# Patient Record
Sex: FEMALE | Race: WHITE | NOT HISPANIC OR LATINO
[De-identification: names, ages, dates, MRNs, and addresses within clinical notes are randomized per-mention and may not be internally consistent; named-entity substitution may affect disease eponyms.]

---

## 2020-07-11 ENCOUNTER — TRANSCRIPTION ENCOUNTER (OUTPATIENT)
Age: 80
End: 2020-07-11

## 2021-02-11 ENCOUNTER — TRANSCRIPTION ENCOUNTER (OUTPATIENT)
Age: 81
End: 2021-02-11

## 2021-05-21 ENCOUNTER — APPOINTMENT (OUTPATIENT)
Dept: PULMONOLOGY | Facility: CLINIC | Age: 81
End: 2021-05-21
Payer: MEDICARE

## 2021-05-21 ENCOUNTER — LABORATORY RESULT (OUTPATIENT)
Age: 81
End: 2021-05-21

## 2021-05-21 VITALS
WEIGHT: 147 LBS | HEART RATE: 100 BPM | DIASTOLIC BLOOD PRESSURE: 62 MMHG | TEMPERATURE: 97.5 F | OXYGEN SATURATION: 98 % | HEIGHT: 61 IN | BODY MASS INDEX: 27.75 KG/M2 | SYSTOLIC BLOOD PRESSURE: 98 MMHG

## 2021-05-21 PROCEDURE — 36415 COLL VENOUS BLD VENIPUNCTURE: CPT

## 2021-05-21 PROCEDURE — 94060 EVALUATION OF WHEEZING: CPT

## 2021-05-21 PROCEDURE — 95012 NITRIC OXIDE EXP GAS DETER: CPT | Mod: 59

## 2021-05-21 PROCEDURE — 99406 BEHAV CHNG SMOKING 3-10 MIN: CPT | Mod: 25

## 2021-05-21 PROCEDURE — 99204 OFFICE O/P NEW MOD 45 MIN: CPT | Mod: 25

## 2021-05-23 NOTE — PROCEDURE
[FreeTextEntry1] : Chest CT scan performed on May 17, 2021 showed mild emphysema.  No new suspicious pulmonary nodules.  Continued chest CT scan follow-up as clinically indicated.  Reticular opacities at lung bases may represent interstitial lung disease.\par \par Spirometry performed in my office today shows mild obstructive airway disease with significant improvement postbronchodilator, consistent with a reversible bronchospastic disease (asthma).\par \par  Exhaled Nitric Oxide             Final\par \par No Documents Attached\par \par \par   Test   Result   Flag Reference Goal Last Verified \par   Exhaled Nitric Oxide 9      REQUIRED \par \par  Ordered by: SANDY ARIAS       Collected/Examined: 21May2021 09:38AM       \par Verification Required       Stage: Final       \par  Performed at: Other       Performed by: SANDY ARIAS       Resulted: 21May2021 09:38AM       Last Updated: 21May2021 09:39AM       \par

## 2021-05-23 NOTE — PHYSICAL EXAM
[No Acute Distress] : no acute distress [Normal Oropharynx] : normal oropharynx [Normal Appearance] : normal appearance [No Neck Mass] : no neck mass [Normal Rate/Rhythm] : normal rate/rhythm [Normal S1, S2] : normal s1, s2 [No Murmurs] : no murmurs [No Resp Distress] : no resp distress [No Abnormalities] : no abnormalities [Benign] : benign [Normal Gait] : normal gait [No Clubbing] : no clubbing [No Cyanosis] : no cyanosis [No Edema] : no edema [FROM] : FROM [Normal Color/ Pigmentation] : normal color/ pigmentation [No Focal Deficits] : no focal deficits [Oriented x3] : oriented x3 [Normal Affect] : normal affect [TextBox_68] : Decreased breath sounds bilaterally

## 2021-05-23 NOTE — DISCUSSION/SUMMARY
[FreeTextEntry1] : Marilyn is a patient with progressively worsening shortness of breath and cough likely secondary to COPD/asthma from history of cigarette smoking, she may have an element of interstitial lung disease/pulmonary fibrosis, rule out collagen vascular disease induced pulmonary fibrosis, such as arthritis.  Secondly, she is a patient with history of atrial fibrillation on Eliquis.

## 2021-05-23 NOTE — CONSULT LETTER
[Dear  ___] : Dear  [unfilled], [Please see my note below.] : Please see my note below. [Courtesy Letter:] : I had the pleasure of seeing your patient, [unfilled], in my office today. [Consult Closing:] : Thank you very much for allowing me to participate in the care of this patient.  If you have any questions, please do not hesitate to contact me. [Sincerely,] : Sincerely, [FreeTextEntry3] : Dr. Petty Steen

## 2021-05-23 NOTE — REASON FOR VISIT
[Consultation] : a consultation [Abnormal CXR/ Chest CT] : an abnormal CXR/ chest CT [COPD] : COPD [Shortness of Breath] : shortness of breath

## 2021-05-23 NOTE — HISTORY OF PRESENT ILLNESS
[TextBox_4] : Marilyn is a pleasant 80-year-old female with history of atrial fibrillation on Eliquis, COPD from history of active cigarette smoking, she came in complaining of progressively worsening chest pain, shortness of breath and cough for years, no fever or chills.

## 2021-05-23 NOTE — ASSESSMENT
[FreeTextEntry1] : 10 minutes spent in smoking cessation counseling. Educated patient on deleterious effects and all potential consequences of smoking. Counseled patient on various smoking cessation techniques.\par Venipuncture with labs drawn in office\par Start Cal Stack.\par Will perform complete pulmonary function test for further evaluation and follow-up in 2 weeks.

## 2021-05-25 LAB
ACE BLD-CCNC: 75 U/L
ALBUMIN SERPL ELPH-MCNC: 4.7 G/DL
ALP BLD-CCNC: 90 U/L
ALT SERPL-CCNC: 19 U/L
ANION GAP SERPL CALC-SCNC: 15 MMOL/L
AST SERPL-CCNC: 22 U/L
BASOPHILS # BLD AUTO: 0.1 K/UL
BASOPHILS NFR BLD AUTO: 1.7 %
BILIRUB SERPL-MCNC: 0.4 MG/DL
BUN SERPL-MCNC: 12 MG/DL
CALCIUM SERPL-MCNC: 10.2 MG/DL
CHLORIDE SERPL-SCNC: 107 MMOL/L
CK SERPL-CCNC: 79 U/L
CO2 SERPL-SCNC: 21 MMOL/L
CREAT SERPL-MCNC: 0.98 MG/DL
CRP SERPL-MCNC: <3 MG/L
ENA JO1 AB SER IA-ACNC: <0.2 AL
ENA SCL70 IGG SER IA-ACNC: <0.2 AL
EOSINOPHIL # BLD AUTO: 0.41 K/UL
EOSINOPHIL NFR BLD AUTO: 7.1 %
ERYTHROCYTE [SEDIMENTATION RATE] IN BLOOD BY WESTERGREN METHOD: 30 MM/HR
GLUCOSE SERPL-MCNC: 92 MG/DL
HCT VFR BLD CALC: 39.8 %
HGB BLD-MCNC: 12.4 G/DL
IMM GRANULOCYTES NFR BLD AUTO: 0.2 %
LYMPHOCYTES # BLD AUTO: 2.06 K/UL
LYMPHOCYTES NFR BLD AUTO: 35.8 %
MAN DIFF?: NORMAL
MCHC RBC-ENTMCNC: 31.2 GM/DL
MCHC RBC-ENTMCNC: 32.1 PG
MCV RBC AUTO: 103.1 FL
MONOCYTES # BLD AUTO: 0.83 K/UL
MONOCYTES NFR BLD AUTO: 14.4 %
NEUTROPHILS # BLD AUTO: 2.35 K/UL
NEUTROPHILS NFR BLD AUTO: 40.8 %
PLATELET # BLD AUTO: 229 K/UL
POTASSIUM SERPL-SCNC: 5 MMOL/L
PROT SERPL-MCNC: 7 G/DL
RBC # BLD: 3.86 M/UL
RBC # FLD: 14.6 %
RHEUMATOID FACT SER QL: <10 IU/ML
SODIUM SERPL-SCNC: 143 MMOL/L
WBC # FLD AUTO: 5.76 K/UL

## 2021-05-31 LAB
ALTERN TENCAPG(M6): 2.2 MCG/ML
ANA SER IF-ACNC: NEGATIVE
ASPER FUMCAPG(M3): 4.4 MCG/ML
AUREOBASCAPG(M12): <2 MCG/ML
MICROPOLYCAPG(M22): <2 MCG/ML
MPO AB + PR3 PNL SER: NORMAL
PENIC CHRYCAPG(M1): 3.7 MCG/ML
PHOMA BETAE IGG: <2 MCG/ML
THERMOCAPG(M23): <2 MCG/ML
TRICHODERMA VIRIDE IGG: <2 MCG/ML

## 2021-06-04 ENCOUNTER — APPOINTMENT (OUTPATIENT)
Dept: PULMONOLOGY | Facility: CLINIC | Age: 81
End: 2021-06-04
Payer: MEDICARE

## 2021-06-04 VITALS
TEMPERATURE: 96.5 F | HEART RATE: 85 BPM | OXYGEN SATURATION: 98 % | DIASTOLIC BLOOD PRESSURE: 64 MMHG | SYSTOLIC BLOOD PRESSURE: 110 MMHG

## 2021-06-04 PROCEDURE — 94010 BREATHING CAPACITY TEST: CPT

## 2021-06-04 PROCEDURE — 99214 OFFICE O/P EST MOD 30 MIN: CPT | Mod: 25

## 2021-06-04 RX ORDER — PANTOPRAZOLE 40 MG/1
40 TABLET, DELAYED RELEASE ORAL DAILY
Qty: 90 | Refills: 3 | Status: ACTIVE | COMMUNITY
Start: 2021-06-04 | End: 1900-01-01

## 2021-06-06 NOTE — CONSULT LETTER
[Dear  ___] : Dear  [unfilled], [Courtesy Letter:] : I had the pleasure of seeing your patient, [unfilled], in my office today. [Please see my note below.] : Please see my note below. [Consult Closing:] : Thank you very much for allowing me to participate in the care of this patient.  If you have any questions, please do not hesitate to contact me. [Sincerely,] : Sincerely, [FreeTextEntry3] : Dr. Petty Steen

## 2021-06-06 NOTE — REASON FOR VISIT
[Abnormal CXR/ Chest CT] : an abnormal CXR/ chest CT [Asthma] : asthma [COPD] : COPD [Shortness of Breath] : shortness of breath [Follow-Up] : a follow-up visit [TextBox_44] : Numbness of breath is slightly better.  Also complains of heartburn

## 2021-06-06 NOTE — ASSESSMENT
[FreeTextEntry1] : 10 minutes spent in smoking cessation counseling. Educated patient on deleterious effects and all potential consequences of smoking. Counseled patient on various smoking cessation techniques.\par Venipuncture with labs drawn in office\par Dulera HFA.\par Start Protonix for GERD

## 2021-06-25 ENCOUNTER — APPOINTMENT (OUTPATIENT)
Dept: PULMONOLOGY | Facility: CLINIC | Age: 81
End: 2021-06-25
Payer: MEDICARE

## 2021-06-25 VITALS
TEMPERATURE: 98 F | SYSTOLIC BLOOD PRESSURE: 122 MMHG | HEART RATE: 103 BPM | DIASTOLIC BLOOD PRESSURE: 70 MMHG | OXYGEN SATURATION: 98 %

## 2021-06-25 PROCEDURE — 99214 OFFICE O/P EST MOD 30 MIN: CPT | Mod: 25

## 2021-06-25 PROCEDURE — 95012 NITRIC OXIDE EXP GAS DETER: CPT

## 2021-06-25 PROCEDURE — 94010 BREATHING CAPACITY TEST: CPT

## 2021-06-25 PROCEDURE — 99407 BEHAV CHNG SMOKING > 10 MIN: CPT | Mod: 25

## 2021-06-25 NOTE — ASSESSMENT
[FreeTextEntry1] : Start Trelegy Ellipta, discontinue Dulera HFA at this point. \par Might need Daliresp for additional COPD treatment\par 10 minutes spent in smoking cessation counseling. Educated patient on deleterious effects and all potential consequences of smoking. Counseled patient on various smoking cessation techniques.\par

## 2021-06-25 NOTE — REASON FOR VISIT
[Follow-Up] : a follow-up visit [Cough] : cough [COPD] : COPD [TextBox_44] : Complains of productive cough

## 2021-06-25 NOTE — PROCEDURE
[FreeTextEntry1] : Spirometry shows mild obstructive airway disease.\par \par  Exhaled Nitric Oxide             Final\par \par No Documents Attached\par \par \par   Test   Result   Flag Reference Goal Last Verified \par   Exhaled Nitric Oxide 7      REQUIRED \par \par  Ordered by: SANDY ARIAS       Collected/Examined: 25Jun2021 09:07AM       \par Verification Required       Stage: Final       \par  Performed at: Other       Performed by: SANDY ARIAS       Resulted: 25Jun2021 09:06AM       Last Updated: 25Jun2021 09:07AM       \par

## 2021-07-02 ENCOUNTER — APPOINTMENT (OUTPATIENT)
Dept: PULMONOLOGY | Facility: CLINIC | Age: 81
End: 2021-07-02
Payer: MEDICARE

## 2021-07-02 VITALS
OXYGEN SATURATION: 97 % | DIASTOLIC BLOOD PRESSURE: 78 MMHG | SYSTOLIC BLOOD PRESSURE: 119 MMHG | BODY MASS INDEX: 27.75 KG/M2 | TEMPERATURE: 97.6 F | WEIGHT: 147 LBS | HEIGHT: 61 IN

## 2021-07-02 PROCEDURE — 99407 BEHAV CHNG SMOKING > 10 MIN: CPT

## 2021-07-02 PROCEDURE — 99214 OFFICE O/P EST MOD 30 MIN: CPT | Mod: 25

## 2021-07-04 NOTE — ASSESSMENT
[FreeTextEntry1] : Continue Trelegy\par Start Daliresp 250 mcg QD\par 10 minutes spent in smoking cessation counseling. Educated patient on deleterious effects and all potential consequences of smoking. Counseled patient on various smoking cessation techniques.\par Check PFT for follow-up

## 2021-08-06 ENCOUNTER — APPOINTMENT (OUTPATIENT)
Dept: PULMONOLOGY | Facility: CLINIC | Age: 81
End: 2021-08-06
Payer: MEDICARE

## 2021-08-06 VITALS
HEART RATE: 96 BPM | HEIGHT: 61 IN | DIASTOLIC BLOOD PRESSURE: 68 MMHG | OXYGEN SATURATION: 97 % | BODY MASS INDEX: 27.75 KG/M2 | WEIGHT: 147 LBS | RESPIRATION RATE: 15 BRPM | TEMPERATURE: 97.5 F | SYSTOLIC BLOOD PRESSURE: 109 MMHG

## 2021-08-06 PROCEDURE — 94010 BREATHING CAPACITY TEST: CPT

## 2021-08-06 PROCEDURE — 94729 DIFFUSING CAPACITY: CPT

## 2021-08-06 PROCEDURE — 99407 BEHAV CHNG SMOKING > 10 MIN: CPT

## 2021-08-06 PROCEDURE — ZZZZZ: CPT

## 2021-08-06 PROCEDURE — 99214 OFFICE O/P EST MOD 30 MIN: CPT | Mod: 25

## 2021-08-06 PROCEDURE — 88738 HGB QUANT TRANSCUTANEOUS: CPT

## 2021-08-06 PROCEDURE — 94726 PLETHYSMOGRAPHY LUNG VOLUMES: CPT

## 2021-08-08 NOTE — REASON FOR VISIT
[Follow-Up] : a follow-up visit [Cough] : cough [COPD] : COPD [Shortness of Breath] : shortness of breath [TextBox_44] : whitish cough

## 2021-08-08 NOTE — ASSESSMENT
[FreeTextEntry1] : Continue Trelegy\par Start Z-Mason and Medrol Dosepak\par Albutertol via Neb\par 10 minutes spent in smoking cessation counseling. Educated patient on deleterious effects and all potential consequences of smoking. Counseled patient on various smoking cessation techniques.\par

## 2021-08-08 NOTE — PROCEDURE
[FreeTextEntry1] : Pulmonary function test performed today: spirometry shows mild obstructive airway disease volume is within normal limits; resistance is increased; diffusion shows moderate impairment.  SPO2 at rest 97%.

## 2021-08-13 ENCOUNTER — APPOINTMENT (OUTPATIENT)
Dept: PULMONOLOGY | Facility: CLINIC | Age: 81
End: 2021-08-13

## 2021-09-27 ENCOUNTER — APPOINTMENT (OUTPATIENT)
Dept: PULMONOLOGY | Facility: CLINIC | Age: 81
End: 2021-09-27
Payer: MEDICARE

## 2021-09-27 VITALS
BODY MASS INDEX: 27.56 KG/M2 | TEMPERATURE: 94.8 F | RESPIRATION RATE: 17 BRPM | HEIGHT: 61 IN | DIASTOLIC BLOOD PRESSURE: 82 MMHG | OXYGEN SATURATION: 96 % | WEIGHT: 146 LBS | SYSTOLIC BLOOD PRESSURE: 120 MMHG | HEART RATE: 98 BPM

## 2021-09-27 PROCEDURE — 90662 IIV NO PRSV INCREASED AG IM: CPT

## 2021-09-27 PROCEDURE — G0008: CPT

## 2021-09-27 PROCEDURE — 99214 OFFICE O/P EST MOD 30 MIN: CPT | Mod: 25

## 2021-09-27 PROCEDURE — 99407 BEHAV CHNG SMOKING > 10 MIN: CPT

## 2021-09-27 NOTE — ASSESSMENT
[FreeTextEntry1] : Continue Trelegy\par Albutertol via Neb\par 10 minutes spent in smoking cessation counseling. Educated patient on deleterious effects and all potential consequences of smoking. Counseled patient on various smoking cessation techniques.\par Fluzone high-dose vaccine given today.\par

## 2021-10-01 ENCOUNTER — TRANSCRIPTION ENCOUNTER (OUTPATIENT)
Age: 81
End: 2021-10-01

## 2021-10-04 ENCOUNTER — APPOINTMENT (OUTPATIENT)
Dept: PULMONOLOGY | Facility: CLINIC | Age: 81
End: 2021-10-04
Payer: MEDICARE

## 2021-10-04 ENCOUNTER — RX RENEWAL (OUTPATIENT)
Age: 81
End: 2021-10-04

## 2021-10-04 VITALS
BODY MASS INDEX: 28.32 KG/M2 | HEIGHT: 61 IN | HEART RATE: 101 BPM | WEIGHT: 150 LBS | OXYGEN SATURATION: 97 % | SYSTOLIC BLOOD PRESSURE: 120 MMHG | DIASTOLIC BLOOD PRESSURE: 74 MMHG

## 2021-10-04 PROCEDURE — 99407 BEHAV CHNG SMOKING > 10 MIN: CPT

## 2021-10-04 PROCEDURE — G0008: CPT

## 2021-10-04 PROCEDURE — 99214 OFFICE O/P EST MOD 30 MIN: CPT | Mod: 25

## 2021-10-04 PROCEDURE — 90662 IIV NO PRSV INCREASED AG IM: CPT

## 2021-10-04 NOTE — ASSESSMENT
[FreeTextEntry1] : Symbicort HFA\par Albutertol via Neb\par 15 minutes spent in smoking cessation counseling. Educated patient on deleterious effects and all potential consequences of smoking. Counseled patient on various smoking cessation techniques.\par Fluzone high-dose vaccine given to the left deltoid region today.\par

## 2021-10-18 ENCOUNTER — APPOINTMENT (OUTPATIENT)
Dept: PULMONOLOGY | Facility: CLINIC | Age: 81
End: 2021-10-18

## 2021-11-01 ENCOUNTER — APPOINTMENT (OUTPATIENT)
Dept: PULMONOLOGY | Facility: CLINIC | Age: 81
End: 2021-11-01
Payer: MEDICARE

## 2021-11-01 VITALS
TEMPERATURE: 95.7 F | DIASTOLIC BLOOD PRESSURE: 75 MMHG | HEART RATE: 97 BPM | WEIGHT: 149 LBS | BODY MASS INDEX: 28.15 KG/M2 | OXYGEN SATURATION: 95 % | SYSTOLIC BLOOD PRESSURE: 116 MMHG

## 2021-11-01 PROCEDURE — 99214 OFFICE O/P EST MOD 30 MIN: CPT | Mod: 25

## 2021-11-01 PROCEDURE — 99407 BEHAV CHNG SMOKING > 10 MIN: CPT

## 2021-11-29 ENCOUNTER — APPOINTMENT (OUTPATIENT)
Dept: PULMONOLOGY | Facility: CLINIC | Age: 81
End: 2021-11-29
Payer: MEDICARE

## 2021-11-29 VITALS
SYSTOLIC BLOOD PRESSURE: 133 MMHG | HEART RATE: 90 BPM | OXYGEN SATURATION: 98 % | HEIGHT: 61 IN | DIASTOLIC BLOOD PRESSURE: 84 MMHG

## 2021-11-29 PROCEDURE — 99214 OFFICE O/P EST MOD 30 MIN: CPT | Mod: 25

## 2021-11-29 PROCEDURE — 99407 BEHAV CHNG SMOKING > 10 MIN: CPT

## 2021-11-29 NOTE — REASON FOR VISIT
[Follow-Up] : a follow-up visit [Cough] : cough [COPD] : COPD [Shortness of Breath] : shortness of breath [TextBox_44] : For right hip hardware surgery by Dr. Juanito Mar on 12/7/21 at [facility].

## 2021-11-29 NOTE — ASSESSMENT
[FreeTextEntry1] : There are no acute or active pulmonary contraindications to the proposed right hip surgery. \par Continue Symbicort HFA for history of COPD\par Continue albuterol via nebulizer. \par 15 minutes spent in smoking cessation counseling. Educated patient on deleterious effects and all potential consequences of smoking. Counseled patient on various smoking cessation techniques.\par

## 2021-11-29 NOTE — CONSULT LETTER
[Dear  ___] : Dear  [unfilled], [Courtesy Letter:] : I had the pleasure of seeing your patient, [unfilled], in my office today. [Please see my note below.] : Please see my note below. [Referral Closing:] : Thank you very much for seeing this patient.  If you have any questions, please do not hesitate to contact me. [Sincerely,] : Sincerely, [FreeTextEntry3] : Dr. Petty Steen [DrMirza  ___] : Dr. FRAZIER

## 2021-11-29 NOTE — DISCUSSION/SUMMARY
[FreeTextEntry1] : Marilyn is a patient scheduled to undergo right hip surgery on December 7, 2021. Secondly, she is a patient with COPD from history of cigarette smoking. Thirdly, she is a patient with stable lung nodules.

## 2021-12-02 ENCOUNTER — LABORATORY RESULT (OUTPATIENT)
Age: 81
End: 2021-12-02

## 2021-12-03 ENCOUNTER — APPOINTMENT (OUTPATIENT)
Dept: PULMONOLOGY | Facility: CLINIC | Age: 81
End: 2021-12-03

## 2021-12-06 ENCOUNTER — APPOINTMENT (OUTPATIENT)
Dept: PULMONOLOGY | Facility: CLINIC | Age: 81
End: 2021-12-06
Payer: MEDICARE

## 2021-12-06 VITALS — SYSTOLIC BLOOD PRESSURE: 130 MMHG | OXYGEN SATURATION: 94 % | DIASTOLIC BLOOD PRESSURE: 80 MMHG | HEART RATE: 98 BPM

## 2021-12-06 PROCEDURE — ZZZZZ: CPT

## 2021-12-06 PROCEDURE — 99214 OFFICE O/P EST MOD 30 MIN: CPT | Mod: 25

## 2021-12-06 PROCEDURE — 94060 EVALUATION OF WHEEZING: CPT

## 2021-12-06 PROCEDURE — 94729 DIFFUSING CAPACITY: CPT

## 2021-12-06 PROCEDURE — 88738 HGB QUANT TRANSCUTANEOUS: CPT

## 2021-12-06 PROCEDURE — 95012 NITRIC OXIDE EXP GAS DETER: CPT

## 2021-12-06 PROCEDURE — 94726 PLETHYSMOGRAPHY LUNG VOLUMES: CPT

## 2021-12-06 NOTE — PROCEDURE
[FreeTextEntry1] : Pulmonary function test performed in my office today: Spirometry shows mild obstructive airway disease with some improvement postbronchodilator; lung volume is within normal limits; resistance is increased; diffusion shows moderate impairment.  SPO2 at rest room air is 94%.\par \par  Exhaled Nitric Oxide             Final\par \par No Documents Attached\par \par \par   Test   Result   Flag Reference Goal Last Verified \par   Exhaled Nitric Oxide 5      REQUIRED \par \par  Ordered by: SANDY ARIAS       Collected/Examined: [id number] 09:36AM       \par Verification Required       Stage: Final       \par  Performed at: Other       Performed by: SANDY ARIAS       Resulted: [id number] 09:36AM       Last Updated: [id number] 09:37AM       \par

## 2021-12-06 NOTE — CONSULT LETTER
[Dear  ___] : Dear  [unfilled], [Courtesy Letter:] : I had the pleasure of seeing your patient, [unfilled], in my office today. [Please see my note below.] : Please see my note below. [Referral Closing:] : Thank you very much for seeing this patient.  If you have any questions, please do not hesitate to contact me. [Sincerely,] : Sincerely, [DrMirza  ___] : Dr. FRAZIER [FreeTextEntry3] : Dr. Petty Steen

## 2021-12-06 NOTE — REASON FOR VISIT
[Follow-Up] : a follow-up visit [Cough] : cough [COPD] : COPD [Shortness of Breath] : shortness of breath [TextBox_44] : For right hip hardware surgery by Dr. Juanito Mar on 12/7/21 at [facility].

## 2021-12-12 LAB — POCT - HEMOGLOBIN (HGB), QUANTITATIVE, TRANSCUTANEOUS: 12.2

## 2022-01-10 ENCOUNTER — APPOINTMENT (OUTPATIENT)
Dept: PULMONOLOGY | Facility: CLINIC | Age: 82
End: 2022-01-10
Payer: MEDICARE

## 2022-01-10 VITALS
SYSTOLIC BLOOD PRESSURE: 124 MMHG | OXYGEN SATURATION: 99 % | HEART RATE: 92 BPM | TEMPERATURE: 98.2 F | DIASTOLIC BLOOD PRESSURE: 63 MMHG

## 2022-01-10 DIAGNOSIS — G47.33 OBSTRUCTIVE SLEEP APNEA (ADULT) (PEDIATRIC): ICD-10-CM

## 2022-01-10 PROCEDURE — 71046 X-RAY EXAM CHEST 2 VIEWS: CPT

## 2022-01-10 PROCEDURE — 99214 OFFICE O/P EST MOD 30 MIN: CPT | Mod: 25

## 2022-01-10 NOTE — DISCUSSION/SUMMARY
[FreeTextEntry1] : Marilyn is a patient with cough secondary to COPD from history of cigarette smoking.  Secondly, she is a patient with stable lung nodules.  Thirdly, she is a patient with history of CAD/atrial fibrillation.

## 2022-01-10 NOTE — PHYSICAL EXAM
[No Acute Distress] : no acute distress [Normal Oropharynx] : normal oropharynx [Normal Appearance] : normal appearance [No Neck Mass] : no neck mass [Normal Rate/Rhythm] : normal rate/rhythm [Normal S1, S2] : normal s1, s2 [No Murmurs] : no murmurs [No Resp Distress] : no resp distress [No Abnormalities] : no abnormalities [Benign] : benign [Normal Gait] : normal gait [No Clubbing] : no clubbing [No Cyanosis] : no cyanosis [No Edema] : no edema [FROM] : FROM [Normal Color/ Pigmentation] : normal color/ pigmentation [No Focal Deficits] : no focal deficits [Oriented x3] : oriented x3 [Normal Affect] : normal affect [TextBox_68] : bilateral rhonchi

## 2022-01-10 NOTE — HISTORY OF PRESENT ILLNESS
[TextBox_4] : sick for 2 weeks, with cough/SOB, no fever/chills, s/p - COVID PCR.  Was treated with antibiotics/Prednisone taper by Tammie Steele. Still has cough\par

## 2022-01-10 NOTE — PROCEDURE
[FreeTextEntry1] : \par  Xray Chest 2 Views PA/Lat             Final\par \par No Documents Attached\par \par \par \par \par   \par Chest x-ray PA and lateral views performed in my office today showed cardiomegaly, clear lungs, no evidence of infiltrates or pleural effusions. \par \par \par  Ordered by: SANDY ARIAS       Collected/Examined: 10Jan2022 08:45AM       \par Verified by: SANDY ARIAS 10Jan2022 06:19PM       \par  Result Communication: No patient communication needed at this time;\par Stage: Final       \par  Performed at: In Office       Performed by: SANDY ARIAS       Resulted: 10Jan2022 08:45AM       Last Updated: 10Jan2022 06:19PM       Accession: [id number]

## 2022-01-10 NOTE — ASSESSMENT
[FreeTextEntry1] : Doxycycline and Prednisone taper\par Continue Symbicort HFA for history of COPD\par Continue albuterol via nebulizer. \par 15 minutes spent in smoking cessation counseling. Educated patient on deleterious effects and all potential consequences of smoking. Counseled patient on various smoking cessation techniques.\par Cardiology follow-up with Dr. Cho.

## 2022-01-24 ENCOUNTER — APPOINTMENT (OUTPATIENT)
Dept: PULMONOLOGY | Facility: CLINIC | Age: 82
End: 2022-01-24
Payer: MEDICARE

## 2022-01-24 VITALS
OXYGEN SATURATION: 96 % | HEART RATE: 94 BPM | DIASTOLIC BLOOD PRESSURE: 70 MMHG | TEMPERATURE: 97.1 F | SYSTOLIC BLOOD PRESSURE: 110 MMHG

## 2022-01-24 DIAGNOSIS — K21.9 GASTRO-ESOPHAGEAL REFLUX DISEASE W/OUT ESOPHAGITIS: ICD-10-CM

## 2022-01-24 PROCEDURE — 99214 OFFICE O/P EST MOD 30 MIN: CPT | Mod: 25

## 2022-01-24 PROCEDURE — 99407 BEHAV CHNG SMOKING > 10 MIN: CPT

## 2022-01-24 NOTE — ASSESSMENT
[FreeTextEntry1] : Start Z-Mason and Medrol Dosepak for COPD.\par Continue Symbicort HFA for history of COPD\par Start Spiriva Turiuhaler for COPD\par Continue albuterol via nebulizer. \par 15 minutes spent in cigarette smoking cessation counseling. Educated patient on deleterious effects and all potential consequences of cigarette smoking. Counseled patient on various smoking cessation techniques.\par Cardiology follow-up with Dr. Cho.\par Return for pulmonary follow-up in 1 week

## 2022-01-24 NOTE — HISTORY OF PRESENT ILLNESS
[TextBox_4] : cough and SOB are slightly better but still has fair amount of both.  Still smoking cigarettes.

## 2022-01-31 ENCOUNTER — APPOINTMENT (OUTPATIENT)
Dept: PULMONOLOGY | Facility: CLINIC | Age: 82
End: 2022-01-31
Payer: MEDICARE

## 2022-01-31 VITALS
DIASTOLIC BLOOD PRESSURE: 77 MMHG | OXYGEN SATURATION: 98 % | SYSTOLIC BLOOD PRESSURE: 112 MMHG | TEMPERATURE: 97.8 F | HEART RATE: 84 BPM

## 2022-01-31 DIAGNOSIS — F17.200 NICOTINE DEPENDENCE, UNSPECIFIED, UNCOMPLICATED: ICD-10-CM

## 2022-01-31 PROCEDURE — 99407 BEHAV CHNG SMOKING > 10 MIN: CPT

## 2022-01-31 PROCEDURE — 99214 OFFICE O/P EST MOD 30 MIN: CPT | Mod: 25

## 2022-01-31 NOTE — ASSESSMENT
[FreeTextEntry1] : Continue Symbicort HFA for history of COPD\par Start Spiriva Turiuhaler for COPD\par Continue albuterol via nebulizer. \par 15 minutes spent in cigarette smoking cessation counseling. Educated patient on deleterious effects and all potential consequences of cigarette smoking. Counseled patient on various smoking cessation techniques.\par Cardiology follow-up with Dr. Cho.\par Return for pulmonary follow-up in 1 month\par Repeat low-dose lung cancer screening chest CT scan for follow-up in 4 months

## 2022-02-28 ENCOUNTER — APPOINTMENT (OUTPATIENT)
Dept: PULMONOLOGY | Facility: CLINIC | Age: 82
End: 2022-02-28
Payer: MEDICARE

## 2022-02-28 VITALS — SYSTOLIC BLOOD PRESSURE: 122 MMHG | OXYGEN SATURATION: 98 % | HEART RATE: 86 BPM | DIASTOLIC BLOOD PRESSURE: 82 MMHG

## 2022-02-28 PROCEDURE — 99214 OFFICE O/P EST MOD 30 MIN: CPT | Mod: 25

## 2022-02-28 PROCEDURE — 36415 COLL VENOUS BLD VENIPUNCTURE: CPT

## 2022-02-28 NOTE — PROCEDURE
[FreeTextEntry1] : Chest CT scan performed on February 21, 2022 showed new tiny right apical clustered nodules possibly related to mucous plugging and/or infection.  Follow-up CT scan in 3 to 4 months is suggested to confirm stability/resolution.  Centrilobular emphysema.  Otherwise stable CT scan of the chest.

## 2022-02-28 NOTE — REASON FOR VISIT
[Follow-Up] : a follow-up visit [Abnormal CXR/ Chest CT] : an abnormal CXR/ chest CT [Cough] : cough [COPD] : COPD [Shortness of Breath] : shortness of breath [TextBox_44] : Marilyn came in complaining of worsening productive cough, just had chest CT scanned.

## 2022-02-28 NOTE — DISCUSSION/SUMMARY
[FreeTextEntry1] : Marilyn is a patient with cough secondary to COPD from history of cigarette smoking.  Secondly, she is a patient with new tiny right apical clustered nodules on current follow-up chest CT scan, possibly secondary to postinflammatory/postinfectious etiologies, rule out tuberculosis (unlikely).  Thirdly, she is a patient with history of CAD/atrial fibrillation.

## 2022-02-28 NOTE — ASSESSMENT
[FreeTextEntry1] : Venipuncture with labs drawn in office\par Collected sputum for culture and AFB.  \par Continue Symbicort HFA for history of COPD\par Continue Spiriva Turbuhaler for COPD\par Start Zithromax for 30 days for COPD and new clustered nodules.\par start Medrol pack.\par Continue albuterol via nebulizer. \par 15 minutes spent in cigarette smoking cessation counseling. Educated patient on deleterious effects and all potential consequences of cigarette smoking. Counseled patient on various smoking cessation techniques.\par Cardiology follow-up with Dr. Cho.\par Return for pulmonary follow-up in 1 month\par Repeat chest CT scan for follow-up in 3 months

## 2022-02-28 NOTE — CONSULT LETTER
HPI Comments: 81-year-old female fell a  2. Yesterday. Complains of pain in the right elbow. Worse with extension. Worse with pronation and supination. No deformity. Minimal swelling. Patient is a 26 y.o. female presenting with arm pain. Arm Pain           Past Medical History:   Diagnosis Date    Asthma     rare inhaler use    Endocrine disease     hashimotos    H/O seasonal allergies     Ill-defined condition     PCOS    Psychiatric disorder     depression, anxiety       Past Surgical History:   Procedure Laterality Date    HX HEENT      tonsillectomy x2         Family History:   Problem Relation Age of Onset    Diabetes Mother     Hypertension Father     Cancer Maternal Grandfather      brain cancer    Stroke Other      PGGM - stroke    Breast Cancer Paternal Grandmother        Social History     Social History    Marital status:      Spouse name: N/A    Number of children: N/A    Years of education: 8     Occupational History    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 Musculoskeletal: Positive for arthralgias. Vitals:    18 0707   BP: 135/80   Pulse: 82   Resp: 16   Temp: 97.6 °F (36.4 °C)   SpO2: 98%   Weight: 129.3 kg (285 lb)   Height: 5' 4\" (1.626 m)            Physical Exam   Constitutional: She is oriented to person, place, and time. She appears well-developed and well-nourished. Musculoskeletal:        Right elbow: She exhibits decreased range of motion.  She [Dear  ___] : Dear  [unfilled], exhibits no effusion and no deformity. Tenderness found. Radial head tenderness noted. Neurological: She is alert and oriented to person, place, and time. Skin: Skin is warm and dry. Psychiatric: She has a normal mood and affect. Nursing note and vitals reviewed. MDM  Number of Diagnoses or Management Options  Diagnosis management comments: 23-year-old female tender to palpation over the radial head after a fall 2. Obtain x-ray    Ice    NSAIDs.         ED Course       Procedures [Courtesy Letter:] : I had the pleasure of seeing your patient, [unfilled], in my office today. [Please see my note below.] : Please see my note below. [Referral Closing:] : Thank you very much for seeing this patient.  If you have any questions, please do not hesitate to contact me. [Sincerely,] : Sincerely, [FreeTextEntry3] : Dr. Petty Steen

## 2022-03-06 LAB
ALBUMIN SERPL ELPH-MCNC: 4.5 G/DL
ALP BLD-CCNC: 69 U/L
ALT SERPL-CCNC: 13 U/L
ANION GAP SERPL CALC-SCNC: 12 MMOL/L
AST SERPL-CCNC: 20 U/L
BACTERIA SPT CULT: ABNORMAL
BASOPHILS # BLD AUTO: 0.11 K/UL
BASOPHILS NFR BLD AUTO: 1.6 %
BILIRUB SERPL-MCNC: 0.3 MG/DL
BUN SERPL-MCNC: 14 MG/DL
CALCIUM SERPL-MCNC: 9.4 MG/DL
CHLORIDE SERPL-SCNC: 108 MMOL/L
CO2 SERPL-SCNC: 22 MMOL/L
CREAT SERPL-MCNC: 0.97 MG/DL
EGFR: 59 ML/MIN/1.73M2
EOSINOPHIL # BLD AUTO: 0.33 K/UL
EOSINOPHIL NFR BLD AUTO: 4.9 %
ERYTHROCYTE [SEDIMENTATION RATE] IN BLOOD BY WESTERGREN METHOD: 21 MM/HR
GLUCOSE SERPL-MCNC: 101 MG/DL
HCT VFR BLD CALC: 42.6 %
HGB BLD-MCNC: 13.1 G/DL
IMM GRANULOCYTES NFR BLD AUTO: 0.3 %
LYMPHOCYTES # BLD AUTO: 1.97 K/UL
LYMPHOCYTES NFR BLD AUTO: 29.2 %
MAN DIFF?: NORMAL
MCHC RBC-ENTMCNC: 30.8 GM/DL
MCHC RBC-ENTMCNC: 33 PG
MCV RBC AUTO: 107.3 FL
MONOCYTES # BLD AUTO: 0.83 K/UL
MONOCYTES NFR BLD AUTO: 12.3 %
NEUTROPHILS # BLD AUTO: 3.48 K/UL
NEUTROPHILS NFR BLD AUTO: 51.7 %
PLATELET # BLD AUTO: 235 K/UL
POTASSIUM SERPL-SCNC: 5.5 MMOL/L
PROT SERPL-MCNC: 6.5 G/DL
RBC # BLD: 3.97 M/UL
RBC # FLD: 15.2 %
SODIUM SERPL-SCNC: 142 MMOL/L
WBC # FLD AUTO: 6.74 K/UL

## 2022-03-14 ENCOUNTER — APPOINTMENT (OUTPATIENT)
Dept: PULMONOLOGY | Facility: CLINIC | Age: 82
End: 2022-03-14
Payer: MEDICARE

## 2022-03-14 VITALS
DIASTOLIC BLOOD PRESSURE: 72 MMHG | OXYGEN SATURATION: 97 % | HEART RATE: 96 BPM | TEMPERATURE: 97.5 F | SYSTOLIC BLOOD PRESSURE: 111 MMHG

## 2022-03-14 PROCEDURE — 99214 OFFICE O/P EST MOD 30 MIN: CPT

## 2022-03-14 NOTE — CONSULT LETTER
[Dear  ___] : Dear  [unfilled], [Courtesy Letter:] : I had the pleasure of seeing your patient, [unfilled], in my office today. [Please see my note below.] : Please see my note below. [Referral Closing:] : Thank you very much for seeing this patient.  If you have any questions, please do not hesitate to contact me. [Sincerely,] : Sincerely, [FreeTextEntry3] : Dr. Petty Steen

## 2022-03-14 NOTE — ASSESSMENT
[FreeTextEntry1] :  Continue Symbicort HFA for history of COPD\par Continue Spiriva Turbuhaler for COPD\par Continue albuterol via nebulizer. \par 15 minutes spent in cigarette smoking cessation counseling. Educated patient on deleterious effects and all potential consequences of cigarette smoking. Counseled patient on various smoking cessation techniques.\par Cardiology follow-up with Dr. Cho.\par Return for pulmonary follow-up in 1 month\par Repeat chest CT scan for follow-up in 3 months\par PFT in 1 month

## 2022-03-14 NOTE — REASON FOR VISIT
[Follow-Up] : a follow-up visit [Abnormal CXR/ Chest CT] : an abnormal CXR/ chest CT [Cough] : cough [COPD] : COPD [Shortness of Breath] : shortness of breath [Nicotine Dependence] : nicotine dependence [TextBox_44] : Cough and shortness of breath has slightly better now.

## 2022-04-07 ENCOUNTER — APPOINTMENT (OUTPATIENT)
Dept: PULMONOLOGY | Facility: CLINIC | Age: 82
End: 2022-04-07

## 2022-04-11 ENCOUNTER — APPOINTMENT (OUTPATIENT)
Dept: PULMONOLOGY | Facility: CLINIC | Age: 82
End: 2022-04-11
Payer: MEDICARE

## 2022-04-11 VITALS
TEMPERATURE: 98.2 F | DIASTOLIC BLOOD PRESSURE: 70 MMHG | SYSTOLIC BLOOD PRESSURE: 108 MMHG | OXYGEN SATURATION: 95 % | HEART RATE: 92 BPM

## 2022-04-11 PROCEDURE — 94729 DIFFUSING CAPACITY: CPT

## 2022-04-11 PROCEDURE — 94010 BREATHING CAPACITY TEST: CPT

## 2022-04-11 PROCEDURE — 99214 OFFICE O/P EST MOD 30 MIN: CPT | Mod: 25

## 2022-04-11 PROCEDURE — ZZZZZ: CPT

## 2022-04-11 PROCEDURE — 94726 PLETHYSMOGRAPHY LUNG VOLUMES: CPT

## 2022-04-11 PROCEDURE — 95012 NITRIC OXIDE EXP GAS DETER: CPT

## 2022-04-12 NOTE — PROCEDURE
[FreeTextEntry1] : Pulmonary function test performed in my office today: Spirometry shows mild obstructive airway disease; lung volume is within normal limits; resistance is increased; diffusion shows moderate impairment.  SPO2 at rest on room air is 95%.\par \par  Exhaled Nitric Oxide             Final\par \par No Documents Attached\par \par \par   Test   Result   Flag Reference Goal Last Verified \par   Exhaled Nitric Oxide 7      REQUIRED \par \par  Ordered by: SANDY ARIAS       Collected/Examined: 11Apr2022 10:17AM       \par Verification Required       Stage: Final       \par  Performed at: Other       Performed by: KAMINI NAIK       Resulted: 11Apr2022 10:17AM       Last Updated: 11Apr2022 10:18AM       \par \par Chest CT scan performed on February 21, 2022 showed new tiny right apical clustered nodules possibly related to mucous plugging and/or infection.  Follow-up CT scan in 3 to 4 months is suggested to confirm stability/resolution.  Centrilobular emphysema.  Otherwise stable CT scan of the chest.

## 2022-04-12 NOTE — ASSESSMENT
[FreeTextEntry1] :  Continue Symbicort HFA for history of COPD\par Continue Spiriva Turbuhaler for COPD\par Continue Proair Respiclick prn. \par 15 minutes spent in cigarette smoking cessation counseling. Educated patient on deleterious effects and all potential consequences of cigarette smoking, such as COPD, lung cancer, etc. Counseled patient on various smoking cessation techniques, such as nicotine patch, Zyban, acupuncture, etc.  Patient agreed to attempt cigarette smoking cessation.  Will follow up on cigarette smoking cessation on next office visit.\par Cardiology follow-up with Dr. Cho.\par Return for pulmonary follow-up in 1 month\par Repeat chest CT scan for follow-up in 1 month\par

## 2022-04-12 NOTE — DISCUSSION/SUMMARY
[FreeTextEntry1] : Marilyn is a patient with cough secondary to COPD from history of cigarette smoking.  Secondly, she is a patient with new tiny right apical clustered nodules on current follow-up chest CT scan, possibly secondary to postinflammatory/postinfectious etiologies.  Thirdly, she is a patient with history of CAD/atrial fibrillation.

## 2022-04-19 ENCOUNTER — TRANSCRIPTION ENCOUNTER (OUTPATIENT)
Age: 82
End: 2022-04-19

## 2022-04-22 ENCOUNTER — APPOINTMENT (OUTPATIENT)
Dept: PULMONOLOGY | Facility: CLINIC | Age: 82
End: 2022-04-22

## 2022-04-22 ENCOUNTER — INPATIENT (INPATIENT)
Facility: HOSPITAL | Age: 82
LOS: 3 days | Discharge: HOME | DRG: 191 | End: 2022-04-26
Attending: INTERNAL MEDICINE | Admitting: INTERNAL MEDICINE
Payer: MEDICARE

## 2022-04-22 VITALS
WEIGHT: 145.06 LBS | HEIGHT: 60 IN | HEART RATE: 93 BPM | DIASTOLIC BLOOD PRESSURE: 72 MMHG | TEMPERATURE: 98 F | SYSTOLIC BLOOD PRESSURE: 130 MMHG | RESPIRATION RATE: 20 BRPM | OXYGEN SATURATION: 98 %

## 2022-04-22 DIAGNOSIS — J44.1 CHRONIC OBSTRUCTIVE PULMONARY DISEASE WITH (ACUTE) EXACERBATION: ICD-10-CM

## 2022-04-22 DIAGNOSIS — Z29.9 ENCOUNTER FOR PROPHYLACTIC MEASURES, UNSPECIFIED: ICD-10-CM

## 2022-04-22 DIAGNOSIS — R06.02 SHORTNESS OF BREATH: ICD-10-CM

## 2022-04-22 DIAGNOSIS — I25.10 ATHEROSCLEROTIC HEART DISEASE OF NATIVE CORONARY ARTERY WITHOUT ANGINA PECTORIS: ICD-10-CM

## 2022-04-22 DIAGNOSIS — I48.20 CHRONIC ATRIAL FIBRILLATION, UNSPECIFIED: ICD-10-CM

## 2022-04-22 LAB
ALBUMIN SERPL ELPH-MCNC: 4.1 G/DL — SIGNIFICANT CHANGE UP (ref 3.3–5)
ALP SERPL-CCNC: 58 U/L — SIGNIFICANT CHANGE UP (ref 40–120)
ALT FLD-CCNC: 17 U/L — SIGNIFICANT CHANGE UP (ref 10–45)
ANION GAP SERPL CALC-SCNC: 14 MMOL/L — SIGNIFICANT CHANGE UP (ref 5–17)
APPEARANCE UR: CLEAR — SIGNIFICANT CHANGE UP
APTT BLD: 25.7 SEC — LOW (ref 27.5–35.5)
AST SERPL-CCNC: 27 U/L — SIGNIFICANT CHANGE UP (ref 10–40)
BASE EXCESS BLDV CALC-SCNC: -1.7 MMOL/L — SIGNIFICANT CHANGE UP (ref -2–2)
BASOPHILS # BLD AUTO: 0.03 K/UL — SIGNIFICANT CHANGE UP (ref 0–0.2)
BASOPHILS NFR BLD AUTO: 0.4 % — SIGNIFICANT CHANGE UP (ref 0–2)
BILIRUB SERPL-MCNC: 0.2 MG/DL — SIGNIFICANT CHANGE UP (ref 0.2–1.2)
BILIRUB UR-MCNC: NEGATIVE — SIGNIFICANT CHANGE UP
BLOOD GAS VENOUS - CREATININE: SIGNIFICANT CHANGE UP MG/DL (ref 0.5–1.3)
BUN SERPL-MCNC: 13 MG/DL — SIGNIFICANT CHANGE UP (ref 7–23)
CA-I SERPL-SCNC: 1.26 MMOL/L — SIGNIFICANT CHANGE UP (ref 1.15–1.33)
CALCIUM SERPL-MCNC: 9.1 MG/DL — SIGNIFICANT CHANGE UP (ref 8.4–10.5)
CHLORIDE BLDV-SCNC: 105 MMOL/L — SIGNIFICANT CHANGE UP (ref 96–108)
CHLORIDE SERPL-SCNC: 108 MMOL/L — SIGNIFICANT CHANGE UP (ref 96–108)
CO2 BLDV-SCNC: 25 MMOL/L — SIGNIFICANT CHANGE UP (ref 22–26)
CO2 SERPL-SCNC: 19 MMOL/L — LOW (ref 22–31)
COLOR SPEC: SIGNIFICANT CHANGE UP
CREAT SERPL-MCNC: 0.68 MG/DL — SIGNIFICANT CHANGE UP (ref 0.5–1.3)
DIFF PNL FLD: NEGATIVE — SIGNIFICANT CHANGE UP
EGFR: 88 ML/MIN/1.73M2 — SIGNIFICANT CHANGE UP
EOSINOPHIL # BLD AUTO: 0.19 K/UL — SIGNIFICANT CHANGE UP (ref 0–0.5)
EOSINOPHIL NFR BLD AUTO: 2.5 % — SIGNIFICANT CHANGE UP (ref 0–6)
GAS PNL BLDV: 136 MMOL/L — SIGNIFICANT CHANGE UP (ref 136–145)
GAS PNL BLDV: SIGNIFICANT CHANGE UP
GAS PNL BLDV: SIGNIFICANT CHANGE UP
GLUCOSE BLDV-MCNC: 93 MG/DL — SIGNIFICANT CHANGE UP (ref 70–99)
GLUCOSE SERPL-MCNC: 91 MG/DL — SIGNIFICANT CHANGE UP (ref 70–99)
GLUCOSE UR QL: NEGATIVE — SIGNIFICANT CHANGE UP
HCO3 BLDV-SCNC: 24 MMOL/L — SIGNIFICANT CHANGE UP (ref 22–29)
HCT VFR BLD CALC: 35.7 % — SIGNIFICANT CHANGE UP (ref 34.5–45)
HCT VFR BLDA CALC: 35 % — SIGNIFICANT CHANGE UP (ref 34.5–46.5)
HGB BLD CALC-MCNC: 11.7 G/DL — SIGNIFICANT CHANGE UP (ref 11.7–16.1)
HGB BLD-MCNC: 11.7 G/DL — SIGNIFICANT CHANGE UP (ref 11.5–15.5)
IMM GRANULOCYTES NFR BLD AUTO: 1.1 % — SIGNIFICANT CHANGE UP (ref 0–1.5)
INR BLD: 1.12 RATIO — SIGNIFICANT CHANGE UP (ref 0.88–1.16)
KETONES UR-MCNC: NEGATIVE — SIGNIFICANT CHANGE UP
LACTATE BLDV-MCNC: 1.7 MMOL/L — SIGNIFICANT CHANGE UP (ref 0.7–2)
LEUKOCYTE ESTERASE UR-ACNC: NEGATIVE — SIGNIFICANT CHANGE UP
LYMPHOCYTES # BLD AUTO: 1.13 K/UL — SIGNIFICANT CHANGE UP (ref 1–3.3)
LYMPHOCYTES # BLD AUTO: 15 % — SIGNIFICANT CHANGE UP (ref 13–44)
MAGNESIUM SERPL-MCNC: 2.3 MG/DL — SIGNIFICANT CHANGE UP (ref 1.6–2.6)
MCHC RBC-ENTMCNC: 32.8 GM/DL — SIGNIFICANT CHANGE UP (ref 32–36)
MCHC RBC-ENTMCNC: 33.8 PG — SIGNIFICANT CHANGE UP (ref 27–34)
MCV RBC AUTO: 103.2 FL — HIGH (ref 80–100)
MONOCYTES # BLD AUTO: 0.9 K/UL — SIGNIFICANT CHANGE UP (ref 0–0.9)
MONOCYTES NFR BLD AUTO: 12 % — SIGNIFICANT CHANGE UP (ref 2–14)
NEUTROPHILS # BLD AUTO: 5.19 K/UL — SIGNIFICANT CHANGE UP (ref 1.8–7.4)
NEUTROPHILS NFR BLD AUTO: 69 % — SIGNIFICANT CHANGE UP (ref 43–77)
NITRITE UR-MCNC: NEGATIVE — SIGNIFICANT CHANGE UP
NRBC # BLD: 0 /100 WBCS — SIGNIFICANT CHANGE UP (ref 0–0)
NT-PROBNP SERPL-SCNC: 1199 PG/ML — HIGH (ref 0–300)
PCO2 BLDV: 42 MMHG — SIGNIFICANT CHANGE UP (ref 39–42)
PH BLDV: 7.36 — SIGNIFICANT CHANGE UP (ref 7.32–7.43)
PH UR: 6 — SIGNIFICANT CHANGE UP (ref 5–8)
PLATELET # BLD AUTO: 241 K/UL — SIGNIFICANT CHANGE UP (ref 150–400)
PO2 BLDV: 51 MMHG — HIGH (ref 25–45)
POTASSIUM BLDV-SCNC: 4.6 MMOL/L — SIGNIFICANT CHANGE UP (ref 3.5–5.1)
POTASSIUM SERPL-MCNC: 4.9 MMOL/L — SIGNIFICANT CHANGE UP (ref 3.5–5.3)
POTASSIUM SERPL-SCNC: 4.9 MMOL/L — SIGNIFICANT CHANGE UP (ref 3.5–5.3)
PROT SERPL-MCNC: 6.6 G/DL — SIGNIFICANT CHANGE UP (ref 6–8.3)
PROT UR-MCNC: NEGATIVE — SIGNIFICANT CHANGE UP
PROTHROM AB SERPL-ACNC: 12.9 SEC — SIGNIFICANT CHANGE UP (ref 10.5–13.4)
RAPID RVP RESULT: DETECTED
RBC # BLD: 3.46 M/UL — LOW (ref 3.8–5.2)
RBC # FLD: 15.2 % — HIGH (ref 10.3–14.5)
RV+EV RNA SPEC QL NAA+PROBE: DETECTED
SAO2 % BLDV: 80.8 % — SIGNIFICANT CHANGE UP (ref 67–88)
SARS-COV-2 RNA SPEC QL NAA+PROBE: SIGNIFICANT CHANGE UP
SODIUM SERPL-SCNC: 141 MMOL/L — SIGNIFICANT CHANGE UP (ref 135–145)
SP GR SPEC: 1.01 — LOW (ref 1.01–1.02)
TROPONIN T, HIGH SENSITIVITY RESULT: 9 NG/L — SIGNIFICANT CHANGE UP (ref 0–51)
UROBILINOGEN FLD QL: NEGATIVE — SIGNIFICANT CHANGE UP
WBC # BLD: 7.52 K/UL — SIGNIFICANT CHANGE UP (ref 3.8–10.5)
WBC # FLD AUTO: 7.52 K/UL — SIGNIFICANT CHANGE UP (ref 3.8–10.5)

## 2022-04-22 PROCEDURE — 93010 ELECTROCARDIOGRAM REPORT: CPT

## 2022-04-22 PROCEDURE — 71250 CT THORAX DX C-: CPT | Mod: 26,MA

## 2022-04-22 PROCEDURE — 71046 X-RAY EXAM CHEST 2 VIEWS: CPT | Mod: 26

## 2022-04-22 PROCEDURE — 99284 EMERGENCY DEPT VISIT MOD MDM: CPT | Mod: 25

## 2022-04-22 PROCEDURE — 99223 1ST HOSP IP/OBS HIGH 75: CPT

## 2022-04-22 RX ORDER — SODIUM CHLORIDE 9 MG/ML
1000 INJECTION, SOLUTION INTRAVENOUS
Refills: 0 | Status: DISCONTINUED | OUTPATIENT
Start: 2022-04-22 | End: 2022-04-26

## 2022-04-22 RX ORDER — CHOLECALCIFEROL (VITAMIN D3) 125 MCG
1000 CAPSULE ORAL DAILY
Refills: 0 | Status: DISCONTINUED | OUTPATIENT
Start: 2022-04-22 | End: 2022-04-26

## 2022-04-22 RX ORDER — IPRATROPIUM/ALBUTEROL SULFATE 18-103MCG
3 AEROSOL WITH ADAPTER (GRAM) INHALATION ONCE
Refills: 0 | Status: COMPLETED | OUTPATIENT
Start: 2022-04-22 | End: 2022-04-22

## 2022-04-22 RX ORDER — VERAPAMIL HCL 240 MG
240 CAPSULE, EXTENDED RELEASE PELLETS 24 HR ORAL DAILY
Refills: 0 | Status: DISCONTINUED | OUTPATIENT
Start: 2022-04-22 | End: 2022-04-26

## 2022-04-22 RX ORDER — INSULIN LISPRO 100/ML
VIAL (ML) SUBCUTANEOUS
Refills: 0 | Status: DISCONTINUED | OUTPATIENT
Start: 2022-04-22 | End: 2022-04-26

## 2022-04-22 RX ORDER — VERAPAMIL HCL 240 MG
1 CAPSULE, EXTENDED RELEASE PELLETS 24 HR ORAL
Qty: 0 | Refills: 0 | DISCHARGE

## 2022-04-22 RX ORDER — ACETAMINOPHEN 500 MG
650 TABLET ORAL EVERY 6 HOURS
Refills: 0 | Status: DISCONTINUED | OUTPATIENT
Start: 2022-04-22 | End: 2022-04-26

## 2022-04-22 RX ORDER — LANOLIN ALCOHOL/MO/W.PET/CERES
3 CREAM (GRAM) TOPICAL AT BEDTIME
Refills: 0 | Status: DISCONTINUED | OUTPATIENT
Start: 2022-04-22 | End: 2022-04-23

## 2022-04-22 RX ORDER — DEXTROSE 50 % IN WATER 50 %
15 SYRINGE (ML) INTRAVENOUS ONCE
Refills: 0 | Status: DISCONTINUED | OUTPATIENT
Start: 2022-04-22 | End: 2022-04-26

## 2022-04-22 RX ORDER — AZITHROMYCIN 500 MG/1
500 TABLET, FILM COATED ORAL DAILY
Refills: 0 | Status: COMPLETED | OUTPATIENT
Start: 2022-04-22 | End: 2022-04-25

## 2022-04-22 RX ORDER — PANTOPRAZOLE SODIUM 20 MG/1
40 TABLET, DELAYED RELEASE ORAL
Refills: 0 | Status: DISCONTINUED | OUTPATIENT
Start: 2022-04-22 | End: 2022-04-26

## 2022-04-22 RX ORDER — DEXTROSE 50 % IN WATER 50 %
25 SYRINGE (ML) INTRAVENOUS ONCE
Refills: 0 | Status: DISCONTINUED | OUTPATIENT
Start: 2022-04-22 | End: 2022-04-26

## 2022-04-22 RX ORDER — APIXABAN 2.5 MG/1
2.5 TABLET, FILM COATED ORAL
Refills: 0 | Status: DISCONTINUED | OUTPATIENT
Start: 2022-04-22 | End: 2022-04-26

## 2022-04-22 RX ORDER — PREGABALIN 225 MG/1
1000 CAPSULE ORAL DAILY
Refills: 0 | Status: DISCONTINUED | OUTPATIENT
Start: 2022-04-22 | End: 2022-04-26

## 2022-04-22 RX ORDER — ONDANSETRON 8 MG/1
4 TABLET, FILM COATED ORAL EVERY 8 HOURS
Refills: 0 | Status: DISCONTINUED | OUTPATIENT
Start: 2022-04-22 | End: 2022-04-26

## 2022-04-22 RX ORDER — GLUCAGON INJECTION, SOLUTION 0.5 MG/.1ML
1 INJECTION, SOLUTION SUBCUTANEOUS ONCE
Refills: 0 | Status: DISCONTINUED | OUTPATIENT
Start: 2022-04-22 | End: 2022-04-26

## 2022-04-22 RX ORDER — IPRATROPIUM/ALBUTEROL SULFATE 18-103MCG
3 AEROSOL WITH ADAPTER (GRAM) INHALATION EVERY 4 HOURS
Refills: 0 | Status: DISCONTINUED | OUTPATIENT
Start: 2022-04-22 | End: 2022-04-26

## 2022-04-22 RX ORDER — BUDESONIDE AND FORMOTEROL FUMARATE DIHYDRATE 160; 4.5 UG/1; UG/1
2 AEROSOL RESPIRATORY (INHALATION)
Refills: 0 | Status: DISCONTINUED | OUTPATIENT
Start: 2022-04-22 | End: 2022-04-26

## 2022-04-22 RX ADMIN — Medication 3 MILLIGRAM(S): at 23:17

## 2022-04-22 RX ADMIN — Medication 3 MILLILITER(S): at 23:16

## 2022-04-22 RX ADMIN — Medication 100 MILLIGRAM(S): at 23:17

## 2022-04-22 RX ADMIN — Medication 40 MILLIGRAM(S): at 17:30

## 2022-04-22 RX ADMIN — Medication 3 MILLILITER(S): at 14:39

## 2022-04-22 RX ADMIN — Medication 3 MILLILITER(S): at 16:24

## 2022-04-22 RX ADMIN — AZITHROMYCIN 500 MILLIGRAM(S): 500 TABLET, FILM COATED ORAL at 23:16

## 2022-04-22 RX ADMIN — APIXABAN 2.5 MILLIGRAM(S): 2.5 TABLET, FILM COATED ORAL at 23:17

## 2022-04-22 NOTE — ED ADULT NURSE NOTE - NSIMPLEMENTINTERV_GEN_ALL_ED
Implemented All Fall with Harm Risk Interventions:  [place] to call system. Call bell, personal items and telephone within reach. Instruct patient to call for assistance. Room bathroom lighting operational. Non-slip footwear when patient is off stretcher. Physically safe environment: no spills, clutter or unnecessary equipment. Stretcher in lowest position, wheels locked, appropriate side rails in place. Provide visual cue, wrist band, yellow gown, etc. Monitor gait and stability. Monitor for mental status changes and reorient to person, place, and time. Review medications for side effects contributing to fall risk. Reinforce activity limits and safety measures with patient and family. Provide visual clues: red socks.

## 2022-04-22 NOTE — ED PROVIDER NOTE - CLINICAL SUMMARY MEDICAL DECISION MAKING FREE TEXT BOX
81F PMH HTN, HLD, CAD s/p stents, copd (not on home O2), a-fib on eliquis p/w SOB, wheezing, fevers, cough. ECG nonischemic. Minimally tachypneic, rest of VSS. Wheezes upper lobes b/l, abd NT, no leg swelling  Concern for copd exacerbation w/ either viral/bacterial pna. Also eval for ACS  Plan: sepsis/cardiac labs, CXR, reassess symptoms 81F PMH HTN, HLD, CAD s/p stents, copd (not on home O2), a-fib on eliquis p/w SOB, wheezing, fevers, cough. ECG nonischemic. Minimally tachypneic, rest of VSS. Wheezes upper lobes b/l, abd NT, no leg swelling  Concern for copd exacerbation w/ either viral/bacterial pna. Also eval for ACS  Plan: sepsis/cardiac labs, CXR, reassess symptoms  Attending Chyna Lopez: 86 yo female h/o copd, htn, hyperlipidemia presenting with cough and sob. pt recently started on prednisone and augmentin without improvement. +sick contact. upon arrival pt hemodynamically stable. on exam pt with diffuse wheezing and scattered rhonchi. no known h/o CHF> pt on eliqis less likely PE. concern for possible pna vs viral infection leading to copd exacerbation.

## 2022-04-22 NOTE — H&P ADULT - HISTORY OF PRESENT ILLNESS
81F active smoker w/ PMH COPD(not on home O2), CAD s/p PCI, A.fib on eliquis, HTN, p/w progressive dyspnea and productive cough. She has a chronic cough at baseline however reports worsening that started 3-days ago with increased whitish sputum production. She also reports progressive dyspnea worse on exertion. She also had a fever at home as high as 102F. She visited a  where she was told she had a PNA and prescribed a steroid taper and augmentin w/o improvement. She also endorsed chest heaviness worse with coughing. Denied palpitations, PND, Orthopnea, h/o VTE, abdominal pain, n/v,    ED course: afebrile. HDS. O2 sats ok. Given Duonebs x2, Solumedrol 40mg IVP x1. RVP + entero/rhinovirus

## 2022-04-22 NOTE — PATIENT PROFILE ADULT - LAST TOBACCO USE (DD-MM-YY)
Telephone Encounter by Zoey Robledo [facility] fax.         22-Mar-2022

## 2022-04-22 NOTE — H&P ADULT - NSICDXFAMILYHX_GEN_ALL_CORE_FT
FAMILY HISTORY:  Father  Still living? Unknown  Family history unknown, Age at diagnosis: Age Unknown    Mother  Still living? Unknown  Family history unknown, Age at diagnosis: Age Unknown

## 2022-04-22 NOTE — H&P ADULT - PROBLEM SELECTOR PLAN 1
-suspect triggered by active tobacco use and entero/rhinovirus  -c/w solumedrol 40mg IV   -c/w duonebs q4 prn  -c/w azithromycin 500mg x 3-days  -c/w symbicort BID  -continuous pulse Ox monitoring; supplemental O2 prn  -encourage smoking cessation -given h/o inc sputum production, active tobacco use and +RVP; suspect 2/2 COPD exacerbation and less likely PNA  -reports improvement since given duonebs x2 and solumedrol in ED  -will manage for COPD exacerbation as below  -BNP elevated to 1199; will obtain TTE to evaluate for structural heart disease

## 2022-04-22 NOTE — ED PEDIATRIC NURSE REASSESSMENT NOTE - NS ED NURSE REASSESS COMMENT FT2
Pt reports improvement after nebulizer treatment. On assessment pt breathing unlabored, symmetrical and spontaneous. Lung sounds improved clear BL. Grandson at bedside, meal provided.

## 2022-04-22 NOTE — PATIENT PROFILE ADULT - FALL HARM RISK - HARM RISK INTERVENTIONS

## 2022-04-22 NOTE — H&P ADULT - ASSESSMENT
81F active smoker w/ PMH COPD(not on home O2), CAD s/p PCI, A.fib on eliquis, HTN, p/w progressive dyspnea and productive cough. Likely COPD exacerbation

## 2022-04-22 NOTE — ED PROVIDER NOTE - NSICDXPASTMEDICALHX_GEN_ALL_CORE_FT
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

## 2022-04-22 NOTE — ED PROVIDER NOTE - NSICDXPASTSURGICALHX_GEN_ALL_CORE_FT
PAST SURGICAL HISTORY:  History of Appendectomy childhood      History of appendectomy     History of  , , ,     History of Cholecystectomy     History of cholecystectomy     History of Hysterectomy  for fibroids      Melanoma     S/P T&A childhood

## 2022-04-22 NOTE — H&P ADULT - NSHPSOCIALHISTORY_GEN_ALL_CORE
Former smoker(1/2-1ppd x60yrs); quit 1-week ago  has a glass of wine occasionally  Denied illicit drugs

## 2022-04-22 NOTE — ED ADULT NURSE REASSESSMENT NOTE - NS ED NURSE REASSESS COMMENT FT1
Report received from HUMBERTO Beck. pt noted to be resting in stretcher, NAD noted at this time. Pt breathing non-labored, RR and O2 sat within normal limits on room air. Pt denies SOB. Plan of care discussed with pt and family at bedside, verbalizes understanding. Safety and comfort maintained.

## 2022-04-22 NOTE — ED PROVIDER NOTE - NS ED ROS FT
CONST: +fevers, +chills  ENT: +sore throat  CV: +chest pain, no leg swelling  RESP: +shortness of breath, +cough  ABD: no abdominal pain, no nausea, no vomiting, no diarrhea  : no dysuria, no flank pain, no hematuria  MSK: no back pain, no extremity pain  SKIN:  no rash

## 2022-04-22 NOTE — ED PROVIDER NOTE - ATTENDING CONTRIBUTION TO CARE
Attending MD Chyna Lopez:  I personally have seen and examined this patient.  Resident note reviewed and agree on plan of care and except where noted.  See HPI, PE, and MDM for details.

## 2022-04-22 NOTE — ED ADULT NURSE NOTE - OBJECTIVE STATEMENT
80 yo presented to the ED co difficulty breathing x 1 week. Pt co SOB on excretion, cough. PMH of COPD, HTN On Assessment, pt lung sounds are wheezing BL. Nebulizer treatment was ordered and given. Pt breathing spontaneous and symmetrical. Pt denies chest pain, fever, chills. 80 yo presented to the ED co difficulty breathing x 1 week. Pt co SOB on excretion, cough. PMH of COPD, HTN On Assessment, pt lung sounds are wheezing BL. Nebulizer treatment was ordered and given. Pt d(x) with pneumonia on Tuesday and completed her antibiotic treatment. Pt breathing spontaneous and symmetrical. Pt denies chest pain, fever, chills. Pt abd soft, nondistended, nontender. Pt reports she felt constipated all week but had a BM this morning. Pt denies dark stools. Pt ambulatory AAOx4 full ROM in all extremities.

## 2022-04-22 NOTE — H&P ADULT - NSHPLABSRESULTS_GEN_ALL_CORE
11.7   7.52  )-----------( 241      ( 2022 15:29 )             35.7           141  |  108  |  13  ----------------------------<  91  4.9   |  19<L>  |  0.68    Ca    9.1      2022 15:29  Mg     2.3         TPro  6.6  /  Alb  4.1  /  TBili  0.2  /  DBili  x   /  AST  27  /  ALT  17  /  AlkPhos  58  -            LIVER FUNCTIONS - ( 2022 15:29 )  Alb: 4.1 g/dL / Pro: 6.6 g/dL / ALK PHOS: 58 U/L / ALT: 17 U/L / AST: 27 U/L / GGT: x             PT/INR - ( 2022 15:29 )   PT: 12.9 sec;   INR: 1.12 ratio         PTT - ( 2022 15:29 )  PTT:25.7 sec    Urinalysis Basic - ( 2022 17:03 )    Color: Light Yellow / Appearance: Clear / S.006 / pH: x  Gluc: x / Ketone: Negative  / Bili: Negative / Urobili: Negative   Blood: x / Protein: Negative / Nitrite: Negative   Leuk Esterase: Negative / RBC: x / WBC x   Sq Epi: x / Non Sq Epi: x / Bacteria: x        I have personally reviewed imaging, no e/o acute cardiopulmonary dz  I have personally reviewed EKG, showing A.fib

## 2022-04-22 NOTE — PATIENT PROFILE ADULT - NSPROHMSYMPCOND_GEN_A_NUR
Physical Therapy  Visit Type: initial evaluation  Co-treat with: Occupational therapist  Precautions:  Medical precautions:  fall risk; standard precautions.    Lines:       Lines in chart and on patient reviewed, precautions maintained throughout session.  Safety Measures: bed alarm    SUBJECTIVE  Patient agreed to participate in therapy this date.  \"Forget about getting me up, it's not going to happen.\"  Patient / Family Goal: maximize function and return home    Pain     Location: pt reports at site of surgery (bottom)- does not provide pain rating   RN informed on pain level     OBJECTIVE     HR: 92 at rest: SaO2 100% on RALevel of consciousness: alert    Oriented to person, place, time and situation     Arousal alertness: appropriate responses to stimuli    Affect/Behavior: alert, appropriate and anxious  Functional Communication/Cognition    Overall status:  Within functional limits    Form of communication:  Verbal     Attention span:  Appears intact    Attention Span Impairment: reduced memory    Commands: follows all commands and directions consistently.    Safety judgement: good awareness of safety precautions.  Range of Motion (measured in degrees unless otherwise noted, active unless indicated)  Comments / Details: Impaired ROM noted 2/2 debility/weakness; pt hesitant to move BLEs actively or allow therapist to move passively 2/2 increase in pain at surgical site   Strength (out of 5 unless otherwise indicated)   Comments / Details:  Unable to evaluate strength 2/2 pain in BLEs    Bed Mobility:    Rolling left: total assist - non-dependent (max Ax2)    Training completed:      Education details: patient safety and patient requires additional training    Pt declined OOB mobility 2/2 pain at surgical site and not wanting to be moved around despite max encouragemet/encouragement. Pt screaming in pain with rolling to L side      Interventions     Training provided: activity tolerance, balance retraining, bed  mobility training and safety training    Skilled input: Verbal instruction/cues  Verbal Consent: Writer verbally educated and received verbal consent for hand placement, positioning of patient, and techniques to be performed today from patient for therapist position for techniques as described above and how they are pertinent to the patient's plan of care.       ASSESSMENT    Impairments: range of motion, strength, balance deficits, pain, activity tolerance and endurance  Functional Limitations: all functional mobility  Pt presents from [facility] with one week of n/v and abd pain. 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 Now POD#1 enteroscopy with biopsy and enteral stenting. Palliative care on board. Pt is a poor historian reporting to come from his apt where tenants were assisting him with IADLs. Pt reports that prior to Aug 2021, pt was functioning independently, living in [facility]. Since then, pt has has a decline in function due to sacral wound and failure to thrive. Pt non-ambulatory at this time. Per chart, pt presents with [facility] where he was receiving total care. Pt currently limited by surgical pain, not wanting to participate in OOB mobility. At this time, pt is requiring max Ax2 for rolling 2/2 weakness and pain. Noted with wounds throughout BLEs. IPT services to continue to follow while in house. Anticipate pt to return to [facility] upon discharge; per pt, plan to go go to daughter's home in [place] post [place].     Discharge Recommendations  Recommendation for Discharge: PT IL: Patient is appropriate for daily Physical Therapy  PT/OT Mobility Equipment for Discharge: TBD    Skilled therapy is required to address these limitations in attempt to maximize the patient's independence.  Progress: progressing toward goals  Predicted patient presentation: Moderate (evolving) - Patient comorbidities and complexities, as defined above, may  have varying impact on steady progress for prescribed plan of care.    End of Session:   Location: in bed  Safety measures: alarm system in place/re-engaged, call light within reach, lines intact and bed rails x3  Handoff to: nurse  Education Provided:   Learning assessment:  - Primary learner: patient  - Are they ready to learn: yes  - Preferred learning style: verbal  - Barriers to learning: no barriers apparent at this time  Education provided during session:  - Receiving education: patient  - Results of above outlined education: Needs reinforcement    PLAN    Suggestions for next session as indicated: PT Frequency: 3 days/week  Frequency Comments: 1/3, LS 2/24, BO, (MS)      Interventions: balance, bed mobility, functional transfer training, endurance training, strengthening and safety education  Agreement to plan and goals: patient agrees with goals and treatment plan        GOALS  Review Date: 3/3/2022  Long Term Goals: (to be met by time of discharge from hospital)  Rolling left: Patient will complete bed mobility for rolling left moderate assist.  Status: progressing/ongoing  Sit to supine: Patient will complete sit to supine moderate assist.  Status: progressing/ongoing  Supine to sit: Patient will complete supine to sit moderate assist.  Status: progressing/ongoing  Reposition in bed: Patient will complete repositioning in bed moderate assist.  Status: progressing/ongoing  Sit (edge of bed): Patient will sit at edge of bed for 10, moderate assist.   Status: progressing/ongoing  Sit to stand: Patient will complete sit to stand transfer with maximal assist.  Status: progressing/ongoing    Documented in the chart in the following areas: Prior Level of Function. Assessment.      Therapy procedure time and total treatment time can be found documented on the Time Entry flowsheet   cardiovascular

## 2022-04-22 NOTE — H&P ADULT - NSICDXPASTMEDICALHX_GEN_ALL_CORE_FT
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

## 2022-04-22 NOTE — H&P ADULT - NSHPREVIEWOFSYSTEMS_GEN_ALL_CORE
regular diet, thin fluids
GEN: +fever, no night sweats or change in appetite  EYES: no changes in vision or diplopia   ENT: no epistaxis, sinus pain, gingival bleeding, odynophagia or dysphagia  CV: no CP, PND or palpitations  RESP:+ cough, no wheezing, or hemoptysis  GI: no hematemesis, hematochezia, or melena  : no dysuria, polyuria, or hematuria  MSK: no arthralgias or joint swelling   NEURO: no gross sensory changes, numbness, focal deficits  PSYCH: no depression or changes in concentration  HEME/ONC: no purpura, petechiae or night sweats  SKIN: no pruritus, hair loss or skin lesions  ALL: no photosensitivity, no complaints of anaphylaxis (SOB, throat swelling)

## 2022-04-22 NOTE — H&P ADULT - PROBLEM SELECTOR PLAN 2
-rate-control with verapamil  -eliquis BID for stroke ppx -suspect triggered by active tobacco use and entero/rhinovirus  -c/w solumedrol 40mg IV   -c/w duonebs q4 prn  -c/w azithromycin 500mg x 3-days  -c/w symbicort BID  -continuous pulse Ox monitoring; supplemental O2 prn  -encourage smoking cessation

## 2022-04-22 NOTE — ED PROVIDER NOTE - OBJECTIVE STATEMENT
81F PMH HTN, HLD, CAD s/p stents, copd (not on home O2), a-fib on eliquis p/w SOB. Has had SOB, productive cough for past few days; diagnosed with pna and started on prednisone and augmentin w/o improvement. Had fevers intermittently as well. Endorsing subtsernal chest pressure. No abd pain, n/v, urinary symptoms, leg swelling, personal/family hx of clots, recent immobilization

## 2022-04-22 NOTE — H&P ADULT - NSHPPHYSICALEXAM_GEN_ALL_CORE
Vital Signs Last 24 Hrs  T(C): 36.5 (22 Apr 2022 22:17), Max: 36.7 (22 Apr 2022 12:37)  T(F): 97.7 (22 Apr 2022 22:17), Max: 98 (22 Apr 2022 12:37)  HR: 95 (22 Apr 2022 22:17) (91 - 95)  BP: 113/77 (22 Apr 2022 22:17) (93/64 - 130/72)  BP(mean): 75 (22 Apr 2022 19:14) (75 - 75)  RR: 19 (22 Apr 2022 22:17) (18 - 20)  SpO2: 98% (22 Apr 2022 22:17) (96% - 100%)

## 2022-04-22 NOTE — ED PROVIDER NOTE - PROGRESS NOTE DETAILS
Attending Chyna Lopez: pt received 1duoneb wth some improvement. still persistent wheezing. will repeat duoneb. and continue to monitor Jim MIMS (PGY-2)  Dr. Chambers would like pt admitted for copd exacerbation under dr. dozier. dr. corbett aware of admission Attending Chyna Lopez: pt received 1duoneb wth some improvement. still persistent wheezing. will repeat duoneb. and continue to monitor consider possible viral infection, cxr without clear infiltrate

## 2022-04-22 NOTE — ED ADULT TRIAGE NOTE - CHIEF COMPLAINT QUOTE
SOB  Diagnosed with pneumonia , Started on   antibiotic since tuesday, prednisone Symbicort 02 sat 98 HX COPD stopped smoking x 1 week

## 2022-04-22 NOTE — ED PROVIDER NOTE - PHYSICAL EXAMINATION
Physical Exam:  Gen: awake alert   HEENT: normal conjunctiva, oral mucosa moist  Lung: minimally tachypneic, wheezes Upper lobes b/l   CV: irregular  Abd: soft, NT, ND, no guarding, no rigidity, no rebound tenderness  MSK: no visible deformities  Skin: Warm, well perfused, no rash, no leg swelling  ~Hung Fernandez MD (PGY-2) Physical Exam:  Gen: awake alert   HEENT: normal conjunctiva, oral mucosa moist  Lung: minimally tachypneic, wheezes Upper lobes b/l   CV: irregular  Abd: soft, NT, ND, no guarding, no rigidity, no rebound tenderness  MSK: no visible deformities  Skin: Warm, well perfused, no rash, no leg swelling  ~Hung Fernandez MD (PGY-2)  Attending Chyna Lopez: Gen: NAD, heent: atrauamtic, eomi, perrla, mmm, op pink, uvula midline, neck; nttp, no nuchal rigidity, chest: nttp, no crepitus, cv: rrr, no murmurs, lungs: diffuse wheezing and bl scattered rhonchi, abd: soft, nontender, nondistended, no peritoneal signs, , no guarding, ext: wwp, neg homans, skin: no rash, neuro: awake and alert, following commands, speech clear, sensation and strength intact, no focal deficits

## 2022-04-23 LAB
ANION GAP SERPL CALC-SCNC: 13 MMOL/L — SIGNIFICANT CHANGE UP (ref 5–17)
BUN SERPL-MCNC: 14 MG/DL — SIGNIFICANT CHANGE UP (ref 7–23)
CALCIUM SERPL-MCNC: 9.3 MG/DL — SIGNIFICANT CHANGE UP (ref 8.4–10.5)
CHLORIDE SERPL-SCNC: 108 MMOL/L — SIGNIFICANT CHANGE UP (ref 96–108)
CO2 SERPL-SCNC: 21 MMOL/L — LOW (ref 22–31)
CREAT SERPL-MCNC: 0.74 MG/DL — SIGNIFICANT CHANGE UP (ref 0.5–1.3)
EGFR: 82 ML/MIN/1.73M2 — SIGNIFICANT CHANGE UP
GLUCOSE BLDC GLUCOMTR-MCNC: 111 MG/DL — HIGH (ref 70–99)
GLUCOSE BLDC GLUCOMTR-MCNC: 147 MG/DL — HIGH (ref 70–99)
GLUCOSE BLDC GLUCOMTR-MCNC: 183 MG/DL — HIGH (ref 70–99)
GLUCOSE BLDC GLUCOMTR-MCNC: 184 MG/DL — HIGH (ref 70–99)
GLUCOSE SERPL-MCNC: 135 MG/DL — HIGH (ref 70–99)
HCT VFR BLD CALC: 32.8 % — LOW (ref 34.5–45)
HGB BLD-MCNC: 10.8 G/DL — LOW (ref 11.5–15.5)
MCHC RBC-ENTMCNC: 32.9 GM/DL — SIGNIFICANT CHANGE UP (ref 32–36)
MCHC RBC-ENTMCNC: 34.6 PG — HIGH (ref 27–34)
MCV RBC AUTO: 105.1 FL — HIGH (ref 80–100)
NRBC # BLD: 0 /100 WBCS — SIGNIFICANT CHANGE UP (ref 0–0)
PLATELET # BLD AUTO: 220 K/UL — SIGNIFICANT CHANGE UP (ref 150–400)
POTASSIUM SERPL-MCNC: 4.3 MMOL/L — SIGNIFICANT CHANGE UP (ref 3.5–5.3)
POTASSIUM SERPL-SCNC: 4.3 MMOL/L — SIGNIFICANT CHANGE UP (ref 3.5–5.3)
RBC # BLD: 3.12 M/UL — LOW (ref 3.8–5.2)
RBC # FLD: 15.3 % — HIGH (ref 10.3–14.5)
SODIUM SERPL-SCNC: 142 MMOL/L — SIGNIFICANT CHANGE UP (ref 135–145)
WBC # BLD: 8.47 K/UL — SIGNIFICANT CHANGE UP (ref 3.8–10.5)
WBC # FLD AUTO: 8.47 K/UL — SIGNIFICANT CHANGE UP (ref 3.8–10.5)

## 2022-04-23 PROCEDURE — 99223 1ST HOSP IP/OBS HIGH 75: CPT

## 2022-04-23 RX ORDER — LANOLIN ALCOHOL/MO/W.PET/CERES
3 CREAM (GRAM) TOPICAL AT BEDTIME
Refills: 0 | Status: DISCONTINUED | OUTPATIENT
Start: 2022-04-23 | End: 2022-04-26

## 2022-04-23 RX ORDER — FUROSEMIDE 40 MG
20 TABLET ORAL DAILY
Refills: 0 | Status: DISCONTINUED | OUTPATIENT
Start: 2022-04-23 | End: 2022-04-26

## 2022-04-23 RX ADMIN — Medication 20 MILLIGRAM(S): at 11:43

## 2022-04-23 RX ADMIN — AZITHROMYCIN 500 MILLIGRAM(S): 500 TABLET, FILM COATED ORAL at 11:36

## 2022-04-23 RX ADMIN — Medication 40 MILLIGRAM(S): at 05:05

## 2022-04-23 RX ADMIN — Medication 1: at 11:37

## 2022-04-23 RX ADMIN — BUDESONIDE AND FORMOTEROL FUMARATE DIHYDRATE 2 PUFF(S): 160; 4.5 AEROSOL RESPIRATORY (INHALATION) at 05:05

## 2022-04-23 RX ADMIN — Medication 100 MILLIGRAM(S): at 13:32

## 2022-04-23 RX ADMIN — Medication 3 MILLILITER(S): at 17:08

## 2022-04-23 RX ADMIN — APIXABAN 2.5 MILLIGRAM(S): 2.5 TABLET, FILM COATED ORAL at 17:07

## 2022-04-23 RX ADMIN — APIXABAN 2.5 MILLIGRAM(S): 2.5 TABLET, FILM COATED ORAL at 10:10

## 2022-04-23 RX ADMIN — BUDESONIDE AND FORMOTEROL FUMARATE DIHYDRATE 2 PUFF(S): 160; 4.5 AEROSOL RESPIRATORY (INHALATION) at 17:07

## 2022-04-23 RX ADMIN — Medication 3 MILLILITER(S): at 13:32

## 2022-04-23 RX ADMIN — PREGABALIN 1000 MICROGRAM(S): 225 CAPSULE ORAL at 11:36

## 2022-04-23 RX ADMIN — PANTOPRAZOLE SODIUM 40 MILLIGRAM(S): 20 TABLET, DELAYED RELEASE ORAL at 05:05

## 2022-04-23 RX ADMIN — Medication 3 MILLILITER(S): at 05:05

## 2022-04-23 RX ADMIN — Medication 100 MILLIGRAM(S): at 21:41

## 2022-04-23 RX ADMIN — Medication 100 MILLIGRAM(S): at 08:22

## 2022-04-23 RX ADMIN — Medication 3 MILLILITER(S): at 10:09

## 2022-04-23 RX ADMIN — Medication 1: at 16:36

## 2022-04-23 RX ADMIN — Medication 240 MILLIGRAM(S): at 05:05

## 2022-04-23 RX ADMIN — Medication 3 MILLIGRAM(S): at 21:41

## 2022-04-23 RX ADMIN — Medication 1000 UNIT(S): at 11:36

## 2022-04-23 NOTE — PROGRESS NOTE ADULT - ASSESSMENT
81F active smoker w/ PMH COPD(not on home O2), CAD s/p PCI, A.fib on eliquis, HTN, p/w progressive dyspnea and productive cough. Likely COPD exacerbation    Shortness of breath.  - given h/o inc sputum production, active tobacco use and +RVP; suspect 2/2 COPD exacerbation and less likely PNA  -reports improvement since given duonebs x2 and solumedrol in ED  -will manage for COPD exacerbation   -BNP elevated to 1199; will obtain TTE to evaluate for structural heart disease.  COPD exacerbation.  - suspect triggered by active tobacco use and entero/rhinovirus  - c/w solumedrol 40mg IV   - c/w duonebs q4 prn  - c/w azithromycin 500mg x 3-days  - c/w symbicort BID  - continuous pulse Ox monitoring; supplemental O2 prn  - encourage smoking cessation.- Pulmonary evaluation    Chronic atrial fibrillation.  - rate-control with verapamil  - eliquis BID for stroke ppx.  CAD S/P percutaneous coronary angioplasty.  - c/w eliquis Bid  - c/w statin.  Prophylactic measure.   - Diet: DASH/TLC  - DVT ppx: eliquis BiD.    Yash Tim MD phone [phone]

## 2022-04-23 NOTE — CONSULT NOTE ADULT - SUBJECTIVE AND OBJECTIVE BOX
CHIEF COMPLAINT: sob    HISTORY OF PRESENT ILLNESS:  81F active smoker w/ PMH COPD(not on home O2), CAD s/p PCI, A.fib on eliquis, HTN, p/w progressive dyspnea and productive cough. She has a chronic cough at baseline however reports worsening that started 3-days ago with increased whitish sputum production. She also reports progressive dyspnea worse on exertion. She also had a fever at home as high as 102F. She visited a  where she was told she had a PNA and prescribed a steroid taper and augmentin w/o improvement. She also endorsed chest heaviness worse with coughing. Denied palpitations, PND, Orthopnea, h/o VTE, abdominal pain, n/v,    ED course: afebrile. HDS. O2 sats ok. Given Duonebs x2, Solumedrol 40mg IVP x1. RVP + entero/rhinovirus      Allergies    Levaquin (Pruritus; Rash)    Intolerances    	    MEDICATIONS:  apixaban 2.5 milliGRAM(s) Oral two times a day  verapamil  milliGRAM(s) Oral daily  azithromycin   Tablet 500 milliGRAM(s) Oral daily  albuterol/ipratropium for Nebulization 3 milliLiter(s) Nebulizer every 4 hours  benzonatate 100 milliGRAM(s) Oral three times a day PRN  budesonide 160 MICROgram(s)/formoterol 4.5 MICROgram(s) Inhaler 2 Puff(s) Inhalation two times a day  acetaminophen     Tablet .. 650 milliGRAM(s) Oral every 6 hours PRN  melatonin 3 milliGRAM(s) Oral at bedtime PRN  ondansetron Injectable 4 milliGRAM(s) IV Push every 8 hours PRN  aluminum hydroxide/magnesium hydroxide/simethicone Suspension 30 milliLiter(s) Oral every 4 hours PRN  pantoprazole    Tablet 40 milliGRAM(s) Oral before breakfast  dextrose 50% Injectable 25 Gram(s) IV Push once  dextrose Oral Gel 15 Gram(s) Oral once PRN  glucagon  Injectable 1 milliGRAM(s) IntraMuscular once  insulin lispro (ADMELOG) corrective regimen sliding scale   SubCutaneous three times a day before meals  methylPREDNISolone sodium succinate Injectable 40 milliGRAM(s) IV Push daily  cholecalciferol 1000 Unit(s) Oral daily  cyanocobalamin 1000 MICROGram(s) Oral daily  dextrose 5%. 1000 milliLiter(s) IV Continuous <Continuous>  dextrose 5%. 1000 milliLiter(s) IV Continuous <Continuous>      PAST MEDICAL & SURGICAL HISTORY:  CAD (Coronary Artery Disease)    Coronary Stent  to RCA, LAD, and DIAG 06 at [facility]    Afib  x 15 yrs --on Coumadin  attempted cardioversion 13 yrs ago--failed    GERD (Gastroesophageal Reflux Disease)    Hypercholesterolemia    Emphysema/COPD    Lip Cancer  resected 6 yrs ago (no chemo/rad)    Skin Cancer of Face  removed 1 yr ago    Skin Cancer of Nose  1 yr ago- removed    H/O: CVA  pt unaware of CVA, yet showed up on CT of head as old CVA    CHF (congestive heart failure)    Skin cancer    PUD (peptic ulcer disease)    Asthma    Smoker    CAD (coronary artery disease)    Melanoma    History of Hysterectomy   for fibroids      History of   , , ,     History of Cholecystectomy      History of Appendectomy  childhood      S/P T&amp;A  childhood      History of cholecystectomy    History of appendectomy    Melanoma        FAMILY HISTORY:  Family history unknown (Father, Mother)        SOCIAL HISTORY:    former smoker. indep in adl    REVIEW OF SYSTEMS:  See HPI, otherwise complete 10 point review of systems negative    [ ] All others negative	      PHYSICAL EXAM:  T(C): 36.7 (22 @ 04:57), Max: 36.7 (22 @ 12:37)  HR: 87 (22 @ 04:57) (87 - 95)  BP: 120/83 (-22 @ 04:57) (93/64 - 130/72)  RR: 18 (22 @ 04:57) (18 - 20)  SpO2: 97% (22 @ 04:57) (96% - 100%)  Wt(kg): --  I&O's Summary      Appearance: No Acute Distress	  HEENT:  Normal oral mucosa, PERRL, EOMI	  Cardiovascular: Normal S1 S2, No JVD, No murmurs/rubs/gallops  Respiratory: Lungs clear to auscultation bilaterally  Gastrointestinal:  Soft, Non-tender, + BS	  Skin: No rashes, No ecchymoses, No cyanosis	  Neurologic: Non-focal  Extremities: No clubbing, cyanosis or edema  Vascular: Peripheral pulses palpable 2+ bilaterally  Psychiatry: A & O x 3, Mood & affect appropriate    Laboratory Data:	 	    CBC Full  -  ( 2022 07:15 )  WBC Count : 8.47 K/uL  Hemoglobin : 10.8 g/dL  Hematocrit : 32.8 %  Platelet Count - Automated : 220 K/uL  Mean Cell Volume : 105.1 fl  Mean Cell Hemoglobin : 34.6 pg  Mean Cell Hemoglobin Concentration : 32.9 gm/dL  Auto Neutrophil # : x  Auto Lymphocyte # : x  Auto Monocyte # : x  Auto Eosinophil # : x  Auto Basophil # : x  Auto Neutrophil % : x  Auto Lymphocyte % : x  Auto Monocyte % : x  Auto Eosinophil % : x  Auto Basophil % : x        142  |  108  |  14  ----------------------------<  135<H>  4.3   |  21<L>  |  0.74      141  |  108  |  13  ----------------------------<  91  4.9   |  19<L>  |  0.68    Ca    9.3      2022 07:13  Ca    9.1      2022 15:29  Mg     2.3         TPro  6.6  /  Alb  4.1  /  TBili  0.2  /  DBili  x   /  AST  27  /  ALT  17  /  AlkPhos  58        proBNP: Serum Pro-Brain Natriuretic Peptide: 1199 pg/mL ( @ 15:29)    Lipid Profile:   HgA1c:   TSH:       CARDIAC MARKERS:            Interpretation of Telemetry: 	    ECG:  	not uploaded  RADIOLOGY:  OTHER: 	    PREVIOUS DIAGNOSTIC TESTING:    [ ] Echocardiogram:  [ ] Catheterization:  [ ] Stress Test:  	    Assessment:  RVP  copd exac  persistent afib  cad s/p pci  sob    Recs:  cardiac stable  no e/o acs or chf at present  cw rate control meds and AC for afib  trial lasix 20mg po qd (elevated BNP, without clinical signs of hypervolemia)  cw steroids and bd per pulm  s/p ct chest. results noted  check 2d echo  will follow  Advanced care planning forms were discussed. Code status including forceful chest compressions, defibrillation and intubation were discussed. The risks benefits and alternatives to pertinent cardiac procedures and interventions were discussed in detail and all questions were answered. Duration: 15 minutes.      Greater than 90 minutes spent on total encounter; more than 50% of the visit was spent counseling and/or coordinating care by the attending physician.   	  Bill Cho MD   Cardiovascular Diseases  [phone]

## 2022-04-24 LAB
ANION GAP SERPL CALC-SCNC: 13 MMOL/L — SIGNIFICANT CHANGE UP (ref 5–17)
BUN SERPL-MCNC: 19 MG/DL — SIGNIFICANT CHANGE UP (ref 7–23)
CALCIUM SERPL-MCNC: 9.1 MG/DL — SIGNIFICANT CHANGE UP (ref 8.4–10.5)
CHLORIDE SERPL-SCNC: 107 MMOL/L — SIGNIFICANT CHANGE UP (ref 96–108)
CO2 SERPL-SCNC: 23 MMOL/L — SIGNIFICANT CHANGE UP (ref 22–31)
CREAT SERPL-MCNC: 0.88 MG/DL — SIGNIFICANT CHANGE UP (ref 0.5–1.3)
CULTURE RESULTS: NO GROWTH — SIGNIFICANT CHANGE UP
EGFR: 66 ML/MIN/1.73M2 — SIGNIFICANT CHANGE UP
GLUCOSE BLDC GLUCOMTR-MCNC: 121 MG/DL — HIGH (ref 70–99)
GLUCOSE BLDC GLUCOMTR-MCNC: 148 MG/DL — HIGH (ref 70–99)
GLUCOSE BLDC GLUCOMTR-MCNC: 173 MG/DL — HIGH (ref 70–99)
GLUCOSE SERPL-MCNC: 84 MG/DL — SIGNIFICANT CHANGE UP (ref 70–99)
HCT VFR BLD CALC: 31.7 % — LOW (ref 34.5–45)
HGB BLD-MCNC: 10.2 G/DL — LOW (ref 11.5–15.5)
MAGNESIUM SERPL-MCNC: 2.2 MG/DL — SIGNIFICANT CHANGE UP (ref 1.6–2.6)
MCHC RBC-ENTMCNC: 32.2 GM/DL — SIGNIFICANT CHANGE UP (ref 32–36)
MCHC RBC-ENTMCNC: 33.4 PG — SIGNIFICANT CHANGE UP (ref 27–34)
MCV RBC AUTO: 103.9 FL — HIGH (ref 80–100)
NRBC # BLD: 0 /100 WBCS — SIGNIFICANT CHANGE UP (ref 0–0)
PHOSPHATE SERPL-MCNC: 3.5 MG/DL — SIGNIFICANT CHANGE UP (ref 2.5–4.5)
PLATELET # BLD AUTO: 216 K/UL — SIGNIFICANT CHANGE UP (ref 150–400)
POTASSIUM SERPL-MCNC: 4 MMOL/L — SIGNIFICANT CHANGE UP (ref 3.5–5.3)
POTASSIUM SERPL-SCNC: 4 MMOL/L — SIGNIFICANT CHANGE UP (ref 3.5–5.3)
RBC # BLD: 3.05 M/UL — LOW (ref 3.8–5.2)
RBC # FLD: 15.1 % — HIGH (ref 10.3–14.5)
SODIUM SERPL-SCNC: 143 MMOL/L — SIGNIFICANT CHANGE UP (ref 135–145)
SPECIMEN SOURCE: SIGNIFICANT CHANGE UP
WBC # BLD: 9.09 K/UL — SIGNIFICANT CHANGE UP (ref 3.8–10.5)
WBC # FLD AUTO: 9.09 K/UL — SIGNIFICANT CHANGE UP (ref 3.8–10.5)

## 2022-04-24 PROCEDURE — 99232 SBSQ HOSP IP/OBS MODERATE 35: CPT

## 2022-04-24 RX ADMIN — BUDESONIDE AND FORMOTEROL FUMARATE DIHYDRATE 2 PUFF(S): 160; 4.5 AEROSOL RESPIRATORY (INHALATION) at 05:10

## 2022-04-24 RX ADMIN — Medication 3 MILLIGRAM(S): at 21:14

## 2022-04-24 RX ADMIN — BUDESONIDE AND FORMOTEROL FUMARATE DIHYDRATE 2 PUFF(S): 160; 4.5 AEROSOL RESPIRATORY (INHALATION) at 17:09

## 2022-04-24 RX ADMIN — Medication 3 MILLILITER(S): at 10:47

## 2022-04-24 RX ADMIN — PANTOPRAZOLE SODIUM 40 MILLIGRAM(S): 20 TABLET, DELAYED RELEASE ORAL at 05:10

## 2022-04-24 RX ADMIN — APIXABAN 2.5 MILLIGRAM(S): 2.5 TABLET, FILM COATED ORAL at 17:08

## 2022-04-24 RX ADMIN — Medication 100 MILLIGRAM(S): at 21:02

## 2022-04-24 RX ADMIN — PREGABALIN 1000 MICROGRAM(S): 225 CAPSULE ORAL at 12:15

## 2022-04-24 RX ADMIN — Medication 1000 UNIT(S): at 12:15

## 2022-04-24 RX ADMIN — Medication 20 MILLIGRAM(S): at 05:14

## 2022-04-24 RX ADMIN — Medication 240 MILLIGRAM(S): at 05:10

## 2022-04-24 RX ADMIN — Medication 1: at 16:51

## 2022-04-24 RX ADMIN — Medication 3 MILLILITER(S): at 14:46

## 2022-04-24 RX ADMIN — Medication 40 MILLIGRAM(S): at 05:09

## 2022-04-24 RX ADMIN — Medication 3 MILLILITER(S): at 05:10

## 2022-04-24 RX ADMIN — AZITHROMYCIN 500 MILLIGRAM(S): 500 TABLET, FILM COATED ORAL at 14:46

## 2022-04-24 RX ADMIN — Medication 100 MILLIGRAM(S): at 05:15

## 2022-04-24 RX ADMIN — Medication 3 MILLILITER(S): at 21:02

## 2022-04-24 RX ADMIN — Medication 3 MILLILITER(S): at 17:08

## 2022-04-24 RX ADMIN — APIXABAN 2.5 MILLIGRAM(S): 2.5 TABLET, FILM COATED ORAL at 05:10

## 2022-04-24 RX ADMIN — Medication 100 MILLIGRAM(S): at 14:49

## 2022-04-24 NOTE — PROGRESS NOTE ADULT - ASSESSMENT
81F active smoker w/ PMH COPD(not on home O2), CAD s/p PCI, A.fib on eliquis, HTN, p/w progressive dyspnea and productive cough. Likely COPD exacerbation    COPD exacerbation.  - suspect triggered by active tobacco use and entero/rhinovirus  - c/w solumedrol 40mg IV for 5 days  - c/w duonebs q4 prn  - c/w azithromycin 500mg x 5 day  - c/w symbicort BID  - continuous pulse Ox monitoring; supplemental O2 prn  - encourage smoking cessation  - Pulmonary follow    Chronic atrial fibrillation.  - rate-control with verapamil  - eliquis BID for stroke ppx.    CAD S/P percutaneous coronary angioplasty.  - c/w eliquis Bid  - c/w statin.    Prophylactic measure.   - Diet: DASH/TLC  - DVT ppx: eliquis BiD.    Yash Tim MD phone [phone]

## 2022-04-25 ENCOUNTER — TRANSCRIPTION ENCOUNTER (OUTPATIENT)
Age: 82
End: 2022-04-25

## 2022-04-25 LAB
ANION GAP SERPL CALC-SCNC: 17 MMOL/L — SIGNIFICANT CHANGE UP (ref 5–17)
BUN SERPL-MCNC: 23 MG/DL — SIGNIFICANT CHANGE UP (ref 7–23)
CALCIUM SERPL-MCNC: 9.4 MG/DL — SIGNIFICANT CHANGE UP (ref 8.4–10.5)
CHLORIDE SERPL-SCNC: 102 MMOL/L — SIGNIFICANT CHANGE UP (ref 96–108)
CO2 SERPL-SCNC: 24 MMOL/L — SIGNIFICANT CHANGE UP (ref 22–31)
CREAT SERPL-MCNC: 1.05 MG/DL — SIGNIFICANT CHANGE UP (ref 0.5–1.3)
EGFR: 54 ML/MIN/1.73M2 — LOW
GLUCOSE BLDC GLUCOMTR-MCNC: 138 MG/DL — HIGH (ref 70–99)
GLUCOSE BLDC GLUCOMTR-MCNC: 154 MG/DL — HIGH (ref 70–99)
GLUCOSE BLDC GLUCOMTR-MCNC: 163 MG/DL — HIGH (ref 70–99)
GLUCOSE BLDC GLUCOMTR-MCNC: 249 MG/DL — HIGH (ref 70–99)
GLUCOSE SERPL-MCNC: 77 MG/DL — SIGNIFICANT CHANGE UP (ref 70–99)
MAGNESIUM SERPL-MCNC: 2.4 MG/DL — SIGNIFICANT CHANGE UP (ref 1.6–2.6)
POTASSIUM SERPL-MCNC: 4 MMOL/L — SIGNIFICANT CHANGE UP (ref 3.5–5.3)
POTASSIUM SERPL-SCNC: 4 MMOL/L — SIGNIFICANT CHANGE UP (ref 3.5–5.3)
SODIUM SERPL-SCNC: 143 MMOL/L — SIGNIFICANT CHANGE UP (ref 135–145)

## 2022-04-25 PROCEDURE — 70450 CT HEAD/BRAIN W/O DYE: CPT | Mod: 26

## 2022-04-25 PROCEDURE — 93306 TTE W/DOPPLER COMPLETE: CPT | Mod: 26

## 2022-04-25 RX ORDER — FUROSEMIDE 40 MG
1 TABLET ORAL
Qty: 30 | Refills: 0
Start: 2022-04-25 | End: 2022-05-24

## 2022-04-25 RX ORDER — APIXABAN 2.5 MG/1
1 TABLET, FILM COATED ORAL
Qty: 60 | Refills: 0
Start: 2022-04-25 | End: 2022-05-24

## 2022-04-25 RX ADMIN — Medication 3 MILLIGRAM(S): at 21:13

## 2022-04-25 RX ADMIN — Medication 100 MILLIGRAM(S): at 14:15

## 2022-04-25 RX ADMIN — Medication 100 MILLIGRAM(S): at 21:13

## 2022-04-25 RX ADMIN — Medication 240 MILLIGRAM(S): at 05:21

## 2022-04-25 RX ADMIN — Medication 1: at 12:31

## 2022-04-25 RX ADMIN — Medication 3 MILLILITER(S): at 17:33

## 2022-04-25 RX ADMIN — Medication 3 MILLILITER(S): at 21:13

## 2022-04-25 RX ADMIN — AZITHROMYCIN 500 MILLIGRAM(S): 500 TABLET, FILM COATED ORAL at 12:32

## 2022-04-25 RX ADMIN — APIXABAN 2.5 MILLIGRAM(S): 2.5 TABLET, FILM COATED ORAL at 17:32

## 2022-04-25 RX ADMIN — BUDESONIDE AND FORMOTEROL FUMARATE DIHYDRATE 2 PUFF(S): 160; 4.5 AEROSOL RESPIRATORY (INHALATION) at 17:33

## 2022-04-25 RX ADMIN — Medication 20 MILLIGRAM(S): at 05:21

## 2022-04-25 RX ADMIN — Medication 1: at 07:42

## 2022-04-25 RX ADMIN — PREGABALIN 1000 MICROGRAM(S): 225 CAPSULE ORAL at 12:32

## 2022-04-25 RX ADMIN — Medication 3 MILLILITER(S): at 05:30

## 2022-04-25 RX ADMIN — Medication 1000 UNIT(S): at 12:33

## 2022-04-25 RX ADMIN — Medication 3 MILLILITER(S): at 14:15

## 2022-04-25 RX ADMIN — Medication 2: at 16:19

## 2022-04-25 RX ADMIN — PANTOPRAZOLE SODIUM 40 MILLIGRAM(S): 20 TABLET, DELAYED RELEASE ORAL at 05:19

## 2022-04-25 RX ADMIN — APIXABAN 2.5 MILLIGRAM(S): 2.5 TABLET, FILM COATED ORAL at 05:24

## 2022-04-25 RX ADMIN — BUDESONIDE AND FORMOTEROL FUMARATE DIHYDRATE 2 PUFF(S): 160; 4.5 AEROSOL RESPIRATORY (INHALATION) at 05:29

## 2022-04-25 RX ADMIN — Medication 100 MILLIGRAM(S): at 05:19

## 2022-04-25 RX ADMIN — Medication 40 MILLIGRAM(S): at 05:23

## 2022-04-25 NOTE — DISCHARGE NOTE PROVIDER - NSDCFUSCHEDAPPT_GEN_ALL_CORE_FT
RECORD, JANA JAY ; 05/23/2022 ; NPCHUCKIE [facility]mMed [address] [facility] Thai Lau  [facility] [address]   Scheduled Appointment: 04/28/2022    Petty Steen  [facility] [address]   Scheduled Appointment: 05/23/2022     Petty Steen  [facility]  [address] [place]  Scheduled Appointment: 05/23/2022

## 2022-04-25 NOTE — DISCHARGE NOTE PROVIDER - NSDCCPCAREPLAN_GEN_ALL_CORE_FT
PRINCIPAL DISCHARGE DIAGNOSIS  Diagnosis: COPD exacerbation  Assessment and Plan of Treatment: Call your Health Care provider upon arrival home to make a follow up appointment within one week.  Take all inhalers as prescribed by your Health Care Provider.  Take steroids as prescribed by your Health Care Provider.  If your cough increases infrequency and severity and/or you have shortness of breath or increased shortness of breath call your Health Care Provider.  If you develop fever, chills, night sweats, malaise, and/or change in mental status call your Health care Provider.  Nutrition is very important.  Eat small frequent meals.  Increase your activity as tolerated.  Do not stay in bed all day        SECONDARY DISCHARGE DIAGNOSES  Diagnosis: Chronic atrial fibrillation  Assessment and Plan of Treatment: Atrial fibrillation is the most common heart rhythm problem.  The condition puts you at risk for has stroke and heart attack  It helps if you control your blood pressure, not drink more than 1-2 alcohol drinks per day, cut down on caffeine, getting treatment for over active thyroid gland, and get regular exercise  Call your doctor if you feel your heart racing or beating unusually, chest tightness or pain, lightheaded, faint, shortness of breath especially with exercise  It is important to take your heart medication as prescribed  You may be on anticoagulation which is very important to take as directed - you may need blood work to monitor drug levels      Diagnosis: Chronic CHF  Assessment and Plan of Treatment: Weigh yourself daily.  If you gain 3lbs in 3 days, or 5lbs in a week call your Health Care Provider.  Do not eat or drink foods containing more than 2000mg of salt (sodium) in your diet every day.  Call your Health Care Provider if you have any swelling or increased swelling in your feet, ankles, and/or stomach.  Take all of your medication as directed.  If you become dizzy call your Health Care Provider.      Diagnosis: Abnormal CT scan  Assessment and Plan of Treatment: Irregular linear opacity is identified within the right upper lobe on CT scan. Follow-up CT evaluation of the chest in 6 months recommended.      Diagnosis: Anemia  Assessment and Plan of Treatment: Follow up with pmd for gonzalo     PRINCIPAL DISCHARGE DIAGNOSIS  Diagnosis: COPD exacerbation  Assessment and Plan of Treatment: Call your Health Care provider upon arrival home to make a follow up appointment within one week.  Take all inhalers as prescribed by your Health Care Provider.  Take steroids as prescribed by your Health Care Provider.  If your cough increases infrequency and severity and/or you have shortness of breath or increased shortness of breath call your Health Care Provider.  If you develop fever, chills, night sweats, malaise, and/or change in mental status call your Health care Provider.  Nutrition is very important.  Eat small frequent meals.  Increase your activity as tolerated.  Do not stay in bed all day        SECONDARY DISCHARGE DIAGNOSES  Diagnosis: Chronic atrial fibrillation  Assessment and Plan of Treatment: Atrial fibrillation is the most common heart rhythm problem.  The condition puts you at risk for has stroke and heart attack  It helps if you control your blood pressure, not drink more than 1-2 alcohol drinks per day, cut down on caffeine, getting treatment for over active thyroid gland, and get regular exercise  Call your doctor if you feel your heart racing or beating unusually, chest tightness or pain, lightheaded, faint, shortness of breath especially with exercise  It is important to take your heart medication as prescribed  You may be on anticoagulation which is very important to take as directed - you may need blood work to monitor drug levels      Diagnosis: Chronic CHF  Assessment and Plan of Treatment: Weigh yourself daily.  If you gain 3lbs in 3 days, or 5lbs in a week call your Health Care Provider.  Do not eat or drink foods containing more than 2000mg of salt (sodium) in your diet every day.  Call your Health Care Provider if you have any swelling or increased swelling in your feet, ankles, and/or stomach.  Take all of your medication as directed.  If you become dizzy call your Health Care Provider.      Diagnosis: Abnormal CT scan  Assessment and Plan of Treatment: Irregular linear opacity is identified within the right upper lobe on CT scan. Follow-up CT evaluation of the chest in 6 months recommended.      Diagnosis: Anemia  Assessment and Plan of Treatment: Follow up with pmd for magment    Diagnosis: Dizziness  Assessment and Plan of Treatment: HOME CARE INSTRUCTIONS  Have someone stay with you until you feel stable.  Do not drive, operate machinery, or play sports until your caregiver says it is okay.  Keep all follow-up appointments as directed by your caregiver.   Lie down right away if you start feeling like you might faint. Breathe deeply and steadily. Wait until all the symptoms have passed.Drink enough fluids to keep your urine clear or pale yellow.  If you are taking blood pressure or heart medicine, get up slowly, taking several minutes to sit and then stand. This can reduce dizziness.  SEEK IMMEDIATE MEDICAL CARE IF:  You have a severe headache.  You have unusual pain in the chest, abdomen, or back.  You are bleeding from the mouth or rectum, or you have black or tarry stool.  You have an irregular or very fast heartbeat.  You have pain with breathing.  You have repeated fainting or seizure-like jerking during an episode.  You faint when sitting or lying down.  You have confusion.  You have difficulty walking.  You have severe weakness.  You have vision problems.  If you fainted, call your local emergency services (_____________________). Do not drive yourself to the hospital

## 2022-04-25 NOTE — DISCHARGE NOTE PROVIDER - NSDCMRMEDTOKEN_GEN_ALL_CORE_FT
Albuterol (Eqv-ProAir HFA) 90 mcg/inh inhalation aerosol: 2 puff(s) inhaled every 6 hours, As Needed  Atrovent CFC free 17 mcg/inh inhalation aerosol: 2 puff(s) inhaled 4 times a day, As Needed  Augmentin 500 mg-125 mg oral tablet: 1 tab(s) orally 2 times a day  benzonatate 100 mg oral capsule: 1 cap(s) orally 3 times a day  Eliquis 5 mg oral tablet: 1 tab(s) orally 2 times a day  Lipitor 20 mg oral tablet: 1 tab(s) orally once a day (at bedtime)  Nexium 40 mg oral delayed release capsule: 1 cap(s) orally once a day  Potassium Chloride (Eqv-K-Tab) 10 mEq oral tablet, extended release: 1 tab(s) orally 2 times a day    **NOTE: unable to verify with Patient    predniSONE 10 mg oral tablet: 1 tab(s) orally once a day  Qvar 80 mcg/inh inhalation aerosol: 1 puff(s) inhaled 2 times a day  Symbicort 160 mcg-4.5 mcg/inh inhalation aerosol: 2 puff(s) inhaled 2 times a day  verapamil 24 hour extended release 240 mg/24 hours oral capsule, extended release: 1 cap(s) orally once a day  Vitamin B12 1000 mcg oral tablet: 1 tab(s) orally once a day  Vitamin D3 25 mcg (1000 intl units) oral tablet: 1 tab(s) orally once a day   Albuterol (Eqv-ProAir HFA) 90 mcg/inh inhalation aerosol: 2 puff(s) inhaled every 6 hours, As Needed  aluminum hydroxide-magnesium hydroxide 200 mg-200 mg/5 mL oral suspension: 30 milliliter(s) orally every 4 hours, As needed, Dyspepsia  Atrovent CFC free 17 mcg/inh inhalation aerosol: 2 puff(s) inhaled 4 times a day, As Needed  benzonatate 100 mg oral capsule: 1 cap(s) orally 3 times a day  Lipitor 20 mg oral tablet: 1 tab(s) orally once a day (at bedtime)  melatonin 3 mg oral tablet: 1 tab(s) orally once a day (at bedtime), As needed, Sleep  Nexium 40 mg oral delayed release capsule: 1 cap(s) orally once a day  predniSONE 10 mg oral tablet: 1 tab(s) orally once a day  Qvar 80 mcg/inh inhalation aerosol: 1 puff(s) inhaled 2 times a day  Symbicort 160 mcg-4.5 mcg/inh inhalation aerosol: 2 puff(s) inhaled 2 times a day  verapamil 24 hour extended release 240 mg/24 hours oral capsule, extended release: 1 cap(s) orally once a day  Vitamin B12 1000 mcg oral tablet: 1 tab(s) orally once a day  Vitamin D3 25 mcg (1000 intl units) oral tablet: 1 tab(s) orally once a day

## 2022-04-25 NOTE — DISCHARGE NOTE PROVIDER - HOSPITAL COURSE
81F active smoker w/ PMH COPD(not on home O2), CAD s/p PCI, A.fib on eliquis, HTN, p/w progressive dyspnea and productive cough. Likely COPD exacerbation    COPD exacerbation.  - suspect triggered by active tobacco use and entero/rhinovirus  - c/w solumedrol 40mg IV for 5 days  - c/w duonebs q4 prn  - c/w azithromycin 500mg x 5 day  - c/w symbicort BID  - continuous pulse Ox monitoring; supplemental O2 prn  - encourage smoking cessation  - Pulmonary follow    Chronic atrial fibrillation.  - rate-control with verapamil  - eliquis BID for stroke ppx.    CAD S/P percutaneous coronary angioplasty.  - c/w eliquis Bid  - c/w statin.    Prophylactic measure.   - Diet: DASH/TLC  - DVT ppx: eliquis BiD.     81F active smoker w/ PMH COPD(not on home O2), CAD s/p PCI, A.fib on eliquis, HTN, p/w progressive dyspnea and productive cough. Likely COPD exacerbation    COPD exacerbation.  - suspect triggered by active tobacco use and entero/rhinovirus  - c/w solumedrol 40mg IV for 5 days  - c/w duonebs q4 prn  - c/w azithromycin 500mg x 5 day  - c/w symbicort BID  - continuous pulse Ox monitoring; supplemental O2 prn  - encourage smoking cessation  - Pulmonary follow    Chronic atrial fibrillation.  - rate-control with verapamil  - eliquis BID for stroke ppx.    CAD S/P percutaneous coronary angioplasty.  - c/w eliquis Bid  - c/w statin.    Prophylactic measure.   - Diet: DASH/TLC  - DVT ppx: eliquis BiD.    Discharged home to follow up with DR. Steen and Dr. Whitten . 81F active smoker w/ PMH COPD(not on home O2), CAD s/p PCI, A.fib on eliquis, HTN, p/w progressive dyspnea and productive cough. Likely COPD exacerbation    COPD exacerbation.  - suspect triggered by active tobacco use and entero/rhinovirus  - c/w solumedrol 40mg IV for 5 days  - c/w duonebs q4 prn  - c/w azithromycin 500mg x 5 day  - c/w symbicort BID  - continuous pulse Ox monitoring; supplemental O2 prn  - encourage smoking cessation  - Pulmonary follow    Chronic atrial fibrillation.  - rate-control with verapamil  - eliquis BID for stroke ppx.    CAD S/P percutaneous coronary angioplasty.  - c/w eliquis Bid  - c/w statin.    Prophylactic measure.   - Diet: DASH/TLC  - DVT ppx: eliquis BiD.    Feels better, refusing carotid dopplers.  Discharged home to follow up with Dr. Steen and Dr. Whitten .

## 2022-04-25 NOTE — DISCHARGE NOTE PROVIDER - NSDCFUADDAPPT_GEN_ALL_CORE_FT
APPTS ARE READY TO BE MADE: [ x] YES    Best Family or Patient Contact (if needed):    Additional Information about above appointments (if needed):    1: PULMONOLOGY  2:   3:     Other comments or requests:    APPTS ARE READY TO BE MADE: [ x] YES    Best Family or Patient Contact (if needed):    Additional Information about above appointments (if needed):    1: PULMONOLOGY  2:   3:     Other comments or requests:   Patient advised they currently have a pulmonologist specialist that they will follow up with. Patient was previously scheduled for 4/28/2022 11:00am.    Patient was previously scheduled with Dimas Davila on 05/02/2022 12:00pm at [address] [place] [address].

## 2022-04-25 NOTE — DISCHARGE NOTE PROVIDER - PROVIDER TOKENS
PROVIDER:[TOKEN:[[id number]]] PROVIDER:[TOKEN:[[id number]]],PROVIDER:[TOKEN:[[id number]]]

## 2022-04-25 NOTE — DISCHARGE NOTE PROVIDER - CARE PROVIDER_API CALL
Saloni Pan)  Critical Care Medicine; Internal Medicine; Pulmonary Disease  [address] [facility], [address]  [place] [address]  Phone: [phone]  Fax: [phone]  Follow Up Time:    Petty Steen (DO)  Critical Care Medicine; Internal Medicine; Pulmonary Disease  [address] [facility], [address]  [place] [address]  Phone: [phone]  Fax: [phone]  Follow Up Time:     Dimas Cho  CARDIOVASCULAR DISEASE  [address] [place] [address]  Phone: [phone]  Fax: [phone]  Follow Up Time:

## 2022-04-25 NOTE — PROGRESS NOTE ADULT - ASSESSMENT
81F active smoker w/ PMH COPD(not on home O2), CAD s/p PCI, A.fib on eliquis, HTN, p/w progressive dyspnea and productive cough. Likely COPD exacerbation    COPD exacerbation.  - suspect triggered by active tobacco use and entero/rhinovirus  - c/w solumedrol 40mg IV for 5 days  - c/w duonebs q4 prn  - c/w azithromycin 500mg x 5 day  - c/w symbicort BID  - continuous pulse Ox monitoring; supplemental O2 prn  - encourage smoking cessation  - Pulmonary follow    Chronic atrial fibrillation.  - rate-control with verapamil  - eliquis BID for stroke ppx.    CAD S/P percutaneous coronary angioplasty.  - c/w eliquis Bid  - c/w statin.    Dizziness  - CT head  - Echo  - C Doppler    Prophylactic measure.   - Diet: DASH/TLC  - DVT ppx: eliquis BiD.    Yash Tim MD phone [phone]

## 2022-04-26 ENCOUNTER — TRANSCRIPTION ENCOUNTER (OUTPATIENT)
Age: 82
End: 2022-04-26

## 2022-04-26 VITALS
HEART RATE: 103 BPM | RESPIRATION RATE: 18 BRPM | SYSTOLIC BLOOD PRESSURE: 122 MMHG | OXYGEN SATURATION: 94 % | TEMPERATURE: 98 F | DIASTOLIC BLOOD PRESSURE: 77 MMHG

## 2022-04-26 LAB
GLUCOSE BLDC GLUCOMTR-MCNC: 121 MG/DL — HIGH (ref 70–99)
GLUCOSE BLDC GLUCOMTR-MCNC: 174 MG/DL — HIGH (ref 70–99)

## 2022-04-26 PROCEDURE — 87040 BLOOD CULTURE FOR BACTERIA: CPT

## 2022-04-26 PROCEDURE — 82947 ASSAY GLUCOSE BLOOD QUANT: CPT

## 2022-04-26 PROCEDURE — 83735 ASSAY OF MAGNESIUM: CPT

## 2022-04-26 PROCEDURE — 85018 HEMOGLOBIN: CPT

## 2022-04-26 PROCEDURE — 83880 ASSAY OF NATRIURETIC PEPTIDE: CPT

## 2022-04-26 PROCEDURE — 70450 CT HEAD/BRAIN W/O DYE: CPT

## 2022-04-26 PROCEDURE — 0225U NFCT DS DNA&RNA 21 SARSCOV2: CPT

## 2022-04-26 PROCEDURE — 81003 URINALYSIS AUTO W/O SCOPE: CPT

## 2022-04-26 PROCEDURE — 87086 URINE CULTURE/COLONY COUNT: CPT

## 2022-04-26 PROCEDURE — 84132 ASSAY OF SERUM POTASSIUM: CPT

## 2022-04-26 PROCEDURE — 83605 ASSAY OF LACTIC ACID: CPT

## 2022-04-26 PROCEDURE — 85027 COMPLETE CBC AUTOMATED: CPT

## 2022-04-26 PROCEDURE — 80053 COMPREHEN METABOLIC PANEL: CPT

## 2022-04-26 PROCEDURE — 84100 ASSAY OF PHOSPHORUS: CPT

## 2022-04-26 PROCEDURE — 85014 HEMATOCRIT: CPT

## 2022-04-26 PROCEDURE — 85730 THROMBOPLASTIN TIME PARTIAL: CPT

## 2022-04-26 PROCEDURE — 82803 BLOOD GASES ANY COMBINATION: CPT

## 2022-04-26 PROCEDURE — 82565 ASSAY OF CREATININE: CPT

## 2022-04-26 PROCEDURE — 71250 CT THORAX DX C-: CPT | Mod: MA

## 2022-04-26 PROCEDURE — 36415 COLL VENOUS BLD VENIPUNCTURE: CPT

## 2022-04-26 PROCEDURE — 85025 COMPLETE CBC W/AUTO DIFF WBC: CPT

## 2022-04-26 PROCEDURE — 71046 X-RAY EXAM CHEST 2 VIEWS: CPT

## 2022-04-26 PROCEDURE — 99285 EMERGENCY DEPT VISIT HI MDM: CPT

## 2022-04-26 PROCEDURE — 94640 AIRWAY INHALATION TREATMENT: CPT

## 2022-04-26 PROCEDURE — 82330 ASSAY OF CALCIUM: CPT

## 2022-04-26 PROCEDURE — 80048 BASIC METABOLIC PNL TOTAL CA: CPT

## 2022-04-26 PROCEDURE — 85610 PROTHROMBIN TIME: CPT

## 2022-04-26 PROCEDURE — 82962 GLUCOSE BLOOD TEST: CPT

## 2022-04-26 PROCEDURE — 82435 ASSAY OF BLOOD CHLORIDE: CPT

## 2022-04-26 PROCEDURE — 84295 ASSAY OF SERUM SODIUM: CPT

## 2022-04-26 PROCEDURE — 93306 TTE W/DOPPLER COMPLETE: CPT

## 2022-04-26 PROCEDURE — 84484 ASSAY OF TROPONIN QUANT: CPT

## 2022-04-26 RX ORDER — POTASSIUM CHLORIDE 20 MEQ
1 PACKET (EA) ORAL
Qty: 0 | Refills: 0 | DISCHARGE

## 2022-04-26 RX ORDER — LANOLIN ALCOHOL/MO/W.PET/CERES
1 CREAM (GRAM) TOPICAL
Qty: 0 | Refills: 0 | DISCHARGE
Start: 2022-04-26

## 2022-04-26 RX ORDER — APIXABAN 2.5 MG/1
1 TABLET, FILM COATED ORAL
Qty: 0 | Refills: 0 | DISCHARGE

## 2022-04-26 RX ADMIN — Medication 20 MILLIGRAM(S): at 06:01

## 2022-04-26 RX ADMIN — Medication 3 MILLILITER(S): at 02:29

## 2022-04-26 RX ADMIN — Medication 3 MILLILITER(S): at 06:01

## 2022-04-26 RX ADMIN — PANTOPRAZOLE SODIUM 40 MILLIGRAM(S): 20 TABLET, DELAYED RELEASE ORAL at 06:01

## 2022-04-26 RX ADMIN — Medication 1: at 11:56

## 2022-04-26 RX ADMIN — Medication 40 MILLIGRAM(S): at 06:00

## 2022-04-26 RX ADMIN — Medication 100 MILLIGRAM(S): at 06:01

## 2022-04-26 RX ADMIN — Medication 240 MILLIGRAM(S): at 06:01

## 2022-04-26 RX ADMIN — APIXABAN 2.5 MILLIGRAM(S): 2.5 TABLET, FILM COATED ORAL at 06:01

## 2022-04-26 RX ADMIN — BUDESONIDE AND FORMOTEROL FUMARATE DIHYDRATE 2 PUFF(S): 160; 4.5 AEROSOL RESPIRATORY (INHALATION) at 06:01

## 2022-04-26 NOTE — PROGRESS NOTE ADULT - PROVIDER SPECIALTY LIST ADULT
Cardiology
Cardiology
Pulmonology
Pulmonology
Internal Medicine

## 2022-04-26 NOTE — DISCHARGE NOTE NURSING/CASE MANAGEMENT/SOCIAL WORK - NSDCFUADDAPPT_GEN_ALL_CORE_FT
APPTS ARE READY TO BE MADE: [ x] YES    Best Family or Patient Contact (if needed):    Additional Information about above appointments (if needed):    1: PULMONOLOGY  2:   3:     Other comments or requests:

## 2022-04-26 NOTE — DISCHARGE NOTE NURSING/CASE MANAGEMENT/SOCIAL WORK - PATIENT PORTAL LINK FT
You can access the FollowMyHealth Patient Portal offered by [facility] by registering at the following website: [email or web address]. By joining [employer]’s FollowMyHealth portal, you will also be able to view your health information using other applications (apps) compatible with our system.

## 2022-04-26 NOTE — DISCHARGE NOTE NURSING/CASE MANAGEMENT/SOCIAL WORK - NSDCPEFALRISK_GEN_ALL_CORE
For information on Fall & Injury Prevention, visit: [email or web address] OR  [email or web address] OR  https://www.cdc.gov/steadi/patient.html

## 2022-04-26 NOTE — PROGRESS NOTE ADULT - SUBJECTIVE AND OBJECTIVE BOX
Cardiovascular Disease Progress Note    Overnight events: No acute events overnight.  no cp/sob/palps/dizziness  Otherwise review of systems negative    Objective Findings:  T(C): 36.8 (04-25-22 @ 04:52), Max: 36.8 (04-25-22 @ 04:52)  HR: 82 (04-25-22 @ 04:52) (74 - 82)  BP: 130/82 (04-25-22 @ 04:52) (101/70 - 130/82)  RR: 18 (04-25-22 @ 04:52) (18 - 18)  SpO2: 97% (04-25-22 @ 04:52) (96% - 97%)  Wt(kg): --  Daily     Daily       Physical Exam:  Gen: NAD  HEENT: EOMI  CV: RRR, normal S1 + S2, no m/r/g  Lungs: CTAB  Abd: soft, non-tender  Ext: No edema    Telemetry:    Laboratory Data:                        10.2   9.09  )-----------( 216      ( 24 Apr 2022 06:37 )             31.7     04-24    143  |  107  |  19  ----------------------------<  84  4.0   |  23  |  0.88    Ca    9.1      24 Apr 2022 06:37  Phos  3.5     04-24  Mg     2.2     04-24                Inpatient Medications:  MEDICATIONS  (STANDING):  albuterol/ipratropium for Nebulization 3 milliLiter(s) Nebulizer every 4 hours  apixaban 2.5 milliGRAM(s) Oral two times a day  azithromycin   Tablet 500 milliGRAM(s) Oral daily  benzonatate 100 milliGRAM(s) Oral three times a day  budesonide 160 MICROgram(s)/formoterol 4.5 MICROgram(s) Inhaler 2 Puff(s) Inhalation two times a day  cholecalciferol 1000 Unit(s) Oral daily  cyanocobalamin 1000 MICROGram(s) Oral daily  dextrose 5%. 1000 milliLiter(s) (50 mL/Hr) IV Continuous <Continuous>  dextrose 5%. 1000 milliLiter(s) (100 mL/Hr) IV Continuous <Continuous>  dextrose 50% Injectable 25 Gram(s) IV Push once  furosemide    Tablet 20 milliGRAM(s) Oral daily  glucagon  Injectable 1 milliGRAM(s) IntraMuscular once  insulin lispro (ADMELOG) corrective regimen sliding scale   SubCutaneous three times a day before meals  pantoprazole    Tablet 40 milliGRAM(s) Oral before breakfast  verapamil  milliGRAM(s) Oral daily      Assessment:  RVP  copd exac  persistent afib  cad s/p pci  sob    Recs:  cardiac stable  no e/o acs or chf at present  cw rate control meds and AC for afib  cw lasix 20mg po qd (elevated BNP, without clinical signs of hypervolemia)  cw steroids and bd per pulm  s/p ct chest. results noted  check 2d echo  will follow        Over 25 minutes spent on total encounter; more than 50% of the visit was spent counseling and/or coordinating care by the attending physician.      Bill Cho MD   Cardiovascular Disease  [phone]
Cardiovascular Disease Progress Note    Overnight events: No acute events overnight.  no new cardiac sx  Otherwise review of systems negative    Objective Findings:  T(C): 36.6 (22 @ 04:58), Max: 36.6 (22 @ 14:19)  HR: 101 (22 @ 04:58) (96 - 101)  BP: 125/79 (22 @ 04:58) (118/80 - 125/79)  RR: 18 (22 @ 04:58) (18 - 18)  SpO2: 95% (22 @ 04:58) (95% - 95%)  Wt(kg): --  Daily     Daily Weight in k.2 (2022 07:56)      Physical Exam:  Gen: NAD  HEENT: EOMI  CV: RRR, normal S1 + S2, no m/r/g  Lungs: CTAB  Abd: soft, non-tender  Ext: No edema    Telemetry:    Laboratory Data:        143  |  102  |  23  ----------------------------<  77  4.0   |  24  |  1.05    Ca    9.4      2022 07:17  Mg     2.4                     Inpatient Medications:  MEDICATIONS  (STANDING):  albuterol/ipratropium for Nebulization 3 milliLiter(s) Nebulizer every 4 hours  apixaban 2.5 milliGRAM(s) Oral two times a day  benzonatate 100 milliGRAM(s) Oral three times a day  budesonide 160 MICROgram(s)/formoterol 4.5 MICROgram(s) Inhaler 2 Puff(s) Inhalation two times a day  cholecalciferol 1000 Unit(s) Oral daily  cyanocobalamin 1000 MICROGram(s) Oral daily  dextrose 5%. 1000 milliLiter(s) (50 mL/Hr) IV Continuous <Continuous>  dextrose 5%. 1000 milliLiter(s) (100 mL/Hr) IV Continuous <Continuous>  dextrose 50% Injectable 25 Gram(s) IV Push once  furosemide    Tablet 20 milliGRAM(s) Oral daily  glucagon  Injectable 1 milliGRAM(s) IntraMuscular once  insulin lispro (ADMELOG) corrective regimen sliding scale   SubCutaneous three times a day before meals  pantoprazole    Tablet 40 milliGRAM(s) Oral before breakfast  verapamil  milliGRAM(s) Oral daily      Assessment:  RVP  copd exac  persistent afib  cad s/p pci  sob    Recs:  cardiac stable  no e/o acs or chf at present  cw rate control meds and AC for afib  cw lasix 20mg po qd (elevated BNP, without clinical signs of hypervolemia)  cw steroids and bd per pulm  s/p ct chest. results noted  2d echo without acute findings  will follow          Over 25 minutes spent on total encounter; more than 50% of the visit was spent counseling and/or coordinating care by the attending physician.      Bill Cho MD   Cardiovascular Disease  [phone]
PULMONARY PROGRESS NOTE    RECORD, JANA  MRN-[id number]    Patient is a 81y old  Female who presents with a chief complaint of SOB and cough (23 Apr 2022 10:41)      HPI:  -+ enterovirus  -copd exacerbation  -was having a lot of cough at home, doing a little better today    ROS:   -    ACTIVE MEDICATION LIST:  MEDICATIONS  (STANDING):  albuterol/ipratropium for Nebulization 3 milliLiter(s) Nebulizer every 4 hours  apixaban 2.5 milliGRAM(s) Oral two times a day  azithromycin   Tablet 500 milliGRAM(s) Oral daily  budesonide 160 MICROgram(s)/formoterol 4.5 MICROgram(s) Inhaler 2 Puff(s) Inhalation two times a day  cholecalciferol 1000 Unit(s) Oral daily  cyanocobalamin 1000 MICROGram(s) Oral daily  dextrose 5%. 1000 milliLiter(s) (50 mL/Hr) IV Continuous <Continuous>  dextrose 5%. 1000 milliLiter(s) (100 mL/Hr) IV Continuous <Continuous>  dextrose 50% Injectable 25 Gram(s) IV Push once  furosemide    Tablet 20 milliGRAM(s) Oral daily  glucagon  Injectable 1 milliGRAM(s) IntraMuscular once  insulin lispro (ADMELOG) corrective regimen sliding scale   SubCutaneous three times a day before meals  methylPREDNISolone sodium succinate Injectable 40 milliGRAM(s) IV Push daily  pantoprazole    Tablet 40 milliGRAM(s) Oral before breakfast  verapamil  milliGRAM(s) Oral daily    MEDICATIONS  (PRN):  acetaminophen     Tablet .. 650 milliGRAM(s) Oral every 6 hours PRN Temp greater or equal to 38C (100.4F), Mild Pain (1 - 3)  aluminum hydroxide/magnesium hydroxide/simethicone Suspension 30 milliLiter(s) Oral every 4 hours PRN Dyspepsia  benzonatate 100 milliGRAM(s) Oral three times a day PRN Cough  dextrose Oral Gel 15 Gram(s) Oral once PRN Blood Glucose LESS THAN 70 milliGRAM(s)/deciliter  melatonin 3 milliGRAM(s) Oral at bedtime PRN Insomnia  ondansetron Injectable 4 milliGRAM(s) IV Push every 8 hours PRN Nausea and/or Vomiting      EXAM:  Vital Signs Last 24 Hrs  T(C): 36.5 (23 Apr 2022 12:08), Max: 36.7 (22 Apr 2022 12:37)  T(F): 97.7 (23 Apr 2022 12:08), Max: 98.1 (23 Apr 2022 04:57)  HR: 87 (23 Apr 2022 12:08) (87 - 95)  BP: 106/66 (23 Apr 2022 12:08) (93/64 - 130/72)  BP(mean): 75 (22 Apr 2022 19:14) (75 - 75)  RR: 18 (23 Apr 2022 12:08) (18 - 20)  SpO2: 98% (23 Apr 2022 12:08) (96% - 100%)    GENERAL: The patient is awake and alert in no apparent distress.     LUNGS: faint wheeze bilat      LABS/IMAGING: reviewed                          10.8   8.47  )-----------( 220      ( 23 Apr 2022 07:15 )             32.8     04-23    142  |  108  |  14  ----------------------------<  135<H>  4.3   |  21<L>  |  0.74    Ca    9.3      23 Apr 2022 07:13  Mg     2.3     04-22    TPro  6.6  /  Alb  4.1  /  TBili  0.2  /  DBili  x   /  AST  27  /  ALT  17  /  AlkPhos  58  04-22  < from: CT Chest No Cont (04.22.22 @ 17:45) >    ACC: [id number] EXAM:  CT CHEST                          PROCEDURE DATE:  04/22/2022          INTERPRETATION:  CLINICAL INFORMATION: Shortness of breath.    COMPARISON: CT chest 12/2/2012.    CONTRAST/COMPLICATIONS:  IV Contrast: NONE  Oral Contrast:NONE  Complications: None reported at time of study completion    PROCEDURE:  CT of the Chest was performed.  Sagittal and coronal reformats were performed.    FINDINGS:    LUNGS AND AIRWAYS: Patent central airways.  Emphysema. Irregular linear   opacity is identified within the right upper lobe (3:33 and 5:92).   Subpleural scarlike opacities identified within the inferolateral aspect   of the left lower lobe.  PLEURA: No pleural effusion.  MEDIASTINUM AND SEAN: No lymphadenopathy.  VESSELS: Coronary arterial and aortic calcifications  HEART: Heart size upper limits normal. No pericardial effusion.  CHEST WALL AND LOWER NECK: Within normal limits.  VISUALIZED UPPER ABDOMEN: Fatty atrophy of the pancreas. A 2.6 cm   peripherally calcified left renal cyst. 1.5 cm upper pole right renal   cyst.  BONES: Degenerative changes.    IMPRESSION:  No CT evidence for focal infiltrate or lobar consolidation.  Irregular linear opacity is identified within the right upper lobe (3:33   and 5:92). Follow-up CT evaluation of the chest in 6 months recommended.      --- End of Report ---          LUZ PEREZ MD; Resident Radiology  This document has been electronically signed.  ABBY CLIFFORD MD; Attending Radiologist  This document has been electronically signed. Apr 22 2022  6:26PM    < end of copied text >      PROBLEM LIST:  81y Female with HEALTH ISSUES - PROBLEM Dx:  COPD exacerbation    Chronic atrial fibrillation    CAD S/P percutaneous coronary angioplasty      Shortness of breath      RECS:  -cont nebs  -solumedrol  -symbicort  -azithro x 5 days  -supportive care    Saloni Pan MD   [phone]          
PULMONARY PROGRESS NOTE    RECORD, JANA  MRN-[id number]    Patient is a 81y old  Female who presents with a chief complaint of SOB and cough (23 Apr 2022 10:41)      HPI:  breathing is improved today    ROS:   -    MEDICATIONS  (STANDING):  albuterol/ipratropium for Nebulization 3 milliLiter(s) Nebulizer every 4 hours  apixaban 2.5 milliGRAM(s) Oral two times a day  azithromycin   Tablet 500 milliGRAM(s) Oral daily  benzonatate 100 milliGRAM(s) Oral three times a day  budesonide 160 MICROgram(s)/formoterol 4.5 MICROgram(s) Inhaler 2 Puff(s) Inhalation two times a day  cholecalciferol 1000 Unit(s) Oral daily  cyanocobalamin 1000 MICROGram(s) Oral daily  dextrose 5%. 1000 milliLiter(s) (50 mL/Hr) IV Continuous <Continuous>  dextrose 5%. 1000 milliLiter(s) (100 mL/Hr) IV Continuous <Continuous>  dextrose 50% Injectable 25 Gram(s) IV Push once  furosemide    Tablet 20 milliGRAM(s) Oral daily  glucagon  Injectable 1 milliGRAM(s) IntraMuscular once  insulin lispro (ADMELOG) corrective regimen sliding scale   SubCutaneous three times a day before meals  methylPREDNISolone sodium succinate Injectable 40 milliGRAM(s) IV Push daily  pantoprazole    Tablet 40 milliGRAM(s) Oral before breakfast  verapamil  milliGRAM(s) Oral daily    MEDICATIONS  (PRN):  acetaminophen     Tablet .. 650 milliGRAM(s) Oral every 6 hours PRN Temp greater or equal to 38C (100.4F), Mild Pain (1 - 3)  aluminum hydroxide/magnesium hydroxide/simethicone Suspension 30 milliLiter(s) Oral every 4 hours PRN Dyspepsia  dextrose Oral Gel 15 Gram(s) Oral once PRN Blood Glucose LESS THAN 70 milliGRAM(s)/deciliter  guaiFENesin Oral Liquid (Sugar-Free) 100 milliGRAM(s) Oral every 6 hours PRN Cough  melatonin 3 milliGRAM(s) Oral at bedtime PRN Sleep  ondansetron Injectable 4 milliGRAM(s) IV Push every 8 hours PRN Nausea and/or Vomiting        EXAM:  Vital Signs Last 24 Hrs  T(C): 36.7 (24 Apr 2022 11:02), Max: 36.9 (23 Apr 2022 20:02)  T(F): 98 (24 Apr 2022 11:02), Max: 98.4 (23 Apr 2022 20:02)  HR: 78 (24 Apr 2022 11:02) (78 - 95)  BP: 101/70 (24 Apr 2022 11:02) (101/70 - 115/81)  BP(mean): --  RR: 18 (24 Apr 2022 11:02) (18 - 18)  SpO2: 96% (24 Apr 2022 11:02) (96% - 99%)    GENERAL: The patient is awake and alert in no apparent distress.     LUNGS: faint wheeze bilat      LABS/IMAGING: reviewed                                     10.2   9.09  )-----------( 216      ( 24 Apr 2022 06:37 )             31.7   04-24    143  |  107  |  19  ----------------------------<  84  4.0   |  23  |  0.88    Ca    9.1      24 Apr 2022 06:37  Phos  3.5     04-24  Mg     2.2     04-24    TPro  6.6  /  Alb  4.1  /  TBili  0.2  /  DBili  x   /  AST  27  /  ALT  17  /  AlkPhos  58  04-22      ACC: [id number] EXAM:  CT CHEST                          PROCEDURE DATE:  04/22/2022          INTERPRETATION:  CLINICAL INFORMATION: Shortness of breath.    COMPARISON: CT chest 12/2/2012.    CONTRAST/COMPLICATIONS:  IV Contrast: NONE  Oral Contrast:NONE  Complications: None reported at time of study completion    PROCEDURE:  CT of the Chest was performed.  Sagittal and coronal reformats were performed.    FINDINGS:    LUNGS AND AIRWAYS: Patent central airways.  Emphysema. Irregular linear   opacity is identified within the right upper lobe (3:33 and 5:92).   Subpleural scarlike opacities identified within the inferolateral aspect   of the left lower lobe.  PLEURA: No pleural effusion.  MEDIASTINUM AND SEAN: No lymphadenopathy.  VESSELS: Coronary arterial and aortic calcifications  HEART: Heart size upper limits normal. No pericardial effusion.  CHEST WALL AND LOWER NECK: Within normal limits.  VISUALIZED UPPER ABDOMEN: Fatty atrophy of the pancreas. A 2.6 cm   peripherally calcified left renal cyst. 1.5 cm upper pole right renal   cyst.  BONES: Degenerative changes.    IMPRESSION:  No CT evidence for focal infiltrate or lobar consolidation.  Irregular linear opacity is identified within the right upper lobe (3:33   and 5:92). Follow-up CT evaluation of the chest in 6 months recommended.      --- End of Report ---          LUZ PEREZ MD; Resident Radiology  This document has been electronically signed.  ABBY CLIFFORD MD; Attending Radiologist  This document has been electronically signed. Apr 22 2022  6:26PM    < end of copied text >      PROBLEM LIST:  81y Female with HEALTH ISSUES - PROBLEM Dx:  COPD exacerbation    Chronic atrial fibrillation    CAD S/P percutaneous coronary angioplasty      Shortness of breath      RECS:  -cont nebs  -solumedrol x 5 days  -symbicort  -azithro x 5 days  -supportive care    Saloni Pan MD   [phone]          
Patient is a 81y old  Female who presents with a chief complaint of SOB and cough (2022 12:35)      SUBJECTIVE / OVERNIGHT EVENTS: Comfortable   Review of Systems  chest pain no  palpitations no  sob improving   nausea no  headache no    MEDICATIONS  (STANDING):  albuterol/ipratropium for Nebulization 3 milliLiter(s) Nebulizer every 4 hours  apixaban 2.5 milliGRAM(s) Oral two times a day  azithromycin   Tablet 500 milliGRAM(s) Oral daily  benzonatate 100 milliGRAM(s) Oral three times a day  budesonide 160 MICROgram(s)/formoterol 4.5 MICROgram(s) Inhaler 2 Puff(s) Inhalation two times a day  cholecalciferol 1000 Unit(s) Oral daily  cyanocobalamin 1000 MICROGram(s) Oral daily  dextrose 5%. 1000 milliLiter(s) (50 mL/Hr) IV Continuous <Continuous>  dextrose 5%. 1000 milliLiter(s) (100 mL/Hr) IV Continuous <Continuous>  dextrose 50% Injectable 25 Gram(s) IV Push once  furosemide    Tablet 20 milliGRAM(s) Oral daily  glucagon  Injectable 1 milliGRAM(s) IntraMuscular once  insulin lispro (ADMELOG) corrective regimen sliding scale   SubCutaneous three times a day before meals  methylPREDNISolone sodium succinate Injectable 40 milliGRAM(s) IV Push daily  pantoprazole    Tablet 40 milliGRAM(s) Oral before breakfast  verapamil  milliGRAM(s) Oral daily    MEDICATIONS  (PRN):  acetaminophen     Tablet .. 650 milliGRAM(s) Oral every 6 hours PRN Temp greater or equal to 38C (100.4F), Mild Pain (1 - 3)  aluminum hydroxide/magnesium hydroxide/simethicone Suspension 30 milliLiter(s) Oral every 4 hours PRN Dyspepsia  dextrose Oral Gel 15 Gram(s) Oral once PRN Blood Glucose LESS THAN 70 milliGRAM(s)/deciliter  guaiFENesin Oral Liquid (Sugar-Free) 100 milliGRAM(s) Oral every 6 hours PRN Cough  melatonin 3 milliGRAM(s) Oral at bedtime PRN Insomnia  melatonin 3 milliGRAM(s) Oral at bedtime PRN Sleep  ondansetron Injectable 4 milliGRAM(s) IV Push every 8 hours PRN Nausea and/or Vomiting      Vital Signs Last 24 Hrs  T(C): 36.9 (2022 20:02), Max: 36.9 (2022 20:02)  T(F): 98.4 (2022 20:02), Max: 98.4 (2022 20:02)  HR: 95 (2022 20:02) (87 - 95)  BP: 111/73 (2022 20:02) (106/66 - 120/83)  BP(mean): --  RR: 18 (2022 20:02) (18 - 19)  SpO2: 99% (2022 20:02) (96% - 99%)    PHYSICAL EXAM:  GENERAL: NAD, well-developed  HEAD:  Atraumatic, Normocephalic  EYES: EOMI, PERRLA, conjunctiva and sclera clear  NECK: Supple, No JVD  CHEST/LUNG: few wheezes to auscultation bilaterally  HEART: Regular rate and rhythm; No murmurs, rubs, or gallops  ABDOMEN: Soft, Nontender, Nondistended; Bowel sounds present  EXTREMITIES:  2+ Peripheral Pulses, No clubbing, cyanosis, or edema  PSYCH: AAOx3  NEUROLOGY: non-focal  SKIN: No rashes or lesions    LABS:                        10.8   8.47  )-----------( 220      ( 2022 07:15 )             32.8     04-23    142  |  108  |  14  ----------------------------<  135<H>  4.3   |  21<L>  |  0.74    Ca    9.3      2022 07:13  Mg     2.3     04-    TPro  6.6  /  Alb  4.1  /  TBili  0.2  /  DBili  x   /  AST  27  /  ALT  17  /  AlkPhos  58  04-22    PT/INR - ( 2022 15:29 )   PT: 12.9 sec;   INR: 1.12 ratio         PTT - ( 2022 15:29 )  PTT:25.7 sec      Urinalysis Basic - ( 2022 17:03 )    Color: Light Yellow / Appearance: Clear / S.006 / pH: x  Gluc: x / Ketone: Negative  / Bili: Negative / Urobili: Negative   Blood: x / Protein: Negative / Nitrite: Negative   Leuk Esterase: Negative / RBC: x / WBC x   Sq Epi: x / Non Sq Epi: x / Bacteria: x          RADIOLOGY & ADDITIONAL TESTS:    Imaging Personally Reviewed:    Consultant(s) Notes Reviewed:      Care Discussed with Consultants/Other Providers:  
Patient is a 81y old  Female who presents with a chief complaint of SOB and cough (24 Apr 2022 12:33)      SUBJECTIVE / OVERNIGHT EVENTS: No new complaints.   Review of Systems  chest pain no  palpitations no  sob no  nausea no  headache no    MEDICATIONS  (STANDING):  albuterol/ipratropium for Nebulization 3 milliLiter(s) Nebulizer every 4 hours  apixaban 2.5 milliGRAM(s) Oral two times a day  azithromycin   Tablet 500 milliGRAM(s) Oral daily  benzonatate 100 milliGRAM(s) Oral three times a day  budesonide 160 MICROgram(s)/formoterol 4.5 MICROgram(s) Inhaler 2 Puff(s) Inhalation two times a day  cholecalciferol 1000 Unit(s) Oral daily  cyanocobalamin 1000 MICROGram(s) Oral daily  dextrose 5%. 1000 milliLiter(s) (50 mL/Hr) IV Continuous <Continuous>  dextrose 5%. 1000 milliLiter(s) (100 mL/Hr) IV Continuous <Continuous>  dextrose 50% Injectable 25 Gram(s) IV Push once  furosemide    Tablet 20 milliGRAM(s) Oral daily  glucagon  Injectable 1 milliGRAM(s) IntraMuscular once  insulin lispro (ADMELOG) corrective regimen sliding scale   SubCutaneous three times a day before meals  methylPREDNISolone sodium succinate Injectable 40 milliGRAM(s) IV Push daily  pantoprazole    Tablet 40 milliGRAM(s) Oral before breakfast  verapamil  milliGRAM(s) Oral daily    MEDICATIONS  (PRN):  acetaminophen     Tablet .. 650 milliGRAM(s) Oral every 6 hours PRN Temp greater or equal to 38C (100.4F), Mild Pain (1 - 3)  aluminum hydroxide/magnesium hydroxide/simethicone Suspension 30 milliLiter(s) Oral every 4 hours PRN Dyspepsia  dextrose Oral Gel 15 Gram(s) Oral once PRN Blood Glucose LESS THAN 70 milliGRAM(s)/deciliter  guaiFENesin Oral Liquid (Sugar-Free) 100 milliGRAM(s) Oral every 6 hours PRN Cough  melatonin 3 milliGRAM(s) Oral at bedtime PRN Sleep  ondansetron Injectable 4 milliGRAM(s) IV Push every 8 hours PRN Nausea and/or Vomiting      Vital Signs Last 24 Hrs  T(C): 36.7 (24 Apr 2022 11:02), Max: 36.9 (23 Apr 2022 20:02)  T(F): 98 (24 Apr 2022 11:02), Max: 98.4 (23 Apr 2022 20:02)  HR: 78 (24 Apr 2022 11:02) (78 - 95)  BP: 101/70 (24 Apr 2022 11:02) (101/70 - 115/81)  BP(mean): --  RR: 18 (24 Apr 2022 11:02) (18 - 18)  SpO2: 96% (24 Apr 2022 11:02) (96% - 99%)    PHYSICAL EXAM:  GENERAL: NAD  HEAD:  Atraumatic, Normocephalic  EYES: EOMI, PERRLA, conjunctiva and sclera clear  NECK: Supple, No JVD  CHEST/LUNG: few wheezes to auscultation bilaterally   HEART: Regular rate and rhythm; No murmurs, rubs, or gallops  ABDOMEN: Soft, Nontender, Nondistended; Bowel sounds present  EXTREMITIES:  2+ Peripheral Pulses, No clubbing, cyanosis, or edema  PSYCH: AAOx3  NEUROLOGY: non-focal  SKIN: No rashes or lesions    LABS:                        10.2   9.09  )-----------( 216      ( 24 Apr 2022 06:37 )             31.7     04-24    143  |  107  |  19  ----------------------------<  84  4.0   |  23  |  0.88    Ca    9.1      24 Apr 2022 06:37  Phos  3.5     04-24  Mg     2.2     04-24                Culture - Blood (collected 22 Apr 2022 23:17)  Source: .Blood Blood-Peripheral  Preliminary Report (24 Apr 2022 01:11):    No growth to date.    Culture - Blood (collected 22 Apr 2022 21:59)  Source: .Blood Blood-Peripheral  Preliminary Report (23 Apr 2022 22:01):    No growth to date.    Culture - Urine (collected 22 Apr 2022 21:57)  Source: Clean Catch Clean Catch (Midstream)  Final Report (24 Apr 2022 14:19):    No growth        RADIOLOGY & ADDITIONAL TESTS:    Imaging Personally Reviewed:    Consultant(s) Notes Reviewed:      Care Discussed with Consultants/Other Providers:  
Patient is a 81y old  Female who presents with a chief complaint of SOB and cough (25 Apr 2022 11:01)      SUBJECTIVE / OVERNIGHT EVENTS: c/o dizziness   Review of Systems  chest pain no  palpitations no  sob no  nausea no  headache no    MEDICATIONS  (STANDING):  albuterol/ipratropium for Nebulization 3 milliLiter(s) Nebulizer every 4 hours  apixaban 2.5 milliGRAM(s) Oral two times a day  benzonatate 100 milliGRAM(s) Oral three times a day  budesonide 160 MICROgram(s)/formoterol 4.5 MICROgram(s) Inhaler 2 Puff(s) Inhalation two times a day  cholecalciferol 1000 Unit(s) Oral daily  cyanocobalamin 1000 MICROGram(s) Oral daily  dextrose 5%. 1000 milliLiter(s) (50 mL/Hr) IV Continuous <Continuous>  dextrose 5%. 1000 milliLiter(s) (100 mL/Hr) IV Continuous <Continuous>  dextrose 50% Injectable 25 Gram(s) IV Push once  furosemide    Tablet 20 milliGRAM(s) Oral daily  glucagon  Injectable 1 milliGRAM(s) IntraMuscular once  insulin lispro (ADMELOG) corrective regimen sliding scale   SubCutaneous three times a day before meals  pantoprazole    Tablet 40 milliGRAM(s) Oral before breakfast  verapamil  milliGRAM(s) Oral daily    MEDICATIONS  (PRN):  acetaminophen     Tablet .. 650 milliGRAM(s) Oral every 6 hours PRN Temp greater or equal to 38C (100.4F), Mild Pain (1 - 3)  aluminum hydroxide/magnesium hydroxide/simethicone Suspension 30 milliLiter(s) Oral every 4 hours PRN Dyspepsia  dextrose Oral Gel 15 Gram(s) Oral once PRN Blood Glucose LESS THAN 70 milliGRAM(s)/deciliter  guaiFENesin Oral Liquid (Sugar-Free) 100 milliGRAM(s) Oral every 6 hours PRN Cough  melatonin 3 milliGRAM(s) Oral at bedtime PRN Sleep  ondansetron Injectable 4 milliGRAM(s) IV Push every 8 hours PRN Nausea and/or Vomiting      Vital Signs Last 24 Hrs  T(C): 36.6 (25 Apr 2022 14:19), Max: 36.8 (25 Apr 2022 04:52)  T(F): 97.9 (25 Apr 2022 14:19), Max: 98.2 (25 Apr 2022 04:52)  HR: 96 (25 Apr 2022 14:19) (74 - 96)  BP: 118/80 (25 Apr 2022 14:19) (118/80 - 130/82)  BP(mean): --  RR: 18 (25 Apr 2022 14:19) (18 - 18)  SpO2: 95% (25 Apr 2022 14:19) (95% - 97%)    PHYSICAL EXAM:  GENERAL: NAD, well-developed  HEAD:  Atraumatic, Normocephalic  EYES: EOMI, PERRLA, conjunctiva and sclera clear  NECK: Supple, No JVD  CHEST/LUNG: rhonchi and wheezes to auscultation bilaterally; No wheeze  HEART: Regular rate and rhythm; No murmurs, rubs, or gallops  ABDOMEN: Soft, Nontender, Nondistended; Bowel sounds present  EXTREMITIES:  2+ Peripheral Pulses, No clubbing, cyanosis, or edema  PSYCH: AAOx3  NEUROLOGY: non-focal  SKIN: No rashes or lesions    LABS:                        10.2   9.09  )-----------( 216      ( 24 Apr 2022 06:37 )             31.7     04-25    143  |  102  |  23  ----------------------------<  77  4.0   |  24  |  1.05    Ca    9.4      25 Apr 2022 07:17  Phos  3.5     04-24  Mg     2.4     04-25                Culture - Blood (collected 22 Apr 2022 23:17)  Source: .Blood Blood-Peripheral  Preliminary Report (24 Apr 2022 01:11):    No growth to date.    Culture - Blood (collected 22 Apr 2022 21:59)  Source: .Blood Blood-Peripheral  Preliminary Report (23 Apr 2022 22:01):    No growth to date.    Culture - Urine (collected 22 Apr 2022 21:57)  Source: [employer] (Midstream)  Final Report (24 Apr 2022 14:19):    No growth        RADIOLOGY & ADDITIONAL TESTS:    Imaging Personally Reviewed:    Consultant(s) Notes Reviewed:      Care Discussed with Consultants/Other Providers:  
Patient is a 81y old  Female who presents with a chief complaint of SOB and cough (26 Apr 2022 07:47)      SUBJECTIVE / OVERNIGHT EVENTS: fees better. Wants to go home.  Review of Systems  chest pain no  palpitations no  sob no  nausea no  headache no    MEDICATIONS  (STANDING):  albuterol/ipratropium for Nebulization 3 milliLiter(s) Nebulizer every 4 hours  apixaban 2.5 milliGRAM(s) Oral two times a day  benzonatate 100 milliGRAM(s) Oral three times a day  budesonide 160 MICROgram(s)/formoterol 4.5 MICROgram(s) Inhaler 2 Puff(s) Inhalation two times a day  cholecalciferol 1000 Unit(s) Oral daily  cyanocobalamin 1000 MICROGram(s) Oral daily  dextrose 5%. 1000 milliLiter(s) (50 mL/Hr) IV Continuous <Continuous>  dextrose 5%. 1000 milliLiter(s) (100 mL/Hr) IV Continuous <Continuous>  dextrose 50% Injectable 25 Gram(s) IV Push once  furosemide    Tablet 20 milliGRAM(s) Oral daily  glucagon  Injectable 1 milliGRAM(s) IntraMuscular once  insulin lispro (ADMELOG) corrective regimen sliding scale   SubCutaneous three times a day before meals  pantoprazole    Tablet 40 milliGRAM(s) Oral before breakfast  verapamil  milliGRAM(s) Oral daily    MEDICATIONS  (PRN):  acetaminophen     Tablet .. 650 milliGRAM(s) Oral every 6 hours PRN Temp greater or equal to 38C (100.4F), Mild Pain (1 - 3)  aluminum hydroxide/magnesium hydroxide/simethicone Suspension 30 milliLiter(s) Oral every 4 hours PRN Dyspepsia  dextrose Oral Gel 15 Gram(s) Oral once PRN Blood Glucose LESS THAN 70 milliGRAM(s)/deciliter  guaiFENesin Oral Liquid (Sugar-Free) 100 milliGRAM(s) Oral every 6 hours PRN Cough  melatonin 3 milliGRAM(s) Oral at bedtime PRN Sleep  ondansetron Injectable 4 milliGRAM(s) IV Push every 8 hours PRN Nausea and/or Vomiting      Vital Signs Last 24 Hrs  T(C): 36.7 (26 Apr 2022 11:17), Max: 36.7 (26 Apr 2022 11:17)  T(F): 98 (26 Apr 2022 11:17), Max: 98 (26 Apr 2022 11:17)  HR: 103 (26 Apr 2022 11:17) (96 - 103)  BP: 122/77 (26 Apr 2022 11:17) (118/80 - 125/79)  BP(mean): --  RR: 18 (26 Apr 2022 11:17) (18 - 18)  SpO2: 94% (26 Apr 2022 11:17) (94% - 95%)    PHYSICAL EXAM:  GENERAL: NAD, well-developed  HEAD:  Atraumatic, Normocephalic  EYES: EOMI, PERRLA, conjunctiva and sclera clear  NECK: Supple, No JVD  CHEST/LUNG: Clear to auscultation bilaterally; No wheeze  HEART: Regular rate and rhythm; No murmurs, rubs, or gallops  ABDOMEN: Soft, Nontender, Nondistended; Bowel sounds present  EXTREMITIES:  2+ Peripheral Pulses, No clubbing, cyanosis, or edema  PSYCH: AAOx3  NEUROLOGY: non-focal  SKIN: No rashes or lesions    LABS:    04-25    143  |  102  |  23  ----------------------------<  77  4.0   |  24  |  1.05    Ca    9.4      25 Apr 2022 07:17  Mg     2.4     04-25                  RADIOLOGY & ADDITIONAL TESTS:    Imaging Personally Reviewed:    Consultant(s) Notes Reviewed:      Care Discussed with Consultants/Other Providers:

## 2022-04-26 NOTE — PROGRESS NOTE ADULT - ASSESSMENT
81F active smoker w/ PMH COPD(not on home O2), CAD s/p PCI, A.fib on eliquis, HTN, p/w progressive dyspnea and productive cough. Likely COPD exacerbation    COPD exacerbation.  - suspect triggered by active tobacco use and entero/rhinovirus  - s/p steroids  - nebs  - s/p azithromycin 500mg x 5 day  - c/w symbicort BID  - encourage smoking cessation  - Pulmonary follow    Chronic atrial fibrillation.  - rate-control with verapamil  - eliquis BID for stroke ppx.    CAD S/P percutaneous coronary angioplasty.  - c/w eliquis Bid  - c/w statin.    Dizziness  - CT head nted  - Echo noted  - C Doppler refused    Prophylactic measure.   - Diet: DASH/TLC  - DVT ppx: eliquis BiD.    DC home Follow with PMD/ Pulmonary/ Cardiology in 3-4 days. QA    Yash Tim MD phone [phone]

## 2022-04-26 NOTE — PROGRESS NOTE ADULT - REASON FOR ADMISSION
SOB and cough

## 2022-04-27 ENCOUNTER — INPATIENT (INPATIENT)
Facility: HOSPITAL | Age: 82
LOS: 9 days | Discharge: HOME HEALTH | DRG: 191 | End: 2022-05-07
Attending: INTERNAL MEDICINE | Admitting: INTERNAL MEDICINE
Payer: MEDICARE

## 2022-04-27 VITALS
DIASTOLIC BLOOD PRESSURE: 68 MMHG | RESPIRATION RATE: 20 BRPM | HEIGHT: 60 IN | HEART RATE: 94 BPM | OXYGEN SATURATION: 94 % | WEIGHT: 160.06 LBS | SYSTOLIC BLOOD PRESSURE: 102 MMHG | TEMPERATURE: 98 F

## 2022-04-27 DIAGNOSIS — J44.1 CHRONIC OBSTRUCTIVE PULMONARY DISEASE WITH (ACUTE) EXACERBATION: ICD-10-CM

## 2022-04-27 LAB
ALBUMIN SERPL ELPH-MCNC: 4.4 G/DL — SIGNIFICANT CHANGE UP (ref 3.3–5)
ALP SERPL-CCNC: 65 U/L — SIGNIFICANT CHANGE UP (ref 40–120)
ALT FLD-CCNC: 31 U/L — SIGNIFICANT CHANGE UP (ref 10–45)
ANION GAP SERPL CALC-SCNC: 15 MMOL/L — SIGNIFICANT CHANGE UP (ref 5–17)
AST SERPL-CCNC: 18 U/L — SIGNIFICANT CHANGE UP (ref 10–40)
BASOPHILS # BLD AUTO: 0 K/UL — SIGNIFICANT CHANGE UP (ref 0–0.2)
BASOPHILS NFR BLD AUTO: 0 % — SIGNIFICANT CHANGE UP (ref 0–2)
BILIRUB SERPL-MCNC: 0.4 MG/DL — SIGNIFICANT CHANGE UP (ref 0.2–1.2)
BUN SERPL-MCNC: 27 MG/DL — HIGH (ref 7–23)
CALCIUM SERPL-MCNC: 9.6 MG/DL — SIGNIFICANT CHANGE UP (ref 8.4–10.5)
CHLORIDE SERPL-SCNC: 97 MMOL/L — SIGNIFICANT CHANGE UP (ref 96–108)
CO2 SERPL-SCNC: 25 MMOL/L — SIGNIFICANT CHANGE UP (ref 22–31)
CREAT SERPL-MCNC: 0.92 MG/DL — SIGNIFICANT CHANGE UP (ref 0.5–1.3)
CULTURE RESULTS: SIGNIFICANT CHANGE UP
EGFR: 63 ML/MIN/1.73M2 — SIGNIFICANT CHANGE UP
EOSINOPHIL # BLD AUTO: 0 K/UL — SIGNIFICANT CHANGE UP (ref 0–0.5)
EOSINOPHIL NFR BLD AUTO: 0 % — SIGNIFICANT CHANGE UP (ref 0–6)
GLUCOSE SERPL-MCNC: 191 MG/DL — HIGH (ref 70–99)
HCT VFR BLD CALC: 40.7 % — SIGNIFICANT CHANGE UP (ref 34.5–45)
HGB BLD-MCNC: 13.2 G/DL — SIGNIFICANT CHANGE UP (ref 11.5–15.5)
LYMPHOCYTES # BLD AUTO: 1.47 K/UL — SIGNIFICANT CHANGE UP (ref 1–3.3)
LYMPHOCYTES # BLD AUTO: 10.5 % — LOW (ref 13–44)
MCHC RBC-ENTMCNC: 32.4 GM/DL — SIGNIFICANT CHANGE UP (ref 32–36)
MCHC RBC-ENTMCNC: 33.6 PG — SIGNIFICANT CHANGE UP (ref 27–34)
MCV RBC AUTO: 103.6 FL — HIGH (ref 80–100)
MONOCYTES # BLD AUTO: 0.86 K/UL — SIGNIFICANT CHANGE UP (ref 0–0.9)
MONOCYTES NFR BLD AUTO: 6.1 % — SIGNIFICANT CHANGE UP (ref 2–14)
NEUTROPHILS # BLD AUTO: 11.46 K/UL — HIGH (ref 1.8–7.4)
NEUTROPHILS NFR BLD AUTO: 81.6 % — HIGH (ref 43–77)
NT-PROBNP SERPL-SCNC: 546 PG/ML — HIGH (ref 0–300)
PLATELET # BLD AUTO: 285 K/UL — SIGNIFICANT CHANGE UP (ref 150–400)
POTASSIUM SERPL-MCNC: 4.2 MMOL/L — SIGNIFICANT CHANGE UP (ref 3.5–5.3)
POTASSIUM SERPL-SCNC: 4.2 MMOL/L — SIGNIFICANT CHANGE UP (ref 3.5–5.3)
PROT SERPL-MCNC: 6.8 G/DL — SIGNIFICANT CHANGE UP (ref 6–8.3)
RAPID RVP RESULT: DETECTED
RBC # BLD: 3.93 M/UL — SIGNIFICANT CHANGE UP (ref 3.8–5.2)
RBC # FLD: 14.7 % — HIGH (ref 10.3–14.5)
RV+EV RNA SPEC QL NAA+PROBE: DETECTED
SARS-COV-2 RNA SPEC QL NAA+PROBE: SIGNIFICANT CHANGE UP
SODIUM SERPL-SCNC: 137 MMOL/L — SIGNIFICANT CHANGE UP (ref 135–145)
SPECIMEN SOURCE: SIGNIFICANT CHANGE UP
TROPONIN T, HIGH SENSITIVITY RESULT: 10 NG/L — SIGNIFICANT CHANGE UP (ref 0–51)
TROPONIN T, HIGH SENSITIVITY RESULT: 12 NG/L — SIGNIFICANT CHANGE UP (ref 0–51)
WBC # BLD: 14.04 K/UL — HIGH (ref 3.8–10.5)
WBC # FLD AUTO: 14.04 K/UL — HIGH (ref 3.8–10.5)

## 2022-04-27 PROCEDURE — 71045 X-RAY EXAM CHEST 1 VIEW: CPT | Mod: 26

## 2022-04-27 PROCEDURE — 93010 ELECTROCARDIOGRAM REPORT: CPT | Mod: GC

## 2022-04-27 PROCEDURE — 99285 EMERGENCY DEPT VISIT HI MDM: CPT | Mod: GC

## 2022-04-27 RX ORDER — IPRATROPIUM/ALBUTEROL SULFATE 18-103MCG
3 AEROSOL WITH ADAPTER (GRAM) INHALATION EVERY 6 HOURS
Refills: 0 | Status: DISCONTINUED | OUTPATIENT
Start: 2022-04-27 | End: 2022-05-07

## 2022-04-27 RX ORDER — LANOLIN ALCOHOL/MO/W.PET/CERES
3 CREAM (GRAM) TOPICAL AT BEDTIME
Refills: 0 | Status: DISCONTINUED | OUTPATIENT
Start: 2022-04-27 | End: 2022-05-07

## 2022-04-27 RX ORDER — VERAPAMIL HCL 240 MG
240 CAPSULE, EXTENDED RELEASE PELLETS 24 HR ORAL DAILY
Refills: 0 | Status: DISCONTINUED | OUTPATIENT
Start: 2022-04-27 | End: 2022-05-07

## 2022-04-27 RX ORDER — ACETAMINOPHEN 500 MG
650 TABLET ORAL EVERY 6 HOURS
Refills: 0 | Status: DISCONTINUED | OUTPATIENT
Start: 2022-04-27 | End: 2022-05-07

## 2022-04-27 RX ORDER — ATORVASTATIN CALCIUM 80 MG/1
20 TABLET, FILM COATED ORAL AT BEDTIME
Refills: 0 | Status: DISCONTINUED | OUTPATIENT
Start: 2022-04-27 | End: 2022-05-07

## 2022-04-27 RX ORDER — IPRATROPIUM/ALBUTEROL SULFATE 18-103MCG
3 AEROSOL WITH ADAPTER (GRAM) INHALATION ONCE
Refills: 0 | Status: COMPLETED | OUTPATIENT
Start: 2022-04-27 | End: 2022-04-27

## 2022-04-27 RX ORDER — PANTOPRAZOLE SODIUM 20 MG/1
40 TABLET, DELAYED RELEASE ORAL
Refills: 0 | Status: DISCONTINUED | OUTPATIENT
Start: 2022-04-27 | End: 2022-05-07

## 2022-04-27 RX ORDER — BUDESONIDE AND FORMOTEROL FUMARATE DIHYDRATE 160; 4.5 UG/1; UG/1
2 AEROSOL RESPIRATORY (INHALATION)
Refills: 0 | Status: DISCONTINUED | OUTPATIENT
Start: 2022-04-27 | End: 2022-05-07

## 2022-04-27 RX ADMIN — Medication 3 MILLILITER(S): at 16:23

## 2022-04-27 RX ADMIN — Medication 3 MILLILITER(S): at 17:08

## 2022-04-27 RX ADMIN — Medication 40 MILLIGRAM(S): at 22:46

## 2022-04-27 RX ADMIN — ATORVASTATIN CALCIUM 20 MILLIGRAM(S): 80 TABLET, FILM COATED ORAL at 22:47

## 2022-04-27 RX ADMIN — Medication 3 MILLILITER(S): at 16:39

## 2022-04-27 RX ADMIN — Medication 100 MILLIGRAM(S): at 22:46

## 2022-04-27 RX ADMIN — Medication 40 MILLIGRAM(S): at 16:39

## 2022-04-27 NOTE — ED ADULT NURSE NOTE - CHIEF COMPLAINT QUOTE
diff breathing D/c yesterday From [facility] COPD Pulm Fibrosis 02 sat 94 r/a   Worsening sob with exertion

## 2022-04-27 NOTE — ED ADULT NURSE NOTE - NSICDXPASTMEDICALHX_GEN_ALL_CORE_FT
PAST MEDICAL HISTORY:  Afib x 15 yrs --on Coumadin  attempted cardioversion 13 yrs ago--failed    Asthma     CAD (coronary artery disease)     CAD (Coronary Artery Disease)     CHF (congestive heart failure)     Coronary Stent to RCA, LAD, and DIAG 9/25/06 at [facility]    Emphysema/COPD     GERD (Gastroesophageal Reflux Disease)     H/O: CVA pt unaware of CVA, yet showed up on CT of head as old CVA    Hypercholesterolemia     Lip Cancer resected 6 yrs ago (no chemo/rad)    Melanoma     PUD (peptic ulcer disease)     Skin cancer     Skin Cancer of Face removed 1 yr ago    Skin Cancer of Nose 1 yr ago- removed    Smoker     
breastfeeding exclusively

## 2022-04-27 NOTE — ED ADULT TRIAGE NOTE - CHIEF COMPLAINT QUOTE
diff breathing D/c yesterday From [facility] COPD Pulm Fibrosis 02 sat 94 r/a   Worsening sob with exertion

## 2022-04-27 NOTE — ED PROVIDER NOTE - TEMPLATE, MLM
After Visit Summary   8/14/2018    Paulette Dorsey    MRN: [id number]           Patient Information     Date Of Birth          1954        Visit Information        Provider Department      8/14/2018 1:30 PM Tomasz Ahmadi [facility]        Today's Diagnoses     Sensorineural hearing loss, bilateral    -  1       Follow-ups after your visit        Who to contact     If you have questions or need follow up information about today's clinic visit or your schedule please contact [facility] directly at [phone].  Normal or non-critical lab and imaging results will be communicated to you by MyChart, letter or phone within 4 business days after the clinic has received the results. If you do not hear from us within 7 days, please contact the clinic through MyChart or phone. If you have a critical or abnormal lab result, we will notify you by phone as soon as possible.  Submit refill requests through [employer] or call your pharmacy and they will forward the refill request to us. Please allow 3 business days for your refill to be completed.          Additional Information About Your Visit        Care EveryWhere ID     This is your Care EveryWhere ID. This could be used by other organizations to access your [facility] medical records  [id number]         Blood Pressure from Last 3 Encounters:   04/24/14 129/87   01/09/14 125/77   11/28/12 116/65    Weight from Last 3 Encounters:   01/24/18 228 lb 6.4 oz (103.6 kg)   04/25/17 227 lb 6.4 oz (103.1 kg)   06/30/16 220 lb (99.8 kg)              We Performed the Following     HEARING AID CHECK/NO CHARGE        [facility] Provider Office Phone # Fax #    [facility] [phone]       [address] [address]        Equal Access to Services     DIEGO CASILLAS AH: Suzie rsoeo Soaye, waaxda luqadaha, qaybta kaalmada adeegyada, rudy carrillo. So [facility]  [phone].    ATENCIÓN: Si pollo mohr, tiene a betancourt disposición servicios gratuitos de asistencia lingüística. Alexi rob [phone].    We comply with applicable federal civil rights laws and [place] laws. We do not discriminate on the basis of race, color, national origin, age, disability, sex, sexual orientation, or gender identity.            Thank you!     Thank you for choosing [facility]  for your care. Our goal is always to provide you with excellent care. Hearing back from our patients is one way we can continue to improve our services. Please take a few minutes to complete the written survey that you may receive in the mail after your visit with us. Thank you!             Your Updated Medication List - Protect others around you: Learn how to safely use, store and throw away your medicines at www.disposemymeds.org.          This list is accurate as of 8/14/18  1:50 PM.  Always use your most recent med list.                   Brand Name Dispense Instructions for use Diagnosis    aspirin 81 MG tablet      1 TABLET DAILY        CYCLOBENZAPRINE HCL PO           fish oil-omega-3 fatty acids 1000 MG capsule      2 capsules daily        OMEPRAZOLE PO      Take  by mouth.        simvastatin 80 MG tablet    ZOCOR     1 TABLET EVERY EVENING        VITAMIN D (CHOLECALCIFEROL) PO      Take by mouth daily           Respiratory General

## 2022-04-27 NOTE — H&P ADULT - NSHPPHYSICALEXAM_GEN_ALL_CORE
PHYSICAL EXAMINATION:  Vital Signs Last 24 Hrs  T(C): 36.4 (27 Apr 2022 16:26), Max: 36.6 (27 Apr 2022 14:40)  T(F): 97.6 (27 Apr 2022 16:26), Max: 97.8 (27 Apr 2022 14:40)  HR: 90 (27 Apr 2022 18:36) (90 - 94)  BP: 100/69 (27 Apr 2022 18:36) (100/69 - 109/70)  BP(mean): --  RR: 20 (27 Apr 2022 18:36) (20 - 26)  SpO2: 93% (27 Apr 2022 18:36) (93% - 98%)  CAPILLARY BLOOD GLUCOSE          GENERAL: NAD, well-groomed, well-developed  HEAD:  atraumatic, normocephalic  EYES: sclera anicteric  ENMT: mucous membranes moist  NECK: supple, No JVD  CHEST/LUNG: rhonchi and wheezes to auscultation bilaterally   HEART: irregular S1, S2  ABDOMEN: BS+, soft, ND, NT   EXTREMITIES:  pulses palpable; no clubbing, cyanosis, or edema b/l LEs  NEURO: awake, alert, interactive; moves all extremities  SKIN: no rashes or lesions

## 2022-04-27 NOTE — PATIENT PROFILE ADULT - FUNCTIONAL ASSESSMENT - DAILY ACTIVITY SCORE.
Mom called   Pt has cough, congestion & fatigue. Appointment scheduled for 7pm [facility]. Mom wants to know if child can be seen earlier. 24

## 2022-04-27 NOTE — ED ADULT NURSE REASSESSMENT NOTE - NS ED NURSE REASSESS COMMENT FT1
Pt provided with hot meal tray and egg salad sandwich. Pt expresses no needs at this time, appears comfortable in stretcher, NAD, daughter at bedside.

## 2022-04-27 NOTE — ED ADULT NURSE NOTE - OBJECTIVE STATEMENT
pt is an 82yo female PMH of COPD and heart disease presenting to the ED complaining of difficulty breathing. pt states she has been experiencing difficulty breathing "for as long as I can remember", however it has "never been this bad". pt states she came to the ED last friday for the same problem and was admitted, discharged yesterday. pt A&Ox3 gross neuro intact, no difficulty speaking in complete sentences, s1s2 heart sounds heard, pulses x 4, bundy x4, abdomen soft nontender nondistended, skin intact. pt denies chest pain, ha, n/v/d, abdominal pain, f/c, urinary symptoms, hematuria. pt is an 80yo female PMH of COPD and heart disease presenting to the ED complaining of difficulty breathing. pt states she has been experiencing difficulty breathing "for as long as I can remember", however it has "never been this bad". pt states she came to the ED last friday for the same problem and was admitted, discharged yesterday. pt describes feeling some chest discomfort as a result of frequent coughing. pt A&Ox3 gross neuro intact, no difficulty speaking in complete sentences, s1s2 heart sounds heard, pulses x 4, bundy x4, abdomen soft nontender nondistended, skin intact. pt denies chest pain, ha, n/v/d, abdominal pain, f/c, urinary symptoms, hematuria. pt is an 82yo female PMH of COPD and heart disease presenting to the ED complaining of difficulty breathing. pt states she has been experiencing difficulty breathing "for as long as I can remember", however it has "never been this bad". pt states she came to the ED last friday for the same problem and was admitted, discharged yesterday. pt describes feeling some chest discomfort as a result of frequent coughing. pt A&Ox3 gross neuro intact, wheezes heard on bilateral lungs upo auscultation no difficulty speaking in complete sentences, s1s2 heart sounds heard, pulses x 4, bundy x4, abdomen soft nontender nondistended, skin intact. pt denies chest pain, ha, n/v/d, abdominal pain, f/c, urinary symptoms, hematuria.

## 2022-04-27 NOTE — PATIENT PROFILE ADULT - FALL HARM RISK - HARM RISK INTERVENTIONS
Communicate Risk of Fall with Harm to all staff/Monitor for mental status changes/Monitor gait and stability/Reinforce activity limits and safety measures with patient and family/Reorient to person, place and time as needed/Review medications for side effects contributing to fall risk/Tailored Fall Risk Interventions/Visual Cue: Yellow wristband and red socks/Bed in lowest position, wheels locked, appropriate side rails in place/Call bell, personal items and telephone in reach/Instruct patient to call for assistance before getting out of bed or chair/Non-slip footwear when patient is out of bed/[place] to call system/Physically safe environment - no spills, clutter or unnecessary equipment/Purposeful Proactive Rounding/Room/bathroom lighting operational, light cord in reach

## 2022-04-27 NOTE — H&P ADULT - HISTORY OF PRESENT ILLNESS
80 yo female with pmhx COPD(not on home O2), CAD s/p PCI, A.fib on eliquis, HTN, presenting with progressive cough and exertional SOB. Patient recently discharged yesterday for COPD exacerbation, s/p steroids and diuresis. Since being home, has had progressive SOB, with cough, worsening today. Came to ED for further evaluation.  	Denies fevers, N/V, LOC.

## 2022-04-27 NOTE — ED PROVIDER NOTE - CLINICAL SUMMARY MEDICAL DECISION MAKING FREE TEXT BOX
COPD(not on home O2), CAD s/p PCI, A.fib on eliquis, HTN, p/w progressive dyspnea and productive cough. suggestive of persisting copd exacerbation, will obtain labs, vbg, cxr, will give duonebs, steroids, will likely need to be re-admitted for no improvement of sx.

## 2022-04-27 NOTE — H&P ADULT - NSHPLABSRESULTS_GEN_ALL_CORE
13.2   14.04 )-----------( 285      ( 27 Apr 2022 17:07 )             40.7       04-27    137  |  97  |  27<H>  ----------------------------<  191<H>  4.2   |  25  |  0.92    Ca    9.6      27 Apr 2022 17:07    TPro  6.8  /  Alb  4.4  /  TBili  0.4  /  DBili  x   /  AST  18  /  ALT  31  /  AlkPhos  65  04-27                      Lactate Trend            CAPILLARY BLOOD GLUCOSE      POCT Blood Glucose.: 174 mg/dL (26 Apr 2022 11:54)        Culture Results:   No growth to date. (04-22 @ 23:17)  Culture Results:   No growth to date. (04-22 @ 21:59)  Culture Results:   No growth (04-22 @ 21:57)  Entero/Rhinovirus (RapRVP): Detected (04.27.22 @ 17:07)   IMPRESSION:    No focal consolidation.    EKG A Fib NSST/T

## 2022-04-27 NOTE — H&P ADULT - NSHPREVIEWOFSYSTEMS_GEN_ALL_CORE
REVIEW OF SYSTEMS:    CONSTITUTIONAL: No weakness, fevers or chills  EYES/ENT: No visual changes;  No vertigo or throat pain   NECK: No pain or stiffness  RESPIRATORY: + cough, + wheezing, no hemoptysis; + shortness of breath  CARDIOVASCULAR: No chest pain or palpitations  GASTROINTESTINAL: No abdominal or epigastric pain. No nausea, vomiting, or hematemesis; No diarrhea or constipation. No melena or hematochezia.  GENITOURINARY: No dysuria, frequency or hematuria  NEUROLOGICAL: No numbness or weakness  SKIN: No itching, burning, rashes, or lesions   All other review of systems is negative unless indicated above.

## 2022-04-27 NOTE — H&P ADULT - ASSESSMENT
81F active smoker w/ PMH COPD(not on home O2), CAD s/p PCI, A.fib on eliquis, HTN, p/w progressive dyspnea and productive cough. Likely COPD exacerbation    COPD exacerbation.  - suspect entero/rhinovirus  - solumedrol 40mg IV   - c/w duonebs q6 prn  - c/w symbicort BID  - continuous pulse Ox monitoring; supplemental O2 prn  - encourage smoking cessation  - Pulmonary evaluation Dr Pan    Chronic atrial fibrillation.  - rate-control with verapamil  - eliquis BID for stroke ppx.    CAD S/P percutaneous coronary angioplasty.  - c/w eliquis Bid  - c/w statin.  - cardiology evaluation dr. Cho    Prophylactic measure.   - Diet: DASH/TLC  - DVT ppx: eliquis BiD.    Further action as per clinical course     d/w patient and daughter at bedside    Yash Tim MD phone [phone]

## 2022-04-27 NOTE — H&P ADULT - NSHPSOCIALHISTORY_GEN_ALL_CORE
Social History:    Marital Status:  (   )    (   ) Single    (   )    ( x )   Occupation:   Lives with: (  ) alone  ( x ) children   (  ) spouse   (  ) parents  (  ) other    Substance Use (street drugs): (  x) never used  (  ) other:  Tobacco Usage:  (   ) never smoked   (  x ) former smoker   (   ) current smoker  (     ) pack years  (        ) last cigarette date  Alcohol Usage: denies    (     ) Advanced Directives: (     ) None    (      ) DNR    (     ) DNI    (     ) Health Care Proxy:

## 2022-04-27 NOTE — ED PROVIDER NOTE - ATTENDING CONTRIBUTION TO CARE
Attending MD Gusman:  I personally have seen and examined this patient. I have performed a substantive portion of the visit including all aspects of the medical decision making.  Resident note reviewed and agree on plan of care and except where noted.        81F with COPD, CHF, AFIB presenting for dyspnea, was just discharged from hospital 1 day prior for COPD exacerbation. patient on exam is slightly tachypneic, O2 sat RA 94%, diminished aeration with scattered rhonchi diffusely. Likely ongoing COPD exacerbation but could be multifactorial. Plan for labs, CXR, initiate bronchodilators, ECG, re-admission          *The above represents an initial assessment/impression. Please refer to progress notes for potential changes in patient clinical course*

## 2022-04-27 NOTE — ED ADULT NURSE NOTE - BEFAST SPEECH
Patient has been out on the unit, pleasant, calm, and cooperative. Patient has been watching television and socializing with other patients. Patient denies all. Patient accepted evening medication without issue. Patient is encouraged to continue to work towards discharge goal by complying with medications, attending groups and to seek staff if feelings are overwhelming. Environmental rounds completed per unit policy to maintain safety of everyone on the unit. Staff will offer support and interventions as [facility] or required. Yes

## 2022-04-27 NOTE — ED PROVIDER NOTE - NSICDXPASTMEDICALHX_GEN_ALL_CORE_FT
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

## 2022-04-28 ENCOUNTER — APPOINTMENT (OUTPATIENT)
Dept: PULMONOLOGY | Facility: CLINIC | Age: 82
End: 2022-04-28

## 2022-04-28 LAB
ANION GAP SERPL CALC-SCNC: 19 MMOL/L — HIGH (ref 5–17)
BUN SERPL-MCNC: 24 MG/DL — HIGH (ref 7–23)
CALCIUM SERPL-MCNC: 9.4 MG/DL — SIGNIFICANT CHANGE UP (ref 8.4–10.5)
CHLORIDE SERPL-SCNC: 101 MMOL/L — SIGNIFICANT CHANGE UP (ref 96–108)
CO2 SERPL-SCNC: 18 MMOL/L — LOW (ref 22–31)
CREAT SERPL-MCNC: 0.76 MG/DL — SIGNIFICANT CHANGE UP (ref 0.5–1.3)
CULTURE RESULTS: SIGNIFICANT CHANGE UP
EGFR: 79 ML/MIN/1.73M2 — SIGNIFICANT CHANGE UP
GLUCOSE SERPL-MCNC: 145 MG/DL — HIGH (ref 70–99)
HCT VFR BLD CALC: 38.7 % — SIGNIFICANT CHANGE UP (ref 34.5–45)
HGB BLD-MCNC: 12.4 G/DL — SIGNIFICANT CHANGE UP (ref 11.5–15.5)
MCHC RBC-ENTMCNC: 32 GM/DL — SIGNIFICANT CHANGE UP (ref 32–36)
MCHC RBC-ENTMCNC: 34.2 PG — HIGH (ref 27–34)
MCV RBC AUTO: 106.6 FL — HIGH (ref 80–100)
NRBC # BLD: 0 /100 WBCS — SIGNIFICANT CHANGE UP (ref 0–0)
PLATELET # BLD AUTO: SIGNIFICANT CHANGE UP (ref 150–400)
POTASSIUM SERPL-MCNC: 5.1 MMOL/L — SIGNIFICANT CHANGE UP (ref 3.5–5.3)
POTASSIUM SERPL-SCNC: 5.1 MMOL/L — SIGNIFICANT CHANGE UP (ref 3.5–5.3)
RBC # BLD: 3.63 M/UL — LOW (ref 3.8–5.2)
RBC # FLD: 14.8 % — HIGH (ref 10.3–14.5)
SODIUM SERPL-SCNC: 138 MMOL/L — SIGNIFICANT CHANGE UP (ref 135–145)
SPECIMEN SOURCE: SIGNIFICANT CHANGE UP
WBC # BLD: 10.43 K/UL — SIGNIFICANT CHANGE UP (ref 3.8–10.5)
WBC # FLD AUTO: 10.43 K/UL — SIGNIFICANT CHANGE UP (ref 3.8–10.5)

## 2022-04-28 PROCEDURE — 99223 1ST HOSP IP/OBS HIGH 75: CPT

## 2022-04-28 RX ADMIN — Medication 3 MILLILITER(S): at 11:35

## 2022-04-28 RX ADMIN — PANTOPRAZOLE SODIUM 40 MILLIGRAM(S): 20 TABLET, DELAYED RELEASE ORAL at 05:45

## 2022-04-28 RX ADMIN — Medication 40 MILLIGRAM(S): at 05:45

## 2022-04-28 RX ADMIN — Medication 650 MILLIGRAM(S): at 14:05

## 2022-04-28 RX ADMIN — Medication 100 MILLIGRAM(S): at 05:47

## 2022-04-28 RX ADMIN — Medication 100 MILLIGRAM(S): at 13:19

## 2022-04-28 RX ADMIN — BUDESONIDE AND FORMOTEROL FUMARATE DIHYDRATE 2 PUFF(S): 160; 4.5 AEROSOL RESPIRATORY (INHALATION) at 17:51

## 2022-04-28 RX ADMIN — Medication 3 MILLILITER(S): at 00:53

## 2022-04-28 RX ADMIN — Medication 240 MILLIGRAM(S): at 05:45

## 2022-04-28 RX ADMIN — Medication 40 MILLIGRAM(S): at 23:54

## 2022-04-28 RX ADMIN — ATORVASTATIN CALCIUM 20 MILLIGRAM(S): 80 TABLET, FILM COATED ORAL at 23:55

## 2022-04-28 RX ADMIN — Medication 40 MILLIGRAM(S): at 13:18

## 2022-04-28 RX ADMIN — Medication 650 MILLIGRAM(S): at 13:18

## 2022-04-28 RX ADMIN — Medication 3 MILLILITER(S): at 17:51

## 2022-04-28 RX ADMIN — Medication 3 MILLILITER(S): at 23:55

## 2022-04-28 RX ADMIN — BUDESONIDE AND FORMOTEROL FUMARATE DIHYDRATE 2 PUFF(S): 160; 4.5 AEROSOL RESPIRATORY (INHALATION) at 06:28

## 2022-04-28 RX ADMIN — Medication 3 MILLILITER(S): at 05:47

## 2022-04-28 RX ADMIN — Medication 100 MILLIGRAM(S): at 23:55

## 2022-04-28 NOTE — PROGRESS NOTE ADULT - SUBJECTIVE AND OBJECTIVE BOX
Patient is a 81y old  Female who presents with a chief complaint of     SUBJECTIVE / OVERNIGHT EVENTS: Comfortable without new complaints.   Review of Systems  chest pain no  palpitations no  sob no  nausea no  headache no    MEDICATIONS  (STANDING):  albuterol/ipratropium for Nebulization 3 milliLiter(s) Nebulizer every 6 hours  atorvastatin 20 milliGRAM(s) Oral at bedtime  benzonatate 100 milliGRAM(s) Oral three times a day  budesonide 160 MICROgram(s)/formoterol 4.5 MICROgram(s) Inhaler 2 Puff(s) Inhalation two times a day  methylPREDNISolone sodium succinate Injectable 40 milliGRAM(s) IV Push every 8 hours  pantoprazole    Tablet 40 milliGRAM(s) Oral before breakfast  verapamil  milliGRAM(s) Oral daily    MEDICATIONS  (PRN):  acetaminophen     Tablet .. 650 milliGRAM(s) Oral every 6 hours PRN Temp greater or equal to 38.5C (101.3F), Mild Pain (1 - 3)  aluminum hydroxide/magnesium hydroxide/simethicone Suspension 30 milliLiter(s) Oral every 4 hours PRN Dyspepsia  melatonin 3 milliGRAM(s) Oral at bedtime PRN Sleep      Vital Signs Last 24 Hrs  T(C): 36.7 (28 Apr 2022 13:07), Max: 36.7 (27 Apr 2022 22:20)  T(F): 98 (28 Apr 2022 13:07), Max: 98.1 (27 Apr 2022 22:20)  HR: 64 (28 Apr 2022 13:07) (64 - 94)  BP: 118/74 (28 Apr 2022 13:07) (99/61 - 118/74)  BP(mean): --  RR: 18 (28 Apr 2022 13:07) (18 - 18)  SpO2: 97% (28 Apr 2022 13:07) (93% - 97%)    PHYSICAL EXAM:  GENERAL: NAD, well-developed  HEAD:  Atraumatic, Normocephalic  EYES: EOMI, PERRLA, conjunctiva and sclera clear  NECK: Supple, No JVD  CHEST/LUNG: rhonchi and wheezes to auscultation bilaterally   HEART: Regular rate and rhythm; No murmurs, rubs, or gallops  ABDOMEN: Soft, Nontender, Nondistended; Bowel sounds present  EXTREMITIES:  2+ Peripheral Pulses, No clubbing, cyanosis, or edema  PSYCH: AAOx3  NEUROLOGY: non-focal  SKIN: No rashes or lesions    LABS:                        See note  See Note )-----------( See note    ( 28 Apr 2022 07:38 )             See note    04-28    138  |  101  |  24<H>  ----------------------------<  145<H>  5.1   |  18<L>  |  0.76    Ca    9.4      28 Apr 2022 07:38    TPro  6.8  /  Alb  4.4  /  TBili  0.4  /  DBili  x   /  AST  18  /  ALT  31  /  AlkPhos  65  04-27                RADIOLOGY & ADDITIONAL TESTS:    Imaging Personally Reviewed:    Consultant(s) Notes Reviewed:      Care Discussed with Consultants/Other Providers:

## 2022-04-28 NOTE — PROGRESS NOTE ADULT - SUBJECTIVE AND OBJECTIVE BOX
PULMONARY PROGRESS NOTE    RECORD, JANA  MRN-[id number]    Patient is a 81y old  Female who presents with a chief complaint of     HPI:  -recently discharged with copd exacerbation from viral infection  -returned to hospital bc coughing up green sputum, coughing still and sob    ROS:   -    ACTIVE MEDICATION LIST:  MEDICATIONS  (STANDING):  albuterol/ipratropium for Nebulization 3 milliLiter(s) Nebulizer every 6 hours  atorvastatin 20 milliGRAM(s) Oral at bedtime  benzonatate 100 milliGRAM(s) Oral three times a day  budesonide 160 MICROgram(s)/formoterol 4.5 MICROgram(s) Inhaler 2 Puff(s) Inhalation two times a day  methylPREDNISolone sodium succinate Injectable 40 milliGRAM(s) IV Push every 8 hours  pantoprazole    Tablet 40 milliGRAM(s) Oral before breakfast  verapamil  milliGRAM(s) Oral daily    MEDICATIONS  (PRN):  acetaminophen     Tablet .. 650 milliGRAM(s) Oral every 6 hours PRN Temp greater or equal to 38.5C (101.3F), Mild Pain (1 - 3)  aluminum hydroxide/magnesium hydroxide/simethicone Suspension 30 milliLiter(s) Oral every 4 hours PRN Dyspepsia  melatonin 3 milliGRAM(s) Oral at bedtime PRN Sleep      EXAM:  Vital Signs Last 24 Hrs  T(C): 36.7 (28 Apr 2022 13:07), Max: 36.7 (27 Apr 2022 22:20)  T(F): 98 (28 Apr 2022 13:07), Max: 98.1 (27 Apr 2022 22:20)  HR: 64 (28 Apr 2022 13:07) (64 - 94)  BP: 118/74 (28 Apr 2022 13:07) (99/61 - 118/74)  BP(mean): --  RR: 18 (28 Apr 2022 13:07) (18 - 26)  SpO2: 97% (28 Apr 2022 13:07) (93% - 98%)    GENERAL: The patient is awake and alert in no apparent distress.     LUNGS: wheeze        LABS/IMAGING: reviewed                        See note  See Note )-----------( See note    ( 28 Apr 2022 07:38 )             See note    04-28    138  |  101  |  24<H>  ----------------------------<  145<H>  5.1   |  18<L>  |  0.76    Ca    9.4      28 Apr 2022 07:38    TPro  6.8  /  Alb  4.4  /  TBili  0.4  /  DBili  x   /  AST  18  /  ALT  31  /  AlkPhos  65  04-27    < from: Xray Chest 1 View- PORTABLE-Urgent (Xray Chest 1 View- PORTABLE-Urgent .) (04.27.22 @ 16:11) >    ACC: [id number] EXAM:  XR CHEST PORTABLE URGENT 1V                          PROCEDURE DATE:  04/27/2022          INTERPRETATION:  CLINICAL INFORMATION: Dyspnea    TECHNIQUE: Frontal radiograph of the chest    COMPARISON: Chest radiograph  1/25/2015    FINDINGS:    No focal consolidation. No pleural effusion. No pneumothorax. Cardiac   silhouette size cannot be accurately assessed on this projection. No   acute osseous findings.    IMPRESSION:    No focal consolidation.    --- End of Report ---          KAMINI WEBB MD; Resident Radiologist  This document has been electronically signed.  THONY DARBY MD; Attending Radiologist  This document has been electronic    < end of copied text >        PROBLEM LIST:  81y Female with HEALTH ISSUES - PROBLEM Dx:  COPD exacerbation    RECS:  -cont symbicort, duonebs  -solumedrol 40 q8  -supplemental O2 wean as tolerated  -incentive benny  -cough syrup  -cards fu      Saloni Pan MD   [phone]

## 2022-04-28 NOTE — PROGRESS NOTE ADULT - SUBJECTIVE AND OBJECTIVE BOX
Cardiovascular Disease Progress Note    Overnight events: No acute events overnight.  readmitted for sob. no cp/palps/dizziness  Otherwise review of systems negative    Objective Findings:  T(C): 36.6 (04-28-22 @ 05:29), Max: 36.7 (04-27-22 @ 22:20)  HR: 85 (04-28-22 @ 05:29) (85 - 94)  BP: 99/61 (04-28-22 @ 05:29) (99/61 - 117/75)  RR: 18 (04-28-22 @ 05:29) (18 - 26)  SpO2: 94% (04-28-22 @ 05:29) (93% - 98%)  Wt(kg): --  Daily Height in cm: 152.4 (27 Apr 2022 14:40)    Daily       Physical Exam:  Gen: NAD  HEENT: EOMI  CV: RRR, normal S1 + S2, no m/r/g  Lungs: CTAB  Abd: soft, non-tender  Ext: No edema    Telemetry:    Laboratory Data:                        13.2   14.04 )-----------( 285      ( 27 Apr 2022 17:07 )             40.7     04-27    137  |  97  |  27<H>  ----------------------------<  191<H>  4.2   |  25  |  0.92    Ca    9.6      27 Apr 2022 17:07    TPro  6.8  /  Alb  4.4  /  TBili  0.4  /  DBili  x   /  AST  18  /  ALT  31  /  AlkPhos  65  04-27              Inpatient Medications:  MEDICATIONS  (STANDING):  albuterol/ipratropium for Nebulization 3 milliLiter(s) Nebulizer every 6 hours  atorvastatin 20 milliGRAM(s) Oral at bedtime  benzonatate 100 milliGRAM(s) Oral three times a day  budesonide 160 MICROgram(s)/formoterol 4.5 MICROgram(s) Inhaler 2 Puff(s) Inhalation two times a day  methylPREDNISolone sodium succinate Injectable 40 milliGRAM(s) IV Push every 8 hours  pantoprazole    Tablet 40 milliGRAM(s) Oral before breakfast  verapamil  milliGRAM(s) Oral daily      Assessment:  RVP  copd exac  persistent afib  cad s/p pci  sob    Recs:  cardiac stable  no e/o acs or chf at present  cw rate control meds and AC for afib  cw lasix 20mg po qd (elevated BNP, without clinical signs of hypervolemia)  cw steroids and bd per pulm  s/p ct chest on recent admission. results noted  2d echo without acute findings  will follow          Over 25 minutes spent on total encounter; more than 50% of the visit was spent counseling and/or coordinating care by the attending physician.      Bill Cho MD   Cardiovascular Disease  [phone]

## 2022-04-29 PROCEDURE — 99232 SBSQ HOSP IP/OBS MODERATE 35: CPT

## 2022-04-29 RX ORDER — APIXABAN 2.5 MG/1
2.5 TABLET, FILM COATED ORAL
Refills: 0 | Status: DISCONTINUED | OUTPATIENT
Start: 2022-04-29 | End: 2022-05-07

## 2022-04-29 RX ADMIN — BUDESONIDE AND FORMOTEROL FUMARATE DIHYDRATE 2 PUFF(S): 160; 4.5 AEROSOL RESPIRATORY (INHALATION) at 06:42

## 2022-04-29 RX ADMIN — BUDESONIDE AND FORMOTEROL FUMARATE DIHYDRATE 2 PUFF(S): 160; 4.5 AEROSOL RESPIRATORY (INHALATION) at 17:03

## 2022-04-29 RX ADMIN — Medication 40 MILLIGRAM(S): at 12:35

## 2022-04-29 RX ADMIN — Medication 40 MILLIGRAM(S): at 06:43

## 2022-04-29 RX ADMIN — Medication 100 MILLIGRAM(S): at 06:42

## 2022-04-29 RX ADMIN — ATORVASTATIN CALCIUM 20 MILLIGRAM(S): 80 TABLET, FILM COATED ORAL at 21:24

## 2022-04-29 RX ADMIN — Medication 100 MILLIGRAM(S): at 12:35

## 2022-04-29 RX ADMIN — Medication 5 MILLIGRAM(S): at 00:27

## 2022-04-29 RX ADMIN — Medication 3 MILLILITER(S): at 06:42

## 2022-04-29 RX ADMIN — Medication 3 MILLIGRAM(S): at 22:15

## 2022-04-29 RX ADMIN — Medication 3 MILLILITER(S): at 17:03

## 2022-04-29 RX ADMIN — Medication 40 MILLIGRAM(S): at 21:24

## 2022-04-29 RX ADMIN — PANTOPRAZOLE SODIUM 40 MILLIGRAM(S): 20 TABLET, DELAYED RELEASE ORAL at 06:42

## 2022-04-29 RX ADMIN — Medication 240 MILLIGRAM(S): at 06:43

## 2022-04-29 RX ADMIN — Medication 3 MILLILITER(S): at 22:47

## 2022-04-29 RX ADMIN — Medication 3 MILLILITER(S): at 12:35

## 2022-04-29 RX ADMIN — Medication 100 MILLIGRAM(S): at 21:24

## 2022-04-29 RX ADMIN — Medication 3 MILLIGRAM(S): at 00:27

## 2022-04-29 NOTE — PROGRESS NOTE ADULT - SUBJECTIVE AND OBJECTIVE BOX
Patient is a 81y old  Female who presents with a chief complaint of     SUBJECTIVE / OVERNIGHT EVENTS: feels better  Review of Systems  chest pain no  palpitations no  sob improving   nausea no  headache no    MEDICATIONS  (STANDING):  albuterol/ipratropium for Nebulization 3 milliLiter(s) Nebulizer every 6 hours  atorvastatin 20 milliGRAM(s) Oral at bedtime  benzonatate 100 milliGRAM(s) Oral three times a day  bisacodyl Suppository 10 milliGRAM(s) Rectal once  budesonide 160 MICROgram(s)/formoterol 4.5 MICROgram(s) Inhaler 2 Puff(s) Inhalation two times a day  methylPREDNISolone sodium succinate Injectable 40 milliGRAM(s) IV Push every 8 hours  pantoprazole    Tablet 40 milliGRAM(s) Oral before breakfast  verapamil  milliGRAM(s) Oral daily    MEDICATIONS  (PRN):  acetaminophen     Tablet .. 650 milliGRAM(s) Oral every 6 hours PRN Temp greater or equal to 38.5C (101.3F), Mild Pain (1 - 3)  aluminum hydroxide/magnesium hydroxide/simethicone Suspension 30 milliLiter(s) Oral every 4 hours PRN Dyspepsia  melatonin 3 milliGRAM(s) Oral at bedtime PRN Sleep      Vital Signs Last 24 Hrs  T(C): 36.9 (29 Apr 2022 20:03), Max: 37 (28 Apr 2022 23:04)  T(F): 98.5 (29 Apr 2022 20:03), Max: 98.6 (28 Apr 2022 23:04)  HR: 102 (29 Apr 2022 20:03) (98 - 108)  BP: 132/78 (29 Apr 2022 20:03) (101/62 - 135/67)  BP(mean): --  RR: 18 (29 Apr 2022 20:03) (17 - 18)  SpO2: 96% (29 Apr 2022 20:03) (95% - 97%)    PHYSICAL EXAM:  GENERAL: NAD, well-developed  HEAD:  Atraumatic, Normocephalic  EYES: EOMI, PERRLA, conjunctiva and sclera clear  NECK: Supple, No JVD  CHEST/LUNG: few rhonchi and wheezes auscultation bilaterally   HEART: Regular rate and rhythm; No murmurs, rubs, or gallops  ABDOMEN: Soft, Nontender, Nondistended; Bowel sounds present  EXTREMITIES:  2+ Peripheral Pulses, No clubbing, cyanosis, or edema  PSYCH: AAOx3  NEUROLOGY: non-focal  SKIN: No rashes or lesions    LABS:                        See note  See Note )-----------( See note    ( 28 Apr 2022 07:38 )             See note    04-28    138  |  101  |  24<H>  ----------------------------<  145<H>  5.1   |  18<L>  |  0.76    Ca    9.4      28 Apr 2022 07:38                  RADIOLOGY & ADDITIONAL TESTS:    Imaging Personally Reviewed:    Consultant(s) Notes Reviewed:      Care Discussed with Consultants/Other Providers:

## 2022-04-29 NOTE — PROGRESS NOTE ADULT - SUBJECTIVE AND OBJECTIVE BOX
Cardiovascular Disease Progress Note    Overnight events: No acute events overnight.  + cough. no cp/sob/palps/dizziness/edema  Otherwise review of systems negative    Objective Findings:  T(C): 37 (04-28-22 @ 23:04), Max: 37 (04-28-22 @ 23:04)  HR: 100 (04-28-22 @ 23:04) (64 - 100)  BP: 109/72 (04-28-22 @ 23:04) (101/62 - 118/74)  RR: 18 (04-28-22 @ 23:04) (18 - 18)  SpO2: 97% (04-28-22 @ 23:04) (96% - 97%)  Wt(kg): --  Daily     Daily       Physical Exam:  Gen: NAD  HEENT: EOMI  CV: RRR, normal S1 + S2, no m/r/g  Lungs: CTAB  Abd: soft, non-tender  Ext: No edema    Telemetry:    Laboratory Data:                        See note  See Note )-----------( See note    ( 28 Apr 2022 07:38 )             See note    04-28    138  |  101  |  24<H>  ----------------------------<  145<H>  5.1   |  18<L>  |  0.76    Ca    9.4      28 Apr 2022 07:38    TPro  6.8  /  Alb  4.4  /  TBili  0.4  /  DBili  x   /  AST  18  /  ALT  31  /  AlkPhos  65  04-27              Inpatient Medications:  MEDICATIONS  (STANDING):  albuterol/ipratropium for Nebulization 3 milliLiter(s) Nebulizer every 6 hours  atorvastatin 20 milliGRAM(s) Oral at bedtime  benzonatate 100 milliGRAM(s) Oral three times a day  bisacodyl Suppository 10 milliGRAM(s) Rectal once  budesonide 160 MICROgram(s)/formoterol 4.5 MICROgram(s) Inhaler 2 Puff(s) Inhalation two times a day  methylPREDNISolone sodium succinate Injectable 40 milliGRAM(s) IV Push every 8 hours  pantoprazole    Tablet 40 milliGRAM(s) Oral before breakfast  verapamil  milliGRAM(s) Oral daily      Assessment:  RVP  copd exac  persistent afib  cad s/p pci  sob    Recs:  cardiac stable  no e/o acs or chf at present  cw rate control meds and AC for afib  cw lasix 20mg po qd (elevated BNP, without clinical signs of hypervolemia)  cw steroids, supportive care and bd per pulm  s/p ct chest on recent admission. results noted  2d echo without acute findings  will follow          Over 25 minutes spent on total encounter; more than 50% of the visit was spent counseling and/or coordinating care by the attending physician.      Bill Cho MD   Cardiovascular Disease  [phone]

## 2022-04-29 NOTE — PROGRESS NOTE ADULT - SUBJECTIVE AND OBJECTIVE BOX
PULMONARY PROGRESS NOTE    RECORD, JANA  MRN-[id number]    Patient is a 81y old  Female who presents with a chief complaint of     HPI:  breathing comfortably      ROS:   -    MEDICATIONS  (STANDING):  albuterol/ipratropium for Nebulization 3 milliLiter(s) Nebulizer every 6 hours  atorvastatin 20 milliGRAM(s) Oral at bedtime  benzonatate 100 milliGRAM(s) Oral three times a day  bisacodyl Suppository 10 milliGRAM(s) Rectal once  budesonide 160 MICROgram(s)/formoterol 4.5 MICROgram(s) Inhaler 2 Puff(s) Inhalation two times a day  methylPREDNISolone sodium succinate Injectable 40 milliGRAM(s) IV Push every 8 hours  pantoprazole    Tablet 40 milliGRAM(s) Oral before breakfast  verapamil  milliGRAM(s) Oral daily    MEDICATIONS  (PRN):  acetaminophen     Tablet .. 650 milliGRAM(s) Oral every 6 hours PRN Temp greater or equal to 38.5C (101.3F), Mild Pain (1 - 3)  aluminum hydroxide/magnesium hydroxide/simethicone Suspension 30 milliLiter(s) Oral every 4 hours PRN Dyspepsia  melatonin 3 milliGRAM(s) Oral at bedtime PRN Sleep    EXAM:  Vital Signs Last 24 Hrs  T(C): 37 (28 Apr 2022 23:04), Max: 37 (28 Apr 2022 23:04)  T(F): 98.6 (28 Apr 2022 23:04), Max: 98.6 (28 Apr 2022 23:04)  HR: 100 (28 Apr 2022 23:04) (64 - 100)  BP: 109/72 (28 Apr 2022 23:04) (101/62 - 118/74)  BP(mean): --  RR: 18 (28 Apr 2022 23:04) (18 - 18)  SpO2: 97% (28 Apr 2022 23:04) (96% - 97%)    GENERAL: The patient is awake and alert in no apparent distress.     LUNGS: wheeze        LABS/IMAGING: reviewed                                   See note  See Note )-----------( See note    ( 28 Apr 2022 07:38 )             See note  04-28    138  |  101  |  24<H>  ----------------------------<  145<H>  5.1   |  18<L>  |  0.76    Ca    9.4      28 Apr 2022 07:38    TPro  6.8  /  Alb  4.4  /  TBili  0.4  /  DBili  x   /  AST  18  /  ALT  31  /  AlkPhos  65  04-27      < from: Xray Chest 1 View- PORTABLE-Urgent (Xray Chest 1 View- PORTABLE-Urgent .) (04.27.22 @ 16:11) >    ACC: [id number] EXAM:  XR CHEST PORTABLE URGENT 1V                          PROCEDURE DATE:  04/27/2022          INTERPRETATION:  CLINICAL INFORMATION: Dyspnea    TECHNIQUE: Frontal radiograph of the chest    COMPARISON: Chest radiograph  1/25/2015    FINDINGS:    No focal consolidation. No pleural effusion. No pneumothorax. Cardiac   silhouette size cannot be accurately assessed on this projection. No   acute osseous findings.    IMPRESSION:    No focal consolidation.    --- End of Report ---          KAMINI WEBB MD; Resident Radiologist  This document has been electronically signed.  THONY DARBY MD; Attending Radiologist  This document has been electronic    < end of copied text >        PROBLEM LIST:  81y Female with HEALTH ISSUES - PROBLEM Dx:  COPD exacerbation    RECS:  -cont symbicort, duonebs  -solumedrol 40 q8  -supplemental O2 wean as tolerated  -incentive benny  -cough syrup  -cards fu      Saloni Pan MD   [phone]

## 2022-04-30 LAB
ANION GAP SERPL CALC-SCNC: 16 MMOL/L — SIGNIFICANT CHANGE UP (ref 5–17)
BASOPHILS # BLD AUTO: 0.03 K/UL — SIGNIFICANT CHANGE UP (ref 0–0.2)
BASOPHILS NFR BLD AUTO: 0.2 % — SIGNIFICANT CHANGE UP (ref 0–2)
BUN SERPL-MCNC: 30 MG/DL — HIGH (ref 7–23)
CALCIUM SERPL-MCNC: 9.4 MG/DL — SIGNIFICANT CHANGE UP (ref 8.4–10.5)
CHLORIDE SERPL-SCNC: 101 MMOL/L — SIGNIFICANT CHANGE UP (ref 96–108)
CO2 SERPL-SCNC: 23 MMOL/L — SIGNIFICANT CHANGE UP (ref 22–31)
CREAT SERPL-MCNC: 0.88 MG/DL — SIGNIFICANT CHANGE UP (ref 0.5–1.3)
EGFR: 66 ML/MIN/1.73M2 — SIGNIFICANT CHANGE UP
EOSINOPHIL # BLD AUTO: 0.02 K/UL — SIGNIFICANT CHANGE UP (ref 0–0.5)
EOSINOPHIL NFR BLD AUTO: 0.1 % — SIGNIFICANT CHANGE UP (ref 0–6)
GLUCOSE SERPL-MCNC: 261 MG/DL — HIGH (ref 70–99)
HCT VFR BLD CALC: 33.5 % — LOW (ref 34.5–45)
HCT VFR BLD CALC: 33.7 % — LOW (ref 34.5–45)
HGB BLD-MCNC: 10.9 G/DL — LOW (ref 11.5–15.5)
HGB BLD-MCNC: 11.1 G/DL — LOW (ref 11.5–15.5)
IMM GRANULOCYTES NFR BLD AUTO: 4.1 % — HIGH (ref 0–1.5)
LYMPHOCYTES # BLD AUTO: 0.78 K/UL — LOW (ref 1–3.3)
LYMPHOCYTES # BLD AUTO: 4.6 % — LOW (ref 13–44)
MCHC RBC-ENTMCNC: 32.3 GM/DL — SIGNIFICANT CHANGE UP (ref 32–36)
MCHC RBC-ENTMCNC: 33.1 GM/DL — SIGNIFICANT CHANGE UP (ref 32–36)
MCHC RBC-ENTMCNC: 33.5 PG — SIGNIFICANT CHANGE UP (ref 27–34)
MCHC RBC-ENTMCNC: 34.5 PG — HIGH (ref 27–34)
MCV RBC AUTO: 103.7 FL — HIGH (ref 80–100)
MCV RBC AUTO: 104 FL — HIGH (ref 80–100)
MONOCYTES # BLD AUTO: 1.34 K/UL — HIGH (ref 0–0.9)
MONOCYTES NFR BLD AUTO: 7.9 % — SIGNIFICANT CHANGE UP (ref 2–14)
NEUTROPHILS # BLD AUTO: 14.08 K/UL — HIGH (ref 1.8–7.4)
NEUTROPHILS NFR BLD AUTO: 83.1 % — HIGH (ref 43–77)
NRBC # BLD: 0 /100 WBCS — SIGNIFICANT CHANGE UP (ref 0–0)
NRBC # BLD: 0 /100 WBCS — SIGNIFICANT CHANGE UP (ref 0–0)
PLATELET # BLD AUTO: 229 K/UL — SIGNIFICANT CHANGE UP (ref 150–400)
PLATELET # BLD AUTO: 242 K/UL — SIGNIFICANT CHANGE UP (ref 150–400)
POTASSIUM SERPL-MCNC: 4.7 MMOL/L — SIGNIFICANT CHANGE UP (ref 3.5–5.3)
POTASSIUM SERPL-SCNC: 4.7 MMOL/L — SIGNIFICANT CHANGE UP (ref 3.5–5.3)
RBC # BLD: 3.22 M/UL — LOW (ref 3.8–5.2)
RBC # BLD: 3.25 M/UL — LOW (ref 3.8–5.2)
RBC # FLD: 14.5 % — SIGNIFICANT CHANGE UP (ref 10.3–14.5)
RBC # FLD: 14.6 % — HIGH (ref 10.3–14.5)
SODIUM SERPL-SCNC: 140 MMOL/L — SIGNIFICANT CHANGE UP (ref 135–145)
WBC # BLD: 15.1 K/UL — HIGH (ref 3.8–10.5)
WBC # BLD: 16.95 K/UL — HIGH (ref 3.8–10.5)
WBC # FLD AUTO: 15.1 K/UL — HIGH (ref 3.8–10.5)
WBC # FLD AUTO: 16.95 K/UL — HIGH (ref 3.8–10.5)

## 2022-04-30 PROCEDURE — 99233 SBSQ HOSP IP/OBS HIGH 50: CPT

## 2022-04-30 RX ORDER — POLYETHYLENE GLYCOL 3350 17 G/17G
17 POWDER, FOR SOLUTION ORAL
Refills: 0 | Status: DISCONTINUED | OUTPATIENT
Start: 2022-04-30 | End: 2022-05-07

## 2022-04-30 RX ORDER — SENNA PLUS 8.6 MG/1
2 TABLET ORAL AT BEDTIME
Refills: 0 | Status: DISCONTINUED | OUTPATIENT
Start: 2022-04-30 | End: 2022-05-07

## 2022-04-30 RX ADMIN — Medication 240 MILLIGRAM(S): at 06:02

## 2022-04-30 RX ADMIN — Medication 30 MILLILITER(S): at 06:02

## 2022-04-30 RX ADMIN — ATORVASTATIN CALCIUM 20 MILLIGRAM(S): 80 TABLET, FILM COATED ORAL at 21:14

## 2022-04-30 RX ADMIN — Medication 40 MILLIGRAM(S): at 21:15

## 2022-04-30 RX ADMIN — Medication 40 MILLIGRAM(S): at 13:01

## 2022-04-30 RX ADMIN — BUDESONIDE AND FORMOTEROL FUMARATE DIHYDRATE 2 PUFF(S): 160; 4.5 AEROSOL RESPIRATORY (INHALATION) at 17:10

## 2022-04-30 RX ADMIN — APIXABAN 2.5 MILLIGRAM(S): 2.5 TABLET, FILM COATED ORAL at 06:02

## 2022-04-30 RX ADMIN — SENNA PLUS 2 TABLET(S): 8.6 TABLET ORAL at 20:31

## 2022-04-30 RX ADMIN — Medication 100 MILLIGRAM(S): at 13:01

## 2022-04-30 RX ADMIN — Medication 650 MILLIGRAM(S): at 21:18

## 2022-04-30 RX ADMIN — Medication 3 MILLIGRAM(S): at 23:05

## 2022-04-30 RX ADMIN — Medication 100 MILLIGRAM(S): at 06:01

## 2022-04-30 RX ADMIN — PANTOPRAZOLE SODIUM 40 MILLIGRAM(S): 20 TABLET, DELAYED RELEASE ORAL at 06:01

## 2022-04-30 RX ADMIN — APIXABAN 2.5 MILLIGRAM(S): 2.5 TABLET, FILM COATED ORAL at 17:10

## 2022-04-30 RX ADMIN — Medication 3 MILLILITER(S): at 11:32

## 2022-04-30 RX ADMIN — Medication 3 MILLILITER(S): at 06:01

## 2022-04-30 RX ADMIN — Medication 3 MILLILITER(S): at 23:05

## 2022-04-30 RX ADMIN — Medication 650 MILLIGRAM(S): at 21:48

## 2022-04-30 RX ADMIN — Medication 40 MILLIGRAM(S): at 06:03

## 2022-04-30 RX ADMIN — Medication 3 MILLILITER(S): at 17:10

## 2022-04-30 RX ADMIN — Medication 10 MILLIGRAM(S): at 02:21

## 2022-04-30 RX ADMIN — BUDESONIDE AND FORMOTEROL FUMARATE DIHYDRATE 2 PUFF(S): 160; 4.5 AEROSOL RESPIRATORY (INHALATION) at 06:03

## 2022-04-30 RX ADMIN — Medication 100 MILLIGRAM(S): at 21:15

## 2022-04-30 NOTE — PROGRESS NOTE ADULT - SUBJECTIVE AND OBJECTIVE BOX
PULMONARY PROGRESS NOTE    RECORD, JANA  MRN-[id number]    Patient is a 81y old  Female who presents with a chief complaint of     HPI:  -Appears comfortable. NAD  -    ROS:   -    ACTIVE MEDICATION LIST:  MEDICATIONS  (STANDING):  albuterol/ipratropium for Nebulization 3 milliLiter(s) Nebulizer every 6 hours  apixaban 2.5 milliGRAM(s) Oral two times a day  atorvastatin 20 milliGRAM(s) Oral at bedtime  benzonatate 100 milliGRAM(s) Oral three times a day  budesonide 160 MICROgram(s)/formoterol 4.5 MICROgram(s) Inhaler 2 Puff(s) Inhalation two times a day  methylPREDNISolone sodium succinate Injectable 40 milliGRAM(s) IV Push every 8 hours  pantoprazole    Tablet 40 milliGRAM(s) Oral before breakfast  verapamil  milliGRAM(s) Oral daily    MEDICATIONS  (PRN):  acetaminophen     Tablet .. 650 milliGRAM(s) Oral every 6 hours PRN Temp greater or equal to 38.5C (101.3F), Mild Pain (1 - 3)  aluminum hydroxide/magnesium hydroxide/simethicone Suspension 30 milliLiter(s) Oral every 4 hours PRN Dyspepsia  melatonin 3 milliGRAM(s) Oral at bedtime PRN Sleep      EXAM:  Vital Signs Last 24 Hrs  T(C): 36.6 (30 Apr 2022 05:00), Max: 37 (29 Apr 2022 16:15)  T(F): 97.9 (30 Apr 2022 05:00), Max: 98.6 (29 Apr 2022 16:15)  HR: 102 (30 Apr 2022 05:00) (99 - 107)  BP: 110/78 (30 Apr 2022 05:00) (109/72 - 135/67)  BP(mean): --  RR: 18 (30 Apr 2022 05:00) (17 - 18)  SpO2: 97% (30 Apr 2022 05:00) (95% - 97%)    GENERAL: The patient is awake and alert in no apparent distress.     LUNGS: Clear to auscultation without wheezing, rales or rhonchi; respirations unlabored    HEART: Regular rate and rhythm without murmur.    LAbs:                         11.1   16.95 )-----------( 242      ( 30 Apr 2022 01:21 )             33.5     PROBLEM LIST:  81y Female with HEALTH ISSUES - PROBLEM Dx:    COPD exacerbation    Smoker    RECS:  -cont symbicort, duonebs  -solumedrol 40 q8  -supplemental O2 wean as tolerated  -incentive benny  -cough syrup  -cards hipolito Steen,   [phone]

## 2022-04-30 NOTE — PROGRESS NOTE ADULT - SUBJECTIVE AND OBJECTIVE BOX
Patient is a 81y old  Female who presents with a chief complaint of COPD exacerbation (30 Apr 2022 10:19)      SUBJECTIVE / OVERNIGHT EVENTS: still with sob. + constipation   Review of Systems  chest pain no  palpitations no  nausea no  headache no    MEDICATIONS  (STANDING):  albuterol/ipratropium for Nebulization 3 milliLiter(s) Nebulizer every 6 hours  apixaban 2.5 milliGRAM(s) Oral two times a day  atorvastatin 20 milliGRAM(s) Oral at bedtime  benzonatate 100 milliGRAM(s) Oral three times a day  budesonide 160 MICROgram(s)/formoterol 4.5 MICROgram(s) Inhaler 2 Puff(s) Inhalation two times a day  methylPREDNISolone sodium succinate Injectable 40 milliGRAM(s) IV Push every 8 hours  pantoprazole    Tablet 40 milliGRAM(s) Oral before breakfast  verapamil  milliGRAM(s) Oral daily    MEDICATIONS  (PRN):  acetaminophen     Tablet .. 650 milliGRAM(s) Oral every 6 hours PRN Temp greater or equal to 38.5C (101.3F), Mild Pain (1 - 3)  aluminum hydroxide/magnesium hydroxide/simethicone Suspension 30 milliLiter(s) Oral every 4 hours PRN Dyspepsia  melatonin 3 milliGRAM(s) Oral at bedtime PRN Sleep      Vital Signs Last 24 Hrs  T(C): 36.5 (30 Apr 2022 11:34), Max: 36.9 (29 Apr 2022 20:03)  T(F): 97.7 (30 Apr 2022 11:34), Max: 98.5 (29 Apr 2022 20:03)  HR: 99 (30 Apr 2022 11:34) (99 - 102)  BP: 127/72 (30 Apr 2022 11:34) (110/78 - 132/78)  BP(mean): --  RR: 18 (30 Apr 2022 11:34) (18 - 18)  SpO2: 98% (30 Apr 2022 11:34) (96% - 98%)    PHYSICAL EXAM:  GENERAL: NAD, well-developed  HEAD:  Atraumatic, Normocephalic  EYES: EOMI, PERRLA, conjunctiva and sclera clear  NECK: Supple, No JVD  CHEST/LUNG: few rhonchi to auscultation bilaterally; few wheeze  HEART: Regular rate and rhythm; No murmurs, rubs, or gallops  ABDOMEN: Soft, Nontender, Nondistended; Bowel sounds present  EXTREMITIES:  2+ Peripheral Pulses, No clubbing, cyanosis, or edema  PSYCH: AAOx3  NEUROLOGY: non-focal  SKIN: No rashes or lesions    LABS:                        10.9   15.10 )-----------( 229      ( 30 Apr 2022 11:43 )             33.7     04-30    140  |  101  |  30<H>  ----------------------------<  261<H>  4.7   |  23  |  0.88    Ca    9.4      30 Apr 2022 11:44                  RADIOLOGY & ADDITIONAL TESTS:    Imaging Personally Reviewed:    Consultant(s) Notes Reviewed:      Care Discussed with Consultants/Other Providers:

## 2022-04-30 NOTE — PROVIDER CONTACT NOTE (MEDICATION) - ASSESSMENT
Patient requesting nebulizer treatment 15 minutes early, pt is due for a duonebs at 0000.
Patient c/o of constipation, last BM was 4/25. Patient was given dulcolax suppository last night with no relief. Patient refused miralax this afternoon.

## 2022-04-30 NOTE — PROGRESS NOTE ADULT - SUBJECTIVE AND OBJECTIVE BOX
I independently examined and evaluated Nikos Echeverria. HPI:  In brief, patient is a 63 y.o. female that presents to the emergency department for evaluation of shortness of breath. Patient wears 3 L of oxygen at home on a chronic basis. Over the past 24 hours, patient notes worsening shortness of breath. She notes that shortness of breath is especially worse with exertion. Patient can barely walk a couple steps without becoming short of breath. Does admit to orthopnea, denies paroxysmal nocturnal dyspnea. Does have a dry, nonproductive cough. Denies history of DVT or PE. Denies hemoptysis. She has not had any increased oxygen requirements. Patient notes that she was recently admitted to our hospital and required \"fluid to be taken away from her heart. \"  She also underwent a cholecystectomy at that time. Patient notes that since discharge, she has also been having chills. She has not checked her temperature at home. Denies weight, syncope, dizziness, lightheadedness. She does describe chest pressure, attributes this to shortness of breath. Denies sharp, ripping, tearing sensation through to the back or into the abdomen. Denies any changes in diet or weight loss. Denies additional precipitating, modifying, alleviating factors. Physical Exam:  Triage VS:    ED Triage Vitals [11/03/20 1400]   Enc Vitals Group      /80      Pulse 102      Resp 20      Temp 98.4 °F (36.9 °C)      Temp Source Oral      SpO2 100 %      Weight 180 lb (81.6 kg)      Height 5' 2\" (1.575 m)     My pulse ox interpretation is - normal    General appearance:  Patient is awake, alert, oriented. Following commands and answering questions. GCS is 15. Non-toxic in appearance. Skin:  Warm. Dry. Intact. No rashes, petechiae, purpura. Eye:  Pupils are equal, round, reactive. Extraocular movements intact. No nystagmus. No gaze deviation. Head, ears, nose, mouth and throat:  Head is normocephalic, atraumatic.  No Cardiovascular Disease Progress Note    Overnight events: No acute events overnight.  + martinez. no cp/palps.dizziness  Otherwise review of systems negative    Objective Findings:  T(C): 36.6 (04-30-22 @ 05:00), Max: 37 (04-29-22 @ 16:15)  HR: 102 (04-30-22 @ 05:00) (99 - 107)  BP: 110/78 (04-30-22 @ 05:00) (109/72 - 135/67)  RR: 18 (04-30-22 @ 05:00) (17 - 18)  SpO2: 97% (04-30-22 @ 05:00) (95% - 97%)  Wt(kg): --  Daily     Daily       Physical Exam:  Gen: NAD  HEENT: EOMI  CV: RRR, normal S1 + S2, no m/r/g  Lungs: CTAB  Abd: soft, non-tender  Ext: No edema    Telemetry:    Laboratory Data:                        11.1   16.95 )-----------( 242      ( 30 Apr 2022 01:21 )             33.5                     Inpatient Medications:  MEDICATIONS  (STANDING):  albuterol/ipratropium for Nebulization 3 milliLiter(s) Nebulizer every 6 hours  apixaban 2.5 milliGRAM(s) Oral two times a day  atorvastatin 20 milliGRAM(s) Oral at bedtime  benzonatate 100 milliGRAM(s) Oral three times a day  budesonide 160 MICROgram(s)/formoterol 4.5 MICROgram(s) Inhaler 2 Puff(s) Inhalation two times a day  methylPREDNISolone sodium succinate Injectable 40 milliGRAM(s) IV Push every 8 hours  pantoprazole    Tablet 40 milliGRAM(s) Oral before breakfast  verapamil  milliGRAM(s) Oral daily      Assessment:  RVP  copd exac  persistent afib  cad s/p pci  sob    Recs:  cardiac stable  no e/o acs or chf at present  cw rate control meds and AC for afib  cw lasix 20mg po qd (elevated BNP, without clinical signs of hypervolemia)  cw steroids, supportive care and bd per pulm  s/p ct chest on recent admission. results noted  2d echo without acute findings  will follow  ? eval for home o2      Over 25 minutes spent on total encounter; more than 50% of the visit was spent counseling and/or coordinating care by the attending physician.      Bill Cho MD   Cardiovascular Disease  [phone] external masses or lesions. No nasal drainage. Pharynx is clear, non-erythematous. Airway is patent. Mucous membranes are moist.  Neck:  Supple. No nuchal rigidity. Trachea midline. No masses, thyromegaly or lymphadenopathy. No JVD. No carotid thrills or bruits. Extremity:  No clubbing, cyanosis, or edema. No joint swelling. Normal muscle tone. Full range of motion in extremities. Heart:  Tachycardic rate and sinus rhythm. Audible S1 & S2. No audible murmurs, rubs, or gallops. Perfusion:  Symmetric peripheral pulses. Brisk capillary refill. Respiratory: Breath sounds diminished bilaterally. No rales, rhonchi or wheezes. Respirations nonlabored. No chest or abdominal retractions. No accessory muscle use. No signs of thoracic trauma. Abdominal:  Soft. Nondistended. No focal tenderness. Patient does have multiple incision sites which are likely from history of recent laparoscopic cholecystectomy. Incisions appear well-healed. There is no pus or purulent drainage. No skin crepitus. Bowel sounds are auscultated in all 4 quadrants. No midline pulsatile abdominal masses, thrills, bruits. Back:  No CVA tenderness to palpation. No midline tenderness to palpation. Neurological:  Alert and oriented times 3. No focal or lateralizing neurological deficits. Psychiatric:  Cooperative. Procedures  Bedside Ultrasound Cardiac Exam:    Indication: Evaluation for pericardial fluid given history of recent pericardiocentesis    General cardiac activity: dynamic  Pericardial effusion: Present  Right heart strain: Present    Impression: normal cardiac contractility with mild effusion and no right heart strain. Images obtained and read by Selam Quiñones  Images saved to ultrasound machine. MDM:  Patient was seen and evaluated in the emergency department by myself and JOSE A España. A thorough history and physical exam were performed and prior medical records were reviewed.  Upon arrival, patient's vital signs were noted. Mild tachycardia with pulse 102. No tachypnea or hypoxia. Patient is on 3 L of supplemental oxygen via nasal cannula which she is on at home as well. Temperature is initially 98.4 °F. Patient was connected to continuous cardiopulmonary monitoring. Rhythm strip interpreted by myself demonstrating sinus rhythm. EKG was interpreted by myself, demonstrating ventricular rate of 103 bpm in sinus tachycardia. There is possible left atrial enlargement. Right bundle branch block pattern. Nonspecific T wave changes in the inferior leads. No electrical alternans. EKG is compared to previous EKG performed on 11/3/2020 and is essentially unchanged. Differential diagnoses and treatment plan were discussed. IV access is established and the patient is initiated on 1 L bolus of 0.9% normal saline. Pertinent labs were drawn and radiographic studies were performed. Once results were available, they were reviewed by myself. Labs demonstrate Leukocytosis white blood cell count 27.9. Patient does have normocytic anemia hemoglobin 9.8. No thrombocytopenia. Electrolytes demonstrate hyponatremia sodium 132. Chloride is hypochloremic at 88. CO2 is elevated at 33. No signs of kidney injury. Glucose is 125. AST and ALT are unremarkable. Bilirubin 0.7. Troponin less than 0.010. BNP is 3274. Lactic acid 1.3. Chest x-ray radiology report reads persistent airspace disease in the right lower lung. Cardiomegaly with no evidence of pulmonary edema. CT angiography pulmonary with IV contrast and CT abdomen and pelvis with IV contrast radiology report reads no pulmonary embolism. Small area of consolidative change posterior segment right lower lobe is new compared to prior CT abdomen 3 weeks ago likely related to focal pneumonia or atelectasis. Nonspecific pericardial effusion, volume appears much smaller than on CT 3 weeks ago.   Nonspecific bilateral pleural effusion without distinct pulmonary vascular engorgement or other findings associated with congestive heart failure. Pulmonary sequelae typical of that seen with smoking including emphysema. No evidence of acute intra-abdominal or intrapelvic process. Greatly diminished ascites compared with prior study with only a small amount remaining in the pelvis. No findings to suggest acute appendicitis. Mildly renal cortical atrophy right kidney with stable benign cyst inferior pole right kidney. Diverticulosis coli without CT evidence of acute diverticulitis. Interval cholecystectomy without CT evidence of abscess. On repeat evaluations, patient remains hemodynamically stable. Results of laboratory and radiographic data along with differential diagnoses and treatment plan were discussed with the patient. Symptoms are concerning for SIRS criteria with sepsis secondary to pneumonia. Patient does have pericardial effusion present, improved compared to previous. Case is discussed with cardiologist Dr. Paul Panchal who is agreeable with consultation and recommends echocardiogram the following morning. Vancomycin and cefepime are continued. On repeat focused exams, there is no signs of severe sepsis or septic shock. Blood pressure remained stable. Given patient's symptoms and risk factors, we recommend admission to the hospital for further monitoring and treatment as necessary. Patient is agreeable. Case is discussed with admitting hospitalist who is agreed with treatment plan and accepts admission. Patient is updated bedside. All questions were answered. Patient is admitted in hemodynamically stable condition. Clinical Impressions:  1. SIRS (systemic inflammatory response syndrome) (HCC)    2. Septicemia ([address]    3. Pneumonia of right lower lobe due to infectious organism    4. Pericardial effusion    5. Bilateral pleural effusion    6.  Other ascites        Procedures:  Bedside cardiopulmonary ultrasound by ED physician      All diagnostic,

## 2022-04-30 NOTE — PROVIDER CONTACT NOTE (MEDICATION) - SITUATION
Patient c/o of constipation, last BM was 4/25. Patient was given dulcolax suppository last night with no relief. Patient refused miralax this afternoon.

## 2022-05-01 LAB
ANION GAP SERPL CALC-SCNC: 12 MMOL/L — SIGNIFICANT CHANGE UP (ref 5–17)
BUN SERPL-MCNC: 30 MG/DL — HIGH (ref 7–23)
CALCIUM SERPL-MCNC: 9.4 MG/DL — SIGNIFICANT CHANGE UP (ref 8.4–10.5)
CHLORIDE SERPL-SCNC: 101 MMOL/L — SIGNIFICANT CHANGE UP (ref 96–108)
CO2 SERPL-SCNC: 25 MMOL/L — SIGNIFICANT CHANGE UP (ref 22–31)
CREAT SERPL-MCNC: 0.92 MG/DL — SIGNIFICANT CHANGE UP (ref 0.5–1.3)
EGFR: 63 ML/MIN/1.73M2 — SIGNIFICANT CHANGE UP
GLUCOSE SERPL-MCNC: 148 MG/DL — HIGH (ref 70–99)
HCT VFR BLD CALC: 32 % — LOW (ref 34.5–45)
HGB BLD-MCNC: 10.7 G/DL — LOW (ref 11.5–15.5)
MCHC RBC-ENTMCNC: 33.4 GM/DL — SIGNIFICANT CHANGE UP (ref 32–36)
MCHC RBC-ENTMCNC: 34.3 PG — HIGH (ref 27–34)
MCV RBC AUTO: 102.6 FL — HIGH (ref 80–100)
NRBC # BLD: 0 /100 WBCS — SIGNIFICANT CHANGE UP (ref 0–0)
PLATELET # BLD AUTO: 232 K/UL — SIGNIFICANT CHANGE UP (ref 150–400)
POTASSIUM SERPL-MCNC: 4.9 MMOL/L — SIGNIFICANT CHANGE UP (ref 3.5–5.3)
POTASSIUM SERPL-SCNC: 4.9 MMOL/L — SIGNIFICANT CHANGE UP (ref 3.5–5.3)
RBC # BLD: 3.12 M/UL — LOW (ref 3.8–5.2)
RBC # FLD: 14.6 % — HIGH (ref 10.3–14.5)
SODIUM SERPL-SCNC: 138 MMOL/L — SIGNIFICANT CHANGE UP (ref 135–145)
WBC # BLD: 15.96 K/UL — HIGH (ref 3.8–10.5)
WBC # FLD AUTO: 15.96 K/UL — HIGH (ref 3.8–10.5)

## 2022-05-01 PROCEDURE — 99233 SBSQ HOSP IP/OBS HIGH 50: CPT

## 2022-05-01 RX ORDER — ALPRAZOLAM 0.25 MG
0.25 TABLET ORAL ONCE
Refills: 0 | Status: DISCONTINUED | OUTPATIENT
Start: 2022-05-01 | End: 2022-05-01

## 2022-05-01 RX ORDER — SIMETHICONE 80 MG/1
80 TABLET, CHEWABLE ORAL ONCE
Refills: 0 | Status: COMPLETED | OUTPATIENT
Start: 2022-05-01 | End: 2022-05-01

## 2022-05-01 RX ORDER — IPRATROPIUM/ALBUTEROL SULFATE 18-103MCG
3 AEROSOL WITH ADAPTER (GRAM) INHALATION ONCE
Refills: 0 | Status: COMPLETED | OUTPATIENT
Start: 2022-05-01 | End: 2022-05-01

## 2022-05-01 RX ADMIN — SENNA PLUS 2 TABLET(S): 8.6 TABLET ORAL at 21:05

## 2022-05-01 RX ADMIN — Medication 100 MILLIGRAM(S): at 21:05

## 2022-05-01 RX ADMIN — Medication 3 MILLILITER(S): at 23:45

## 2022-05-01 RX ADMIN — Medication 40 MILLIGRAM(S): at 17:25

## 2022-05-01 RX ADMIN — APIXABAN 2.5 MILLIGRAM(S): 2.5 TABLET, FILM COATED ORAL at 17:25

## 2022-05-01 RX ADMIN — Medication 3 MILLILITER(S): at 17:25

## 2022-05-01 RX ADMIN — SIMETHICONE 80 MILLIGRAM(S): 80 TABLET, CHEWABLE ORAL at 20:01

## 2022-05-01 RX ADMIN — PANTOPRAZOLE SODIUM 40 MILLIGRAM(S): 20 TABLET, DELAYED RELEASE ORAL at 05:04

## 2022-05-01 RX ADMIN — Medication 40 MILLIGRAM(S): at 05:05

## 2022-05-01 RX ADMIN — Medication 30 MILLILITER(S): at 11:58

## 2022-05-01 RX ADMIN — Medication 100 MILLIGRAM(S): at 05:04

## 2022-05-01 RX ADMIN — BUDESONIDE AND FORMOTEROL FUMARATE DIHYDRATE 2 PUFF(S): 160; 4.5 AEROSOL RESPIRATORY (INHALATION) at 17:24

## 2022-05-01 RX ADMIN — BUDESONIDE AND FORMOTEROL FUMARATE DIHYDRATE 2 PUFF(S): 160; 4.5 AEROSOL RESPIRATORY (INHALATION) at 05:05

## 2022-05-01 RX ADMIN — POLYETHYLENE GLYCOL 3350 17 GRAM(S): 17 POWDER, FOR SOLUTION ORAL at 17:25

## 2022-05-01 RX ADMIN — Medication 100 MILLIGRAM(S): at 11:58

## 2022-05-01 RX ADMIN — Medication 3 MILLILITER(S): at 05:03

## 2022-05-01 RX ADMIN — ATORVASTATIN CALCIUM 20 MILLIGRAM(S): 80 TABLET, FILM COATED ORAL at 21:04

## 2022-05-01 RX ADMIN — APIXABAN 2.5 MILLIGRAM(S): 2.5 TABLET, FILM COATED ORAL at 09:01

## 2022-05-01 RX ADMIN — Medication 3 MILLILITER(S): at 11:58

## 2022-05-01 RX ADMIN — Medication 240 MILLIGRAM(S): at 05:05

## 2022-05-01 RX ADMIN — Medication 3 MILLILITER(S): at 19:39

## 2022-05-01 NOTE — PROGRESS NOTE ADULT - SUBJECTIVE AND OBJECTIVE BOX
PULMONARY PROGRESS NOTE    RECORD, JANA  MRN-[id number]    Patient is a 81y old  Female who presents with a chief complaint of COPD exacerbation (30 Apr 2022 10:19)      HPI:  -Appears comfortable. NAD  -    ROS:   -    ACTIVE MEDICATION LIST:  MEDICATIONS  (STANDING):  albuterol/ipratropium for Nebulization 3 milliLiter(s) Nebulizer every 6 hours  apixaban 2.5 milliGRAM(s) Oral two times a day  atorvastatin 20 milliGRAM(s) Oral at bedtime  benzonatate 100 milliGRAM(s) Oral three times a day  budesonide 160 MICROgram(s)/formoterol 4.5 MICROgram(s) Inhaler 2 Puff(s) Inhalation two times a day  methylPREDNISolone sodium succinate Injectable 40 milliGRAM(s) IV Push every 8 hours  pantoprazole    Tablet 40 milliGRAM(s) Oral before breakfast  polyethylene glycol 3350 17 Gram(s) Oral two times a day  senna 2 Tablet(s) Oral at bedtime  verapamil  milliGRAM(s) Oral daily    MEDICATIONS  (PRN):  acetaminophen     Tablet .. 650 milliGRAM(s) Oral every 6 hours PRN Temp greater or equal to 38.5C (101.3F), Mild Pain (1 - 3)  aluminum hydroxide/magnesium hydroxide/simethicone Suspension 30 milliLiter(s) Oral every 4 hours PRN Dyspepsia  melatonin 3 milliGRAM(s) Oral at bedtime PRN Sleep      EXAM:  Vital Signs Last 24 Hrs  T(C): 36.8 (01 May 2022 04:50), Max: 36.9 (30 Apr 2022 20:17)  T(F): 98.2 (01 May 2022 04:50), Max: 98.4 (30 Apr 2022 20:17)  HR: 100 (01 May 2022 04:50) (98 - 100)  BP: 110/66 (01 May 2022 04:50) (105/69 - 127/72)  BP(mean): --  RR: 18 (01 May 2022 04:50) (18 - 18)  SpO2: 97% (01 May 2022 04:50) (96% - 98%)    GENERAL: The patient is awake and alert in no apparent distress.     LUNGS: Clear to auscultation without wheezing, rales or rhonchi; respirations unlabored    HEART: Regular rate and rhythm without murmur.    Labs:                        10.7   15.96 )-----------( 232      ( 01 May 2022 07:34 )             32.0   05-01    138  |  101  |  30<H>  ----------------------------<  148<H>  4.9   |  25  |  0.92    Ca    9.4      01 May 2022 07:31      PROBLEM LIST:  81y Female with HEALTH ISSUES - PROBLEM Dx:    lCOPD exacerbation    Smoker    RECS:  -cont symbicort, duonebs  -Reduce solumedrol to 40mg IVP Q 12 hourly  -supplemental O2 wean as tolerated  -incentive benny  -cough syrup  -cards hipolito Steen DO  [phone]

## 2022-05-01 NOTE — CHART NOTE - NSCHARTNOTEFT_GEN_A_CORE
Medicine PA Note     RECORD, JANA  MRN-[id number]  Allergies    Levaquin (Pruritus; Rash)    Intolerances       Called to evaluate patient for shortness of breath. Pt seen and evaluated at bedside. Patient states she had a transient episode where she felt she couldn't take a breath in as if her "windpipe" was closing. Patient states this has happened in the past, but it was concerning to her she brought it to nursing staff's attention. States she had shrimp for dinner which she states she has no allergy to. States may be gas pain, currently receiving duoneb, conversing in full sentences. Remains on 2LNC saturating 96%. No change in O2 requirement. Recently desecalated from IV solumedrol q 8 to q13 today. Denies any CP, dizziness, headache, N/V/D.    Vital Signs Last 24 Hrs  T(C): 36.7 (05-01-22 @ 12:16), Max: 36.9 (04-30-22 @ 20:17)  T(F): 98 (05-01-22 @ 12:16), Max: 98.4 (04-30-22 @ 20:17)  HR: 95 (05-01-22 @ 12:16) (95 - 102)  BP: 111/78 (05-01-22 @ 12:16) (96/63 - 111/78)  BP(mean): --  RR: 18 (05-01-22 @ 12:16) (18 - 18)  SpO2: 97% (05-01-22 @ 12:16) (95% - 97%)  Daily     Daily   I&O's Summary    30 Apr 2022 07:01  -  01 May 2022 07:00  --------------------------------------------------------  IN: 1320 mL / OUT: 0 mL / NET: 1320 mL    01 May 2022 07:01  -  01 May 2022 19:54  --------------------------------------------------------  IN: 480 mL / OUT: 0 mL / NET: 480 mL      CAPILLARY BLOOD GLUCOSE                          10.7   15.96 )-----------( 232      ( 01 May 2022 07:34 )             32.0     05-01    138  |  101  |  30<H>  ----------------------------<  148<H>  4.9   |  25  |  0.92    Ca    9.4      01 May 2022 07:31          PHYSICAL EXAM:  GENERAL: Mildly anxious  NECK: Supple, No JVD  CHEST/LUNG: Clear to auscultation bilaterally; No wheeze  HEART: Regular rate and rhythm;   ABDOMEN: Soft, Nontender, Nondistended; Bowel sounds present  EXTREMITIES:  2+ Peripheral Pulses,       Assessment/Plan: HPI:  81F active smoker w/ PMH COPD(not on home O2), CAD s/p PCI, A.fib on eliquis, HTN, p/w progressive dyspnea and productive cough. Likely COPD exacerbation.    Now with transient SOB episode, no desaturation.    #SOB. >?component of gas pain  -Simethicone 80mg x 1  -Continue w/ duonebs q6.  -Continue IV solumedrol q 12.  -supplemental O2 wean as tolerated  -incentive benny  -cough syrup    -Edouard Gant PA-C, [id number], Dept of Medicine Medicine PA Note     RECORD, JANA  MRN-[id number]  Allergies    Levaquin (Pruritus; Rash)    Intolerances       Called to evaluate patient for shortness of breath. Pt seen and evaluated at bedside. Patient states she had a transient episode where she felt she couldn't take a breath in as if her "windpipe" was closing. Patient states this has happened in the past, but it was concerning to her she brought it to nursing staff's attention. States she had shrimp for dinner which she states she has no allergy to. States may be gas pain, currently receiving duoneb, conversing in full sentences. Remains on 2LNC saturating 96%. No change in O2 requirement. Recently deescalated from IV solumedrol q 8 to q12 today. Denies any CP, dizziness, headache, N/V/D.    Vital Signs Last 24 Hrs  T(C): 36.7 (05-01-22 @ 12:16), Max: 36.9 (04-30-22 @ 20:17)  T(F): 98 (05-01-22 @ 12:16), Max: 98.4 (04-30-22 @ 20:17)  HR: 95 (05-01-22 @ 12:16) (95 - 102)  BP: 111/78 (05-01-22 @ 12:16) (96/63 - 111/78)  BP(mean): --  RR: 18 (05-01-22 @ 12:16) (18 - 18)  SpO2: 97% (05-01-22 @ 12:16) (95% - 97%)  Daily     Daily   I&O's Summary    30 Apr 2022 07:01  -  01 May 2022 07:00  --------------------------------------------------------  IN: 1320 mL / OUT: 0 mL / NET: 1320 mL    01 May 2022 07:01  -  01 May 2022 19:54  --------------------------------------------------------  IN: 480 mL / OUT: 0 mL / NET: 480 mL      CAPILLARY BLOOD GLUCOSE                          10.7   15.96 )-----------( 232      ( 01 May 2022 07:34 )             32.0     05-01    138  |  101  |  30<H>  ----------------------------<  148<H>  4.9   |  25  |  0.92    Ca    9.4      01 May 2022 07:31          PHYSICAL EXAM:  GENERAL: Mildly anxious  NECK: Supple, No JVD  CHEST/LUNG: Clear to auscultation bilaterally; No wheeze  HEART: Regular rate and rhythm;   ABDOMEN: Soft, Nontender, Nondistended; Bowel sounds present  EXTREMITIES:  2+ Peripheral Pulses,       Assessment/Plan: HPI:  81F active smoker w/ PMH COPD(not on home O2), CAD s/p PCI, A.fib on eliquis, HTN, p/w progressive dyspnea and productive cough. Likely COPD exacerbation.    Now with transient SOB episode, no desaturation.    #SOB. >?component of gas pain  -Simethicone 80mg x 1  -Continue w/ duonebs q6.  -Continue IV solumedrol q 12.  -supplemental O2 wean as tolerated  -incentive benny  -cough syrup    -Edouard Gant PA-C, [id number], Dept of Medicine

## 2022-05-01 NOTE — PROGRESS NOTE ADULT - SUBJECTIVE AND OBJECTIVE BOX
Patient is a 81y old  Female who presents with a chief complaint of Dyspnea (01 May 2022 09:17)      SUBJECTIVE / OVERNIGHT EVENTS: feels better  Review of Systems  chest pain no  palpitations no  sob improving   nausea no  headache no    MEDICATIONS  (STANDING):  albuterol/ipratropium for Nebulization 3 milliLiter(s) Nebulizer every 6 hours  apixaban 2.5 milliGRAM(s) Oral two times a day  atorvastatin 20 milliGRAM(s) Oral at bedtime  benzonatate 100 milliGRAM(s) Oral three times a day  budesonide 160 MICROgram(s)/formoterol 4.5 MICROgram(s) Inhaler 2 Puff(s) Inhalation two times a day  methylPREDNISolone sodium succinate Injectable 40 milliGRAM(s) IV Push every 12 hours  pantoprazole    Tablet 40 milliGRAM(s) Oral before breakfast  polyethylene glycol 3350 17 Gram(s) Oral two times a day  senna 2 Tablet(s) Oral at bedtime  verapamil  milliGRAM(s) Oral daily    MEDICATIONS  (PRN):  acetaminophen     Tablet .. 650 milliGRAM(s) Oral every 6 hours PRN Temp greater or equal to 38.5C (101.3F), Mild Pain (1 - 3)  aluminum hydroxide/magnesium hydroxide/simethicone Suspension 30 milliLiter(s) Oral every 4 hours PRN Dyspepsia  melatonin 3 milliGRAM(s) Oral at bedtime PRN Sleep      Vital Signs Last 24 Hrs  T(C): 36.7 (01 May 2022 12:16), Max: 36.9 (30 Apr 2022 20:17)  T(F): 98 (01 May 2022 12:16), Max: 98.4 (30 Apr 2022 20:17)  HR: 95 (01 May 2022 12:16) (95 - 102)  BP: 111/78 (01 May 2022 12:16) (96/63 - 111/78)  BP(mean): --  RR: 18 (01 May 2022 12:16) (18 - 18)  SpO2: 97% (01 May 2022 12:16) (95% - 97%)    PHYSICAL EXAM:  GENERAL: NAD, well-developed  HEAD:  Atraumatic, Normocephalic  EYES: EOMI, PERRLA, conjunctiva and sclera clear  NECK: Supple, No JVD  CHEST/LUNG: few rhonchi to auscultation bilaterally   HEART: Regular rate and rhythm; No murmurs, rubs, or gallops  ABDOMEN: Soft, Nontender, Nondistended; Bowel sounds present  EXTREMITIES:  2+ Peripheral Pulses, No clubbing, cyanosis, or edema  PSYCH: AAOx3   NEUROLOGY: non-focal  SKIN: No rashes or lesions    LABS:                        10.7   15.96 )-----------( 232      ( 01 May 2022 07:34 )             32.0     05-01    138  |  101  |  30<H>  ----------------------------<  148<H>  4.9   |  25  |  0.92    Ca    9.4      01 May 2022 07:31                  RADIOLOGY & ADDITIONAL TESTS:    Imaging Personally Reviewed:    Consultant(s) Notes Reviewed:      Care Discussed with Consultants/Other Providers:

## 2022-05-02 LAB
CK MB CFR SERPL CALC: 3 NG/ML — SIGNIFICANT CHANGE UP (ref 0–3.8)
CK SERPL-CCNC: 47 U/L — SIGNIFICANT CHANGE UP (ref 25–170)
TROPONIN T, HIGH SENSITIVITY RESULT: 11 NG/L — SIGNIFICANT CHANGE UP (ref 0–51)

## 2022-05-02 PROCEDURE — 99233 SBSQ HOSP IP/OBS HIGH 50: CPT

## 2022-05-02 PROCEDURE — 71045 X-RAY EXAM CHEST 1 VIEW: CPT | Mod: 26

## 2022-05-02 RX ORDER — TIOTROPIUM BROMIDE 18 UG/1
1 CAPSULE ORAL; RESPIRATORY (INHALATION) AT BEDTIME
Refills: 0 | Status: DISCONTINUED | OUTPATIENT
Start: 2022-05-02 | End: 2022-05-07

## 2022-05-02 RX ADMIN — BUDESONIDE AND FORMOTEROL FUMARATE DIHYDRATE 2 PUFF(S): 160; 4.5 AEROSOL RESPIRATORY (INHALATION) at 17:01

## 2022-05-02 RX ADMIN — Medication 3 MILLILITER(S): at 05:23

## 2022-05-02 RX ADMIN — Medication 100 MILLIGRAM(S): at 05:13

## 2022-05-02 RX ADMIN — APIXABAN 2.5 MILLIGRAM(S): 2.5 TABLET, FILM COATED ORAL at 08:41

## 2022-05-02 RX ADMIN — Medication 40 MILLIGRAM(S): at 05:14

## 2022-05-02 RX ADMIN — Medication 3 MILLILITER(S): at 17:01

## 2022-05-02 RX ADMIN — PANTOPRAZOLE SODIUM 40 MILLIGRAM(S): 20 TABLET, DELAYED RELEASE ORAL at 05:13

## 2022-05-02 RX ADMIN — Medication 3 MILLILITER(S): at 13:35

## 2022-05-02 RX ADMIN — POLYETHYLENE GLYCOL 3350 17 GRAM(S): 17 POWDER, FOR SOLUTION ORAL at 17:01

## 2022-05-02 RX ADMIN — SENNA PLUS 2 TABLET(S): 8.6 TABLET ORAL at 22:25

## 2022-05-02 RX ADMIN — Medication 40 MILLIGRAM(S): at 17:02

## 2022-05-02 RX ADMIN — Medication 100 MILLIGRAM(S): at 22:24

## 2022-05-02 RX ADMIN — TIOTROPIUM BROMIDE 1 CAPSULE(S): 18 CAPSULE ORAL; RESPIRATORY (INHALATION) at 22:23

## 2022-05-02 RX ADMIN — BUDESONIDE AND FORMOTEROL FUMARATE DIHYDRATE 2 PUFF(S): 160; 4.5 AEROSOL RESPIRATORY (INHALATION) at 05:20

## 2022-05-02 RX ADMIN — Medication 3 MILLILITER(S): at 23:41

## 2022-05-02 RX ADMIN — Medication 100 MILLIGRAM(S): at 13:35

## 2022-05-02 RX ADMIN — Medication 240 MILLIGRAM(S): at 05:14

## 2022-05-02 RX ADMIN — APIXABAN 2.5 MILLIGRAM(S): 2.5 TABLET, FILM COATED ORAL at 17:02

## 2022-05-02 RX ADMIN — POLYETHYLENE GLYCOL 3350 17 GRAM(S): 17 POWDER, FOR SOLUTION ORAL at 05:13

## 2022-05-02 RX ADMIN — ATORVASTATIN CALCIUM 20 MILLIGRAM(S): 80 TABLET, FILM COATED ORAL at 22:25

## 2022-05-02 NOTE — PROGRESS NOTE ADULT - SUBJECTIVE AND OBJECTIVE BOX
Cardiovascular Disease Progress Note    Overnight events: No acute events overnight.  + exertional dyspnea. no rest sx  Otherwise review of systems negative    Objective Findings:  T(C): 36.7 (05-02-22 @ 04:21), Max: 36.8 (05-01-22 @ 09:02)  HR: 102 (05-02-22 @ 04:21) (95 - 104)  BP: 107/73 (05-02-22 @ 04:21) (96/63 - 111/78)  RR: 18 (05-02-22 @ 04:21) (18 - 18)  SpO2: 98% (05-02-22 @ 04:21) (95% - 98%)  Wt(kg): --  Daily     Daily       Physical Exam:  Gen: NAD  HEENT: EOMI  CV: RRR, normal S1 + S2, no m/r/g  Lungs: CTAB  Abd: soft, non-tender  Ext: No edema    Telemetry:    Laboratory Data:                        10.7   15.96 )-----------( 232      ( 01 May 2022 07:34 )             32.0     05-01    138  |  101  |  30<H>  ----------------------------<  148<H>  4.9   |  25  |  0.92    Ca    9.4      01 May 2022 07:31        CARDIAC MARKERS ( 02 May 2022 00:24 )  x     / x     / 47 U/L / x     / 3.0 ng/mL          Inpatient Medications:  MEDICATIONS  (STANDING):  albuterol/ipratropium for Nebulization 3 milliLiter(s) Nebulizer every 6 hours  apixaban 2.5 milliGRAM(s) Oral two times a day  atorvastatin 20 milliGRAM(s) Oral at bedtime  benzonatate 100 milliGRAM(s) Oral three times a day  budesonide 160 MICROgram(s)/formoterol 4.5 MICROgram(s) Inhaler 2 Puff(s) Inhalation two times a day  methylPREDNISolone sodium succinate Injectable 40 milliGRAM(s) IV Push every 12 hours  pantoprazole    Tablet 40 milliGRAM(s) Oral before breakfast  polyethylene glycol 3350 17 Gram(s) Oral two times a day  senna 2 Tablet(s) Oral at bedtime  verapamil  milliGRAM(s) Oral daily      Assessment:  RVP  copd exac  persistent afib  cad s/p pci  sob    Recs:  cardiac stable  no e/o acs or chf at present  cw rate control meds and AC for afib  cw lasix 20mg po qd (elevated BNP, without clinical signs of hypervolemia)  cw steroids, supportive care and bd per pulm  s/p ct chest on recent admission. results noted  2d echo without acute findings  will follow  ? eval for home o2  outpatient ischemic eval if exertional dyspnea persists after tx of copd and correction of exertional hypoxemia      Over 25 minutes spent on total encounter; more than 50% of the visit was spent counseling and/or coordinating care by the attending physician.      Bill Cho MD   Cardiovascular Disease  [phone]

## 2022-05-02 NOTE — PROGRESS NOTE ADULT - SUBJECTIVE AND OBJECTIVE BOX
Patient is a 81y old  Female who presents with a chief complaint of Dyspnea (01 May 2022 09:17)      SUBJECTIVE / OVERNIGHT EVENTS: feels better. Still with SOB.  Review of Systems  chest pain no  palpitations no  nausea no  headache no    MEDICATIONS  (STANDING):  albuterol/ipratropium for Nebulization 3 milliLiter(s) Nebulizer every 6 hours  apixaban 2.5 milliGRAM(s) Oral two times a day  atorvastatin 20 milliGRAM(s) Oral at bedtime  benzonatate 100 milliGRAM(s) Oral three times a day  budesonide 160 MICROgram(s)/formoterol 4.5 MICROgram(s) Inhaler 2 Puff(s) Inhalation two times a day  methylPREDNISolone sodium succinate Injectable 40 milliGRAM(s) IV Push every 12 hours  pantoprazole    Tablet 40 milliGRAM(s) Oral before breakfast  polyethylene glycol 3350 17 Gram(s) Oral two times a day  senna 2 Tablet(s) Oral at bedtime  tiotropium 18 MICROgram(s) Capsule 1 Capsule(s) Inhalation at bedtime  verapamil  milliGRAM(s) Oral daily    MEDICATIONS  (PRN):  acetaminophen     Tablet .. 650 milliGRAM(s) Oral every 6 hours PRN Temp greater or equal to 38.5C (101.3F), Mild Pain (1 - 3)  aluminum hydroxide/magnesium hydroxide/simethicone Suspension 30 milliLiter(s) Oral every 4 hours PRN Dyspepsia  melatonin 3 milliGRAM(s) Oral at bedtime PRN Sleep      Vital Signs Last 24 Hrs  T(C): 36.4 (02 May 2022 12:30), Max: 36.7 (02 May 2022 04:21)  T(F): 97.5 (02 May 2022 12:30), Max: 98 (02 May 2022 04:21)  HR: 98 (02 May 2022 12:30) (92 - 104)  BP: 123/78 (02 May 2022 12:30) (107/70 - 123/78)  BP(mean): --  RR: 18 (02 May 2022 12:30) (18 - 18)  SpO2: 98% (02 May 2022 12:30) (93% - 98%)    PHYSICAL EXAM:  GENERAL: NAD  HEAD:  Atraumatic, Normocephalic  EYES: EOMI, PERRLA, conjunctiva and sclera clear  NECK: Supple, No JVD  CHEST/LUNG: few rhonchi to auscultation bilaterally  HEART: Regular rate and rhythm; No murmurs, rubs, or gallops  ABDOMEN: Soft, Nontender, Nondistended; Bowel sounds present  EXTREMITIES:  2+ Peripheral Pulses, No clubbing, cyanosis, or edema  PSYCH: AAOx3  NEUROLOGY: non-focal  SKIN: No rashes or lesions    LABS:                        10.7   15.96 )-----------( 232      ( 01 May 2022 07:34 )             32.0     05-01    138  |  101  |  30<H>  ----------------------------<  148<H>  4.9   |  25  |  0.92    Ca    9.4      01 May 2022 07:31        CARDIAC MARKERS ( 02 May 2022 00:24 )  x     / x     / 47 U/L / x     / 3.0 ng/mL            RADIOLOGY & ADDITIONAL TESTS:    Imaging Personally Reviewed:    Consultant(s) Notes Reviewed:      Care Discussed with Consultants/Other Providers:

## 2022-05-02 NOTE — CHART NOTE - NSCHARTNOTEFT_GEN_A_CORE
Left two messages for patient in regards to follow up care with callback information.
One attempt was made to confirm follow-up appointments were made on 4/27.
Left a message for patient in regards to follow up care with callback information.

## 2022-05-02 NOTE — PROGRESS NOTE ADULT - SUBJECTIVE AND OBJECTIVE BOX
PULMONARY PROGRESS NOTE    RECORD, JANA  MRN-[id number]    Patient is a 81y old  Female who presents with a chief complaint of Dyspnea (01 May 2022 09:17)      HPI:  -cough helped with hycodan  -dry cough, tired, fatigue, poor sleep , dyspneic  -using IS  - on 2L now    ROS:   -    ACTIVE MEDICATION LIST:  MEDICATIONS  (STANDING):  albuterol/ipratropium for Nebulization 3 milliLiter(s) Nebulizer every 6 hours  apixaban 2.5 milliGRAM(s) Oral two times a day  atorvastatin 20 milliGRAM(s) Oral at bedtime  benzonatate 100 milliGRAM(s) Oral three times a day  budesonide 160 MICROgram(s)/formoterol 4.5 MICROgram(s) Inhaler 2 Puff(s) Inhalation two times a day  methylPREDNISolone sodium succinate Injectable 40 milliGRAM(s) IV Push every 12 hours  pantoprazole    Tablet 40 milliGRAM(s) Oral before breakfast  polyethylene glycol 3350 17 Gram(s) Oral two times a day  senna 2 Tablet(s) Oral at bedtime  verapamil  milliGRAM(s) Oral daily    MEDICATIONS  (PRN):  acetaminophen     Tablet .. 650 milliGRAM(s) Oral every 6 hours PRN Temp greater or equal to 38.5C (101.3F), Mild Pain (1 - 3)  aluminum hydroxide/magnesium hydroxide/simethicone Suspension 30 milliLiter(s) Oral every 4 hours PRN Dyspepsia  melatonin 3 milliGRAM(s) Oral at bedtime PRN Sleep      EXAM:  Vital Signs Last 24 Hrs  T(C): 36.4 (02 May 2022 12:30), Max: 36.7 (02 May 2022 04:21)  T(F): 97.5 (02 May 2022 12:30), Max: 98 (02 May 2022 04:21)  HR: 98 (02 May 2022 12:30) (92 - 104)  BP: 123/78 (02 May 2022 12:30) (107/70 - 123/78)  BP(mean): --  RR: 18 (02 May 2022 12:30) (18 - 18)  SpO2: 98% (02 May 2022 12:30) (93% - 98%)    GENERAL: The patient is awake and alert in no apparent distress.     LUNGS: no obvious wheeze                             10.7   15.96 )-----------( 232      ( 01 May 2022 07:34 )             32.0       05-01    138  |  101  |  30<H>  ----------------------------<  148<H>  4.9   |  25  |  0.92    Ca    9.4      01 May 2022 07:31       rad< from: Xray Chest 1 View- PORTABLE-Urgent (Xray Chest 1 View- PORTABLE-Urgent .) (04.27.22 @ 16:11) >    ACC: [id number] EXAM:  XR CHEST PORTABLE URGENT 1V                          PROCEDURE DATE:  04/27/2022          INTERPRETATION:  CLINICAL INFORMATION: Dyspnea    TECHNIQUE: Frontal radiograph of the chest    COMPARISON: Chest radiograph  1/25/2015    FINDINGS:    No focal consolidation. No pleural effusion. No pneumothorax. Cardiac   silhouette size cannot be accurately assessed on this projection. No   acute osseous findings.    IMPRESSION:    No focal consolidation.    --- End of Report ---          KAMINI WEBB MD; Resident Radiologist  This document has been electronically signed.  THONY DARBY MD; Attending Radiologist  This document has been electronically signed. Apr 27 2022  4:30PM    < end of copied text >      PROBLEM LIST:  81y Female with HEALTH ISSUES - PROBLEM Dx:         RECS:  continue solumedrol IVP BID  symbicort BID  start spiriva qhs  hycodan PRN for cough  nebs followed by acapella/IS  taper down/off 02      Please call with any questions.    Celsa Lau DO  [facility] Pulmonary/Sleep Medicine  [phone]

## 2022-05-03 LAB
ACID FAST STN SPT: NORMAL
HCT VFR BLD CALC: 34.6 % — SIGNIFICANT CHANGE UP (ref 34.5–45)
HGB BLD-MCNC: 11.5 G/DL — SIGNIFICANT CHANGE UP (ref 11.5–15.5)
MCHC RBC-ENTMCNC: 33.2 GM/DL — SIGNIFICANT CHANGE UP (ref 32–36)
MCHC RBC-ENTMCNC: 34.1 PG — HIGH (ref 27–34)
MCV RBC AUTO: 102.7 FL — HIGH (ref 80–100)
NRBC # BLD: 0 /100 WBCS — SIGNIFICANT CHANGE UP (ref 0–0)
PLATELET # BLD AUTO: 219 K/UL — SIGNIFICANT CHANGE UP (ref 150–400)
RBC # BLD: 3.37 M/UL — LOW (ref 3.8–5.2)
RBC # FLD: 14.6 % — HIGH (ref 10.3–14.5)
WBC # BLD: 15.26 K/UL — HIGH (ref 3.8–10.5)
WBC # FLD AUTO: 15.26 K/UL — HIGH (ref 3.8–10.5)

## 2022-05-03 PROCEDURE — 99233 SBSQ HOSP IP/OBS HIGH 50: CPT

## 2022-05-03 PROCEDURE — 71250 CT THORAX DX C-: CPT | Mod: 26

## 2022-05-03 RX ORDER — AZITHROMYCIN 500 MG/1
TABLET, FILM COATED ORAL
Refills: 0 | Status: DISCONTINUED | OUTPATIENT
Start: 2022-05-03 | End: 2022-05-03

## 2022-05-03 RX ORDER — DORNASE ALFA 1 MG/ML
2.5 SOLUTION RESPIRATORY (INHALATION) DAILY
Refills: 0 | Status: DISCONTINUED | OUTPATIENT
Start: 2022-05-03 | End: 2022-05-07

## 2022-05-03 RX ORDER — CEFEPIME 1 G/1
2000 INJECTION, POWDER, FOR SOLUTION INTRAMUSCULAR; INTRAVENOUS EVERY 24 HOURS
Refills: 0 | Status: DISCONTINUED | OUTPATIENT
Start: 2022-05-04 | End: 2022-05-05

## 2022-05-03 RX ORDER — CEFEPIME 1 G/1
INJECTION, POWDER, FOR SOLUTION INTRAMUSCULAR; INTRAVENOUS
Refills: 0 | Status: DISCONTINUED | OUTPATIENT
Start: 2022-05-03 | End: 2022-05-05

## 2022-05-03 RX ORDER — AZITHROMYCIN 500 MG/1
500 TABLET, FILM COATED ORAL ONCE
Refills: 0 | Status: COMPLETED | OUTPATIENT
Start: 2022-05-03 | End: 2022-05-03

## 2022-05-03 RX ORDER — CEFEPIME 1 G/1
2000 INJECTION, POWDER, FOR SOLUTION INTRAMUSCULAR; INTRAVENOUS ONCE
Refills: 0 | Status: COMPLETED | OUTPATIENT
Start: 2022-05-03 | End: 2022-05-03

## 2022-05-03 RX ADMIN — Medication 40 MILLIGRAM(S): at 05:48

## 2022-05-03 RX ADMIN — Medication 3 MILLILITER(S): at 11:52

## 2022-05-03 RX ADMIN — AZITHROMYCIN 255 MILLIGRAM(S): 500 TABLET, FILM COATED ORAL at 14:37

## 2022-05-03 RX ADMIN — Medication 3 MILLILITER(S): at 18:26

## 2022-05-03 RX ADMIN — Medication 240 MILLIGRAM(S): at 05:49

## 2022-05-03 RX ADMIN — Medication 100 MILLIGRAM(S): at 14:37

## 2022-05-03 RX ADMIN — CEFEPIME 2000 MILLIGRAM(S): 1 INJECTION, POWDER, FOR SOLUTION INTRAMUSCULAR; INTRAVENOUS at 14:51

## 2022-05-03 RX ADMIN — ATORVASTATIN CALCIUM 20 MILLIGRAM(S): 80 TABLET, FILM COATED ORAL at 21:39

## 2022-05-03 RX ADMIN — Medication 3 MILLILITER(S): at 06:11

## 2022-05-03 RX ADMIN — BUDESONIDE AND FORMOTEROL FUMARATE DIHYDRATE 2 PUFF(S): 160; 4.5 AEROSOL RESPIRATORY (INHALATION) at 18:26

## 2022-05-03 RX ADMIN — APIXABAN 2.5 MILLIGRAM(S): 2.5 TABLET, FILM COATED ORAL at 20:23

## 2022-05-03 RX ADMIN — SENNA PLUS 2 TABLET(S): 8.6 TABLET ORAL at 21:39

## 2022-05-03 RX ADMIN — Medication 3 MILLILITER(S): at 23:41

## 2022-05-03 RX ADMIN — APIXABAN 2.5 MILLIGRAM(S): 2.5 TABLET, FILM COATED ORAL at 08:59

## 2022-05-03 RX ADMIN — POLYETHYLENE GLYCOL 3350 17 GRAM(S): 17 POWDER, FOR SOLUTION ORAL at 18:27

## 2022-05-03 RX ADMIN — POLYETHYLENE GLYCOL 3350 17 GRAM(S): 17 POWDER, FOR SOLUTION ORAL at 05:49

## 2022-05-03 RX ADMIN — PANTOPRAZOLE SODIUM 40 MILLIGRAM(S): 20 TABLET, DELAYED RELEASE ORAL at 05:49

## 2022-05-03 RX ADMIN — Medication 40 MILLIGRAM(S): at 18:27

## 2022-05-03 RX ADMIN — Medication 100 MILLIGRAM(S): at 05:49

## 2022-05-03 RX ADMIN — BUDESONIDE AND FORMOTEROL FUMARATE DIHYDRATE 2 PUFF(S): 160; 4.5 AEROSOL RESPIRATORY (INHALATION) at 05:49

## 2022-05-03 RX ADMIN — Medication 100 MILLIGRAM(S): at 21:39

## 2022-05-03 RX ADMIN — TIOTROPIUM BROMIDE 1 CAPSULE(S): 18 CAPSULE ORAL; RESPIRATORY (INHALATION) at 21:39

## 2022-05-03 RX ADMIN — Medication 100 MILLIGRAM(S): at 18:27

## 2022-05-03 NOTE — DIETITIAN INITIAL EVALUATION ADULT - NSICDXPASTMEDICALHX_GEN_ALL_CORE_FT
PAST MEDICAL HISTORY:  Afib x 15 yrs --on Coumadin  attempted cardioversion 13 yrs ago--failed    Asthma     CAD (coronary artery disease)     CAD (Coronary Artery Disease)     CHF (congestive heart failure)     Coronary Stent to RCA, LAD, and DIAG 9/25/06 at [facility]    Emphysema/COPD     GERD (Gastroesophageal Reflux Disease)     H/O: CVA pt unaware of CVA, yet showed up on CT of head as old CVA    Hypercholesterolemia     Lip Cancer resected 6 yrs ago (no chemo/rad)    Melanoma     PUD (peptic ulcer disease)     Skin cancer     Skin Cancer of Face removed 1 yr ago    Skin Cancer of Nose 1 yr ago- removed    Smoker

## 2022-05-03 NOTE — DIETITIAN INITIAL EVALUATION ADULT - REASON INDICATOR FOR ASSESSMENT
Length of stay.  · Assessment	  81F active smoker w/ PMH COPD(not on home O2), CAD s/p PCI, A.fib on eliquis, HTN, p/w progressive dyspnea and productive cough. Likely COPD exacerbation    COPD exacerbation.

## 2022-05-03 NOTE — CONSULT NOTE ADULT - SUBJECTIVE AND OBJECTIVE BOX
HPI:   Patient is a 81y female with copd, afib, cad, active smoker who was admitted  with sob, tx for copd exacerbation with zithromax, steroids and was positive for EV/RV on rvp. She went home on  then returned on  with ongoing sob. She has been on steroids but still doing poorly, now with heavy green sputum, zithromax commenced. NO fever, has pain in the chest when she coughs. No hemoptysis, no n,v,d,ha    REVIEW OF SYSTEMS:  All other review of systems negative (Comprehensive ROS)    PAST MEDICAL & SURGICAL HISTORY:  CAD (Coronary Artery Disease)    Coronary Stent  to RCA, LAD, and DIAG 06 at [facility]    Afib  x 15 yrs --on Coumadin  attempted cardioversion 13 yrs ago--failed    GERD (Gastroesophageal Reflux Disease)    Hypercholesterolemia    Emphysema/COPD    Lip Cancer  resected 6 yrs ago (no chemo/rad)    Skin Cancer of Face  removed 1 yr ago    Skin Cancer of Nose  1 yr ago- removed    H/O: CVA  pt unaware of CVA, yet showed up on CT of head as old CVA    CHF (congestive heart failure)    Skin cancer    PUD (peptic ulcer disease)    Asthma    Smoker    CAD (coronary artery disease)    Melanoma    History of Hysterectomy   for fibroids      History of   , , ,     History of Cholecystectomy      History of Appendectomy  childhood      S/P T&amp;A  childhood      History of cholecystectomy    History of appendectomy    Melanoma        Allergies    Levaquin (Pruritus; Rash)    Intolerances        Antimicrobials Day #  :  azithromycin  IVPB 500 milliGRAM(s) IV Intermittent once  cefepime   IVPB      doxycycline hyclate Capsule 100 milliGRAM(s) Oral every 12 hours    Other Medications:  acetaminophen     Tablet .. 650 milliGRAM(s) Oral every 6 hours PRN  albuterol/ipratropium for Nebulization 3 milliLiter(s) Nebulizer every 6 hours  aluminum hydroxide/magnesium hydroxide/simethicone Suspension 30 milliLiter(s) Oral every 4 hours PRN  apixaban 2.5 milliGRAM(s) Oral two times a day  atorvastatin 20 milliGRAM(s) Oral at bedtime  benzonatate 100 milliGRAM(s) Oral three times a day  budesonide 160 MICROgram(s)/formoterol 4.5 MICROgram(s) Inhaler 2 Puff(s) Inhalation two times a day  dornase olga Solution 2.5 milliGRAM(s) Inhalation daily  hydrocodone/homatropine Syrup 5 milliLiter(s) Oral every 8 hours PRN  melatonin 3 milliGRAM(s) Oral at bedtime PRN  methylPREDNISolone sodium succinate Injectable 40 milliGRAM(s) IV Push every 12 hours  pantoprazole    Tablet 40 milliGRAM(s) Oral before breakfast  polyethylene glycol 3350 17 Gram(s) Oral two times a day  senna 2 Tablet(s) Oral at bedtime  tiotropium 18 MICROgram(s) Capsule 1 Capsule(s) Inhalation at bedtime  verapamil  milliGRAM(s) Oral daily      FAMILY HISTORY:  Family history unknown (Father, Mother)        SOCIAL HISTORY:  Smoking: [ x]Yes [ ]No  ETOH: [ ]Yes x[ ]No  Drug Use: [ ]Yes [ x]No   [x ] Single[ ]    T(F): 97.9 (22 @ 12:09), Max: 98.3 (22 @ 20:35)  HR: 111 (22 @ 12:09)  BP: 107/83 (22 @ 12:09)  RR: 18 (22 @ 12:09)  SpO2: 93% (22 @ 12:09)  Wt(kg): --    PHYSICAL EXAM:  General: alert, no acute distress  Eyes:  anicteric, no conjunctival injection, no discharge  Oropharynx: no lesions or injection 	  Neck: supple, without adenopathy  Lungs: wheezes and rhonchi to auscultation  Heart: regular rate and rhythm; no murmur, rubs or gallops  Abdomen: soft, nondistended, nontender, without mass or organomegaly  Skin: no lesions  Extremities: no clubbing, cyanosis, or edema  Neurologic: alert, oriented, moves all extremities    LAB RESULTS:                        11.5   15.26 )-----------( 219      ( 03 May 2022 07:13 )             34.6                 MICROBIOLOGY:  RECENT CULTURES:        RADIOLOGY REVIEWED:    < from: Xray Chest 1 View- PORTABLE-Urgent (Xray Chest 1 View- PORTABLE-Urgent .) (22 @ 06:29) >    ACC: [id number] EXAM:  XR CHEST PORTABLE URGENT 1V                          PROCEDURE DATE:  2022          INTERPRETATION:  CLINICAL INFORMATION: Cough.    TECHNIQUE: Frontal view of the chest    COMPARISON: Chest radiograph  2022.    FINDINGS:    The lungs are clear.  Heart size is enlarged.  No acute osseous abnormality.      IMPRESSION:    Clear lungs.      < end of copied text >        Impression:  Elderly woman who is active smoker, admitted  with sob, rv/ev on rvp, tx for copd exacerbation, had zithromax for 3 d and d/c on , back on  with ongoing wheezes, sob, on steroids but still doing bad. NOw with thick green sputum hence concerned for pneumonia or at minimum bacterial bronchitis warranting broader abx.     Recommendations:  start doxycycline and cefepime  sputum and blood cx, rvp, legionella  repeat ct chest

## 2022-05-03 NOTE — DIETITIAN INITIAL EVALUATION ADULT - PERTINENT MEDS FT
MEDICATIONS  (STANDING):  albuterol/ipratropium for Nebulization 3 milliLiter(s) Nebulizer every 6 hours  apixaban 2.5 milliGRAM(s) Oral two times a day  atorvastatin 20 milliGRAM(s) Oral at bedtime  benzonatate 100 milliGRAM(s) Oral three times a day  budesonide 160 MICROgram(s)/formoterol 4.5 MICROgram(s) Inhaler 2 Puff(s) Inhalation two times a day  cefepime   IVPB      dornase olga Solution 2.5 milliGRAM(s) Inhalation daily  doxycycline hyclate Capsule 100 milliGRAM(s) Oral every 12 hours  methylPREDNISolone sodium succinate Injectable 40 milliGRAM(s) IV Push every 12 hours  pantoprazole    Tablet 40 milliGRAM(s) Oral before breakfast  polyethylene glycol 3350 17 Gram(s) Oral two times a day  senna 2 Tablet(s) Oral at bedtime  tiotropium 18 MICROgram(s) Capsule 1 Capsule(s) Inhalation at bedtime  verapamil  milliGRAM(s) Oral daily    MEDICATIONS  (PRN):  acetaminophen     Tablet .. 650 milliGRAM(s) Oral every 6 hours PRN Temp greater or equal to 38.5C (101.3F), Mild Pain (1 - 3)  aluminum hydroxide/magnesium hydroxide/simethicone Suspension 30 milliLiter(s) Oral every 4 hours PRN Dyspepsia  hydrocodone/homatropine Syrup 5 milliLiter(s) Oral every 8 hours PRN Cough  melatonin 3 milliGRAM(s) Oral at bedtime PRN Sleep

## 2022-05-03 NOTE — GOALS OF CARE CONVERSATION - ADVANCED CARE PLANNING - CONVERSATION DETAILS
Goals of care discussed with the patient and she declines any further paperwork at this time
Spoke to patient regarding advance directives. Provided patient health care proxy form. Patient will follow up to complete health care proxy with medical team. Patient said she is likely to make daughter Giuliana York as healthcare proxy and she would trust Giuliana to make medical decisions for her.    Regarding code status, patient is considering what they would prefer with their goals of care, patient watched informational video on goals of care. Provided MOLST. Explained MOLST decisions, including code status, goals of care. Patient would like to review MOSLT with family and will follow up with medical team to complete MOLST.  Patient has COPD and expressed that she "does not want to die feeling like I cannot breathe", patient expressed that she wants to die peacefully without overly invasive procedures and does not want to prolong her life if she has no good quality of life. Patient expressed that she has lived a very good life and "when it is her time to go I'm ready".     Explained that until MOLST is complete, patient will remain Full Code. Patient expressed that she would like to be DNR but was unsure of intubation wishes. Informed NP Loraine of potential wish to complete MOLST and medical team will follow up to complete MOLST if necessary. Patient acknowledged understanding of Full Code and agreed. Provided video information regarding advance directives, as well as contact information. Will remain available for questions.

## 2022-05-03 NOTE — PROGRESS NOTE ADULT - SUBJECTIVE AND OBJECTIVE BOX
PULMONARY PROGRESS NOTE    RECORD, JANA  MRN-[id number]    Patient is a 81y old  Female who presents with a chief complaint of Dyspnea (01 May 2022 09:17)      HPI:  -coughing, productive  -hard to breathe  -upset about clinical condition thus far    ROS:   -    ACTIVE MEDICATION LIST:  MEDICATIONS  (STANDING):  albuterol/ipratropium for Nebulization 3 milliLiter(s) Nebulizer every 6 hours  apixaban 2.5 milliGRAM(s) Oral two times a day  atorvastatin 20 milliGRAM(s) Oral at bedtime  azithromycin  IVPB      benzonatate 100 milliGRAM(s) Oral three times a day  budesonide 160 MICROgram(s)/formoterol 4.5 MICROgram(s) Inhaler 2 Puff(s) Inhalation two times a day  dornase olga Solution 2.5 milliGRAM(s) Inhalation daily  methylPREDNISolone sodium succinate Injectable 40 milliGRAM(s) IV Push every 12 hours  pantoprazole    Tablet 40 milliGRAM(s) Oral before breakfast  polyethylene glycol 3350 17 Gram(s) Oral two times a day  senna 2 Tablet(s) Oral at bedtime  tiotropium 18 MICROgram(s) Capsule 1 Capsule(s) Inhalation at bedtime  verapamil  milliGRAM(s) Oral daily    MEDICATIONS  (PRN):  acetaminophen     Tablet .. 650 milliGRAM(s) Oral every 6 hours PRN Temp greater or equal to 38.5C (101.3F), Mild Pain (1 - 3)  aluminum hydroxide/magnesium hydroxide/simethicone Suspension 30 milliLiter(s) Oral every 4 hours PRN Dyspepsia  hydrocodone/homatropine Syrup 5 milliLiter(s) Oral every 8 hours PRN Cough  melatonin 3 milliGRAM(s) Oral at bedtime PRN Sleep      EXAM:  Vital Signs Last 24 Hrs  T(C): 36.6 (03 May 2022 04:17), Max: 36.8 (02 May 2022 20:35)  T(F): 97.8 (03 May 2022 04:17), Max: 98.3 (02 May 2022 20:35)  HR: 101 (03 May 2022 04:17) (98 - 101)  BP: 114/74 (03 May 2022 04:17) (114/74 - 123/78)  BP(mean): --  RR: 18 (03 May 2022 11:56) (18 - 18)  SpO2: 92% (03 May 2022 11:56) (92% - 98%)    GENERAL: The patient is awake and alert in no apparent distress.     LUNGS: not wheezing, rhonchi    HEART: Regular rate and rhythm without murmur.                            11.5   15.26 )-----------( 219      ( 03 May 2022 07:13 )             34.6      rad< from: Xray Chest 1 View- PORTABLE-Urgent (Xray Chest 1 View- PORTABLE-Urgent .) (05.02.22 @ 06:29) >    ACC: [id number] EXAM:  XR CHEST PORTABLE URGENT 1V                          PROCEDURE DATE:  05/02/2022          INTERPRETATION:  CLINICAL INFORMATION: Cough.    TECHNIQUE: Frontal view of the chest    COMPARISON: Chest radiograph  4/27/2022.    FINDINGS:    The lungs are clear.  Heart size is enlarged.  No acute osseous abnormality.      IMPRESSION:    Clear lungs.        --- End of Report ---          LUZ PEREZ MD; Resident Radiology  This document has been electronically signed.  SHANA OLIVIER MD; Attending Radiologist  This document has been electronically signed. May  3 2022  9:19AM    < end of copied text >      PROBLEM LIST:  81y Female with HEALTH ISSUES - PROBLEM Dx:             RECS:  continue solumedrol 40IVP  add on azithromycin IV today, with plan for oral starting 5/4  nebs followed by IS  pulmozyme  inhalers  hycodan PRN  PPI daily  perles        Please call with any questions.    Celsa Lau DO  [facility] Pulmonary/Sleep Medicine  [phone]

## 2022-05-03 NOTE — PROGRESS NOTE ADULT - SUBJECTIVE AND OBJECTIVE BOX
Cardiovascular Disease Progress Note    Overnight events: No acute events overnight.  + productive cough. + sob. no cp/palps/dizzubess  Otherwise review of systems negative    Objective Findings:  T(C): 36.6 (05-03-22 @ 04:17), Max: 36.8 (05-02-22 @ 20:35)  HR: 101 (05-03-22 @ 04:17) (92 - 101)  BP: 114/74 (05-03-22 @ 04:17) (107/70 - 123/78)  RR: 18 (05-03-22 @ 04:17) (18 - 18)  SpO2: 97% (05-03-22 @ 04:17) (93% - 98%)  Wt(kg): --  Daily     Daily       Physical Exam:  Gen: NAD  HEENT: EOMI  CV: RRR, normal S1 + S2, no m/r/g  Lungs: CTAB  Abd: soft, non-tender  Ext: No edema    Telemetry:    Laboratory Data:                        11.5   15.26 )-----------( 219      ( 03 May 2022 07:13 )             34.6             CARDIAC MARKERS ( 02 May 2022 00:24 )  x     / x     / 47 U/L / x     / 3.0 ng/mL          Inpatient Medications:  MEDICATIONS  (STANDING):  albuterol/ipratropium for Nebulization 3 milliLiter(s) Nebulizer every 6 hours  apixaban 2.5 milliGRAM(s) Oral two times a day  atorvastatin 20 milliGRAM(s) Oral at bedtime  benzonatate 100 milliGRAM(s) Oral three times a day  budesonide 160 MICROgram(s)/formoterol 4.5 MICROgram(s) Inhaler 2 Puff(s) Inhalation two times a day  methylPREDNISolone sodium succinate Injectable 40 milliGRAM(s) IV Push every 12 hours  pantoprazole    Tablet 40 milliGRAM(s) Oral before breakfast  polyethylene glycol 3350 17 Gram(s) Oral two times a day  senna 2 Tablet(s) Oral at bedtime  tiotropium 18 MICROgram(s) Capsule 1 Capsule(s) Inhalation at bedtime  verapamil  milliGRAM(s) Oral daily      Assessment:  RVP  copd exac  persistent afib  cad s/p pci  sob    Recs:  cardiac stable  no e/o acs or chf at present  cw rate control meds and AC for afib  cw lasix 20mg po qd (elevated BNP, without clinical signs of hypervolemia)  cw steroids, supportive care and bd per pulm  s/p ct chest on recent admission. results noted  2d echo without acute findings  will follow  ? eval for home o2  outpatient ischemic eval if exertional dyspnea persists after tx of copd and correction of exertional hypoxemia  would check pct and repeat lung imaging. low threshold for initiating on abx    Over 25 minutes spent on total encounter; more than 50% of the visit was spent counseling and/or coordinating care by the attending physician.      Bill Cho MD   Cardiovascular Disease  [phone]

## 2022-05-03 NOTE — PROGRESS NOTE ADULT - SUBJECTIVE AND OBJECTIVE BOX
Patient is a 81y old  Female who presents with a chief complaint of Chronic obstructive pulmonary disease with acute exacerbation     (03 May 2022 16:48)      SUBJECTIVE / OVERNIGHT EVENTS: c/o sob and productive cough with green sputum  Review of Systems  chest pain no  palpitations no  nausea no  headache no    MEDICATIONS  (STANDING):  albuterol/ipratropium for Nebulization 3 milliLiter(s) Nebulizer every 6 hours  apixaban 2.5 milliGRAM(s) Oral two times a day  atorvastatin 20 milliGRAM(s) Oral at bedtime  benzonatate 100 milliGRAM(s) Oral three times a day  budesonide 160 MICROgram(s)/formoterol 4.5 MICROgram(s) Inhaler 2 Puff(s) Inhalation two times a day  cefepime   IVPB      dornase olga Solution 2.5 milliGRAM(s) Inhalation daily  doxycycline hyclate Capsule 100 milliGRAM(s) Oral every 12 hours  methylPREDNISolone sodium succinate Injectable 40 milliGRAM(s) IV Push every 12 hours  pantoprazole    Tablet 40 milliGRAM(s) Oral before breakfast  polyethylene glycol 3350 17 Gram(s) Oral two times a day  senna 2 Tablet(s) Oral at bedtime  tiotropium 18 MICROgram(s) Capsule 1 Capsule(s) Inhalation at bedtime  verapamil  milliGRAM(s) Oral daily    MEDICATIONS  (PRN):  acetaminophen     Tablet .. 650 milliGRAM(s) Oral every 6 hours PRN Temp greater or equal to 38.5C (101.3F), Mild Pain (1 - 3)  aluminum hydroxide/magnesium hydroxide/simethicone Suspension 30 milliLiter(s) Oral every 4 hours PRN Dyspepsia  hydrocodone/homatropine Syrup 5 milliLiter(s) Oral every 8 hours PRN Cough  melatonin 3 milliGRAM(s) Oral at bedtime PRN Sleep      Vital Signs Last 24 Hrs  T(C): 36.6 (03 May 2022 12:09), Max: 36.8 (02 May 2022 20:35)  T(F): 97.9 (03 May 2022 12:09), Max: 98.3 (02 May 2022 20:35)  HR: 111 (03 May 2022 12:09) (100 - 111)  BP: 107/83 (03 May 2022 12:09) (107/83 - 121/87)  BP(mean): --  RR: 18 (03 May 2022 12:09) (18 - 18)  SpO2: 93% (03 May 2022 12:09) (92% - 97%)    PHYSICAL EXAM:  GENERAL: NAD  HEAD:  Atraumatic, Normocephalic  EYES: EOMI, PERRLA, conjunctiva and sclera clear  NECK: Supple, No JVD  CHEST/LUNG: few rhonchi to auscultation bilaterally  HEART: Regular rate and rhythm; No murmurs, rubs, or gallops  ABDOMEN: Soft, Nontender, Nondistended; Bowel sounds present  EXTREMITIES:  2+ Peripheral Pulses, No clubbing, cyanosis, or edema  PSYCH: AAOx3  NEUROLOGY: non-focal  SKIN: No rashes or lesions    LABS:                        11.5   15.26 )-----------( 219      ( 03 May 2022 07:13 )             34.6             CARDIAC MARKERS ( 02 May 2022 00:24 )  x     / x     / 47 U/L / x     / 3.0 ng/mL            RADIOLOGY & ADDITIONAL TESTS:    Imaging Personally Reviewed:    Consultant(s) Notes Reviewed:      Care Discussed with Consultants/Other Providers:

## 2022-05-04 LAB
B PERT DNA SPEC QL NAA+PROBE: SIGNIFICANT CHANGE UP
BASOPHILS # BLD AUTO: 0.03 K/UL — SIGNIFICANT CHANGE UP (ref 0–0.2)
BASOPHILS NFR BLD AUTO: 0.2 % — SIGNIFICANT CHANGE UP (ref 0–2)
C PNEUM DNA SPEC QL NAA+PROBE: SIGNIFICANT CHANGE UP
EOSINOPHIL # BLD AUTO: 0.02 K/UL — SIGNIFICANT CHANGE UP (ref 0–0.5)
EOSINOPHIL NFR BLD AUTO: 0.1 % — SIGNIFICANT CHANGE UP (ref 0–6)
FLUAV H1 2009 PAND RNA SPEC QL NAA+PROBE: SIGNIFICANT CHANGE UP
FLUAV H1 RNA SPEC QL NAA+PROBE: SIGNIFICANT CHANGE UP
FLUAV H3 RNA SPEC QL NAA+PROBE: SIGNIFICANT CHANGE UP
FLUAV SUBTYP SPEC NAA+PROBE: SIGNIFICANT CHANGE UP
FLUBV RNA SPEC QL NAA+PROBE: SIGNIFICANT CHANGE UP
GRAM STN FLD: SIGNIFICANT CHANGE UP
HADV DNA SPEC QL NAA+PROBE: SIGNIFICANT CHANGE UP
HCOV PNL SPEC NAA+PROBE: SIGNIFICANT CHANGE UP
HCT VFR BLD CALC: 32.4 % — LOW (ref 34.5–45)
HGB BLD-MCNC: 10.7 G/DL — LOW (ref 11.5–15.5)
HMPV RNA SPEC QL NAA+PROBE: SIGNIFICANT CHANGE UP
HPIV1 RNA SPEC QL NAA+PROBE: SIGNIFICANT CHANGE UP
HPIV2 RNA SPEC QL NAA+PROBE: SIGNIFICANT CHANGE UP
HPIV3 RNA SPEC QL NAA+PROBE: SIGNIFICANT CHANGE UP
HPIV4 RNA SPEC QL NAA+PROBE: SIGNIFICANT CHANGE UP
IMM GRANULOCYTES NFR BLD AUTO: 2.3 % — HIGH (ref 0–1.5)
LEGIONELLA AG UR QL: NEGATIVE — SIGNIFICANT CHANGE UP
LYMPHOCYTES # BLD AUTO: 0.84 K/UL — LOW (ref 1–3.3)
LYMPHOCYTES # BLD AUTO: 6.2 % — LOW (ref 13–44)
MCHC RBC-ENTMCNC: 33 GM/DL — SIGNIFICANT CHANGE UP (ref 32–36)
MCHC RBC-ENTMCNC: 33.5 PG — SIGNIFICANT CHANGE UP (ref 27–34)
MCV RBC AUTO: 101.6 FL — HIGH (ref 80–100)
MONOCYTES # BLD AUTO: 1.2 K/UL — HIGH (ref 0–0.9)
MONOCYTES NFR BLD AUTO: 8.9 % — SIGNIFICANT CHANGE UP (ref 2–14)
NEUTROPHILS # BLD AUTO: 11.12 K/UL — HIGH (ref 1.8–7.4)
NEUTROPHILS NFR BLD AUTO: 82.3 % — HIGH (ref 43–77)
NRBC # BLD: 0 /100 WBCS — SIGNIFICANT CHANGE UP (ref 0–0)
PLATELET # BLD AUTO: 191 K/UL — SIGNIFICANT CHANGE UP (ref 150–400)
RAPID RVP RESULT: DETECTED
RBC # BLD: 3.19 M/UL — LOW (ref 3.8–5.2)
RBC # FLD: 14.4 % — SIGNIFICANT CHANGE UP (ref 10.3–14.5)
RV+EV RNA SPEC QL NAA+PROBE: DETECTED
SARS-COV-2 RNA SPEC QL NAA+PROBE: SIGNIFICANT CHANGE UP
SARS-COV-2 RNA SPEC QL NAA+PROBE: SIGNIFICANT CHANGE UP
SPECIMEN SOURCE: SIGNIFICANT CHANGE UP
WBC # BLD: 13.52 K/UL — HIGH (ref 3.8–10.5)
WBC # FLD AUTO: 13.52 K/UL — HIGH (ref 3.8–10.5)

## 2022-05-04 PROCEDURE — 99232 SBSQ HOSP IP/OBS MODERATE 35: CPT

## 2022-05-04 RX ADMIN — Medication 240 MILLIGRAM(S): at 05:54

## 2022-05-04 RX ADMIN — Medication 40 MILLIGRAM(S): at 17:45

## 2022-05-04 RX ADMIN — SENNA PLUS 2 TABLET(S): 8.6 TABLET ORAL at 20:55

## 2022-05-04 RX ADMIN — TIOTROPIUM BROMIDE 1 CAPSULE(S): 18 CAPSULE ORAL; RESPIRATORY (INHALATION) at 20:56

## 2022-05-04 RX ADMIN — ATORVASTATIN CALCIUM 20 MILLIGRAM(S): 80 TABLET, FILM COATED ORAL at 20:55

## 2022-05-04 RX ADMIN — APIXABAN 2.5 MILLIGRAM(S): 2.5 TABLET, FILM COATED ORAL at 17:45

## 2022-05-04 RX ADMIN — Medication 3 MILLILITER(S): at 05:55

## 2022-05-04 RX ADMIN — BUDESONIDE AND FORMOTEROL FUMARATE DIHYDRATE 2 PUFF(S): 160; 4.5 AEROSOL RESPIRATORY (INHALATION) at 06:09

## 2022-05-04 RX ADMIN — Medication 40 MILLIGRAM(S): at 05:55

## 2022-05-04 RX ADMIN — PANTOPRAZOLE SODIUM 40 MILLIGRAM(S): 20 TABLET, DELAYED RELEASE ORAL at 05:54

## 2022-05-04 RX ADMIN — APIXABAN 2.5 MILLIGRAM(S): 2.5 TABLET, FILM COATED ORAL at 05:54

## 2022-05-04 RX ADMIN — BUDESONIDE AND FORMOTEROL FUMARATE DIHYDRATE 2 PUFF(S): 160; 4.5 AEROSOL RESPIRATORY (INHALATION) at 17:44

## 2022-05-04 RX ADMIN — POLYETHYLENE GLYCOL 3350 17 GRAM(S): 17 POWDER, FOR SOLUTION ORAL at 05:54

## 2022-05-04 RX ADMIN — Medication 100 MILLIGRAM(S): at 05:55

## 2022-05-04 RX ADMIN — Medication 100 MILLIGRAM(S): at 20:55

## 2022-05-04 RX ADMIN — Medication 3 MILLILITER(S): at 17:44

## 2022-05-04 RX ADMIN — POLYETHYLENE GLYCOL 3350 17 GRAM(S): 17 POWDER, FOR SOLUTION ORAL at 17:44

## 2022-05-04 RX ADMIN — DORNASE ALFA 2.5 MILLIGRAM(S): 1 SOLUTION RESPIRATORY (INHALATION) at 13:27

## 2022-05-04 RX ADMIN — Medication 100 MILLIGRAM(S): at 13:28

## 2022-05-04 RX ADMIN — Medication 3 MILLILITER(S): at 11:22

## 2022-05-04 RX ADMIN — Medication 100 MILLIGRAM(S): at 05:54

## 2022-05-04 RX ADMIN — Medication 100 MILLIGRAM(S): at 17:45

## 2022-05-04 RX ADMIN — CEFEPIME 100 MILLIGRAM(S): 1 INJECTION, POWDER, FOR SOLUTION INTRAMUSCULAR; INTRAVENOUS at 14:07

## 2022-05-04 NOTE — PROGRESS NOTE ADULT - SUBJECTIVE AND OBJECTIVE BOX
Cardiovascular Disease Progress Note    Overnight events: No acute events overnight.  no cp/sob/palps/dizziness  Otherwise review of systems negative    Objective Findings:  T(C): 36.8 (05-04-22 @ 04:27), Max: 36.9 (05-03-22 @ 20:16)  HR: 96 (05-04-22 @ 04:27) (88 - 111)  BP: 110/67 (05-04-22 @ 04:27) (107/83 - 122/85)  RR: 18 (05-04-22 @ 04:27) (18 - 18)  SpO2: 99% (05-04-22 @ 04:27) (92% - 99%)  Wt(kg): --  Daily     Daily       Physical Exam:  Gen: NAD  HEENT: EOMI  CV: RRR, normal S1 + S2, no m/r/g  Lungs: CTAB  Abd: soft, non-tender  Ext: No edema    Telemetry:    Laboratory Data:                        11.5   15.26 )-----------( 219      ( 03 May 2022 07:13 )             34.6                     Inpatient Medications:  MEDICATIONS  (STANDING):  albuterol/ipratropium for Nebulization 3 milliLiter(s) Nebulizer every 6 hours  apixaban 2.5 milliGRAM(s) Oral two times a day  atorvastatin 20 milliGRAM(s) Oral at bedtime  benzonatate 100 milliGRAM(s) Oral three times a day  budesonide 160 MICROgram(s)/formoterol 4.5 MICROgram(s) Inhaler 2 Puff(s) Inhalation two times a day  cefepime   IVPB      cefepime   IVPB 2000 milliGRAM(s) IV Intermittent every 24 hours  dornase olga Solution 2.5 milliGRAM(s) Inhalation daily  doxycycline hyclate Capsule 100 milliGRAM(s) Oral every 12 hours  methylPREDNISolone sodium succinate Injectable 40 milliGRAM(s) IV Push every 12 hours  pantoprazole    Tablet 40 milliGRAM(s) Oral before breakfast  polyethylene glycol 3350 17 Gram(s) Oral two times a day  senna 2 Tablet(s) Oral at bedtime  tiotropium 18 MICROgram(s) Capsule 1 Capsule(s) Inhalation at bedtime  verapamil  milliGRAM(s) Oral daily      Assessment:  RVP  copd exac  persistent afib  cad s/p pci  sob    Recs:  cardiac stable  no e/o acs or chf at present  cw rate control meds and AC for afib  cw lasix 20mg po qd (elevated BNP, without clinical signs of hypervolemia)  cw steroids, abx, supportive care and bd per pulm  s/p ct chest on recent admission. results noted  2d echo without acute findings  will follow  ? eval for home o2  outpatient ischemic eval if exertional dyspnea persists after tx of copd and correction of exertional hypoxemia          Over 25 minutes spent on total encounter; more than 50% of the visit was spent counseling and/or coordinating care by the attending physician.      Bill Cho MD   Cardiovascular Disease  [phone]

## 2022-05-04 NOTE — PROGRESS NOTE ADULT - SUBJECTIVE AND OBJECTIVE BOX
Patient is a 81y old  Female who presents with a chief complaint of copd (04 May 2022 18:18)      SUBJECTIVE / OVERNIGHT EVENTS: Feels better.   Review of Systems  chest pain no  palpitations no  sob improving   nausea no  headache no    MEDICATIONS  (STANDING):  albuterol/ipratropium for Nebulization 3 milliLiter(s) Nebulizer every 6 hours  apixaban 2.5 milliGRAM(s) Oral two times a day  atorvastatin 20 milliGRAM(s) Oral at bedtime  benzonatate 100 milliGRAM(s) Oral three times a day  budesonide 160 MICROgram(s)/formoterol 4.5 MICROgram(s) Inhaler 2 Puff(s) Inhalation two times a day  cefepime   IVPB      cefepime   IVPB 2000 milliGRAM(s) IV Intermittent every 24 hours  dornase olga Solution 2.5 milliGRAM(s) Inhalation daily  doxycycline hyclate Capsule 100 milliGRAM(s) Oral every 12 hours  methylPREDNISolone sodium succinate Injectable 40 milliGRAM(s) IV Push every 12 hours  pantoprazole    Tablet 40 milliGRAM(s) Oral before breakfast  polyethylene glycol 3350 17 Gram(s) Oral two times a day  senna 2 Tablet(s) Oral at bedtime  tiotropium 18 MICROgram(s) Capsule 1 Capsule(s) Inhalation at bedtime  verapamil  milliGRAM(s) Oral daily    MEDICATIONS  (PRN):  acetaminophen     Tablet .. 650 milliGRAM(s) Oral every 6 hours PRN Temp greater or equal to 38.5C (101.3F), Mild Pain (1 - 3)  aluminum hydroxide/magnesium hydroxide/simethicone Suspension 30 milliLiter(s) Oral every 4 hours PRN Dyspepsia  hydrocodone/homatropine Syrup 5 milliLiter(s) Oral every 8 hours PRN Cough  melatonin 3 milliGRAM(s) Oral at bedtime PRN Sleep      Vital Signs Last 24 Hrs  T(C): 36.8 (04 May 2022 20:00), Max: 36.8 (04 May 2022 04:27)  T(F): 98.3 (04 May 2022 20:00), Max: 98.3 (04 May 2022 10:58)  HR: 94 (04 May 2022 20:00) (94 - 109)  BP: 117/79 (04 May 2022 20:00) (108/73 - 117/79)  BP(mean): --  RR: 18 (04 May 2022 20:00) (18 - 20)  SpO2: 93% (04 May 2022 20:00) (93% - 99%)    PHYSICAL EXAM:  GENERAL: NAD, well-developed  HEAD:  Atraumatic, Normocephalic  EYES: EOMI, PERRLA, conjunctiva and sclera clear  NECK: Supple, No JVD  CHEST/LUNG: few rhonchi to auscultation bilaterally  HEART: Regular rate and rhythm; No murmurs, rubs, or gallops  ABDOMEN: Soft, Nontender, Nondistended; Bowel sounds present  EXTREMITIES:  2+ Peripheral Pulses, No clubbing, cyanosis, or edema  PSYCH: AAOx3  NEUROLOGY: non-focal  SKIN: No rashes or lesions    LABS:                        10.7   13.52 )-----------( 191      ( 04 May 2022 07:26 )             32.4                     Culture - Sputum (collected 03 May 2022 18:34)  Source: .Sputum Sputum  Gram Stain (04 May 2022 05:44):    Few polymorphonuclear leukocytes per low power field    Rare Squamous epithelial cells per low power field    Moderate Yeast like cells per oil power field    Few Gram positive cocci in pairs per oil power field  Preliminary Report (04 May 2022 20:55):    Normal Respiratory Nicky present        RADIOLOGY & ADDITIONAL TESTS:    Imaging Personally Reviewed:    Consultant(s) Notes Reviewed:      Care Discussed with Consultants/Other Providers:

## 2022-05-04 NOTE — PROGRESS NOTE ADULT - SUBJECTIVE AND OBJECTIVE BOX
CC: f/u for  pneumonia  Patient reports she feels much better today    REVIEW OF SYSTEMS:  All other review of systems negative (Comprehensive ROS)    Antimicrobials Day #  :2  cefepime   IVPB      cefepime   IVPB 2000 milliGRAM(s) IV Intermittent every 24 hours  doxycycline hyclate Capsule 100 milliGRAM(s) Oral every 12 hours    Other Medications Reviewed    T(F): 98.3 (05-04-22 @ 10:58), Max: 98.4 (05-03-22 @ 20:16)  HR: 109 (05-04-22 @ 10:58)  BP: 108/73 (05-04-22 @ 10:58)  RR: 18 (05-04-22 @ 10:58)  SpO2: 95% (05-04-22 @ 10:58)  Wt(kg): --    PHYSICAL EXAM:  General: alert, no acute distress  Eyes:  anicteric, no conjunctival injection, no discharge  Oropharynx: no lesions or injection 	  Neck: supple, without adenopathy  Lungs: wheezes to auscultation  Heart: regular rate and rhythm; no murmur, rubs or gallops  Abdomen: soft, mildly distended, nontender, without mass or organomegaly  Skin: no lesions  Extremities: no clubbing, cyanosis, or edema  Neurologic: alert, oriented, moves all extremities    LAB RESULTS:                        10.7   13.52 )-----------( 191      ( 04 May 2022 07:26 )             32.4               MICROBIOLOGY:  RECENT CULTURES:  05-03 @ 18:34 .Sputum Sputum       Few polymorphonuclear leukocytes per low power field  Rare Squamous epithelial cells per low power field  Moderate Yeast like cells per oil power field  Few Gram positive cocci in pairs per oil power field  Legionella pneumophila Antigen, Urine (05.03.22 @ 17:32)   Legionella Antigen, Urine: Negative: Positive Testing method: Immunochromatographic Assay.         RADIOLOGY REVIEWED:    < from: CT Chest No Cont (05.03.22 @ 17:34) >    ACC: [id number] EXAM:  CT CHEST                          PROCEDURE DATE:  05/03/2022          INTERPRETATION:  HISTORY: Admitting Dxs: J44.1 DIFF BREATHING. COPD.   Pneumonia.    EXAMINATION: CT CHEST was performed without IV contrast.    COMPARISON:4/22/2022.    FINDINGS:    AIRWAYS, LUNGS, PLEURA: Mucus plugging involving the segmental airways of   the bilateral lower lobes. No pleural effusion.    Emphysema. Previously described linear right apical nodular opacity   appears slightly decreasedcompared to 4/22/2022 (image 35, series 3).   New patchy posterior right upper lobe opacity (images 52-64, series 3).    MEDIASTINUM: Dilated left atrium. No pericardial effusion. Thoracic aorta   normal caliber.  No large mediastinal lymph nodes.    IMAGED ABDOMEN: Partially imaged peripherally calcified left renal cystic   lesion, which was present on prior exam.    SOFT TISSUES: Unremarkable.    BONES: Unremarkable.      IMPRESSION:.    New patchy posterior right upper lobe ground-glass opacity, which may   represent atelectasis or infection.    Previously described right apical nodular opacity is slightly decreased   and may be infectious/inflammatory etiology.    Recommend CT chest follow-up in 3 months to ensure clearing of right   upper lobe opacities.    --- End of Report ---              < end of copied text >            Assessment:  Patient with severe copd admitted a couple of weeks ago for exacerbation set off by uri with rhinovirus, came back last week within a day of d/c feeling worse, on steroids for copd but not getting better, now doing better with addition of antibiotics. Has a new infiltrate so suspect some post viral pneumonia at this point too.   Plan:  continue doxycycline and cefepime  await sputum culture  copd per pulmonary

## 2022-05-04 NOTE — PROGRESS NOTE ADULT - SUBJECTIVE AND OBJECTIVE BOX
PULMONARY PROGRESS NOTE    RECORD, JANA  MRN-[id number]    Patient is a 81y old  Female who presents with a chief complaint of Chronic obstructive pulmonary disease with acute exacerbation     (03 May 2022 16:48)      HPI:  -feels better  - using IS  - on room air    ROS:   -    ACTIVE MEDICATION LIST:  MEDICATIONS  (STANDING):  albuterol/ipratropium for Nebulization 3 milliLiter(s) Nebulizer every 6 hours  apixaban 2.5 milliGRAM(s) Oral two times a day  atorvastatin 20 milliGRAM(s) Oral at bedtime  benzonatate 100 milliGRAM(s) Oral three times a day  budesonide 160 MICROgram(s)/formoterol 4.5 MICROgram(s) Inhaler 2 Puff(s) Inhalation two times a day  cefepime   IVPB      cefepime   IVPB 2000 milliGRAM(s) IV Intermittent every 24 hours  dornase olga Solution 2.5 milliGRAM(s) Inhalation daily  doxycycline hyclate Capsule 100 milliGRAM(s) Oral every 12 hours  methylPREDNISolone sodium succinate Injectable 40 milliGRAM(s) IV Push every 12 hours  pantoprazole    Tablet 40 milliGRAM(s) Oral before breakfast  polyethylene glycol 3350 17 Gram(s) Oral two times a day  senna 2 Tablet(s) Oral at bedtime  tiotropium 18 MICROgram(s) Capsule 1 Capsule(s) Inhalation at bedtime  verapamil  milliGRAM(s) Oral daily    MEDICATIONS  (PRN):  acetaminophen     Tablet .. 650 milliGRAM(s) Oral every 6 hours PRN Temp greater or equal to 38.5C (101.3F), Mild Pain (1 - 3)  aluminum hydroxide/magnesium hydroxide/simethicone Suspension 30 milliLiter(s) Oral every 4 hours PRN Dyspepsia  hydrocodone/homatropine Syrup 5 milliLiter(s) Oral every 8 hours PRN Cough  melatonin 3 milliGRAM(s) Oral at bedtime PRN Sleep      EXAM:  Vital Signs Last 24 Hrs  T(C): 36.8 (04 May 2022 10:58), Max: 36.9 (03 May 2022 20:16)  T(F): 98.3 (04 May 2022 10:58), Max: 98.4 (03 May 2022 20:16)  HR: 109 (04 May 2022 10:58) (88 - 109)  BP: 108/73 (04 May 2022 10:58) (108/73 - 122/85)  BP(mean): --  RR: 18 (04 May 2022 10:58) (18 - 20)  SpO2: 95% (04 May 2022 10:58) (93% - 99%)    GENERAL: The patient is awake and alert in no apparent distress.     LUNGS: no wheezing, still rhonchi                             10.7   13.52 )-----------( 191      ( 04 May 2022 07:26 )             32.4        rad< from: CT Chest No Cont (05.03.22 @ 17:34) >    ACC: [id number] EXAM:  CT CHEST                          PROCEDURE DATE:  05/03/2022          INTERPRETATION:  HISTORY: Admitting Dxs: J44.1 DIFF BREATHING. COPD.   Pneumonia.    EXAMINATION: CT CHEST was performed without IV contrast.    COMPARISON:4/22/2022.    FINDINGS:    AIRWAYS, LUNGS, PLEURA: Mucus plugging involving the segmental airways of   the bilateral lower lobes. No pleural effusion.    Emphysema. Previously described linear right apical nodular opacity   appears slightly decreasedcompared to 4/22/2022 (image 35, series 3).   New patchy posterior right upper lobe opacity (images 52-64, series 3).    MEDIASTINUM: Dilated left atrium. No pericardial effusion. Thoracic aorta   normal caliber.  No large mediastinal lymph nodes.    IMAGED ABDOMEN: Partially imaged peripherally calcified left renal cystic   lesion, which was present on prior exam.    SOFT TISSUES: Unremarkable.    BONES: Unremarkable.      IMPRESSION:.    New patchy posterior right upper lobe ground-glass opacity, which may   represent atelectasis or infection.    Previously described right apical nodular opacity is slightly decreased   and may be infectious/inflammatory etiology.    Recommend CT chest follow-up in 3 months to ensure clearing of right   upper lobe opacities.    --- End of Report ---             JAMIN MERCADO MD; Attending Radiologist  This document has been electronically signed. May  4 2022  8:32AM    < end of copied text >      PROBLEM LIST:  81y Female with HEALTH ISSUES - PROBLEM Dx:             RECS:  inhalers  nebs  pulmozyme  IS after nebs/pulmozyme  abx per ID  taper off 02  plan for solumedroL BID today and likely will go to qdaily on 5/5  repeat CT chest outpatient in 6-8weeks        Please call with any questions.    Celsa Lau,   [facility] Pulmonary/Sleep Medicine  [phone]

## 2022-05-05 LAB
ANION GAP SERPL CALC-SCNC: 14 MMOL/L — SIGNIFICANT CHANGE UP (ref 5–17)
BUN SERPL-MCNC: 30 MG/DL — HIGH (ref 7–23)
CALCIUM SERPL-MCNC: 9.6 MG/DL — SIGNIFICANT CHANGE UP (ref 8.4–10.5)
CHLORIDE SERPL-SCNC: 98 MMOL/L — SIGNIFICANT CHANGE UP (ref 96–108)
CO2 SERPL-SCNC: 26 MMOL/L — SIGNIFICANT CHANGE UP (ref 22–31)
CREAT SERPL-MCNC: 0.78 MG/DL — SIGNIFICANT CHANGE UP (ref 0.5–1.3)
CULTURE RESULTS: SIGNIFICANT CHANGE UP
EGFR: 77 ML/MIN/1.73M2 — SIGNIFICANT CHANGE UP
GLUCOSE SERPL-MCNC: 155 MG/DL — HIGH (ref 70–99)
HCT VFR BLD CALC: 35.5 % — SIGNIFICANT CHANGE UP (ref 34.5–45)
HGB BLD-MCNC: 11.7 G/DL — SIGNIFICANT CHANGE UP (ref 11.5–15.5)
MCHC RBC-ENTMCNC: 33 GM/DL — SIGNIFICANT CHANGE UP (ref 32–36)
MCHC RBC-ENTMCNC: 34.1 PG — HIGH (ref 27–34)
MCV RBC AUTO: 103.5 FL — HIGH (ref 80–100)
NRBC # BLD: 0 /100 WBCS — SIGNIFICANT CHANGE UP (ref 0–0)
PLATELET # BLD AUTO: 187 K/UL — SIGNIFICANT CHANGE UP (ref 150–400)
POTASSIUM SERPL-MCNC: 4.7 MMOL/L — SIGNIFICANT CHANGE UP (ref 3.5–5.3)
POTASSIUM SERPL-SCNC: 4.7 MMOL/L — SIGNIFICANT CHANGE UP (ref 3.5–5.3)
RBC # BLD: 3.43 M/UL — LOW (ref 3.8–5.2)
RBC # FLD: 14.4 % — SIGNIFICANT CHANGE UP (ref 10.3–14.5)
SODIUM SERPL-SCNC: 138 MMOL/L — SIGNIFICANT CHANGE UP (ref 135–145)
SPECIMEN SOURCE: SIGNIFICANT CHANGE UP
WBC # BLD: 14.51 K/UL — HIGH (ref 3.8–10.5)
WBC # FLD AUTO: 14.51 K/UL — HIGH (ref 3.8–10.5)

## 2022-05-05 PROCEDURE — 99232 SBSQ HOSP IP/OBS MODERATE 35: CPT

## 2022-05-05 RX ORDER — CEFUROXIME AXETIL 250 MG
500 TABLET ORAL EVERY 12 HOURS
Refills: 0 | Status: DISCONTINUED | OUTPATIENT
Start: 2022-05-05 | End: 2022-05-07

## 2022-05-05 RX ORDER — SODIUM CHLORIDE 9 MG/ML
4 INJECTION INTRAMUSCULAR; INTRAVENOUS; SUBCUTANEOUS EVERY 12 HOURS
Refills: 0 | Status: DISCONTINUED | OUTPATIENT
Start: 2022-05-05 | End: 2022-05-07

## 2022-05-05 RX ADMIN — POLYETHYLENE GLYCOL 3350 17 GRAM(S): 17 POWDER, FOR SOLUTION ORAL at 05:16

## 2022-05-05 RX ADMIN — Medication 3 MILLILITER(S): at 05:14

## 2022-05-05 RX ADMIN — SODIUM CHLORIDE 4 MILLILITER(S): 9 INJECTION INTRAMUSCULAR; INTRAVENOUS; SUBCUTANEOUS at 10:28

## 2022-05-05 RX ADMIN — Medication 100 MILLIGRAM(S): at 17:36

## 2022-05-05 RX ADMIN — SENNA PLUS 2 TABLET(S): 8.6 TABLET ORAL at 21:10

## 2022-05-05 RX ADMIN — Medication 100 MILLIGRAM(S): at 14:52

## 2022-05-05 RX ADMIN — Medication 40 MILLIGRAM(S): at 05:14

## 2022-05-05 RX ADMIN — Medication 3 MILLIGRAM(S): at 21:10

## 2022-05-05 RX ADMIN — POLYETHYLENE GLYCOL 3350 17 GRAM(S): 17 POWDER, FOR SOLUTION ORAL at 17:36

## 2022-05-05 RX ADMIN — BUDESONIDE AND FORMOTEROL FUMARATE DIHYDRATE 2 PUFF(S): 160; 4.5 AEROSOL RESPIRATORY (INHALATION) at 17:35

## 2022-05-05 RX ADMIN — SODIUM CHLORIDE 4 MILLILITER(S): 9 INJECTION INTRAMUSCULAR; INTRAVENOUS; SUBCUTANEOUS at 21:09

## 2022-05-05 RX ADMIN — Medication 240 MILLIGRAM(S): at 05:15

## 2022-05-05 RX ADMIN — APIXABAN 2.5 MILLIGRAM(S): 2.5 TABLET, FILM COATED ORAL at 17:37

## 2022-05-05 RX ADMIN — Medication 100 MILLIGRAM(S): at 05:15

## 2022-05-05 RX ADMIN — Medication 30 MILLIGRAM(S): at 17:36

## 2022-05-05 RX ADMIN — DORNASE ALFA 2.5 MILLIGRAM(S): 1 SOLUTION RESPIRATORY (INHALATION) at 17:35

## 2022-05-05 RX ADMIN — TIOTROPIUM BROMIDE 1 CAPSULE(S): 18 CAPSULE ORAL; RESPIRATORY (INHALATION) at 21:09

## 2022-05-05 RX ADMIN — APIXABAN 2.5 MILLIGRAM(S): 2.5 TABLET, FILM COATED ORAL at 05:16

## 2022-05-05 RX ADMIN — Medication 3 MILLILITER(S): at 12:28

## 2022-05-05 RX ADMIN — Medication 3 MILLILITER(S): at 00:12

## 2022-05-05 RX ADMIN — PANTOPRAZOLE SODIUM 40 MILLIGRAM(S): 20 TABLET, DELAYED RELEASE ORAL at 05:15

## 2022-05-05 RX ADMIN — Medication 3 MILLILITER(S): at 17:35

## 2022-05-05 RX ADMIN — Medication 100 MILLIGRAM(S): at 21:10

## 2022-05-05 RX ADMIN — CEFEPIME 100 MILLIGRAM(S): 1 INJECTION, POWDER, FOR SOLUTION INTRAMUSCULAR; INTRAVENOUS at 14:51

## 2022-05-05 RX ADMIN — Medication 3 MILLILITER(S): at 21:09

## 2022-05-05 RX ADMIN — ATORVASTATIN CALCIUM 20 MILLIGRAM(S): 80 TABLET, FILM COATED ORAL at 21:10

## 2022-05-05 NOTE — PROGRESS NOTE ADULT - SUBJECTIVE AND OBJECTIVE BOX
Patient is a 81y old  Female who presents with a chief complaint of copd (04 May 2022 18:18)      SUBJECTIVE / OVERNIGHT EVENTS: feels better  Review of Systems  chest pain no  palpitations no  sob improving   nausea no  headache no    MEDICATIONS  (STANDING):  albuterol/ipratropium for Nebulization 3 milliLiter(s) Nebulizer every 6 hours  apixaban 2.5 milliGRAM(s) Oral two times a day  atorvastatin 20 milliGRAM(s) Oral at bedtime  benzonatate 100 milliGRAM(s) Oral three times a day  budesonide 160 MICROgram(s)/formoterol 4.5 MICROgram(s) Inhaler 2 Puff(s) Inhalation two times a day  cefepime   IVPB      cefepime   IVPB 2000 milliGRAM(s) IV Intermittent every 24 hours  dornase olga Solution 2.5 milliGRAM(s) Inhalation daily  doxycycline hyclate Capsule 100 milliGRAM(s) Oral every 12 hours  methylPREDNISolone sodium succinate Injectable 30 milliGRAM(s) IV Push two times a day  pantoprazole    Tablet 40 milliGRAM(s) Oral before breakfast  polyethylene glycol 3350 17 Gram(s) Oral two times a day  senna 2 Tablet(s) Oral at bedtime  sodium chloride 7% Inhalation 4 milliLiter(s) Inhalation every 12 hours  tiotropium 18 MICROgram(s) Capsule 1 Capsule(s) Inhalation at bedtime  verapamil  milliGRAM(s) Oral daily    MEDICATIONS  (PRN):  acetaminophen     Tablet .. 650 milliGRAM(s) Oral every 6 hours PRN Temp greater or equal to 38.5C (101.3F), Mild Pain (1 - 3)  aluminum hydroxide/magnesium hydroxide/simethicone Suspension 30 milliLiter(s) Oral every 4 hours PRN Dyspepsia  hydrocodone/homatropine Syrup 5 milliLiter(s) Oral every 8 hours PRN Cough  melatonin 3 milliGRAM(s) Oral at bedtime PRN Sleep      Vital Signs Last 24 Hrs  T(C): 36.5 (05 May 2022 11:44), Max: 36.8 (04 May 2022 20:00)  T(F): 97.7 (05 May 2022 11:44), Max: 98.3 (04 May 2022 20:00)  HR: 93 (05 May 2022 11:44) (93 - 95)  BP: 106/72 (05 May 2022 11:44) (106/72 - 138/76)  BP(mean): --  RR: 18 (05 May 2022 11:44) (18 - 18)  SpO2: 95% (05 May 2022 11:44) (93% - 95%)    PHYSICAL EXAM:  GENERAL: NAD, well-developed  HEAD:  Atraumatic, Normocephalic  EYES: EOMI, PERRLA, conjunctiva and sclera clear  NECK: Supple, No JVD  CHEST/LUNG: few rhonchi to auscultation bilaterally; No wheeze  HEART: Regular rate and rhythm; No murmurs, rubs, or gallops  ABDOMEN: Soft, Nontender, Nondistended; Bowel sounds present  EXTREMITIES:  2+ Peripheral Pulses, No clubbing, cyanosis, or edema  PSYCH: AAOx3  NEUROLOGY: non-focal  SKIN: No rashes or lesions    LABS:                        11.7   14.51 )-----------( 187      ( 05 May 2022 07:06 )             35.5     05-05    138  |  98  |  30<H>  ----------------------------<  155<H>  4.7   |  26  |  0.78    Ca    9.6      05 May 2022 07:06                Culture - Sputum (collected 03 May 2022 18:34)  Source: .Sputum Sputum  Gram Stain (04 May 2022 05:44):    Few polymorphonuclear leukocytes per low power field    Rare Squamous epithelial cells per low power field    Moderate Yeast like cells per oil power field    Few Gram positive cocci in pairs per oil power field  Preliminary Report (04 May 2022 20:55):    Normal Respiratory Nicky present    Culture - Blood (collected 03 May 2022 16:00)  Source: .Blood Blood-Venous  Preliminary Report (04 May 2022 23:01):    No growth to date.        RADIOLOGY & ADDITIONAL TESTS:    Imaging Personally Reviewed:    Consultant(s) Notes Reviewed:      Care Discussed with Consultants/Other Providers:

## 2022-05-05 NOTE — PROGRESS NOTE ADULT - SUBJECTIVE AND OBJECTIVE BOX
Cardiovascular Disease Progress Note    Overnight events: No acute events overnight.  sob improving. no cp/palps/dizziness  Otherwise review of systems negative    Objective Findings:  T(C): 36.8 (22 @ 05:14), Max: 36.8 (22 @ 10:58)  HR: 95 (22 @ 05:14) (94 - 109)  BP: 138/76 (22 @ 05:14) (108/73 - 138/76)  RR: 18 (22 @ 05:14) (18 - 20)  SpO2: 95% (22 @ 05:14) (93% - 95%)  Wt(kg): --  Daily     Daily Weight in k.3 (04 May 2022 10:16)      Physical Exam:  Gen: NAD  HEENT: EOMI  CV: RRR, normal S1 + S2, no m/r/g  Lungs: CTAB  Abd: soft, non-tender  Ext: No edema    Telemetry:    Laboratory Data:                        10.7   13.52 )-----------( 191      ( 04 May 2022 07:26 )             32.4                     Inpatient Medications:  MEDICATIONS  (STANDING):  albuterol/ipratropium for Nebulization 3 milliLiter(s) Nebulizer every 6 hours  apixaban 2.5 milliGRAM(s) Oral two times a day  atorvastatin 20 milliGRAM(s) Oral at bedtime  benzonatate 100 milliGRAM(s) Oral three times a day  budesonide 160 MICROgram(s)/formoterol 4.5 MICROgram(s) Inhaler 2 Puff(s) Inhalation two times a day  cefepime   IVPB      cefepime   IVPB 2000 milliGRAM(s) IV Intermittent every 24 hours  dornase olga Solution 2.5 milliGRAM(s) Inhalation daily  doxycycline hyclate Capsule 100 milliGRAM(s) Oral every 12 hours  methylPREDNISolone sodium succinate Injectable 40 milliGRAM(s) IV Push every 12 hours  pantoprazole    Tablet 40 milliGRAM(s) Oral before breakfast  polyethylene glycol 3350 17 Gram(s) Oral two times a day  senna 2 Tablet(s) Oral at bedtime  tiotropium 18 MICROgram(s) Capsule 1 Capsule(s) Inhalation at bedtime  verapamil  milliGRAM(s) Oral daily      Assessment:  RVP  copd exac  persistent afib  cad s/p pci  sob    Recs:  cardiac stable  no e/o acs or chf at present  cw rate control meds and AC for afib  cw lasix 20mg po qd (elevated BNP, without clinical signs of hypervolemia)  cw steroids, abx, supportive care and bd per pulm  s/p ct chest on recent admission. results noted  2d echo without acute findings  will follow  ? eval for home o2  outpatient ischemic eval if exertional dyspnea persists after tx of copd and correction of exertional hypoxemia        Over 25 minutes spent on total encounter; more than 50% of the visit was spent counseling and/or coordinating care by the attending physician.      Bill Cho MD   Cardiovascular Disease  [phone]

## 2022-05-05 NOTE — PROGRESS NOTE ADULT - SUBJECTIVE AND OBJECTIVE BOX
CC: f/u for pneumonia    Patient reports feels much better    REVIEW OF SYSTEMS:  All other review of systems negative (Comprehensive ROS)    Antimicrobials Day #  :3  cefepime   IVPB      cefepime   IVPB 2000 milliGRAM(s) IV Intermittent every 24 hours  doxycycline hyclate Capsule 100 milliGRAM(s) Oral every 12 hours    Other Medications Reviewed    T(F): 97.7 (05-05-22 @ 11:44), Max: 98.3 (05-04-22 @ 20:00)  HR: 93 (05-05-22 @ 11:44)  BP: 106/72 (05-05-22 @ 11:44)  RR: 18 (05-05-22 @ 11:44)  SpO2: 95% (05-05-22 @ 11:44)  Wt(kg): --    PHYSICAL EXAM:  General: alert, no acute distress  Eyes:  anicteric, no conjunctival injection, no discharge  Oropharynx: no lesions or injection 	  Neck: supple, without adenopathy  Lungs: improved wheezes to auscultation  Heart: regular rate and rhythm; no murmur, rubs or gallops  Abdomen: soft, nondistended, nontender, without mass or organomegaly  Skin: no lesions  Extremities: no clubbing, cyanosis, or edema  Neurologic: alert, oriented, moves all extremities    LAB RESULTS:                        11.7   14.51 )-----------( 187      ( 05 May 2022 07:06 )             35.5     05-05    138  |  98  |  30<H>  ----------------------------<  155<H>  4.7   |  26  |  0.78    Ca    9.6      05 May 2022 07:06          MICROBIOLOGY:  RECENT CULTURES:  05-03 @ 18:34 .Sputum Sputum     Normal Respiratory Nicky present    Few polymorphonuclear leukocytes per low power field  Rare Squamous epithelial cells per low power field  Moderate Yeast like cells per oil power field  Few Gram positive cocci in pairs per oil power field    05-03 @ 16:00 .Blood Blood-Venous     No growth to date.          RADIOLOGY REVIEWED:    < from: CT Chest No Cont (05.03.22 @ 17:34) >  ACC: [id number] EXAM:  CT CHEST                          PROCEDURE DATE:  05/03/2022          INTERPRETATION:  HISTORY: Admitting Dxs: J44.1 DIFF BREATHING. COPD.   Pneumonia.    EXAMINATION: CT CHEST was performed without IV contrast.    COMPARISON:4/22/2022.    FINDINGS:    AIRWAYS, LUNGS, PLEURA: Mucus plugging involving the segmental airways of   the bilateral lower lobes. No pleural effusion.    Emphysema. Previously described linear right apical nodular opacity   appears slightly decreasedcompared to 4/22/2022 (image 35, series 3).   New patchy posterior right upper lobe opacity (images 52-64, series 3).    MEDIASTINUM: Dilated left atrium. No pericardial effusion. Thoracic aorta   normal caliber.  No large mediastinal lymph nodes.    IMAGED ABDOMEN: Partially imaged peripherally calcified left renal cystic   lesion, which was present on prior exam.    SOFT TISSUES: Unremarkable.    BONES: Unremarkable.      IMPRESSION:.    New patchy posterior right upper lobe ground-glass opacity, which may   represent atelectasis or infection.    Previously described right apical nodular opacity is slightly decreased   and may be infectious/inflammatory etiology.    Recommend CT chest follow-up in 3 months to ensure clearing of right   upper lobe opacities.    --- End of Report ---    < end of copied text >            Assessment:  patient with active smoking until a couple of weeks ago, was here with copd /uri, d/c home a day prior to admit, returned with sob and severe wheezes, failed to get better on copd tx, now much improved with addition of antibiotics.   Plan:  will change to po antibiotics tomorrow with ceftin and doxy to complete total 7 days of antibiotics   steroid taper per pulmonary\  smoking cessation strongly endorsed

## 2022-05-05 NOTE — PROGRESS NOTE ADULT - SUBJECTIVE AND OBJECTIVE BOX
PULMONARY PROGRESS NOTE    RECORD, JANA  MRN-[id number]    Patient is a 81y old  Female who presents with a chief complaint of copd (04 May 2022 18:18)      HPI:  -feels better  - thinks hycodan best for her cough  - continues to use IS    ROS:   -    ACTIVE MEDICATION LIST:  MEDICATIONS  (STANDING):  albuterol/ipratropium for Nebulization 3 milliLiter(s) Nebulizer every 6 hours  apixaban 2.5 milliGRAM(s) Oral two times a day  atorvastatin 20 milliGRAM(s) Oral at bedtime  benzonatate 100 milliGRAM(s) Oral three times a day  budesonide 160 MICROgram(s)/formoterol 4.5 MICROgram(s) Inhaler 2 Puff(s) Inhalation two times a day  cefepime   IVPB      cefepime   IVPB 2000 milliGRAM(s) IV Intermittent every 24 hours  dornase olga Solution 2.5 milliGRAM(s) Inhalation daily  doxycycline hyclate Capsule 100 milliGRAM(s) Oral every 12 hours  methylPREDNISolone sodium succinate Injectable 40 milliGRAM(s) IV Push every 12 hours  pantoprazole    Tablet 40 milliGRAM(s) Oral before breakfast  polyethylene glycol 3350 17 Gram(s) Oral two times a day  senna 2 Tablet(s) Oral at bedtime  tiotropium 18 MICROgram(s) Capsule 1 Capsule(s) Inhalation at bedtime  verapamil  milliGRAM(s) Oral daily    MEDICATIONS  (PRN):  acetaminophen     Tablet .. 650 milliGRAM(s) Oral every 6 hours PRN Temp greater or equal to 38.5C (101.3F), Mild Pain (1 - 3)  aluminum hydroxide/magnesium hydroxide/simethicone Suspension 30 milliLiter(s) Oral every 4 hours PRN Dyspepsia  hydrocodone/homatropine Syrup 5 milliLiter(s) Oral every 8 hours PRN Cough  melatonin 3 milliGRAM(s) Oral at bedtime PRN Sleep      EXAM:  Vital Signs Last 24 Hrs  T(C): 36.8 (05 May 2022 05:14), Max: 36.8 (04 May 2022 10:58)  T(F): 98.2 (05 May 2022 05:14), Max: 98.3 (04 May 2022 10:58)  HR: 95 (05 May 2022 05:14) (94 - 109)  BP: 138/76 (05 May 2022 05:14) (108/73 - 138/76)  BP(mean): --  RR: 18 (05 May 2022 05:14) (18 - 20)  SpO2: 95% (05 May 2022 05:14) (93% - 95%)    GENERAL: The patient is awake and alert in no apparent distress.     LUNGS:  still wheezing although improved from prrior exams                          11.7   14.51 )-----------( 187      ( 05 May 2022 07:06 )             35.5       05-05    138  |  98  |  30<H>  ----------------------------<  155<H>  4.7   |  26  |  0.78    Ca    9.6      05 May 2022 07:06    < from: CT Chest No Cont (05.03.22 @ 17:34) >    Pneumonia.    EXAMINATION: CT CHEST was performed without IV contrast.    COMPARISON:4/22/2022.    FINDINGS:    AIRWAYS, LUNGS, PLEURA: Mucus plugging involving the segmental airways of   the bilateral lower lobes. No pleural effusion.    Emphysema. Previously described linear right apical nodular opacity   appears slightly decreasedcompared to 4/22/2022 (image 35, series 3).   New patchy posterior right upper lobe opacity (images 52-64, series 3).    MEDIASTINUM: Dilated left atrium. No pericardial effusion. Thoracic aorta   normal caliber.  No large mediastinal lymph nodes.    IMAGED ABDOMEN: Partially imaged peripherally calcified left renal cystic   lesion, which was present on prior exam.    SOFT TISSUES: Unremarkable.    BONES: Unremarkable.      IMPRESSION:.    New patchy posterior right upper lobe ground-glass opacity, which may   represent atelectasis or infection.    Previously described right apical nodular opacity is slightly decreased   and may be infectious/inflammatory etiology.    Recommend CT chest follow-up in 3 months to ensure clearing of right   upper lobe opacities.    --- End of Report ---             JAMIN MERCADO MD; Attending Radiologist  This document has been electronically signed. May  4 2022  8:32AM    < end of copied text >    PROBLEM LIST:  81y Female with HEALTH ISSUES - PROBLEM Dx:            RECS:  start solumedrol 30 IVP BID today (5/5)  continue inhalers  continue airwayclearance with pulmozyme and IS  continue nebs  can start/try hypersal BID also to help with her cough/secretions  abx per ID  taper off 02  repeat CT chest outpatient in 6-8weeks    Please call with any questions.    Celsa Lau DO  [facility] Pulmonary/Sleep Medicine  [phone]

## 2022-05-06 RX ADMIN — Medication 100 MILLIGRAM(S): at 18:08

## 2022-05-06 RX ADMIN — Medication 3 MILLILITER(S): at 23:10

## 2022-05-06 RX ADMIN — Medication 3 MILLILITER(S): at 14:02

## 2022-05-06 RX ADMIN — Medication 100 MILLIGRAM(S): at 06:03

## 2022-05-06 RX ADMIN — DORNASE ALFA 2.5 MILLIGRAM(S): 1 SOLUTION RESPIRATORY (INHALATION) at 14:03

## 2022-05-06 RX ADMIN — Medication 30 MILLIGRAM(S): at 18:05

## 2022-05-06 RX ADMIN — SENNA PLUS 2 TABLET(S): 8.6 TABLET ORAL at 21:17

## 2022-05-06 RX ADMIN — POLYETHYLENE GLYCOL 3350 17 GRAM(S): 17 POWDER, FOR SOLUTION ORAL at 18:08

## 2022-05-06 RX ADMIN — Medication 30 MILLIGRAM(S): at 06:03

## 2022-05-06 RX ADMIN — Medication 240 MILLIGRAM(S): at 06:05

## 2022-05-06 RX ADMIN — BUDESONIDE AND FORMOTEROL FUMARATE DIHYDRATE 2 PUFF(S): 160; 4.5 AEROSOL RESPIRATORY (INHALATION) at 06:04

## 2022-05-06 RX ADMIN — Medication 100 MILLIGRAM(S): at 14:02

## 2022-05-06 RX ADMIN — SODIUM CHLORIDE 4 MILLILITER(S): 9 INJECTION INTRAMUSCULAR; INTRAVENOUS; SUBCUTANEOUS at 14:03

## 2022-05-06 RX ADMIN — BUDESONIDE AND FORMOTEROL FUMARATE DIHYDRATE 2 PUFF(S): 160; 4.5 AEROSOL RESPIRATORY (INHALATION) at 18:07

## 2022-05-06 RX ADMIN — Medication 3 MILLILITER(S): at 06:55

## 2022-05-06 RX ADMIN — APIXABAN 2.5 MILLIGRAM(S): 2.5 TABLET, FILM COATED ORAL at 06:03

## 2022-05-06 RX ADMIN — POLYETHYLENE GLYCOL 3350 17 GRAM(S): 17 POWDER, FOR SOLUTION ORAL at 06:04

## 2022-05-06 RX ADMIN — Medication 3 MILLILITER(S): at 18:07

## 2022-05-06 RX ADMIN — Medication 500 MILLIGRAM(S): at 18:07

## 2022-05-06 RX ADMIN — ATORVASTATIN CALCIUM 20 MILLIGRAM(S): 80 TABLET, FILM COATED ORAL at 21:17

## 2022-05-06 RX ADMIN — PANTOPRAZOLE SODIUM 40 MILLIGRAM(S): 20 TABLET, DELAYED RELEASE ORAL at 06:03

## 2022-05-06 RX ADMIN — Medication 100 MILLIGRAM(S): at 21:17

## 2022-05-06 RX ADMIN — Medication 500 MILLIGRAM(S): at 06:02

## 2022-05-06 RX ADMIN — TIOTROPIUM BROMIDE 1 CAPSULE(S): 18 CAPSULE ORAL; RESPIRATORY (INHALATION) at 21:17

## 2022-05-06 RX ADMIN — SODIUM CHLORIDE 4 MILLILITER(S): 9 INJECTION INTRAMUSCULAR; INTRAVENOUS; SUBCUTANEOUS at 21:17

## 2022-05-06 RX ADMIN — APIXABAN 2.5 MILLIGRAM(S): 2.5 TABLET, FILM COATED ORAL at 18:08

## 2022-05-06 NOTE — PROGRESS NOTE ADULT - SUBJECTIVE AND OBJECTIVE BOX
CC: f/u for pneumonia    Patient reports  feels well, some sob in am now better  REVIEW OF SYSTEMS:  All other review of systems negative (Comprehensive ROS)    Antimicrobials Day #  :4/7  cefuroxime   Tablet 500 milliGRAM(s) Oral every 12 hours  doxycycline hyclate Capsule 100 milliGRAM(s) Oral every 12 hours    Other Medications Reviewed    T(F): 98.5 (05-06-22 @ 20:13), Max: 98.8 (05-06-22 @ 06:11)  HR: 100 (05-06-22 @ 20:13)  BP: 109/75 (05-06-22 @ 20:13)  RR: 20 (05-06-22 @ 20:13)  SpO2: 96% (05-06-22 @ 20:13)  Wt(kg): --    PHYSICAL EXAM:  General: alert, no acute distress  Eyes:  anicteric, no conjunctival injection, no discharge  Oropharynx: no lesions or injection 	  Neck: supple, without adenopathy  Lungs: improved wheeze to auscultation  Heart: regular rate and rhythm; no murmur, rubs or gallops  Abdomen: soft, nondistended, nontender, without mass or organomegaly  Skin: no lesions  Extremities: no clubbing, cyanosis, or edema  Neurologic: alert, oriented, moves all extremities    LAB RESULTS:                        11.7   14.51 )-----------( 187      ( 05 May 2022 07:06 )             35.5     05-05    138  |  98  |  30<H>  ----------------------------<  155<H>  4.7   |  26  |  0.78    Ca    9.6      05 May 2022 07:06          MICROBIOLOGY:  RECENT CULTURES:  05-03 @ 18:34 .Sputum Sputum     Normal Respiratory Nicky present    Few polymorphonuclear leukocytes per low power field  Rare Squamous epithelial cells per low power field  Moderate Yeast like cells per oil power field  Few Gram positive cocci in pairs per oil power field    05-03 @ 16:00 .Blood Blood-Venous     No growth to date.          RADIOLOGY REVIEWED:    < from: CT Chest No Cont (05.03.22 @ 17:34) >    ACC: [id number] EXAM:  CT CHEST                          PROCEDURE DATE:  05/03/2022          INTERPRETATION:  HISTORY: Admitting Dxs: J44.1 DIFF BREATHING. COPD.   Pneumonia.    EXAMINATION: CT CHEST was performed without IV contrast.    COMPARISON:4/22/2022.    FINDINGS:    AIRWAYS, LUNGS, PLEURA: Mucus plugging involving the segmental airways of   the bilateral lower lobes. No pleural effusion.    Emphysema. Previously described linear right apical nodular opacity   appears slightly decreasedcompared to 4/22/2022 (image 35, series 3).   New patchy posterior right upper lobe opacity (images 52-64, series 3).    MEDIASTINUM: Dilated left atrium. No pericardial effusion. Thoracic aorta   normal caliber.  No large mediastinal lymph nodes.    IMAGED ABDOMEN: Partially imaged peripherally calcified left renal cystic   lesion, which was present on prior exam.    SOFT TISSUES: Unremarkable.    BONES: Unremarkable.      IMPRESSION:.    New patchy posterior right upper lobe ground-glass opacity, which may   represent atelectasis or infection.    Previously described right apical nodular opacity is slightly decreased   and may be infectious/inflammatory etiology.    Recommend CT chest follow-up in 3 months to ensure clearing of right   upper lobe opacities.    --- End of Report ---             JAMIN MERCADO MD; Attending Radiologist    < end of copied text >            Assessment:  patient is active smoker, recent stay for copd exacerbation from , returned within a day of d/c with worse sob, new ct shows infiltrate, clinically improved on abx. suspect post viral bacterial pneumonia  Plan:3 more days of ceftin and doxy  steroids per pulm

## 2022-05-06 NOTE — PROGRESS NOTE ADULT - SUBJECTIVE AND OBJECTIVE BOX
Cardiovascular Disease Progress Note    Overnight events: No acute events overnight.  no cp/sob/palps  Otherwise review of systems negative    Objective Findings:  T(C): 37.1 (05-06-22 @ 06:11), Max: 37.1 (05-06-22 @ 06:11)  HR: 102 (05-06-22 @ 06:11) (88 - 102)  BP: 110/70 (05-06-22 @ 06:11) (106/72 - 132/73)  RR: 18 (05-06-22 @ 06:11) (18 - 18)  SpO2: 93% (05-06-22 @ 06:11) (92% - 95%)  Wt(kg): --  Daily     Daily       Physical Exam:  Gen: NAD  HEENT: EOMI  CV: RRR, normal S1 + S2, no m/r/g  Lungs: CTAB  Abd: soft, non-tender  Ext: No edema    Telemetry:    Laboratory Data:                        11.7   14.51 )-----------( 187      ( 05 May 2022 07:06 )             35.5     05-05    138  |  98  |  30<H>  ----------------------------<  155<H>  4.7   |  26  |  0.78    Ca    9.6      05 May 2022 07:06                Inpatient Medications:  MEDICATIONS  (STANDING):  albuterol/ipratropium for Nebulization 3 milliLiter(s) Nebulizer every 6 hours  apixaban 2.5 milliGRAM(s) Oral two times a day  atorvastatin 20 milliGRAM(s) Oral at bedtime  benzonatate 100 milliGRAM(s) Oral three times a day  budesonide 160 MICROgram(s)/formoterol 4.5 MICROgram(s) Inhaler 2 Puff(s) Inhalation two times a day  cefuroxime   Tablet 500 milliGRAM(s) Oral every 12 hours  dornase olga Solution 2.5 milliGRAM(s) Inhalation daily  doxycycline hyclate Capsule 100 milliGRAM(s) Oral every 12 hours  methylPREDNISolone sodium succinate Injectable 30 milliGRAM(s) IV Push two times a day  pantoprazole    Tablet 40 milliGRAM(s) Oral before breakfast  polyethylene glycol 3350 17 Gram(s) Oral two times a day  senna 2 Tablet(s) Oral at bedtime  sodium chloride 7% Inhalation 4 milliLiter(s) Inhalation every 12 hours  tiotropium 18 MICROgram(s) Capsule 1 Capsule(s) Inhalation at bedtime  verapamil  milliGRAM(s) Oral daily      Assessment:  RVP  copd exac  persistent afib  cad s/p pci  sob    Recs:  cardiac stable  no e/o acs or chf at present  cw rate control meds and AC for afib  cw lasix 20mg po qd (elevated BNP, without clinical signs of hypervolemia)  cw steroids, abx, supportive care and bd per pulm  s/p ct chest on recent admission. results noted  2d echo without acute findings  ? eval for home o2  outpatient ischemic eval if exertional dyspnea persists after tx of copd and correction of exertional hypoxemia            Over 25 minutes spent on total encounter; more than 50% of the visit was spent counseling and/or coordinating care by the attending physician.      Bill Cho MD   Cardiovascular Disease  [phone]

## 2022-05-06 NOTE — PROGRESS NOTE ADULT - SUBJECTIVE AND OBJECTIVE BOX
Patient is a 81y old  Female who presents with a chief complaint of copd (05 May 2022 18:19)      SUBJECTIVE / OVERNIGHT EVENTS: feels better.  Review of Systems  chest pain no  palpitations no  sob no  nausea no  headache no    MEDICATIONS  (STANDING):  albuterol/ipratropium for Nebulization 3 milliLiter(s) Nebulizer every 6 hours  apixaban 2.5 milliGRAM(s) Oral two times a day  atorvastatin 20 milliGRAM(s) Oral at bedtime  benzonatate 100 milliGRAM(s) Oral three times a day  budesonide 160 MICROgram(s)/formoterol 4.5 MICROgram(s) Inhaler 2 Puff(s) Inhalation two times a day  cefuroxime   Tablet 500 milliGRAM(s) Oral every 12 hours  dornase olga Solution 2.5 milliGRAM(s) Inhalation daily  doxycycline hyclate Capsule 100 milliGRAM(s) Oral every 12 hours  methylPREDNISolone sodium succinate Injectable 30 milliGRAM(s) IV Push two times a day  pantoprazole    Tablet 40 milliGRAM(s) Oral before breakfast  polyethylene glycol 3350 17 Gram(s) Oral two times a day  senna 2 Tablet(s) Oral at bedtime  sodium chloride 7% Inhalation 4 milliLiter(s) Inhalation every 12 hours  tiotropium 18 MICROgram(s) Capsule 1 Capsule(s) Inhalation at bedtime  verapamil  milliGRAM(s) Oral daily    MEDICATIONS  (PRN):  acetaminophen     Tablet .. 650 milliGRAM(s) Oral every 6 hours PRN Temp greater or equal to 38.5C (101.3F), Mild Pain (1 - 3)  aluminum hydroxide/magnesium hydroxide/simethicone Suspension 30 milliLiter(s) Oral every 4 hours PRN Dyspepsia  hydrocodone/homatropine Syrup 5 milliLiter(s) Oral every 8 hours PRN Cough  melatonin 3 milliGRAM(s) Oral at bedtime PRN Sleep      Vital Signs Last 24 Hrs  T(C): 36.5 (06 May 2022 11:51), Max: 37.1 (06 May 2022 06:11)  T(F): 97.7 (06 May 2022 11:51), Max: 98.8 (06 May 2022 06:11)  HR: 97 (06 May 2022 11:51) (88 - 102)  BP: 100/57 (06 May 2022 11:51) (100/57 - 132/73)  BP(mean): --  RR: 18 (06 May 2022 11:51) (18 - 18)  SpO2: 95% (06 May 2022 11:51) (92% - 95%)    PHYSICAL EXAM:  GENERAL: NAD, well-developed  HEAD:  Atraumatic, Normocephalic  EYES: EOMI, PERRLA, conjunctiva and sclera clear  NECK: Supple, No JVD  CHEST/LUNG: few rhonchi to auscultation bilaterally; No wheeze  HEART: Regular rate and rhythm; No murmurs, rubs, or gallops  ABDOMEN: Soft, Nontender, Nondistended; Bowel sounds present  EXTREMITIES:  2+ Peripheral Pulses, No clubbing, cyanosis, or edema  PSYCH: AAOx3  NEUROLOGY: non-focal  SKIN: No rashes or lesions    LABS:                        11.7   14.51 )-----------( 187      ( 05 May 2022 07:06 )             35.5     05-05    138  |  98  |  30<H>  ----------------------------<  155<H>  4.7   |  26  |  0.78    Ca    9.6      05 May 2022 07:06                  RADIOLOGY & ADDITIONAL TESTS:    Imaging Personally Reviewed:    Consultant(s) Notes Reviewed:      Care Discussed with Consultants/Other Providers:

## 2022-05-07 ENCOUNTER — TRANSCRIPTION ENCOUNTER (OUTPATIENT)
Age: 82
End: 2022-05-07

## 2022-05-07 VITALS
RESPIRATION RATE: 18 BRPM | SYSTOLIC BLOOD PRESSURE: 118 MMHG | HEART RATE: 96 BPM | TEMPERATURE: 98 F | DIASTOLIC BLOOD PRESSURE: 80 MMHG | OXYGEN SATURATION: 96 %

## 2022-05-07 PROCEDURE — 87040 BLOOD CULTURE FOR BACTERIA: CPT

## 2022-05-07 PROCEDURE — 71250 CT THORAX DX C-: CPT

## 2022-05-07 PROCEDURE — U0003: CPT

## 2022-05-07 PROCEDURE — 99232 SBSQ HOSP IP/OBS MODERATE 35: CPT

## 2022-05-07 PROCEDURE — U0005: CPT

## 2022-05-07 PROCEDURE — 80053 COMPREHEN METABOLIC PANEL: CPT

## 2022-05-07 PROCEDURE — 83880 ASSAY OF NATRIURETIC PEPTIDE: CPT

## 2022-05-07 PROCEDURE — 85027 COMPLETE CBC AUTOMATED: CPT

## 2022-05-07 PROCEDURE — 84484 ASSAY OF TROPONIN QUANT: CPT

## 2022-05-07 PROCEDURE — 94640 AIRWAY INHALATION TREATMENT: CPT

## 2022-05-07 PROCEDURE — 71045 X-RAY EXAM CHEST 1 VIEW: CPT

## 2022-05-07 PROCEDURE — 96374 THER/PROPH/DIAG INJ IV PUSH: CPT

## 2022-05-07 PROCEDURE — 85025 COMPLETE CBC W/AUTO DIFF WBC: CPT

## 2022-05-07 PROCEDURE — 82550 ASSAY OF CK (CPK): CPT

## 2022-05-07 PROCEDURE — 87449 NOS EACH ORGANISM AG IA: CPT

## 2022-05-07 PROCEDURE — 82553 CREATINE MB FRACTION: CPT

## 2022-05-07 PROCEDURE — 36415 COLL VENOUS BLD VENIPUNCTURE: CPT

## 2022-05-07 PROCEDURE — 99285 EMERGENCY DEPT VISIT HI MDM: CPT | Mod: 25

## 2022-05-07 PROCEDURE — 0225U NFCT DS DNA&RNA 21 SARSCOV2: CPT

## 2022-05-07 PROCEDURE — 80048 BASIC METABOLIC PNL TOTAL CA: CPT

## 2022-05-07 PROCEDURE — 87070 CULTURE OTHR SPECIMN AEROBIC: CPT

## 2022-05-07 RX ORDER — BECLOMETHASONE DIPROPIONATE 40 UG/1
1 AEROSOL, METERED RESPIRATORY (INHALATION)
Qty: 0 | Refills: 0 | DISCHARGE

## 2022-05-07 RX ORDER — CEFUROXIME AXETIL 250 MG
1 TABLET ORAL
Qty: 4 | Refills: 0
Start: 2022-05-07 | End: 2022-05-08

## 2022-05-07 RX ORDER — BUDESONIDE AND FORMOTEROL FUMARATE DIHYDRATE 160; 4.5 UG/1; UG/1
2 AEROSOL RESPIRATORY (INHALATION)
Qty: 1 | Refills: 0
Start: 2022-05-07 | End: 2022-06-05

## 2022-05-07 RX ORDER — IPRATROPIUM/ALBUTEROL SULFATE 18-103MCG
3 AEROSOL WITH ADAPTER (GRAM) INHALATION
Qty: 0 | Refills: 0 | DISCHARGE
Start: 2022-05-07 | End: 2022-05-21

## 2022-05-07 RX ORDER — SENNA PLUS 8.6 MG/1
2 TABLET ORAL
Qty: 60 | Refills: 0
Start: 2022-05-07 | End: 2022-06-05

## 2022-05-07 RX ORDER — SODIUM CHLORIDE 9 MG/ML
4 INJECTION INTRAMUSCULAR; INTRAVENOUS; SUBCUTANEOUS
Qty: 120 | Refills: 0
Start: 2022-05-07 | End: 2022-05-21

## 2022-05-07 RX ORDER — TIOTROPIUM BROMIDE 18 UG/1
1 CAPSULE ORAL; RESPIRATORY (INHALATION)
Qty: 1 | Refills: 0
Start: 2022-05-07 | End: 2022-06-05

## 2022-05-07 RX ORDER — ACETAMINOPHEN 500 MG
2 TABLET ORAL
Qty: 0 | Refills: 0 | DISCHARGE
Start: 2022-05-07

## 2022-05-07 RX ORDER — IPRATROPIUM/ALBUTEROL SULFATE 18-103MCG
3 AEROSOL WITH ADAPTER (GRAM) INHALATION
Qty: 180 | Refills: 0
Start: 2022-05-07 | End: 2022-05-21

## 2022-05-07 RX ORDER — POLYETHYLENE GLYCOL 3350 17 G/17G
17 POWDER, FOR SOLUTION ORAL
Qty: 510 | Refills: 0
Start: 2022-05-07 | End: 2022-05-21

## 2022-05-07 RX ORDER — ALBUTEROL 90 UG/1
2 AEROSOL, METERED ORAL
Qty: 0 | Refills: 0 | DISCHARGE

## 2022-05-07 RX ORDER — BUDESONIDE AND FORMOTEROL FUMARATE DIHYDRATE 160; 4.5 UG/1; UG/1
2 AEROSOL RESPIRATORY (INHALATION)
Qty: 0 | Refills: 0 | DISCHARGE

## 2022-05-07 RX ORDER — DORNASE ALFA 1 MG/ML
2.5 SOLUTION RESPIRATORY (INHALATION)
Qty: 37.5 | Refills: 0
Start: 2022-05-07 | End: 2022-05-21

## 2022-05-07 RX ADMIN — Medication 100 MILLIGRAM(S): at 05:34

## 2022-05-07 RX ADMIN — PANTOPRAZOLE SODIUM 40 MILLIGRAM(S): 20 TABLET, DELAYED RELEASE ORAL at 05:34

## 2022-05-07 RX ADMIN — Medication 30 MILLIGRAM(S): at 05:35

## 2022-05-07 RX ADMIN — Medication 3 MILLILITER(S): at 11:59

## 2022-05-07 RX ADMIN — APIXABAN 2.5 MILLIGRAM(S): 2.5 TABLET, FILM COATED ORAL at 09:03

## 2022-05-07 RX ADMIN — Medication 500 MILLIGRAM(S): at 05:34

## 2022-05-07 RX ADMIN — BUDESONIDE AND FORMOTEROL FUMARATE DIHYDRATE 2 PUFF(S): 160; 4.5 AEROSOL RESPIRATORY (INHALATION) at 05:36

## 2022-05-07 RX ADMIN — SODIUM CHLORIDE 4 MILLILITER(S): 9 INJECTION INTRAMUSCULAR; INTRAVENOUS; SUBCUTANEOUS at 10:05

## 2022-05-07 RX ADMIN — Medication 240 MILLIGRAM(S): at 05:35

## 2022-05-07 RX ADMIN — DORNASE ALFA 2.5 MILLIGRAM(S): 1 SOLUTION RESPIRATORY (INHALATION) at 11:58

## 2022-05-07 RX ADMIN — Medication 3 MILLILITER(S): at 05:35

## 2022-05-07 NOTE — DISCHARGE NOTE PROVIDER - CARE PROVIDER_API CALL
Petty Steen (DO)  Critical Care Medicine; Internal Medicine; Pulmonary Disease  [address] [facility] [address], [address]  [place] [address]  Phone: [phone]  Fax: [phone]  Follow Up Time: 1-3 days   Petty Steen (DO)  Critical Care Medicine; Internal Medicine; Pulmonary Disease  [address] [facility] [address]  [place] [address]  Phone: [phone]  Fax: [phone]  Follow Up Time: 1-3 days    Dimas Cho  CARDIOVASCULAR DISEASE  [address] [place] [address]  Phone: [phone]  Fax: [phone]  Follow Up Time: 1 week

## 2022-05-07 NOTE — PROGRESS NOTE ADULT - ASSESSMENT
81F active smoker w/ PMH COPD(not on home O2), CAD s/p PCI, A.fib on eliquis, HTN, p/w progressive dyspnea and productive cough. Likely COPD exacerbation    COPD exacerbation.  - suspect entero/rhinovirus  - solumedrol 40mg IV   - c/w duonebs q6 prn  - c/w symbicort BID  - continue Cefepime and Doxycicline   - sputum and blood cultures  - ID follow Dr. Meeks   - continuous pulse Ox monitoring; supplemental O2 prn  - encourage smoking cessation  - Pulmonary follow Dr Pan    Chronic atrial fibrillation.  - rate-control with verapamil  - eliquis BID for stroke ppx.    CAD S/P percutaneous coronary angioplasty.  - c/w eliquis Bid  - c/w statin.  - cardiology follow dr. Cho     Constipation resolved   - Bowel regimen    Prophylactic measure.   - Diet: DASH/TLC  - DVT ppx: eliquis BiD.    d/w patient      Yash Tim MD phone [phone] 
81F active smoker w/ PMH COPD(not on home O2), CAD s/p PCI, A.fib on eliquis, HTN, p/w progressive dyspnea and productive cough. Likely COPD exacerbation    COPD exacerbation.  - suspect entero/rhinovirus  - solumedrol 40mg IV   - c/w duonebs q6 prn  - c/w symbicort BID  - continuous pulse Ox monitoring; supplemental O2 prn  - encourage smoking cessation  - Pulmonary follow Dr Pan    Chronic atrial fibrillation.  - rate-control with verapamil  - eliquis BID for stroke ppx.    CAD S/P percutaneous coronary angioplasty.  - c/w eliquis Bid  - c/w statin.  - cardiology follow dr. Cho    Constipation  - Bowel regimen    Prophylactic measure.   - Diet: DASH/TLC  - DVT ppx: eliquis BiD.    d/w patient    Yash Tim MD phone [phone] 
81F active smoker w/ PMH COPD(not on home O2), CAD s/p PCI, A.fib on eliquis, HTN, p/w progressive dyspnea and productive cough. Likely COPD exacerbation    COPD exacerbation.  - suspect entero/rhinovirus  - solumedrol 40mg IV   - c/w duonebs q6 prn  - c/w symbicort BID  - start Cefepime and Doxycicline   - sputum and blood cultures  - ID evaluation Dr. Meeks   - continuous pulse Ox monitoring; supplemental O2 prn  - encourage smoking cessation  - Pulmonary follow Dr Pan    Chronic atrial fibrillation.  - rate-control with verapamil  - eliquis BID for stroke ppx.    CAD S/P percutaneous coronary angioplasty.  - c/w eliquis Bid  - c/w statin.  - cardiology follow dr. Cho     Constipation resolved   - Bowel regimen    Prophylactic measure.   - Diet: DASH/TLC  - DVT ppx: eliquis BiD.    d/w patient      Yash Tim MD phone [phone] 
81F active smoker w/ PMH COPD(not on home O2), CAD s/p PCI, A.fib on eliquis, HTN, p/w progressive dyspnea and productive cough. Likely COPD exacerbation    COPD exacerbation.  - suspect entero/rhinovirus  - solumedrol change to Prednisone taper.     - c/w duonebs q6 prn  - c/w symbicort BID  - continue Ceftin.  - ID follow   - continuous pulse Ox monitoring; supplemental O2 prn  - encourage smoking cessation  - Pulmonary follow. Cleared for DC.    Chronic atrial fibrillation.  - rate-control with verapamil  - eliquis BID for stroke ppx.    CAD S/P percutaneous coronary angioplasty.  - c/w eliquis Bid  - c/w statin.  - cardiology follow dr. Coh     Constipation resolved   - Bowel regimen    Prophylactic measure.   - Diet: DASH/TLC  - DVT ppx: eliquis BiD.    DC home. Follow with PMD/ Pulmonary/ Cardiology in 2-3 days.      d/w patient QA     Yash Tim MD phone [phone] 
81F active smoker w/ PMH COPD(not on home O2), CAD s/p PCI, A.fib on eliquis, HTN, p/w progressive dyspnea and productive cough. Likely COPD exacerbation    COPD exacerbation.  - suspect entero/rhinovirus  - solumedrol 40mg IV   - c/w duonebs q6 prn  - c/w symbicort BID  - continuous pulse Ox monitoring; supplemental O2 prn  - encourage smoking cessation  - Pulmonary follow Dr Pan    Chronic atrial fibrillation.  - rate-control with verapamil  - eliquis BID for stroke ppx.    CAD S/P percutaneous coronary angioplasty.  - c/w eliquis Bid  - c/w statin.  - cardiology follow dr. Cho    Prophylactic measure.   - Diet: DASH/TLC  - DVT ppx: eliquis BiD.    d/w patient    Yash Tim MD phone [phone] 
81F active smoker w/ PMH COPD(not on home O2), CAD s/p PCI, A.fib on eliquis, HTN, p/w progressive dyspnea and productive cough. Likely COPD exacerbation    COPD exacerbation.  - suspect entero/rhinovirus  - solumedrol continue to 30 bid    - c/w duonebs q6 prn  - c/w symbicort BID  - continue Cefepime and Doxycicline . Change to Ceftin.  - ID follow   - continuous pulse Ox monitoring; supplemental O2 prn  - encourage smoking cessation  - Pulmonary follow    Chronic atrial fibrillation.  - rate-control with verapamil  - eliquis BID for stroke ppx.    CAD S/P percutaneous coronary angioplasty.  - c/w eliquis Bid  - c/w statin.  - cardiology follow dr. Cho     Constipation resolved   - Bowel regimen    Prophylactic measure.   - Diet: DASH/TLC  - DVT ppx: eliquis BiD.    DCP home.     d/w patient      Yash Tim MD phone [phone] 
81F active smoker w/ PMH COPD(not on home O2), CAD s/p PCI, A.fib on eliquis, HTN, p/w progressive dyspnea and productive cough. Likely COPD exacerbation    COPD exacerbation.  - suspect entero/rhinovirus  - solumedrol taper to 30 bid    - c/w duonebs q6 prn  - c/w symbicort BID  - continue Cefepime and Doxycicline   - sputum and blood cultures  - ID follow   - continuous pulse Ox monitoring; supplemental O2 prn  - encourage smoking cessation  - Pulmonary follow    Chronic atrial fibrillation.  - rate-control with verapamil  - eliquis BID for stroke ppx.    CAD S/P percutaneous coronary angioplasty.  - c/w eliquis Bid  - c/w statin.  - cardiology follow dr. Cho     Constipation resolved   - Bowel regimen    Prophylactic measure.   - Diet: DASH/TLC  - DVT ppx: eliquis BiD.    d/w patient      Yash Tim MD phone [phone] 
81F active smoker w/ PMH COPD(not on home O2), CAD s/p PCI, A.fib on eliquis, HTN, p/w progressive dyspnea and productive cough. Likely COPD exacerbation    COPD exacerbation.  - suspect entero/rhinovirus  - solumedrol 40mg IV   - c/w duonebs q6 prn  - c/w symbicort BID  - continuous pulse Ox monitoring; supplemental O2 prn  - encourage smoking cessation  - Pulmonary follow Dr Pan    Chronic atrial fibrillation.  - rate-control with verapamil  - eliquis BID for stroke ppx.    CAD S/P percutaneous coronary angioplasty.  - c/w eliquis Bid  - c/w statin.  - cardiology follow dr. Cho     Constipation resolved   - Bowel regimen    Prophylactic measure.   - Diet: DASH/TLC  - DVT ppx: eliquis BiD.    d/w patient      Yash Tim MD phone [phone] 
81F active smoker w/ PMH COPD(not on home O2), CAD s/p PCI, A.fib on eliquis, HTN, p/w progressive dyspnea and productive cough. Likely COPD exacerbation    COPD exacerbation.  - suspect entero/rhinovirus  - solumedrol 40mg IV   - c/w duonebs q6 prn  - c/w symbicort BID  - continuous pulse Ox monitoring; supplemental O2 prn  - encourage smoking cessation  - Pulmonary follow Dr Pan    Chronic atrial fibrillation.  - rate-control with verapamil  - eliquis BID for stroke ppx.    CAD S/P percutaneous coronary angioplasty.  - c/w eliquis Bid  - c/w statin.  - cardiology follow dr. Cho     Constipation resolved   - Bowel regimen    Prophylactic measure.   - Diet: DASH/TLC  - DVT ppx: eliquis BiD.    d/w patient     Yash Tim MD phone [phone] 
81F active smoker w/ PMH COPD(not on home O2), CAD s/p PCI, A.fib on eliquis, HTN, p/w progressive dyspnea and productive cough. Likely COPD exacerbation    COPD exacerbation.  - suspect entero/rhinovirus  - solumedrol 40mg IV   - c/w duonebs q6 prn  - c/w symbicort BID  - continuous pulse Ox monitoring; supplemental O2 prn  - encourage smoking cessation  - Pulmonary follow Dr Pan    Chronic atrial fibrillation.  - rate-control with verapamil  - eliquis BID for stroke ppx.    CAD S/P percutaneous coronary angioplasty.  - c/w eliquis Bid  - c/w statin.  - cardiology follow dr. Cho    Prophylactic measure.   - Diet: DASH/TLC  - DVT ppx: eliquis BiD.    d/w patient    Yash Tim MD phone [phone]

## 2022-05-07 NOTE — DISCHARGE NOTE PROVIDER - NSDCMRMEDTOKEN_GEN_ALL_CORE_FT
Albuterol (Eqv-ProAir HFA) 90 mcg/inh inhalation aerosol: 2 puff(s) inhaled every 6 hours, As Needed  aluminum hydroxide-magnesium hydroxide 200 mg-200 mg/5 mL oral suspension: 30 milliliter(s) orally every 4 hours, As needed, Dyspepsia  Atrovent CFC free 17 mcg/inh inhalation aerosol: 2 puff(s) inhaled 4 times a day, As Needed  benzonatate 100 mg oral capsule: 1 cap(s) orally 3 times a day  Lipitor 20 mg oral tablet: 1 tab(s) orally once a day (at bedtime)  melatonin 3 mg oral tablet: 1 tab(s) orally once a day (at bedtime), As needed, Sleep  Nexium 40 mg oral delayed release capsule: 1 cap(s) orally once a day  predniSONE 10 mg oral tablet: 1 tab(s) orally once a day  Qvar 80 mcg/inh inhalation aerosol: 1 puff(s) inhaled 2 times a day  Symbicort 160 mcg-4.5 mcg/inh inhalation aerosol: 2 puff(s) inhaled 2 times a day  verapamil 24 hour extended release 240 mg/24 hours oral capsule, extended release: 1 cap(s) orally once a day  Vitamin B12 1000 mcg oral tablet: 1 tab(s) orally once a day  Vitamin D3 25 mcg (1000 intl units) oral tablet: 1 tab(s) orally once a day   acetaminophen 325 mg oral tablet: 2 tab(s) orally every 6 hours, As needed, Temp greater or equal to 38.5C (101.3F), Mild Pain (1 - 3)  aluminum hydroxide-magnesium hydroxide 200 mg-200 mg/5 mL oral suspension: 30 milliliter(s) orally every 4 hours, As needed, Dyspepsia  benzonatate 100 mg oral capsule: 1 cap(s) orally 3 times a day  budesonide-formoterol 160 mcg-4.5 mcg/inh inhalation aerosol: 2 puff(s) inhaled 2 times a day   cefuroxime 500 mg oral tablet: 1 tab(s) orally every 12 hours  dornase olga 2.5 mg/2.5 mL inhalation solution: 2.5 milliliter(s) inhaled once a day  doxycycline monohydrate 100 mg oral capsule: 1 cap(s) orally every 12 hours  hydrocodone-homatropine 5 mg-1.5 mg/5 mL oral syrup: 5 milliliter(s) orally every 8 hours, As needed, Cough    iSTOP reference #: [id number] MDD:15 mL per day  ipratropium-albuterol 0.5 mg-2.5 mg/3 mL inhalation solution: 3 milliliter(s) inhaled every 6 hours  Lipitor 20 mg oral tablet: 1 tab(s) orally once a day (at bedtime)  melatonin 3 mg oral tablet: 1 tab(s) orally once a day (at bedtime), As needed, Sleep  nebulizer machine: ICD 10: J44. 1  Nexium 40 mg oral delayed release capsule: 1 cap(s) orally once a day  polyethylene glycol 3350 oral powder for reconstitution: 17 gram(s) orally 2 times a day  predniSONE 20 mg oral tablet: 60 mg (3 tabs) by mouth x 1 day (5/8)  50 mg by mouth x 1 day (5/9)  40 mg by mouth x 1 day (5/10)  30 mg by mouth x 1 day (5/11)  20 mg by mouth x 1 day (5/12)   10 mg by mouth x 1 day (5/13)    senna oral tablet: 2 tab(s) orally once a day (at bedtime)  sodium chloride 7% inhalation solution: 4 milliliter(s) inhaled every 12 hours  tiotropium 18 mcg inhalation capsule: 1 cap(s) inhaled once a day (at bedtime)  verapamil 24 hour extended release 240 mg/24 hours oral capsule, extended release: 1 cap(s) orally once a day  Vitamin B12 1000 mcg oral tablet: 1 tab(s) orally once a day  Vitamin D3 25 mcg (1000 intl units) oral tablet: 1 tab(s) orally once a day

## 2022-05-07 NOTE — DISCHARGE NOTE NURSING/CASE MANAGEMENT/SOCIAL WORK - PATIENT PORTAL LINK FT
You can access the FollowMyHealth Patient Portal offered by [facility] by registering at the following website: [email or web address]. By joining [employer]’s FollowMyHealth portal, you will also be able to view your health information using other applications (apps) compatible with our system.

## 2022-05-07 NOTE — DISCHARGE NOTE PROVIDER - PROVIDER TOKENS
PROVIDER:[TOKEN:[[id number]],FOLLOWUP:[1-3 days]] PROVIDER:[TOKEN:[[id number]],FOLLOWUP:[1-3 days]],PROVIDER:[TOKEN:[[id number]],FOLLOWUP:[1 week]]

## 2022-05-07 NOTE — DISCHARGE NOTE PROVIDER - HOSPITAL COURSE
80 yo F active smoker w/ PMH COPD (not on home O2), CAD s/p PCI, A.fib on eliquis, HTN, p/w progressive dyspnea and productive cough.     COPD exacerbation.  - suspect entero/rhinovirus  - s/p IV Solumedrol, to continue on Prednisone taper and f/u with pulm within 1 week.  - c/w duonebs q6 prn  - c/w symbicort BID  - s/p Cefepime. Switched to Doxycycline and Ceftin  - ID and pulm consulted  - encouraged smoking cessation    Chronic atrial fibrillation.  - rate-control with verapamil  - eliquis BID for stroke ppx    CAD S/P percutaneous coronary angioplasty.  - c/w eliquis Bid  - c/w statin.  - cardiology followed Dr. Cho     Constipation resolved   - Bowel regimen    Pt medically cleared for discharge home with home care. Discussed with medical attending.

## 2022-05-07 NOTE — PROGRESS NOTE ADULT - PROVIDER SPECIALTY LIST ADULT
Cardiology
Internal Medicine
Pulmonology
Pulmonology
Cardiology
Infectious Disease
Internal Medicine
Pulmonology
Infectious Disease
Infectious Disease
Internal Medicine
Internal Medicine
Pulmonology

## 2022-05-07 NOTE — DISCHARGE NOTE PROVIDER - NSDCCPCAREPLAN_GEN_ALL_CORE_FT
PRINCIPAL DISCHARGE DIAGNOSIS  Diagnosis: COPD exacerbation  Assessment and Plan of Treatment: Continue your Prednisone taper as follows: 60 mg on 5/8, 50 mg on 5/9, 40 mg on 5/10, 30 mg on 5/11, 20 mg on 5/12, and 10 mg on 5/13.  Continue Tessalon Perles, duoneb, Symbicort, Spiriva, sodium chloride 7%, Hycodon, and Pulmozyme as prescribed.  Continue Doxycycline and Cefuroxime for 2 additional days to complete your antibiotics course.  It is very important to follow up with Dr. Steen upon discharge.  Call your Health Care provider on Monday to make a follow up appointment.  Take all inhalers as prescribed by your Health Care Provider.  Take steroids as prescribed by your Health Care Provider.  If your cough increases infrequency and severity and/or you have shortness of breath or increased shortness of breath call your Health Care Provider.  If you develop fever, chills, night sweats, malaise, and/or change in mental status call your Health care Provider.  Nutrition is very important.  Eat small frequent meals.  Increase your activity as tolerated.  Do not stay in bed all day      SECONDARY DISCHARGE DIAGNOSES  Diagnosis: Constipation  Assessment and Plan of Treatment: Continue with Miralax and Senna as needed for constipation.    Diagnosis: Chronic atrial fibrillation  Assessment and Plan of Treatment: Continue with Eliquis and Verapamil as prescribed.  Atrial fibrillation is the most common heart rhythm problem.  The condition puts you at risk for has stroke and heart attack  It helps if you control your blood pressure, not drink more than 1-2 alcohol drinks per day, cut down on caffeine, getting treatment for over active thyroid gland, and get regular exercise  Call your doctor if you feel your heart racing or beating unusually, chest tightness or pain, lightheaded, faint, shortness of breath especially with exercise  It is important to take your heart medication as prescribed  You may be on anticoagulation which is very important to take as directed - you may need blood work to monitor drug levels    Diagnosis: CAD (coronary artery disease)  Assessment and Plan of Treatment: Continue with Lipitor as prescribed.  Coronary artery disease is a condition where the arteries the supply the heart muscle get clogges with fatty deposits & puts you at risk for a heart attack  Call your doctor if you have any new pain, pressure, or discomfort in the center of your chest, pain, tingling or discomfort in arms, back, neck, jaw, or stomach, shortness of breath, nausea, vomiting, burping or heartburn, sweating, cold and clammy skin, racing or abnormal heartbeat for more than 10 minutes or if they keep coming & going.  Call 911 and do not tr to get to hospital by care  You can help yourself with lefestyle changes (quitting smoking if you smoke), eat lots of fruits & vegetables & low fat dairy products, not a lot of meat & fatty foods, walk or some form of physical activity most days of the week, lose weight if you are overweight  Take your cardiac medication as prescribed to lower cholesterol, to lower blood pressure, aspirin to prevent blood clots, and diabetes control  Make sure to keep appointments with doctor for cardiac follow up care

## 2022-05-07 NOTE — DISCHARGE NOTE NURSING/CASE MANAGEMENT/SOCIAL WORK - NSDCFUADDAPPT_GEN_ALL_CORE_FT
APPTS ARE READY TO BE MADE: [X] YES    Best Family or Patient Contact (if needed):    Additional Information about above appointments (if needed):    1: Pulmonologist  2: Primary care provider  3: Cardiologist    Other comments or requests:

## 2022-05-07 NOTE — DISCHARGE NOTE PROVIDER - CARE PROVIDERS DIRECT ADDRESSES
,[email or web address]_Unknown

## 2022-05-07 NOTE — DISCHARGE NOTE PROVIDER - NSDCFUADDAPPT_GEN_ALL_CORE_FT
APPTS ARE READY TO BE MADE: [X] YES    Best Family or Patient Contact (if needed):    Additional Information about above appointments (if needed):    1: Pulmonologist  2: Primary care provider  3: Cardiologist    Other comments or requests:    APPTS ARE READY TO BE MADE: [X] YES    Best Family or Patient Contact (if needed):    Additional Information about above appointments (if needed):    1: Pulmonologist  2: Primary care provider  3: Cardiologist    Patient was scheduled with Dr. Steen on 05/10/2022 at 11:40 am at [address], [address] [place] [address]. Patient advised of appointment details.     Patient was scheduled with Dr. Cho  on 05/18/2022 at 10:30am at [address] Patient advised of appointment details.   Other comments or requests:

## 2022-05-07 NOTE — DISCHARGE NOTE PROVIDER - NSDCFUSCHEDAPPT_GEN_ALL_CORE_FT
Petty Steen  [facility]  [phone]  Scheduled Appointment: 05/23/2022     Petty Steen  [facility]  PULMMED [address]  Scheduled Appointment: 05/10/2022    [facility]  PULED [address]  Scheduled Appointment: 05/10/2022    Thai Lau  [facility]  PULMMED [address]  Scheduled Appointment: 05/10/2022    Petty Steen  [facility]  [address]ulmMed [address]  Scheduled Appointment: 05/23/2022

## 2022-05-07 NOTE — PROGRESS NOTE ADULT - SUBJECTIVE AND OBJECTIVE BOX
Patient is a 81y old  Female who presents with a chief complaint of copd (06 May 2022 21:10)      SUBJECTIVE / OVERNIGHT EVENTS: Comfortable without new complaints.   Review of Systems  chest pain no  palpitations no  sob improving   nausea no  headache no    MEDICATIONS  (STANDING):  albuterol/ipratropium for Nebulization 3 milliLiter(s) Nebulizer every 6 hours  apixaban 2.5 milliGRAM(s) Oral two times a day  atorvastatin 20 milliGRAM(s) Oral at bedtime  benzonatate 100 milliGRAM(s) Oral three times a day  budesonide 160 MICROgram(s)/formoterol 4.5 MICROgram(s) Inhaler 2 Puff(s) Inhalation two times a day  cefuroxime   Tablet 500 milliGRAM(s) Oral every 12 hours  dornase olga Solution 2.5 milliGRAM(s) Inhalation daily  doxycycline hyclate Capsule 100 milliGRAM(s) Oral every 12 hours  methylPREDNISolone sodium succinate Injectable 30 milliGRAM(s) IV Push two times a day  pantoprazole    Tablet 40 milliGRAM(s) Oral before breakfast  polyethylene glycol 3350 17 Gram(s) Oral two times a day  senna 2 Tablet(s) Oral at bedtime  sodium chloride 7% Inhalation 4 milliLiter(s) Inhalation every 12 hours  tiotropium 18 MICROgram(s) Capsule 1 Capsule(s) Inhalation at bedtime  verapamil  milliGRAM(s) Oral daily    MEDICATIONS  (PRN):  acetaminophen     Tablet .. 650 milliGRAM(s) Oral every 6 hours PRN Temp greater or equal to 38.5C (101.3F), Mild Pain (1 - 3)  aluminum hydroxide/magnesium hydroxide/simethicone Suspension 30 milliLiter(s) Oral every 4 hours PRN Dyspepsia  hydrocodone/homatropine Syrup 5 milliLiter(s) Oral every 8 hours PRN Cough  melatonin 3 milliGRAM(s) Oral at bedtime PRN Sleep      Vital Signs Last 24 Hrs  T(C): 36.9 (07 May 2022 04:58), Max: 36.9 (06 May 2022 20:13)  T(F): 98.4 (07 May 2022 04:58), Max: 98.5 (06 May 2022 20:13)  HR: 102 (07 May 2022 04:58) (97 - 102)  BP: 102/62 (07 May 2022 04:58) (100/57 - 109/75)  BP(mean): --  RR: 18 (07 May 2022 04:58) (18 - 20)  SpO2: 97% (07 May 2022 04:58) (95% - 97%)    PHYSICAL EXAM:  GENERAL: NAD, well-developed  HEAD:  Atraumatic, Normocephalic  EYES: EOMI, PERRLA, conjunctiva and sclera clear  NECK: Supple, No JVD  CHEST/LUNG: Clear to auscultation bilaterally; No wheeze  HEART: Regular rate and rhythm; No murmurs, rubs, or gallops  ABDOMEN: Soft, Nontender, Nondistended; Bowel sounds present  EXTREMITIES:  2+ Peripheral Pulses, No clubbing, cyanosis, or edema  PSYCH: AAOx3  NEUROLOGY: non-focal  SKIN: No rashes or lesions    LABS:                      RADIOLOGY & ADDITIONAL TESTS:    Imaging Personally Reviewed:    Consultant(s) Notes Reviewed:      Care Discussed with Consultants/Other Providers:

## 2022-05-07 NOTE — PROGRESS NOTE ADULT - SUBJECTIVE AND OBJECTIVE BOX
PULMONARY PROGRESS NOTE    RECORD, JANA  MRN-[id number]    Patient is a 81y old  Female who presents with a chief complaint of copd (06 May 2022 21:10)      HPI:  -on room air  wants to go home    -    ROS:   -    ACTIVE MEDICATION LIST:  MEDICATIONS  (STANDING):  albuterol/ipratropium for Nebulization 3 milliLiter(s) Nebulizer every 6 hours  apixaban 2.5 milliGRAM(s) Oral two times a day  atorvastatin 20 milliGRAM(s) Oral at bedtime  benzonatate 100 milliGRAM(s) Oral three times a day  budesonide 160 MICROgram(s)/formoterol 4.5 MICROgram(s) Inhaler 2 Puff(s) Inhalation two times a day  cefuroxime   Tablet 500 milliGRAM(s) Oral every 12 hours  dornase olga Solution 2.5 milliGRAM(s) Inhalation daily  doxycycline hyclate Capsule 100 milliGRAM(s) Oral every 12 hours  methylPREDNISolone sodium succinate Injectable 30 milliGRAM(s) IV Push two times a day  pantoprazole    Tablet 40 milliGRAM(s) Oral before breakfast  polyethylene glycol 3350 17 Gram(s) Oral two times a day  senna 2 Tablet(s) Oral at bedtime  sodium chloride 7% Inhalation 4 milliLiter(s) Inhalation every 12 hours  tiotropium 18 MICROgram(s) Capsule 1 Capsule(s) Inhalation at bedtime  verapamil  milliGRAM(s) Oral daily    MEDICATIONS  (PRN):  acetaminophen     Tablet .. 650 milliGRAM(s) Oral every 6 hours PRN Temp greater or equal to 38.5C (101.3F), Mild Pain (1 - 3)  aluminum hydroxide/magnesium hydroxide/simethicone Suspension 30 milliLiter(s) Oral every 4 hours PRN Dyspepsia  hydrocodone/homatropine Syrup 5 milliLiter(s) Oral every 8 hours PRN Cough  melatonin 3 milliGRAM(s) Oral at bedtime PRN Sleep      EXAM:  Vital Signs Last 24 Hrs  T(C): 36.9 (07 May 2022 04:58), Max: 36.9 (06 May 2022 20:13)  T(F): 98.4 (07 May 2022 04:58), Max: 98.5 (06 May 2022 20:13)  HR: 102 (07 May 2022 04:58) (97 - 102)  BP: 102/62 (07 May 2022 04:58) (100/57 - 109/75)  BP(mean): --  RR: 18 (07 May 2022 04:58) (18 - 20)  SpO2: 97% (07 May 2022 04:58) (95% - 97%)    GENERAL: The patient is awake and alert in no apparent distress.     LUNGS : wheezing b/l but improved from previous                >>> <<<  < from: CT Chest No Cont (05.03.22 @ 17:34) >    ACC: [id number] EXAM:  CT CHEST                          PROCEDURE DATE:  05/03/2022          INTERPRETATION:  HISTORY: Admitting Dxs: J44.1 DIFF BREATHING. COPD.   Pneumonia.    EXAMINATION: CT CHEST was performed without IV contrast.    COMPARISON:4/22/2022.    FINDINGS:    AIRWAYS, LUNGS, PLEURA: Mucus plugging involving the segmental airways of   the bilateral lower lobes. No pleural effusion.    Emphysema. Previously described linear right apical nodular opacity   appears slightly decreasedcompared to 4/22/2022 (image 35, series 3).   New patchy posterior right upper lobe opacity (images 52-64, series 3).    MEDIASTINUM: Dilated left atrium. No pericardial effusion. Thoracic aorta   normal caliber.  No large mediastinal lymph nodes.    IMAGED ABDOMEN: Partially imaged peripherally calcified left renal cystic   lesion, which was present on prior exam.    SOFT TISSUES: Unremarkable.    BONES: Unremarkable.      IMPRESSION:.    New patchy posterior right upper lobe ground-glass opacity, which may   represent atelectasis or infection.    Previously described right apical nodular opacity is slightly decreased   and may be infectious/inflammatory etiology.    Recommend CT chest follow-up in 3 months to ensure clearing of right   upper lobe opacities.    --- End of Report ---             JAMIN MERCADO MD; Attending Radiologist  This document has been electronically signed. May  4 2022  8:32AM    < end of copied text >    PROBLEM LIST:  81y Female with HEALTH ISSUES - PROBLEM Dx:      RECS:  start prednisone 60mg on 5/8, with 10mg taper daily  continue inhalers  take IS home with her  discharge her on hypersal 3% , q 4hrs standing neb treatments  discharge her on duoneb PRN (she is tachy while in patient)  abx per ID  taper off 02  repeat CT chest outpatient in 6-8weeks  close f/u with Dr Steen  i asked her to call the office on monday for an appt      Please call with any questions.    Celsa Lau DO  [facility] Pulmonary/Sleep Medicine  [phone]   PULMONARY PROGRESS NOTE    RECORD, JANA  MRN-[id number]    Patient is a 81y old  Female who presents with a chief complaint of copd (06 May 2022 21:10)      HPI:  -on room air  wants to go home    -    ROS:   -    ACTIVE MEDICATION LIST:  MEDICATIONS  (STANDING):  albuterol/ipratropium for Nebulization 3 milliLiter(s) Nebulizer every 6 hours  apixaban 2.5 milliGRAM(s) Oral two times a day  atorvastatin 20 milliGRAM(s) Oral at bedtime  benzonatate 100 milliGRAM(s) Oral three times a day  budesonide 160 MICROgram(s)/formoterol 4.5 MICROgram(s) Inhaler 2 Puff(s) Inhalation two times a day  cefuroxime   Tablet 500 milliGRAM(s) Oral every 12 hours  dornase olga Solution 2.5 milliGRAM(s) Inhalation daily  doxycycline hyclate Capsule 100 milliGRAM(s) Oral every 12 hours  methylPREDNISolone sodium succinate Injectable 30 milliGRAM(s) IV Push two times a day  pantoprazole    Tablet 40 milliGRAM(s) Oral before breakfast  polyethylene glycol 3350 17 Gram(s) Oral two times a day  senna 2 Tablet(s) Oral at bedtime  sodium chloride 7% Inhalation 4 milliLiter(s) Inhalation every 12 hours  tiotropium 18 MICROgram(s) Capsule 1 Capsule(s) Inhalation at bedtime  verapamil  milliGRAM(s) Oral daily    MEDICATIONS  (PRN):  acetaminophen     Tablet .. 650 milliGRAM(s) Oral every 6 hours PRN Temp greater or equal to 38.5C (101.3F), Mild Pain (1 - 3)  aluminum hydroxide/magnesium hydroxide/simethicone Suspension 30 milliLiter(s) Oral every 4 hours PRN Dyspepsia  hydrocodone/homatropine Syrup 5 milliLiter(s) Oral every 8 hours PRN Cough  melatonin 3 milliGRAM(s) Oral at bedtime PRN Sleep      EXAM:  Vital Signs Last 24 Hrs  T(C): 36.9 (07 May 2022 04:58), Max: 36.9 (06 May 2022 20:13)  T(F): 98.4 (07 May 2022 04:58), Max: 98.5 (06 May 2022 20:13)  HR: 102 (07 May 2022 04:58) (97 - 102)  BP: 102/62 (07 May 2022 04:58) (100/57 - 109/75)  BP(mean): --  RR: 18 (07 May 2022 04:58) (18 - 20)  SpO2: 97% (07 May 2022 04:58) (95% - 97%)    GENERAL: The patient is awake and alert in no apparent distress.     LUNGS : wheezing b/l but improved from previous                >>> <<<  < from: CT Chest No Cont (05.03.22 @ 17:34) >    ACC: [id number] EXAM:  CT CHEST                          PROCEDURE DATE:  05/03/2022          INTERPRETATION:  HISTORY: Admitting Dxs: J44.1 DIFF BREATHING. COPD.   Pneumonia.    EXAMINATION: CT CHEST was performed without IV contrast.    COMPARISON:4/22/2022.    FINDINGS:    AIRWAYS, LUNGS, PLEURA: Mucus plugging involving the segmental airways of   the bilateral lower lobes. No pleural effusion.    Emphysema. Previously described linear right apical nodular opacity   appears slightly decreasedcompared to 4/22/2022 (image 35, series 3).   New patchy posterior right upper lobe opacity (images 52-64, series 3).    MEDIASTINUM: Dilated left atrium. No pericardial effusion. Thoracic aorta   normal caliber.  No large mediastinal lymph nodes.    IMAGED ABDOMEN: Partially imaged peripherally calcified left renal cystic   lesion, which was present on prior exam.    SOFT TISSUES: Unremarkable.    BONES: Unremarkable.      IMPRESSION:.    New patchy posterior right upper lobe ground-glass opacity, which may   represent atelectasis or infection.    Previously described right apical nodular opacity is slightly decreased   and may be infectious/inflammatory etiology.    Recommend CT chest follow-up in 3 months to ensure clearing of right   upper lobe opacities.    --- End of Report ---             JAMIN MERCADO MD; Attending Radiologist  This document has been electronically signed. May  4 2022  8:32AM    < end of copied text >    PROBLEM LIST:  81y Female with HEALTH ISSUES - PROBLEM Dx:      RECS:  start prednisone 60mg on 5/8, with 10mg taper daily  continue inhalers  take IS home with her  discharge her on hypersal 3% , q 4hrs standing neb treatments  discharge her on duoneb PRN (she is tachy while in patient)  abx per ID  taper off 02  repeat CT chest outpatient in 6-8weeks  close f/u with Dr Steen- although wheeze is better, its still present so she needs to be seen quickly in the office  i asked her to call the office on monday for an appt      Please call with any questions.    Celsa Lua DO  [facility] Pulmonary/Sleep Medicine  [phone]

## 2022-05-07 NOTE — DISCHARGE NOTE NURSING/CASE MANAGEMENT/SOCIAL WORK - NSDCPEFALRISK_GEN_ALL_CORE
For information on Fall & Injury Prevention, visit: [email or web address] OR  [email or web address] OR  https://www.cdc.gov/steadi/patient.html

## 2022-05-07 NOTE — DISCHARGE NOTE NURSING/CASE MANAGEMENT/SOCIAL WORK - DATE OF LAST VACCINATION
CALLED IN RX (ALPRAZOLAM 0.5MG) TO [facility] (PHONE #[phone]). 27-Apr-2022
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
## 2022-05-07 NOTE — PROGRESS NOTE ADULT - SUBJECTIVE AND OBJECTIVE BOX
Cardiovascular Disease Progress Note    Overnight events: No acute events overnight.  no new cardiac sx  Otherwise review of systems negative    Objective Findings:  T(C): 36.9 (05-07-22 @ 04:58), Max: 36.9 (05-06-22 @ 20:13)  HR: 102 (05-07-22 @ 04:58) (97 - 102)  BP: 102/62 (05-07-22 @ 04:58) (100/57 - 109/75)  RR: 18 (05-07-22 @ 04:58) (18 - 20)  SpO2: 97% (05-07-22 @ 04:58) (95% - 97%)  Wt(kg): --  Daily     Daily       Physical Exam:  Gen: NAD  HEENT: EOMI  CV: RRR, normal S1 + S2, no m/r/g  Lungs: CTAB  Abd: soft, non-tender  Ext: No edema    Telemetry:    Laboratory Data:                    Inpatient Medications:  MEDICATIONS  (STANDING):  albuterol/ipratropium for Nebulization 3 milliLiter(s) Nebulizer every 6 hours  apixaban 2.5 milliGRAM(s) Oral two times a day  atorvastatin 20 milliGRAM(s) Oral at bedtime  benzonatate 100 milliGRAM(s) Oral three times a day  budesonide 160 MICROgram(s)/formoterol 4.5 MICROgram(s) Inhaler 2 Puff(s) Inhalation two times a day  cefuroxime   Tablet 500 milliGRAM(s) Oral every 12 hours  dornase olga Solution 2.5 milliGRAM(s) Inhalation daily  doxycycline hyclate Capsule 100 milliGRAM(s) Oral every 12 hours  methylPREDNISolone sodium succinate Injectable 30 milliGRAM(s) IV Push two times a day  pantoprazole    Tablet 40 milliGRAM(s) Oral before breakfast  polyethylene glycol 3350 17 Gram(s) Oral two times a day  senna 2 Tablet(s) Oral at bedtime  sodium chloride 7% Inhalation 4 milliLiter(s) Inhalation every 12 hours  tiotropium 18 MICROgram(s) Capsule 1 Capsule(s) Inhalation at bedtime  verapamil  milliGRAM(s) Oral daily      Assessment:  RVP  copd exac  persistent afib  cad s/p pci  sob    Recs:  cardiac stable  no e/o acs or chf at present  cw rate control meds and AC for afib  cw lasix 20mg po qd (elevated BNP, without clinical signs of hypervolemia)  cw steroids, abx, supportive care and bd per pulm  s/p ct chest on recent admission. results noted  2d echo without acute findings  outpatient ischemic eval if exertional dyspnea persists after tx of copd and correction of exertional hypoxemia          Over 25 minutes spent on total encounter; more than 50% of the visit was spent counseling and/or coordinating care by the attending physician.      Bill Cho MD   Cardiovascular Disease  [phone]

## 2022-05-08 LAB
CULTURE RESULTS: SIGNIFICANT CHANGE UP
SPECIMEN SOURCE: SIGNIFICANT CHANGE UP

## 2022-05-10 ENCOUNTER — APPOINTMENT (OUTPATIENT)
Dept: PULMONOLOGY | Facility: CLINIC | Age: 82
End: 2022-05-10
Payer: MEDICARE

## 2022-05-10 ENCOUNTER — APPOINTMENT (OUTPATIENT)
Dept: PULMONOLOGY | Facility: CLINIC | Age: 82
End: 2022-05-10

## 2022-05-10 VITALS — DIASTOLIC BLOOD PRESSURE: 78 MMHG | SYSTOLIC BLOOD PRESSURE: 118 MMHG | HEART RATE: 78 BPM | OXYGEN SATURATION: 96 %

## 2022-05-10 PROCEDURE — 94726 PLETHYSMOGRAPHY LUNG VOLUMES: CPT

## 2022-05-10 PROCEDURE — 99406 BEHAV CHNG SMOKING 3-10 MIN: CPT

## 2022-05-10 PROCEDURE — 94060 EVALUATION OF WHEEZING: CPT

## 2022-05-10 PROCEDURE — 36415 COLL VENOUS BLD VENIPUNCTURE: CPT

## 2022-05-10 PROCEDURE — 94729 DIFFUSING CAPACITY: CPT

## 2022-05-10 PROCEDURE — 99214 OFFICE O/P EST MOD 30 MIN: CPT | Mod: 25

## 2022-05-10 PROCEDURE — ZZZZZ: CPT

## 2022-05-10 NOTE — HISTORY OF PRESENT ILLNESS
[Former] : former [TextBox_4] : JANA OLIVARES is a 81 year old female who presents for NUSH discharge f/u\par she sees Dr Steen\par admitted for cough/ URI symptoms, RVP + on 4/27\par was on IV steroids, abx and chest clearance \par saw ID, cardio \par discharged on room air. on prednisone taper\par \par today- feels better. no wheeze. on prednisone 30mg today\par she wants to review inhalers\par \par she has noticed some leg swelling\par there is an increase in weight\par  [YearQuit] : 2022

## 2022-05-10 NOTE — PROCEDURE
[FreeTextEntry1] : \par ACC: [id number] EXAM:  CT CHEST                      \par \par PROCEDURE DATE:  05/03/2022  \par \par \par \par INTERPRETATION:  HISTORY: Admitting Dxs: J44.1 DIFF BREATHING. COPD. \par Pneumonia.\par \par EXAMINATION: CT CHEST was performed without IV contrast.\par \par COMPARISON: 4/22/2022.\par \par FINDINGS:\par \par AIRWAYS, LUNGS, PLEURA: Mucus plugging involving the segmental airways of \par the bilateral lower lobes. No pleural effusion.\par \par Emphysema. Previously described linear right apical nodular opacity \par appears slightly decreased compared to 4/22/2022 (image 35, series 3). \par New patchy posterior right upper lobe opacity (images 52-64, series 3).\par \par MEDIASTINUM: Dilated left atrium. No pericardial effusion. Thoracic aorta \par normal caliber.  No large mediastinal lymph nodes.\par \par IMAGED ABDOMEN: Partially imaged peripherally calcified left renal cystic \par lesion, which was present on prior exam.\par \par SOFT TISSUES: Unremarkable.\par \par BONES: Unremarkable.\par \par \par IMPRESSION:.\par \par New patchy posterior right upper lobe ground-glass opacity, which may \par represent atelectasis or infection.\par \par Previously described right apical nodular opacity is slightly decreased \par and may be infectious/inflammatory etiology.\par \par Recommend CT chest follow-up in 3 months to ensure clearing of right \par upper lobe opacities.\par \par --- End of Report ---\par \par \par \par \par \par  JAMIN MERCADO MD; Attending Radiologist\par This document has been electronically signed. May  4 2022  8:32AM\par \par \par mild obstruction with bronchodilator response. normal volume. increased resistance\par moderate dlco that corrects for VA\par

## 2022-05-10 NOTE — PHYSICAL EXAM
[No Acute Distress] : no acute distress [Well Nourished] : well nourished [Well Developed] : well developed [Normal Rate/Rhythm] : normal rate/rhythm [No Resp Distress] : no resp distress [No Acc Muscle Use] : no acc muscle use [Clear to Auscultation Bilaterally] : clear to auscultation bilaterally [Oriented x3] : oriented x3 [TextBox_68] : no wheeze (improved from hospitalization )  [TextBox_105] : trace edema

## 2022-05-10 NOTE — ASSESSMENT
[FreeTextEntry1] : check probnp\par see cardio for edema- lasix?\par needs repeat CT chest in 6-8 weeks\par \par f/u with DR Steen

## 2022-05-11 LAB — NT-PROBNP SERPL-MCNC: 769 PG/ML

## 2022-05-23 ENCOUNTER — APPOINTMENT (OUTPATIENT)
Dept: PULMONOLOGY | Facility: CLINIC | Age: 82
End: 2022-05-23
Payer: MEDICARE

## 2022-05-23 ENCOUNTER — LABORATORY RESULT (OUTPATIENT)
Age: 82
End: 2022-05-23

## 2022-05-23 VITALS
TEMPERATURE: 97.6 F | SYSTOLIC BLOOD PRESSURE: 94 MMHG | DIASTOLIC BLOOD PRESSURE: 55 MMHG | HEART RATE: 112 BPM | OXYGEN SATURATION: 95 %

## 2022-05-23 LAB — DEPRECATED D DIMER PPP IA-ACNC: 268 NG/ML DDU

## 2022-05-23 PROCEDURE — 36415 COLL VENOUS BLD VENIPUNCTURE: CPT

## 2022-05-23 PROCEDURE — 71046 X-RAY EXAM CHEST 2 VIEWS: CPT

## 2022-05-23 PROCEDURE — 99214 OFFICE O/P EST MOD 30 MIN: CPT | Mod: 25

## 2022-05-23 NOTE — PROCEDURE
[FreeTextEntry1] : \par  Xray Chest 2 Views PA/Lat             Final\par \par No Documents Attached\par \par \par \par \par   \par Chest x-ray PA and lateral views performed in my office today showed hyperinflated lungs, clear lungs, no evidence of infiltrates or pleural effusions. \par \par \par  Ordered by: SANDY ARIAS       Collected/Examined: 23May2022 10:02AM       \par Verification Required       Stage: Final       \par  Performed at: In Office       Performed by: SANDY ARIAS       Resulted: 23May2022 10:02AM       Last Updated: 23May2022 10:03AM       \par \par Chest CT scan report on April 22, 2002 noted and reviewed with patient.

## 2022-05-23 NOTE — REASON FOR VISIT
[Follow-Up] : a follow-up visit [COPD] : COPD [Shortness of Breath] : shortness of breath [TextBox_44] : quit smoking 5 weeks ago

## 2022-05-23 NOTE — ASSESSMENT
[FreeTextEntry1] : Venipuncture with labs drawn in office\par  Continue Symbicort HFA for history of COPD\par Continue Spiriva Turbuhaler for COPD\par Start Z-Mason and prednisone taper.\par Start Daliresp 250 mg p.o. daily for COPD.\par Continue Proair Respiclick prn. \par Return for pulmonary follow-up in 2 weeks.\par Cardiology follow-up with Dr. Cho.\par Return for pulmonary follow-up in 1 month\par Repeat chest CT scan for follow-up in 1 month\par

## 2022-05-23 NOTE — HISTORY OF PRESENT ILLNESS
[TextBox_4] : Marilyn was recently admitted to [facility] for COPD exacerbation, was treated with IV Solu-Medrol and nebulizers.  She came in for hospital follow-up today, feels dyspneic still, also has mild cough, but no chest pain.

## 2022-05-25 LAB
ANION GAP SERPL CALC-SCNC: 15 MMOL/L
BASOPHILS # BLD AUTO: 0.04 K/UL
BASOPHILS NFR BLD AUTO: 0.6 %
BUN SERPL-MCNC: 12 MG/DL
CALCIUM SERPL-MCNC: 9.1 MG/DL
CHLORIDE SERPL-SCNC: 100 MMOL/L
CO2 SERPL-SCNC: 22 MMOL/L
CREAT SERPL-MCNC: 0.81 MG/DL
EGFR: 73 ML/MIN/1.73M2
EOSINOPHIL # BLD AUTO: 0.13 K/UL
EOSINOPHIL NFR BLD AUTO: 2.1 %
GLUCOSE SERPL-MCNC: 119 MG/DL
HCT VFR BLD CALC: 31.4 %
HGB BLD-MCNC: 10 G/DL
IMM GRANULOCYTES NFR BLD AUTO: 0.8 %
LYMPHOCYTES # BLD AUTO: 0.66 K/UL
LYMPHOCYTES NFR BLD AUTO: 10.5 %
MAN DIFF?: NORMAL
MCHC RBC-ENTMCNC: 31.8 GM/DL
MCHC RBC-ENTMCNC: 34 PG
MCV RBC AUTO: 106.8 FL
MONOCYTES # BLD AUTO: 0.5 K/UL
MONOCYTES NFR BLD AUTO: 7.9 %
NEUTROPHILS # BLD AUTO: 4.91 K/UL
NEUTROPHILS NFR BLD AUTO: 78.1 %
PLATELET # BLD AUTO: 305 K/UL
POTASSIUM SERPL-SCNC: 3.6 MMOL/L
RBC # BLD: 2.94 M/UL
RBC # FLD: 14.6 %
SODIUM SERPL-SCNC: 136 MMOL/L
WBC # FLD AUTO: 6.29 K/UL

## 2022-06-13 ENCOUNTER — APPOINTMENT (OUTPATIENT)
Dept: PULMONOLOGY | Facility: CLINIC | Age: 82
End: 2022-06-13
Payer: MEDICARE

## 2022-06-13 ENCOUNTER — LABORATORY RESULT (OUTPATIENT)
Age: 82
End: 2022-06-13

## 2022-06-13 VITALS — SYSTOLIC BLOOD PRESSURE: 114 MMHG | DIASTOLIC BLOOD PRESSURE: 79 MMHG | OXYGEN SATURATION: 95 % | HEART RATE: 88 BPM

## 2022-06-13 PROCEDURE — 99214 OFFICE O/P EST MOD 30 MIN: CPT

## 2022-06-13 NOTE — HISTORY OF PRESENT ILLNESS
[TextBox_4] : Marilyn was recently admitted to [facility] for COPD exacerbation, was treated with IV Solu-Medrol and nebulizers.  She came in for hospital follow-up today, feels dyspneic still, also has mild cough, but no chest pain.

## 2022-06-13 NOTE — ASSESSMENT
[FreeTextEntry1] : Venipuncture with labs drawn in office\par  Continue Symbicort HFA for history of COPD\par Continue Spiriva Turbuhaler for COPD\par Albuterol via nebulizer 4 times daily\par Continue Lasix daily. \par Start prednisone 10 mg QD for 2 weeks\par Continue Proair Respiclick prn. \par Return for pulmonary follow-up in 2 weeks.\par Cardiology follow-up with Dr. Cho.\par Return for pulmonary follow-up in 1 month\par Repeat chest CT scan for follow-up in 3 month.\par

## 2022-06-13 NOTE — REASON FOR VISIT
[Follow-Up] : a follow-up visit [COPD] : COPD [Shortness of Breath] : shortness of breath [TextBox_44] : Still has significant amount of shortness of breath at this point

## 2022-06-18 LAB
BASOPHILS # BLD AUTO: 0 K/UL
BASOPHILS NFR BLD AUTO: 0 %
CK SERPL-CCNC: 41 U/L
EOSINOPHIL # BLD AUTO: 0.11 K/UL
EOSINOPHIL NFR BLD AUTO: 1.7 %
HCT VFR BLD CALC: 33.6 %
HGB BLD-MCNC: 10.3 G/DL
LYMPHOCYTES # BLD AUTO: 2.51 K/UL
LYMPHOCYTES NFR BLD AUTO: 38.3 %
MAN DIFF?: NORMAL
MCHC RBC-ENTMCNC: 30.7 GM/DL
MCHC RBC-ENTMCNC: 34.3 PG
MCV RBC AUTO: 112 FL
MONOCYTES # BLD AUTO: 0.51 K/UL
MONOCYTES NFR BLD AUTO: 7.8 %
NEUTROPHILS # BLD AUTO: 3.42 K/UL
NEUTROPHILS NFR BLD AUTO: 52.2 %
NT-PROBNP SERPL-MCNC: 637 PG/ML
PLATELET # BLD AUTO: 246 K/UL
RBC # BLD: 3 M/UL
RBC # FLD: 17.2 %
WBC # FLD AUTO: 6.56 K/UL

## 2022-06-27 ENCOUNTER — APPOINTMENT (OUTPATIENT)
Dept: PULMONOLOGY | Facility: CLINIC | Age: 82
End: 2022-06-27

## 2022-06-27 ENCOUNTER — INPATIENT (INPATIENT)
Facility: HOSPITAL | Age: 82
LOS: 1 days | Discharge: HOME | DRG: 287 | End: 2022-06-29
Attending: INTERNAL MEDICINE | Admitting: INTERNAL MEDICINE
Payer: MEDICARE

## 2022-06-27 VITALS
RESPIRATION RATE: 18 BRPM | SYSTOLIC BLOOD PRESSURE: 129 MMHG | TEMPERATURE: 98 F | WEIGHT: 145.06 LBS | DIASTOLIC BLOOD PRESSURE: 69 MMHG | OXYGEN SATURATION: 97 % | HEART RATE: 98 BPM | HEIGHT: 60 IN

## 2022-06-27 VITALS — SYSTOLIC BLOOD PRESSURE: 121 MMHG | OXYGEN SATURATION: 95 % | HEART RATE: 86 BPM | DIASTOLIC BLOOD PRESSURE: 64 MMHG

## 2022-06-27 DIAGNOSIS — R07.9 CHEST PAIN, UNSPECIFIED: ICD-10-CM

## 2022-06-27 DIAGNOSIS — R49.0 DYSPHONIA: ICD-10-CM

## 2022-06-27 LAB
ALBUMIN SERPL ELPH-MCNC: 4.3 G/DL — SIGNIFICANT CHANGE UP (ref 3.3–5)
ALP SERPL-CCNC: 64 U/L — SIGNIFICANT CHANGE UP (ref 40–120)
ALT FLD-CCNC: 17 U/L — SIGNIFICANT CHANGE UP (ref 10–45)
ANION GAP SERPL CALC-SCNC: 11 MMOL/L — SIGNIFICANT CHANGE UP (ref 5–17)
ANISOCYTOSIS BLD QL: SLIGHT — SIGNIFICANT CHANGE UP
AST SERPL-CCNC: 18 U/L — SIGNIFICANT CHANGE UP (ref 10–40)
BASOPHILS # BLD AUTO: 0.11 K/UL — SIGNIFICANT CHANGE UP (ref 0–0.2)
BASOPHILS NFR BLD AUTO: 1.7 % — SIGNIFICANT CHANGE UP (ref 0–2)
BILIRUB SERPL-MCNC: 0.4 MG/DL — SIGNIFICANT CHANGE UP (ref 0.2–1.2)
BUN SERPL-MCNC: 12 MG/DL — SIGNIFICANT CHANGE UP (ref 7–23)
CALCIUM SERPL-MCNC: 9.5 MG/DL — SIGNIFICANT CHANGE UP (ref 8.4–10.5)
CHLORIDE SERPL-SCNC: 104 MMOL/L — SIGNIFICANT CHANGE UP (ref 96–108)
CO2 SERPL-SCNC: 25 MMOL/L — SIGNIFICANT CHANGE UP (ref 22–31)
CREAT SERPL-MCNC: 0.83 MG/DL — SIGNIFICANT CHANGE UP (ref 0.5–1.3)
EGFR: 71 ML/MIN/1.73M2 — SIGNIFICANT CHANGE UP
EOSINOPHIL # BLD AUTO: 0.23 K/UL — SIGNIFICANT CHANGE UP (ref 0–0.5)
EOSINOPHIL NFR BLD AUTO: 3.5 % — SIGNIFICANT CHANGE UP (ref 0–6)
GLUCOSE SERPL-MCNC: 104 MG/DL — HIGH (ref 70–99)
HCT VFR BLD CALC: 34.4 % — LOW (ref 34.5–45)
HGB BLD-MCNC: 11 G/DL — LOW (ref 11.5–15.5)
LYMPHOCYTES # BLD AUTO: 2.65 K/UL — SIGNIFICANT CHANGE UP (ref 1–3.3)
LYMPHOCYTES # BLD AUTO: 40 % — SIGNIFICANT CHANGE UP (ref 13–44)
MACROCYTES BLD QL: SLIGHT — SIGNIFICANT CHANGE UP
MANUAL SMEAR VERIFICATION: SIGNIFICANT CHANGE UP
MCHC RBC-ENTMCNC: 32 GM/DL — SIGNIFICANT CHANGE UP (ref 32–36)
MCHC RBC-ENTMCNC: 34.1 PG — HIGH (ref 27–34)
MCV RBC AUTO: 106.5 FL — HIGH (ref 80–100)
MONOCYTES # BLD AUTO: 1.03 K/UL — HIGH (ref 0–0.9)
MONOCYTES NFR BLD AUTO: 15.6 % — HIGH (ref 2–14)
NEUTROPHILS # BLD AUTO: 2.54 K/UL — SIGNIFICANT CHANGE UP (ref 1.8–7.4)
NEUTROPHILS NFR BLD AUTO: 38.3 % — LOW (ref 43–77)
NT-PROBNP SERPL-SCNC: 691 PG/ML — HIGH (ref 0–300)
OVALOCYTES BLD QL SMEAR: SLIGHT — SIGNIFICANT CHANGE UP
PLAT MORPH BLD: NORMAL — SIGNIFICANT CHANGE UP
PLATELET # BLD AUTO: 301 K/UL — SIGNIFICANT CHANGE UP (ref 150–400)
POIKILOCYTOSIS BLD QL AUTO: SLIGHT — SIGNIFICANT CHANGE UP
POTASSIUM SERPL-MCNC: 3.9 MMOL/L — SIGNIFICANT CHANGE UP (ref 3.5–5.3)
POTASSIUM SERPL-SCNC: 3.9 MMOL/L — SIGNIFICANT CHANGE UP (ref 3.5–5.3)
PROT SERPL-MCNC: 6.8 G/DL — SIGNIFICANT CHANGE UP (ref 6–8.3)
RAPID RVP RESULT: SIGNIFICANT CHANGE UP
RBC # BLD: 3.23 M/UL — LOW (ref 3.8–5.2)
RBC # FLD: 15.9 % — HIGH (ref 10.3–14.5)
RBC BLD AUTO: ABNORMAL
SARS-COV-2 RNA SPEC QL NAA+PROBE: SIGNIFICANT CHANGE UP
SODIUM SERPL-SCNC: 140 MMOL/L — SIGNIFICANT CHANGE UP (ref 135–145)
TROPONIN T, HIGH SENSITIVITY RESULT: 11 NG/L — SIGNIFICANT CHANGE UP (ref 0–51)
VARIANT LYMPHS # BLD: 0.9 % — SIGNIFICANT CHANGE UP (ref 0–6)
WBC # BLD: 6.63 K/UL — SIGNIFICANT CHANGE UP (ref 3.8–10.5)
WBC # FLD AUTO: 6.63 K/UL — SIGNIFICANT CHANGE UP (ref 3.8–10.5)

## 2022-06-27 PROCEDURE — 71046 X-RAY EXAM CHEST 2 VIEWS: CPT | Mod: 26

## 2022-06-27 PROCEDURE — 99214 OFFICE O/P EST MOD 30 MIN: CPT

## 2022-06-27 PROCEDURE — 99285 EMERGENCY DEPT VISIT HI MDM: CPT | Mod: CS,GC

## 2022-06-27 NOTE — ED PROVIDER NOTE - ATTENDING CONTRIBUTION TO CARE
Private Physician Normal Tammie  81y female pmh AFib on ac, Asthma/copd/smoker, CAD sp ptca w stent, CVA, melanoma, PUD. pt comes to ed referred by pmd for c/o chest pain occurring during night lasting 15min substernal mod severe rad to neck w/o nvdc. PE WDWN female awake alert normocephalic atraumatic neck supple chest clear anterior & posterior cv no rubs, gallops or murmurs abd soft soft +bs no mass guarding neruo gcs 15 speech fluent moves all extr  Dereck John MD, Facep

## 2022-06-27 NOTE — REASON FOR VISIT
[Follow-Up] : a follow-up visit [COPD] : COPD [Shortness of Breath] : shortness of breath [TextBox_44] : Shortness of breath is better, but has hoarseness

## 2022-06-27 NOTE — ED ADULT NURSE NOTE - NSICDXPASTMEDICALHX_GEN_ALL_CORE_FT
PAST MEDICAL HISTORY:  Afib x 15 yrs --on Coumadin  attempted cardioversion 13 yrs ago--failed    Asthma     CAD (coronary artery disease)     CAD (Coronary Artery Disease)     CHF (congestive heart failure)     Coronary Stent to RCA, LAD, and DIAG 9/25/06 at [facility]    Emphysema/COPD     GERD (Gastroesophageal Reflux Disease)     H/O: CVA pt unaware of CVA, yet showed up on CT of head as old CVA    Hypercholesterolemia     Lip Cancer resected 6 yrs ago (no chemo/rad)    Melanoma     PUD (peptic ulcer disease)     Skin cancer     Skin Cancer of Face removed 1 yr ago    Skin Cancer of Nose 1 yr ago- removed    Smoker

## 2022-06-27 NOTE — ASSESSMENT
[FreeTextEntry1] : Check PFT for follow-up in 1 month.\par  Continue Symbicort HFA for history of COPD\par Continue Spiriva Turbuhaler for COPD\par Albuterol via nebulizer 4 times daily\par Continue Lasix daily. \par Start diflucan 100 mg p.o. daily for 7 days\par Continue Proair Respiclick prn. \par Cardiology follow-up with Dr. Cho.\par Return for pulmonary follow-up in 1 month\par Repeat chest CT scan for follow-up in 3 month.\par

## 2022-06-27 NOTE — ED ADULT NURSE NOTE - OBJECTIVE STATEMENT
Pt discharged home, awake, alert, oriented x's 4,  denies any pain, no apparent distress noted. All questions and concerns addressed and answered, pt verbalizes understanding of discharge process, pt meets discharge criteria and is being discharged to car via wheelchair.   
Pt is an 81y F with hx of  CAD, CHF, Emphysema, COPD (not on home O2), AFIB, GERD, and stent placement who presents to ED c/o facial and chest pains w/ palpitations starting at 3am. Pt states "Pain peaked earlier this afternoon and has now resolved." Pt went to PCP today who directed her to ED for further evaluation due to cardiac hx. Also of note, pt started on antibiotics today for "throat infection" from nebulizer treatments. Pt also endorsing bilateral lower extremity swelling. Pt is A and O times 4, strong peripheral pulses, strong extremities x4. Respirations even and unlabored with symmetrical chest rise, ABD soft and nondistended, skin warm dry and intact. Pt placed on cardiac monitor. IV placed w/ bloodwork in lab. MD HEART at bedside to assess. Pt updated on plan of care, comfort and safety maintained. Pt previous pack a day smoker x60 years. Pt ambulatory with steady gait noted.

## 2022-06-27 NOTE — ED PROVIDER NOTE - CLINICAL SUMMARY MEDICAL DECISION MAKING FREE TEXT BOX
81F PMH COPD emphysema prior stents on eliquis CC chest pain episodes, given hx and physical concern for pneumonia vs acs vs new onset chf given new swelling will give labs and imaging w/ ekg

## 2022-06-27 NOTE — DISCUSSION/SUMMARY
[FreeTextEntry1] : Improved shortness of breath and cough secondary to COPD exacerbation/CHF.  Hoarseness of the voice possibly secondary to oral candidiasis

## 2022-06-27 NOTE — ED PROVIDER NOTE - PHYSICAL EXAMINATION
GENERAL: Awake, alert, NAD  LUNGS: CTAB, no wheezes or crackles   CARDIAC: RRR, no m/r/g no reproducible chest pain   ABDOMEN: Soft, non tender, non distended, no rebound, no guarding  EXT: mild edema non pitting no calf tenderness no deformities.  NEURO: A&Ox3. Moving all extremities.  SKIN: Warm and dry. No rash.  PSYCH: Normal affect.

## 2022-06-27 NOTE — ED PROVIDER NOTE - NSICDXPASTMEDICALHX_GEN_ALL_CORE_FT
PAST MEDICAL HISTORY:  Afib x 15 yrs --on Coumadin  attempted cardioversion 13 yrs ago--failed    Asthma     CAD (coronary artery disease)     CAD (Coronary Artery Disease)     CHF (congestive heart failure)     Coronary Stent to RCA, LAD, and DIAG 9/25/06 at [facility]    Emphysema/COPD     GERD (Gastroesophageal Reflux Disease)     H/O: CVA pt unaware of CVA, yet showed up on CT of head as old CVA    Hypercholesterolemia     Lip Cancer resected 6 yrs ago (no chemo/rad)    Melanoma     PUD (peptic ulcer disease)     Skin cancer     Skin Cancer of Face removed 1 yr ago    Skin Cancer of Nose 1 yr ago- removed    Smoker

## 2022-06-27 NOTE — ED ADULT NURSE NOTE - NSIMPLEMENTINTERV_GEN_ALL_ED
Implemented All Fall with Harm Risk Interventions:  Treichlers to call system. Call bell, personal items and telephone within reach. Instruct patient to call for assistance. Room bathroom lighting operational. Non-slip footwear when patient is off stretcher. Physically safe environment: no spills, clutter or unnecessary equipment. Stretcher in lowest position, wheels locked, appropriate side rails in place. Provide visual cue, wrist band, yellow gown, etc. Monitor gait and stability. Monitor for mental status changes and reorient to person, place, and time. Review medications for side effects contributing to fall risk. Reinforce activity limits and safety measures with patient and family. Provide visual clues: red socks.

## 2022-06-27 NOTE — ED ADULT TRIAGE NOTE - CHIEF COMPLAINT QUOTE
intermittent acute onset of facial pain that is associated with chest pain and palpitations, pt has hx of cardiac stents- on eliquis

## 2022-06-28 LAB — TROPONIN T, HIGH SENSITIVITY RESULT: 10 NG/L — SIGNIFICANT CHANGE UP (ref 0–51)

## 2022-06-28 PROCEDURE — 99152 MOD SED SAME PHYS/QHP 5/>YRS: CPT

## 2022-06-28 PROCEDURE — 93458 L HRT ARTERY/VENTRICLE ANGIO: CPT | Mod: 26

## 2022-06-28 PROCEDURE — 93010 ELECTROCARDIOGRAM REPORT: CPT

## 2022-06-28 RX ORDER — SODIUM CHLORIDE 9 MG/ML
250 INJECTION INTRAMUSCULAR; INTRAVENOUS; SUBCUTANEOUS ONCE
Refills: 0 | Status: COMPLETED | OUTPATIENT
Start: 2022-06-28 | End: 2022-06-28

## 2022-06-28 RX ORDER — LANOLIN ALCOHOL/MO/W.PET/CERES
3 CREAM (GRAM) TOPICAL AT BEDTIME
Refills: 0 | Status: DISCONTINUED | OUTPATIENT
Start: 2022-06-28 | End: 2022-06-29

## 2022-06-28 RX ORDER — ATORVASTATIN CALCIUM 80 MG/1
20 TABLET, FILM COATED ORAL AT BEDTIME
Refills: 0 | Status: DISCONTINUED | OUTPATIENT
Start: 2022-06-28 | End: 2022-06-29

## 2022-06-28 RX ORDER — PANTOPRAZOLE SODIUM 20 MG/1
40 TABLET, DELAYED RELEASE ORAL
Refills: 0 | Status: DISCONTINUED | OUTPATIENT
Start: 2022-06-28 | End: 2022-06-29

## 2022-06-28 RX ORDER — VERAPAMIL HCL 240 MG
240 CAPSULE, EXTENDED RELEASE PELLETS 24 HR ORAL DAILY
Refills: 0 | Status: DISCONTINUED | OUTPATIENT
Start: 2022-06-28 | End: 2022-06-29

## 2022-06-28 RX ORDER — POLYETHYLENE GLYCOL 3350 17 G/17G
17 POWDER, FOR SOLUTION ORAL
Refills: 0 | Status: DISCONTINUED | OUTPATIENT
Start: 2022-06-28 | End: 2022-06-29

## 2022-06-28 RX ORDER — SENNA PLUS 8.6 MG/1
2 TABLET ORAL AT BEDTIME
Refills: 0 | Status: DISCONTINUED | OUTPATIENT
Start: 2022-06-28 | End: 2022-06-29

## 2022-06-28 RX ORDER — TIOTROPIUM BROMIDE 18 UG/1
1 CAPSULE ORAL; RESPIRATORY (INHALATION) AT BEDTIME
Refills: 0 | Status: DISCONTINUED | OUTPATIENT
Start: 2022-06-28 | End: 2022-06-29

## 2022-06-28 RX ORDER — IPRATROPIUM/ALBUTEROL SULFATE 18-103MCG
3 AEROSOL WITH ADAPTER (GRAM) INHALATION EVERY 6 HOURS
Refills: 0 | Status: DISCONTINUED | OUTPATIENT
Start: 2022-06-28 | End: 2022-06-29

## 2022-06-28 RX ADMIN — POLYETHYLENE GLYCOL 3350 17 GRAM(S): 17 POWDER, FOR SOLUTION ORAL at 17:07

## 2022-06-28 RX ADMIN — ATORVASTATIN CALCIUM 20 MILLIGRAM(S): 80 TABLET, FILM COATED ORAL at 21:13

## 2022-06-28 RX ADMIN — SODIUM CHLORIDE 750 MILLILITER(S): 9 INJECTION INTRAMUSCULAR; INTRAVENOUS; SUBCUTANEOUS at 12:05

## 2022-06-28 RX ADMIN — Medication 3 MILLILITER(S): at 17:01

## 2022-06-28 RX ADMIN — SENNA PLUS 2 TABLET(S): 8.6 TABLET ORAL at 21:13

## 2022-06-28 RX ADMIN — TIOTROPIUM BROMIDE 1 CAPSULE(S): 18 CAPSULE ORAL; RESPIRATORY (INHALATION) at 21:13

## 2022-06-28 RX ADMIN — Medication 240 MILLIGRAM(S): at 14:41

## 2022-06-28 RX ADMIN — PANTOPRAZOLE SODIUM 40 MILLIGRAM(S): 20 TABLET, DELAYED RELEASE ORAL at 14:41

## 2022-06-28 RX ADMIN — Medication 3 MILLILITER(S): at 23:23

## 2022-06-28 NOTE — H&P ADULT - NSHPLABSRESULTS_GEN_ALL_CORE
11.0   6.63  )-----------( 301      ( 27 Jun 2022 20:32 )             34.4       06-27    140  |  104  |  12  ----------------------------<  104<H>  3.9   |  25  |  0.83    Ca    9.5      27 Jun 2022 20:32    TPro  6.8  /  Alb  4.3  /  TBili  0.4  /  DBili  x   /  AST  18  /  ALT  17  /  AlkPhos  64  06-27          < from: Xray Chest 2 Views PA/Lat (06.27.22 @ 21:14) >    IMPRESSION:  Clear lungs.    < end of copied text >    EKG A Fib NSST/T

## 2022-06-28 NOTE — H&P ADULT - NSHPPHYSICALEXAM_GEN_ALL_CORE
PHYSICAL EXAMINATION:  Vital Signs Last 24 Hrs  T(C): 36.6 (28 Jun 2022 09:28), Max: 36.9 (27 Jun 2022 15:12)  T(F): 97.9 (28 Jun 2022 09:28), Max: 98.4 (27 Jun 2022 15:12)  HR: 97 (28 Jun 2022 09:28) (93 - 102)  BP: 123/92 (28 Jun 2022 09:28) (102/67 - 129/69)  BP(mean): --  RR: 17 (28 Jun 2022 09:28) (15 - 18)  SpO2: 98% (28 Jun 2022 09:28) (96% - 100%)  CAPILLARY BLOOD GLUCOSE          GENERAL: NAD, well-groomed, well-developed  HEAD:  atraumatic, normocephalic  EYES: sclera anicteric  ENMT: mucous membranes moist  NECK: supple, No JVD  CHEST/LUNG: clear to auscultation bilaterally; no rales, rhonchi, or wheezing b/l  HEART: irregular S1, S2  ABDOMEN: BS+, soft, ND, NT   EXTREMITIES:  pulses palpable; no clubbing, cyanosis, or edema b/l LEs  NEURO: awake, alert, interactive; moves all extremities  SKIN: no rashes or lesions

## 2022-06-28 NOTE — H&P ADULT - NSHPSOCIALHISTORY_GEN_ALL_CORE
Social History:    Marital Status:  (   )    (   ) Single    (   )    ( x )   Occupation:   Lives with: ( x ) alone  (  ) children   (  ) spouse   (  ) parents  (  ) other    Substance Use (street drugs): (x  ) never used  (  ) other:  Tobacco Usage:  (   ) never smoked   ( x  ) former smoker   (   ) current smoker  (     ) pack years  (        ) last cigarette date  Alcohol Usage: denies    (     ) Advanced Directives: (     ) None    (      ) DNR    (     ) DNI    (     ) Health Care Proxy:

## 2022-06-28 NOTE — CONSULT NOTE ADULT - SUBJECTIVE AND OBJECTIVE BOX
CHIEF COMPLAINT: cp    HISTORY OF PRESENT ILLNESS:  81F PMH prior stents on eliquis, copd emphysema CC two episdoes of right sided jaw pain and chest pain. First was on friday second was today, lasted approx 15 minutes went away on own. PT noted right sided jaw pain which went down to pts chest and pt felt that her heart was racing. Currently not endorsing any chest pain. Pt is compliant with her medication.  Recently hosp for pneumonia       Allergies    Levaquin (Pruritus; Rash)    Intolerances    	    MEDICATIONS:                  PAST MEDICAL & SURGICAL HISTORY:  CAD (Coronary Artery Disease)      Coronary Stent  to RCA, LAD, and DIAG 06 at [facility]      Afib  x 15 yrs --on Coumadin  attempted cardioversion 13 yrs ago--failed      GERD (Gastroesophageal Reflux Disease)      Hypercholesterolemia      Emphysema/COPD      Lip Cancer  resected 6 yrs ago (no chemo/rad)      Skin Cancer of Face  removed 1 yr ago      Skin Cancer of Nose  1 yr ago- removed      H/O: CVA  pt unaware of CVA, yet showed up on CT of head as old CVA      CHF (congestive heart failure)      Skin cancer      PUD (peptic ulcer disease)      Asthma      Smoker      CAD (coronary artery disease)      Melanoma      History of Hysterectomy   for fibroids        History of   , , ,       History of Cholecystectomy        History of Appendectomy  childhood        S/P T&amp;A  childhood        History of cholecystectomy      History of appendectomy      Melanoma          FAMILY HISTORY:      SOCIAL HISTORY:    former smoker. indep in adl    REVIEW OF SYSTEMS:  See HPI, otherwise complete 10 point review of systems negative    [ ] All others negative	      PHYSICAL EXAM:  T(C): 36.8 (22 @ 05:10), Max: 36.9 (22 @ 15:12)  HR: 94 (22 @ 05:10) (93 - 102)  BP: 102/67 (22 @ 05:10) (102/67 - 129/69)  RR: 16 (22 @ 05:10) (15 - 18)  SpO2: 96% (22 @ 05:10) (96% - 100%)  Wt(kg): --  I&O's Summary      Appearance: No Acute Distress	  HEENT:  Normal oral mucosa, PERRL, EOMI	  Cardiovascular: Normal S1 S2, No JVD, No murmurs/rubs/gallops  Respiratory: Lungs clear to auscultation bilaterally  Gastrointestinal:  Soft, Non-tender, + BS	  Skin: No rashes, No ecchymoses, No cyanosis	  Neurologic: Non-focal  Extremities: No clubbing, cyanosis or edema  Vascular: Peripheral pulses palpable 2+ bilaterally  Psychiatry: A & O x 3, Mood & affect appropriate    Laboratory Data:	 	    CBC Full  -  ( 2022 20:32 )  WBC Count : 6.63 K/uL  Hemoglobin : 11.0 g/dL  Hematocrit : 34.4 %  Platelet Count - Automated : 301 K/uL  Mean Cell Volume : 106.5 fl  Mean Cell Hemoglobin : 34.1 pg  Mean Cell Hemoglobin Concentration : 32.0 gm/dL  Auto Neutrophil # : 2.54 K/uL  Auto Lymphocyte # : 2.65 K/uL  Auto Monocyte # : 1.03 K/uL  Auto Eosinophil # : 0.23 K/uL  Auto Basophil # : 0.11 K/uL  Auto Neutrophil % : 38.3 %  Auto Lymphocyte % : 40.0 %  Auto Monocyte % : 15.6 %  Auto Eosinophil % : 3.5 %  Auto Basophil % : 1.7 %        140  |  104  |  12  ----------------------------<  104<H>  3.9   |  25  |  0.83    Ca    9.5      2022 20:32    TPro  6.8  /  Alb  4.3  /  TBili  0.4  /  DBili  x   /  AST  18  /  ALT  17  /  AlkPhos  64        proBNP: Serum Pro-Brain Natriuretic Peptide: 691 pg/mL ( @ 20:32)    Lipid Profile:   HgA1c:   TSH:       CARDIAC MARKERS:            Interpretation of Telemetry: 	    ECG:  	afib nonspeciifc st/t changes  RADIOLOGY:  OTHER: 	    PREVIOUS DIAGNOSTIC TESTING:    [ ] Echocardiogram:  [ ] Catheterization:  [ ] Stress Test:  	        Assessment:  unstable angina  copd  persistent afib  cad s/p pci  sob    Recs:  cardiac stable  no e/o acs by ecg or biomarkers. given known cad and unstable angina sx will plan for C with dr richardson today. not currently on antiplatelet agents given concomitant AC (last dose of eliquis reportedly  evening)  resume rate control meds (verapamil 240mg).  eliquis/AC on hold for LHC  resume lasix 20mg po qd to maintain euvolemic  tele monitoring  2d echo  resume bronchodilators  will follow  Advanced care planning forms were discussed. Code status including forceful chest compressions, defibrillation and intubation were discussed. The risks benefits and alternatives to pertinent cardiac procedures and interventions were discussed in detail and all questions were answered. Duration: 15 minutes.        Greater than 90 minutes spent on total encounter; more than 50% of the visit was spent counseling and/or coordinating care by the attending physician.   	  Bill Cho MD   Cardiovascular Diseases  [phone]

## 2022-06-28 NOTE — H&P ADULT - ASSESSMENT
81 f with    CAD/ Chest pain  - telemetry  - cardiac enzymes  - cardiology evaluation  - cath pending     A Fib  - rate control  - hold Eliquis for cath    COPD  - nebs    DVT prophylaxis  - restart Eliquis post cath    Further action as per clinical course     Yash Tim MD phone [phone]

## 2022-06-28 NOTE — H&P ADULT - HISTORY OF PRESENT ILLNESS
81F PMH prior stents on eliquis, copd emphysema CC two episdoes of right sided jaw pain and chest pain. First was on friday second was today, lasted approx 15 minutes went away on own. PT noted right sided jaw pain which went down to pts chest and pt felt that her heart was racing. Currently not endorsing any chest pain. Pt is compliant with her medication.  Recently hosp for pneumonia

## 2022-06-28 NOTE — PATIENT PROFILE ADULT - FALL HARM RISK - HARM RISK INTERVENTIONS

## 2022-06-29 ENCOUNTER — TRANSCRIPTION ENCOUNTER (OUTPATIENT)
Age: 82
End: 2022-06-29

## 2022-06-29 VITALS
SYSTOLIC BLOOD PRESSURE: 121 MMHG | OXYGEN SATURATION: 98 % | TEMPERATURE: 98 F | RESPIRATION RATE: 17 BRPM | DIASTOLIC BLOOD PRESSURE: 64 MMHG | HEART RATE: 96 BPM

## 2022-06-29 PROCEDURE — 99285 EMERGENCY DEPT VISIT HI MDM: CPT | Mod: 25

## 2022-06-29 PROCEDURE — 93306 TTE W/DOPPLER COMPLETE: CPT | Mod: 26

## 2022-06-29 PROCEDURE — 93005 ELECTROCARDIOGRAM TRACING: CPT

## 2022-06-29 PROCEDURE — C1887: CPT

## 2022-06-29 PROCEDURE — 71046 X-RAY EXAM CHEST 2 VIEWS: CPT

## 2022-06-29 PROCEDURE — 83880 ASSAY OF NATRIURETIC PEPTIDE: CPT

## 2022-06-29 PROCEDURE — 36415 COLL VENOUS BLD VENIPUNCTURE: CPT

## 2022-06-29 PROCEDURE — 99152 MOD SED SAME PHYS/QHP 5/>YRS: CPT

## 2022-06-29 PROCEDURE — 93458 L HRT ARTERY/VENTRICLE ANGIO: CPT

## 2022-06-29 PROCEDURE — 80053 COMPREHEN METABOLIC PANEL: CPT

## 2022-06-29 PROCEDURE — 84484 ASSAY OF TROPONIN QUANT: CPT

## 2022-06-29 PROCEDURE — 85025 COMPLETE CBC W/AUTO DIFF WBC: CPT

## 2022-06-29 PROCEDURE — C1894: CPT

## 2022-06-29 PROCEDURE — 94640 AIRWAY INHALATION TREATMENT: CPT

## 2022-06-29 PROCEDURE — 0225U NFCT DS DNA&RNA 21 SARSCOV2: CPT

## 2022-06-29 PROCEDURE — C1769: CPT

## 2022-06-29 PROCEDURE — 93306 TTE W/DOPPLER COMPLETE: CPT

## 2022-06-29 RX ORDER — APIXABAN 2.5 MG/1
5 TABLET, FILM COATED ORAL EVERY 12 HOURS
Refills: 0 | Status: DISCONTINUED | OUTPATIENT
Start: 2022-06-29 | End: 2022-06-29

## 2022-06-29 RX ADMIN — Medication 240 MILLIGRAM(S): at 11:28

## 2022-06-29 RX ADMIN — APIXABAN 5 MILLIGRAM(S): 2.5 TABLET, FILM COATED ORAL at 11:28

## 2022-06-29 RX ADMIN — PANTOPRAZOLE SODIUM 40 MILLIGRAM(S): 20 TABLET, DELAYED RELEASE ORAL at 05:35

## 2022-06-29 RX ADMIN — POLYETHYLENE GLYCOL 3350 17 GRAM(S): 17 POWDER, FOR SOLUTION ORAL at 05:36

## 2022-06-29 RX ADMIN — Medication 3 MILLILITER(S): at 05:36

## 2022-06-29 NOTE — DISCHARGE NOTE PROVIDER - NSDCFUADDINST_GEN_ALL_CORE_FT
Wound Care:   the day AFTER your procedure remove bandage GENTLY, and clean using  mild soap and gentle warm, water stream, pat dry. leave OPEN to air. YOU MAY SHOWER   DO NOT apply lotions, creams, ointments, powder, perfumes to your incision site  DO NOT SOAK your site for 1 week ( no baths, no pools, no tubs, etc...)  Check  your groin and /or wrist daily. A small amount of bruising, and soreness are normal    ACTIVITY: for 24 hours   - DO NOT DRIVE  - DO NOT make any important decisions or sign legal documents   - DO NOT operate heavy machineries   - you may resume sexual activity in 48 hours, unless otherwise instructed by your cardiologist     If your procedure was done through the WRIST: for the NEXT 3DAYS:  - avoid pushing, pulling, with that affected wrist   - avoid repeated movement of that hand and wrist ( e.g: typing, hammering)  - DO NOT LIFT anything more than 5 lbs     If your procedure was done through the GROIN: for the NEXT 5 DAYS  - Limit climbing stairs, DO NOT soak in bathtub or pool  - no strenous activities, pushing, pulling, straining  - Do not lift anything 10lbs or heavier     MEDICATION:   take your medications as explained ( see discharge paperwork)   If you received a STENT, you will be taking antiplatelet medications to KEEP YOUR STENT OPEN ( e.g: Aspirin, Plavix, Brilinta, Effient, etc).  Take as prescribed DO NOT STOP taking them without consulting with your cardiologist first.     Follow heart healthy diet recommended by your doctor, , if you smoke STOP SMOKING ( may call [phone] for center of tobacco control if you need assistance)     CALL your doctor to make appointment in 2 WEEKS     ***CALL YOUR DOCTOR***  if you experience: fever, chills, body aches, or severe pain, swelling, redness, heat or yellow discharge at incision site  If you experience Bleeding or excruciating pain at the procedural site, swelling ( golf ball size) at your procedural site  If you experience CHEST PAIN  If you experience extremity numbness, tingling, temperature change ( of your procedural site)   If you are unable to reach your doctor, you may contact:   -Cardiology Office at [facility] at [phone] or   - [facility] [phone]  - [facility] [phone]

## 2022-06-29 NOTE — DISCHARGE NOTE PROVIDER - NSDCMRMEDTOKEN_GEN_ALL_CORE_FT
budesonide-formoterol 160 mcg-4.5 mcg/inh inhalation aerosol: 2 puff(s) inhaled 2 times a day   Eliquis 5 mg oral tablet: 1 tab(s) orally 2 times a day  ipratropium-albuterol 0.5 mg-2.5 mg/3 mL inhalation solution: 3 milliliter(s) inhaled 3 times a day  Lasix 20 mg oral tablet: 1 tab(s) orally once a day  Lipitor 20 mg oral tablet: 1 tab(s) orally once a day (at bedtime)  pantoprazole 40 mg oral delayed release tablet: 1 tab(s) orally once a day  potassium chloride 10 mEq oral tablet, extended release: 1 tab(s) orally once a day  sodium chloride 7% inhalation solution: 4 milliliter(s) inhaled every 12 hours, As Needed  tiotropium 18 mcg inhalation capsule: 1 cap(s) inhaled once a day (at bedtime)  verapamil 24 hour extended release 240 mg/24 hours oral capsule, extended release: 1 cap(s) orally once a day  Vitamin B12 1000 mcg oral tablet: 1 tab(s) orally once a day  Vitamin D3 25 mcg (1000 intl units) oral tablet: 1 tab(s) orally once a day

## 2022-06-29 NOTE — PROGRESS NOTE ADULT - ASSESSMENT
81 f with    CAD/ Chest pain  - telemetry  - cardiology follow  - s/p cath diagnostic      A Fib  - rate control  - Eliquis      COPD  - nebs    DVT prophylaxis  - Eliquis     AR home. Follow with PMD/ Cardiology in 3-4 days. PILOL Tim MD phone [phone]

## 2022-06-29 NOTE — DISCHARGE NOTE PROVIDER - HOSPITAL COURSE
81F PMH prior stents on eliquis, copd emphysema CC two episdoes of right sided jaw pain and chest pain. First was on friday second was today, lasted approx 15 minutes went away on own. PT noted right sided jaw pain which went down to pts chest and pt felt that her heart was racing. Currently not endorsing any chest pain. Pt is compliant with her medication.  Recently hosp for pneumonia  (28 Jun 2022 10:09)    < from: Cardiac Catheterization (06.28.22 @ 13:24) >  Diagnostic Conclusions:   Mild nonobstructive disease.  All prior stents are widely patent. Medical therapy  Diagnostic Findings:   Coronary Angiography   The coronary circulation is right dominant.    LM   Left main artery:Angiography shows no disease.    LAD   Left anterior descending artery: Angiography shows no disease.    CX   Circumflex: Angiography shows mild atherosclerosis. First obtuse marginal: There is a 40 % stenosis.  RCA   Right coronary artery: Angiography shows mild atherosclerosis.      Pt tolerated procedure well with no post cath complications and hospitalization remained uneventful. Pt stable for discharge home.

## 2022-06-29 NOTE — PROGRESS NOTE ADULT - SUBJECTIVE AND OBJECTIVE BOX
Patient is a 81y old  Female who presents with a chief complaint of chest pain  (29 Jun 2022 10:34)      SUBJECTIVE / OVERNIGHT EVENTS: Comfortable without new complaints.   Review of Systems  chest pain no  palpitations no  sob no  nausea no  headache no    MEDICATIONS  (STANDING):  albuterol/ipratropium for Nebulization 3 milliLiter(s) Nebulizer every 6 hours  apixaban 5 milliGRAM(s) Oral every 12 hours  atorvastatin 20 milliGRAM(s) Oral at bedtime  pantoprazole    Tablet 40 milliGRAM(s) Oral before breakfast  polyethylene glycol 3350 17 Gram(s) Oral two times a day  senna 2 Tablet(s) Oral at bedtime  tiotropium 18 MICROgram(s) Capsule 1 Capsule(s) Inhalation at bedtime  verapamil  milliGRAM(s) Oral daily    MEDICATIONS  (PRN):  aluminum hydroxide/magnesium hydroxide/simethicone Suspension 30 milliLiter(s) Oral every 4 hours PRN Dyspepsia  melatonin 3 milliGRAM(s) Oral at bedtime PRN Sleep      Vital Signs Last 24 Hrs  T(C): 36.7 (29 Jun 2022 13:36), Max: 37 (28 Jun 2022 21:27)  T(F): 98 (29 Jun 2022 13:36), Max: 98.6 (28 Jun 2022 21:27)  HR: 96 (29 Jun 2022 13:36) (93 - 122)  BP: 121/64 (29 Jun 2022 13:36) (89/52 - 126/77)  BP(mean): 79 (29 Jun 2022 13:36) (63 - 98)  RR: 17 (29 Jun 2022 13:36) (15 - 18)  SpO2: 98% (29 Jun 2022 13:36) (96% - 100%)    PHYSICAL EXAM:  GENERAL: NAD, well-developed  HEAD:  Atraumatic, Normocephalic  EYES: EOMI, PERRLA, conjunctiva and sclera clear  NECK: Supple, No JVD  CHEST/LUNG: Clear to auscultation bilaterally; No wheeze  HEART: Regular rate and rhythm; No murmurs, rubs, or gallops  ABDOMEN: Soft, Nontender, Nondistended; Bowel sounds present  EXTREMITIES:  2+ Peripheral Pulses, No clubbing, cyanosis, or edema  PSYCH: AAOx3  NEUROLOGY: non-focal  SKIN: No rashes or lesions    LABS:                        11.0   6.63  )-----------( 301      ( 27 Jun 2022 20:32 )             34.4     06-27    140  |  104  |  12  ----------------------------<  104<H>  3.9   |  25  |  0.83    Ca    9.5      27 Jun 2022 20:32    TPro  6.8  /  Alb  4.3  /  TBili  0.4  /  DBili  x   /  AST  18  /  ALT  17  /  AlkPhos  64  06-27          < from: Cardiac Catheterization (06.28.22 @ 13:24) >    Diagnostic Findings:     Coronary Angiography   The coronary circulation is right dominant.      LM   Left main artery:Angiography shows no disease.      LAD   Left anterior descending artery: Angiography shows no disease.      CX   Circumflex: Angiography shows mild atherosclerosis. First obtuse  marginal: There is a 40 % stenosis.    RCA   Right coronary artery: Angiography shows mild atherosclerosis.      < end of copied text >        RADIOLOGY & ADDITIONAL TESTS:    Imaging Personally Reviewed:    Consultant(s) Notes Reviewed:      Care Discussed with Consultants/Other Providers:  
Cardiovascular Disease Progress Note    Overnight events: No acute events overnight.  no cp/sob/palps  Otherwise review of systems negative    Objective Findings:  T(C): 36.4 (06-29-22 @ 04:39), Max: 37 (06-28-22 @ 21:27)  HR: 93 (06-29-22 @ 04:39) (93 - 122)  BP: 101/55 (06-29-22 @ 04:39) (89/52 - 126/77)  RR: 17 (06-29-22 @ 04:39) (15 - 17)  SpO2: 98% (06-29-22 @ 04:39) (96% - 100%)  Wt(kg): --  Daily Height in cm: 157.48 (28 Jun 2022 09:28)    Daily       Physical Exam:  Gen: NAD  HEENT: EOMI  CV: RRR, normal S1 + S2, no m/r/g  Lungs: CTAB  Abd: soft, non-tender  Ext: No edema    Telemetry:    Laboratory Data:                        11.0   6.63  )-----------( 301      ( 27 Jun 2022 20:32 )             34.4     06-27    140  |  104  |  12  ----------------------------<  104<H>  3.9   |  25  |  0.83    Ca    9.5      27 Jun 2022 20:32    TPro  6.8  /  Alb  4.3  /  TBili  0.4  /  DBili  x   /  AST  18  /  ALT  17  /  AlkPhos  64  06-27              Inpatient Medications:  MEDICATIONS  (STANDING):  albuterol/ipratropium for Nebulization 3 milliLiter(s) Nebulizer every 6 hours  atorvastatin 20 milliGRAM(s) Oral at bedtime  pantoprazole    Tablet 40 milliGRAM(s) Oral before breakfast  polyethylene glycol 3350 17 Gram(s) Oral two times a day  senna 2 Tablet(s) Oral at bedtime  tiotropium 18 MICROgram(s) Capsule 1 Capsule(s) Inhalation at bedtime  verapamil  milliGRAM(s) Oral daily      Assessment:  unstable angina  copd  persistent afib  cad s/p pci  sob    Recs:  cardiac stable  s/p lhc --> nonobs cad, patent stents. cw antiplatelets, statin and antianginals  cw rate control meds (verapamil 240mg) and resume eliquis  resume lasix 20mg po qd to maintain euvolemic  tele monitoring  2d echo  cw bronchodilators  will follow  dispo planning        Over 25 minutes spent on total encounter; more than 50% of the visit was spent counseling and/or coordinating care by the attending physician.      Bill Cho MD   Cardiovascular Disease  [phone]

## 2022-06-29 NOTE — DISCHARGE NOTE PROVIDER - CARE PROVIDER_API CALL
Bill Cho)  Internal Medicine  [address], [place] [address]  Phone: [phone]  Fax: [phone]  Follow Up Time:

## 2022-06-29 NOTE — DISCHARGE NOTE NURSING/CASE MANAGEMENT/SOCIAL WORK - PATIENT PORTAL LINK FT
You can access the FollowMyHealth Patient Portal offered by [facility] by registering at the following website: [email or web address]. By joining [facility]’s FollowMyHealth portal, you will also be able to view your health information using other applications (apps) compatible with our system.

## 2022-06-29 NOTE — DISCHARGE NOTE PROVIDER - NSDCCPCAREPLAN_GEN_ALL_CORE_FT
PRINCIPAL DISCHARGE DIAGNOSIS  Diagnosis: Chest pain  Assessment and Plan of Treatment: Low salt, low fat diet.   Weight management.   Take medications as prescribed.    No smoking.  Follow up appointments with your doctor(s)  as instruced.      SECONDARY DISCHARGE DIAGNOSES  Diagnosis: HTN (hypertension)  Assessment and Plan of Treatment: Continue with your blood pressure medications; eat a heart healthy diet with low salt diet; exercise regularly (consult with your physician or cardiologist first); maintain a heart healthy weight; if you smoke - quit (A resource to help you stop smoking is the [facility] [employer] Control – phone number [phone].); include healthy ways to manage stress. Continue to follow with your primary care physician or cardiologist.    Diagnosis: HLD (hyperlipidemia)  Assessment and Plan of Treatment: Continue with your cholesterol medications. Eat a heart healthy diet that is low in saturated fats and salt, and includes whole grains, fruits, vegetables and lean protein; exercise regularly (consult with your physician or cardiologist first); maintain a heart healthy weight; if you smoke - quit (A resource to help you stop smoking is the [facility] [employer] Control – phone number [phone].). Continue to follow with your primary physician or cardiologist.

## 2022-06-29 NOTE — DISCHARGE NOTE PROVIDER - NSDCFUSCHEDAPPT_GEN_ALL_CORE_FT
[facility]  [facility] [address]  Scheduled Appointment: 08/08/2022    Petty Steen  [facility] [address]  Scheduled Appointment: 08/08/2022

## 2022-07-27 NOTE — PATIENT PROFILE ADULT - FLU SEASON?
EXAMINATION TYPE: CT abdomen pelvis w con

CT DLP: 1168.7 mGycm, Automated exposure control for dose reduction was used.

 

DATE OF EXAM: 7/27/2022 5:52 PM

 

COMPARISON: 7/13/2022

 

CLINICAL INDICATION:Male, 64 years old with history of abdominal pain, acute, nonlocalized; abdominal
 pain

 

TECHNIQUE:  Axial CT of the abdomen and pelvis. Sagittal and coronal reformats were created on a [employer] workstation. 

 

Contrast used:100 mL of Isovue 300 with IV Contrast, 

Oral contrast used: without Oral Contrast 

 

FINDINGS: 

LOWER CHEST: Left epicardial fat lymph nodes have increased in size now measuring 1.4 and 1.5 cm in s
hort axis previously 0.7 and 1.0 cm lung within the lymph node previously 0.6 cm 1.3 cm.

 

ABDOMEN

LIVER: Marketed low-attenuation to the liver,

GALLBLADDER AND BILE DUCTS: Unremarkable.

PANCREAS: Unremarkable.

SPLEEN: Unremarkable.

ADRENAL GLANDS: Unremarkable.

KIDNEYS AND URETERS: No evidence of hydronephrosis or renal calculus. The ureters are unremarkable.  


 

PELVIS, streak artifact limits evaluation of the pelvis.

BLADDER: Unremarkable

REPRODUCTIVE: Unremarkable.

 

ABDOMEN & PELVIS

STOMACH AND BOWEL: Scattered clonic diverticula present. No evidence of bowel obstruction. 

PERITONEUM: No evidence of pneumoperitoneum or free fluid.

 

VASCULATURE: No evidence of aortic aneurysm. Moderate to severe coronary artery atherosclerosis

MUSCULOSKELETAL: No acute osseous abnormalities, multilevel disc degeneration changes seen throughout
 the spine multilevel biconcave vertebral deformity with vertebroplasty changes at L2 and T12. Fixati
on hardware bilaterally of the hips noted which limits evaluation the pelvis. Mild dextroscoliosis ap
ex at L1.

LYMPH NODES: Soft tissue mass near the aorta just below the left diaphragm measuring 3.1 x 2.7 cm pre
viously 7/13/2022 measuring 2.1 x 1.6 cm. Additional adjacent to the left kidney superior pole medial
ly is a 1.1 cm lymph node in short axis previously 0.9 cm.

SOFT TISSUE/ABDOMINAL WALL: Unremarkable 

 

IMPRESSION:

1.  No evidence for acute intra-abdominal process

2. Increase in size of left aortic lymphadenopathy near the gastroesophageal junction and increase in
 size of left epicardial fat lymph nodes concerning for progression of disease.

3. Hepatic steatosis.
Yes...

## 2022-08-08 ENCOUNTER — APPOINTMENT (OUTPATIENT)
Dept: PULMONOLOGY | Facility: CLINIC | Age: 82
End: 2022-08-08

## 2022-08-08 VITALS
WEIGHT: 149 LBS | OXYGEN SATURATION: 96 % | HEART RATE: 98 BPM | SYSTOLIC BLOOD PRESSURE: 111 MMHG | TEMPERATURE: 97.2 F | BODY MASS INDEX: 28.15 KG/M2 | DIASTOLIC BLOOD PRESSURE: 77 MMHG

## 2022-08-08 PROCEDURE — 94729 DIFFUSING CAPACITY: CPT

## 2022-08-08 PROCEDURE — 99214 OFFICE O/P EST MOD 30 MIN: CPT | Mod: 25

## 2022-08-08 PROCEDURE — 94060 EVALUATION OF WHEEZING: CPT

## 2022-08-08 PROCEDURE — 95012 NITRIC OXIDE EXP GAS DETER: CPT

## 2022-08-08 PROCEDURE — 88738 HGB QUANT TRANSCUTANEOUS: CPT

## 2022-08-08 PROCEDURE — 94726 PLETHYSMOGRAPHY LUNG VOLUMES: CPT

## 2022-08-08 NOTE — ASSESSMENT
[FreeTextEntry1] : Check PFT for follow-up in 1 month.\par  Continue Symbicort HFA for history of COPD\par Continue Spiriva Turbuhaler for COPD\par Albuterol via nebulizer 4 times daily\par Continue Lasix daily. \par \par Continue Proair Respiclick prn. \par Cardiology follow-up with Dr. Cho.\par Return for pulmonary follow-up in 2 month\par Repeat chest CT scan for follow-up in 2 month.\par

## 2022-08-08 NOTE — PROCEDURE
[FreeTextEntry1] : Pulmonary function test pulmonary my office today: Spirometry shows mild obstructive airway disease with some improvement postbronchodilator; lung volumes within normal limits; resistance is increased; diffusion shows moderate impairment.  SPO2 at rest on room air is 96%.\par \par \par  Exhaled Nitric Oxide             Final\par \par No Documents Attached\par \par \par   Test   Result   Flag Reference Goal Last Verified \par   Exhaled Nitric Oxide 10      REQUIRED \par \par  Ordered by: SANDY ARIAS       Collected/Examined: [id number] 09:37AM       \par Verification Required       Stage: Final       \par  Performed at: Other       Performed by: SANDY ARIAS       Resulted: [id number] 09:37AM       Last Updated: [id number] 09:38AM       \par

## 2022-08-08 NOTE — REASON FOR VISIT
[Follow-Up] : a follow-up visit [COPD] : COPD [Shortness of Breath] : shortness of breath [TextBox_44] : Shortness of breath is better

## 2022-09-14 ENCOUNTER — NON-APPOINTMENT (OUTPATIENT)
Age: 82
End: 2022-09-14

## 2022-09-15 ENCOUNTER — OUTPATIENT (OUTPATIENT)
Dept: OUTPATIENT SERVICES | Facility: HOSPITAL | Age: 82
LOS: 1 days | End: 2022-09-15

## 2022-09-15 ENCOUNTER — TRANSCRIPTION ENCOUNTER (OUTPATIENT)
Age: 82
End: 2022-09-15

## 2022-09-15 DIAGNOSIS — U07.1 COVID-19: ICD-10-CM

## 2022-09-16 ENCOUNTER — OUTPATIENT (OUTPATIENT)
Dept: OUTPATIENT SERVICES | Facility: HOSPITAL | Age: 82
LOS: 1 days | End: 2022-09-16

## 2022-09-16 ENCOUNTER — APPOINTMENT (OUTPATIENT)
Dept: DISASTER EMERGENCY | Facility: HOSPITAL | Age: 82
End: 2022-09-16

## 2022-09-16 VITALS
SYSTOLIC BLOOD PRESSURE: 94 MMHG | WEIGHT: 147.05 LBS | DIASTOLIC BLOOD PRESSURE: 66 MMHG | HEART RATE: 95 BPM | RESPIRATION RATE: 17 BRPM | OXYGEN SATURATION: 98 % | TEMPERATURE: 98 F | HEIGHT: 61 IN

## 2022-09-16 VITALS
HEART RATE: 84 BPM | RESPIRATION RATE: 17 BRPM | SYSTOLIC BLOOD PRESSURE: 98 MMHG | TEMPERATURE: 98 F | OXYGEN SATURATION: 98 % | DIASTOLIC BLOOD PRESSURE: 59 MMHG

## 2022-09-16 VITALS — WEIGHT: 147.05 LBS

## 2022-09-16 DIAGNOSIS — U07.1 COVID-19: ICD-10-CM

## 2022-09-16 RX ORDER — BEBTELOVIMAB 87.5 MG/ML
175 INJECTION, SOLUTION INTRAVENOUS ONCE
Refills: 0 | Status: COMPLETED | OUTPATIENT
Start: 2022-09-16 | End: 2022-09-16

## 2022-09-16 RX ADMIN — BEBTELOVIMAB 175 MILLIGRAM(S): 87.5 INJECTION, SOLUTION INTRAVENOUS at 10:20

## 2022-09-16 NOTE — MONOCLONAL ANTIBODY INFUSION - HOME MEDICATIONS
pantoprazole 40 mg oral delayed release tablet , 1 tab(s) orally once a day  potassium chloride 10 mEq oral tablet, extended release , 1 tab(s) orally once a day  Lasix 20 mg oral tablet , 1 tab(s) orally once a day  Eliquis 5 mg oral tablet , 1 tab(s) orally 2 times a day  ipratropium-albuterol 0.5 mg-2.5 mg/3 mL inhalation solution , 3 milliliter(s) inhaled 3 times a day  sodium chloride 7% inhalation solution , 4 milliliter(s) inhaled every 12 hours, As Needed  budesonide-formoterol 160 mcg-4.5 mcg/inh inhalation aerosol , 2 puff(s) inhaled 2 times a day   tiotropium 18 mcg inhalation capsule , 1 cap(s) inhaled once a day (at bedtime)  Vitamin B12 1000 mcg oral tablet , 1 tab(s) orally once a day  Vitamin D3 25 mcg (1000 intl units) oral tablet , 1 tab(s) orally once a day  Lipitor 20 mg oral tablet , 1 tab(s) orally once a day (at bedtime)  verapamil 24 hour extended release 240 mg/24 hours oral capsule, extended release , 1 cap(s) orally once a day

## 2022-09-16 NOTE — MONOCLONAL ANTIBODY INFUSION - ASSESSMENT AND PLAN
CC: Monoclonal Antibody Infusion/COVID 19 Positive  81y Female with hx of atrial fibrillation, skin cancer, CAD s/p stents, COPD, and recent dx of COVID 19+ who presents today for elective Bebtelovimab. Patient has been screened and was deemed to be a candidate.    Symptoms/ Criteria  Date of Symptom Onset: 9/14/22  Symptoms: cough, diarrhea, bodyaches   Date of Positive COVID PCR: 9/15/22  Risk Profile includes: age     Vaccination Status: Moderna x 3     PMHx:    Infection due to severe acute respiratory syndrome coronavirus 2 (SARS-CoV-2)    ASSESSMENT:  Pt is COVID positive and symptomatic who was referred for Bebtelovimab monoclonal antibody treatment.    PLAN:  - MAB treatment explained to patient. I have reviewed the Bebtelovimab Emergency Use Authorization (EUA).   - Consent for MAB obtained.   - Risk & benefits discussed. Patient verbalized understanding of plan and agrees to treatment. All questions answered.  - 175mg of Bebtelovimab administered as a single intravenous injection over at least 30 seconds.   - Observe patient for one hour post medication administration and then if stable, discharge home with outpatient follow up as planned by PCP.    POST ASSESSMENT:   Patient completed MAB, and monitored x 1 hour post-infusion with no adverse reactions noted, remained hemodynamically stable.  - Patient tolerated injection well; denies complaints of chest pain/SOB/dizziness/palpitations.   - VSS for discharge home.  - D/C instructions given/ fact sheet included.  - Patient was instructed to self-isolate and use infection control measures (e.g wear mask, isolate, social distance, avoid sharing personal items, clean and disinfect "high touch" surfaces, and frequent handwashing according to the CDC guidelines.   - The patient was informed on what symptoms to be aware of for the next couple of days, and if there are any issues to call the 24/7 clinical call center. Patient was instructed to follow up with primary care provider as needed.    DISCHARGE at11:15AM.

## 2022-09-16 NOTE — MONOCLONAL ANTIBODY INFUSION - EXAM
Exam/findings:  T(C): --  HR: --  BP: --  RR: --  SpO2: --    PE:   Appearance: NAD	  HEENT:  NC/AT  Cardiovascular:  No edema  Respiratory: no use of accessory muscles  Gastrointestinal:  non-distended   Skin: warm and dry  Neurologic: Non-focal  Extremities: Normal range of motion     Exam/findings:  Vital Signs Last 24 Hrs  T(C): 36.8 (16 Sep 2022 10:15), Max: 36.8 (16 Sep 2022 10:15)  T(F): 98.3 (16 Sep 2022 10:15), Max: 98.3 (16 Sep 2022 10:15)  HR: 87 (16 Sep 2022 10:22) (87 - 95)  BP: 100/69 (16 Sep 2022 10:22) (94/66 - 100/69)  RR: 17 (16 Sep 2022 10:22) (17 - 17)  SpO2: 98% (16 Sep 2022 10:22) (98% - 98%)    Parameters below as of 16 Sep 2022 10:22  Patient On (Oxygen Delivery Method): room air    PE:   Appearance: NAD	  HEENT:  NC/AT  Cardiovascular:  No edema  Respiratory: no use of accessory muscles  Gastrointestinal:  non-distended   Skin: warm and dry  Neurologic: Non-focal  Extremities: Normal range of motion

## 2022-09-22 ENCOUNTER — NON-APPOINTMENT (OUTPATIENT)
Age: 82
End: 2022-09-22

## 2022-10-10 ENCOUNTER — APPOINTMENT (OUTPATIENT)
Dept: PULMONOLOGY | Facility: CLINIC | Age: 82
End: 2022-10-10

## 2022-10-10 VITALS
WEIGHT: 147 LBS | HEIGHT: 61 IN | HEART RATE: 85 BPM | BODY MASS INDEX: 27.75 KG/M2 | DIASTOLIC BLOOD PRESSURE: 68 MMHG | TEMPERATURE: 97.4 F | SYSTOLIC BLOOD PRESSURE: 132 MMHG | OXYGEN SATURATION: 98 %

## 2022-10-10 PROCEDURE — 90662 IIV NO PRSV INCREASED AG IM: CPT

## 2022-10-10 PROCEDURE — 99214 OFFICE O/P EST MOD 30 MIN: CPT | Mod: 25

## 2022-10-10 PROCEDURE — G0008: CPT

## 2022-10-10 NOTE — ASSESSMENT
[FreeTextEntry1] : High-dose Fluzone vaccine given to left deltoid region\par Check PFT for follow-up in 1 month.\par  Continue Symbicort HFA for history of COPD\par Continue Spiriva Turbuhaler for COPD\par Albuterol via nebulizer 4 times daily\par Continue Lasix daily. \par \par Continue Proair Respiclick prn. \par Cardiology follow-up with Dr. Cho.\par Return for pulmonary follow-up in 2 month\par , thoracic Zeltsman\par

## 2022-10-18 ENCOUNTER — NON-APPOINTMENT (OUTPATIENT)
Age: 82
End: 2022-10-18

## 2022-10-18 ENCOUNTER — APPOINTMENT (OUTPATIENT)
Dept: THORACIC SURGERY | Facility: CLINIC | Age: 82
End: 2022-10-18

## 2022-10-18 VITALS
WEIGHT: 145 LBS | BODY MASS INDEX: 27.38 KG/M2 | DIASTOLIC BLOOD PRESSURE: 80 MMHG | RESPIRATION RATE: 18 BRPM | OXYGEN SATURATION: 95 % | HEIGHT: 61 IN | HEART RATE: 80 BPM | SYSTOLIC BLOOD PRESSURE: 126 MMHG

## 2022-10-18 PROCEDURE — 99205 OFFICE O/P NEW HI 60 MIN: CPT

## 2022-10-18 RX ORDER — NICOTINE POLACRILEX 4 MG/1
4 GUM, CHEWING ORAL
Qty: 1 | Refills: 0 | Status: DISCONTINUED | COMMUNITY
Start: 2022-05-10 | End: 2022-10-18

## 2022-10-18 RX ORDER — PREDNISONE 10 MG/1
10 TABLET ORAL
Qty: 18 | Refills: 0 | Status: DISCONTINUED | COMMUNITY
Start: 2022-01-10 | End: 2022-10-18

## 2022-10-18 RX ORDER — METHYLPREDNISOLONE 4 MG/1
4 TABLET ORAL
Qty: 1 | Refills: 0 | Status: DISCONTINUED | COMMUNITY
Start: 2022-02-28 | End: 2022-10-18

## 2022-10-18 RX ORDER — PREDNISONE 10 MG/1
10 TABLET ORAL
Qty: 30 | Refills: 0 | Status: DISCONTINUED | COMMUNITY
Start: 2022-05-23 | End: 2022-10-18

## 2022-10-18 RX ORDER — TIOTROPIUM BROMIDE 18 UG/1
18 CAPSULE ORAL; RESPIRATORY (INHALATION) DAILY
Qty: 3 | Refills: 3 | Status: DISCONTINUED | COMMUNITY
Start: 2022-01-24 | End: 2022-10-18

## 2022-10-18 RX ORDER — PREDNISONE 10 MG/1
10 TABLET ORAL
Qty: 18 | Refills: 1 | Status: DISCONTINUED | COMMUNITY
Start: 2021-10-04 | End: 2022-10-18

## 2022-10-18 RX ORDER — FLUCONAZOLE 100 MG/1
100 TABLET ORAL DAILY
Qty: 7 | Refills: 0 | Status: DISCONTINUED | COMMUNITY
Start: 2022-06-27 | End: 2022-10-18

## 2022-10-18 RX ORDER — ALBUTEROL SULFATE 2.5 MG/3ML
(2.5 MG/3ML) SOLUTION RESPIRATORY (INHALATION)
Qty: 5 | Refills: 10 | Status: DISCONTINUED | COMMUNITY
Start: 2021-08-06 | End: 2022-10-18

## 2022-10-18 RX ORDER — LEVOFLOXACIN 500 MG/1
500 TABLET, FILM COATED ORAL DAILY
Qty: 7 | Refills: 0 | Status: DISCONTINUED | COMMUNITY
Start: 2022-03-06 | End: 2022-10-18

## 2022-10-18 RX ORDER — AZITHROMYCIN 250 MG/1
250 TABLET, FILM COATED ORAL
Qty: 1 | Refills: 0 | Status: DISCONTINUED | COMMUNITY
Start: 2022-05-23 | End: 2022-10-18

## 2022-10-18 RX ORDER — DOXYCYCLINE HYCLATE 100 MG/1
100 TABLET ORAL
Qty: 10 | Refills: 0 | Status: DISCONTINUED | COMMUNITY
Start: 2022-01-10 | End: 2022-10-18

## 2022-10-18 RX ORDER — ALBUTEROL SULFATE 90 UG/1
108 (90 BASE) POWDER, METERED RESPIRATORY (INHALATION)
Qty: 1 | Refills: 5 | Status: DISCONTINUED | COMMUNITY
Start: 2022-04-11 | End: 2022-10-18

## 2022-10-18 RX ORDER — METHYLPREDNISOLONE 4 MG/1
4 TABLET ORAL
Qty: 1 | Refills: 0 | Status: DISCONTINUED | COMMUNITY
Start: 2021-08-06 | End: 2022-10-18

## 2022-10-18 RX ORDER — AZITHROMYCIN 250 MG/1
250 TABLET, FILM COATED ORAL
Qty: 1 | Refills: 0 | Status: DISCONTINUED | COMMUNITY
Start: 2021-10-04 | End: 2022-10-18

## 2022-10-18 RX ORDER — CEFDINIR 300 MG/1
300 CAPSULE ORAL
Qty: 10 | Refills: 0 | Status: DISCONTINUED | COMMUNITY
Start: 2022-03-08 | End: 2022-10-18

## 2022-10-18 RX ORDER — IPRATROPIUM BROMIDE AND ALBUTEROL SULFATE 2.5; .5 MG/3ML; MG/3ML
0.5-2.5 (3) SOLUTION RESPIRATORY (INHALATION) 4 TIMES DAILY
Qty: 3 | Refills: 1 | Status: DISCONTINUED | COMMUNITY
Start: 2021-10-04 | End: 2022-10-18

## 2022-10-18 RX ORDER — METHYLPREDNISOLONE 4 MG/1
4 TABLET ORAL
Qty: 1 | Refills: 0 | Status: DISCONTINUED | COMMUNITY
Start: 2022-01-24 | End: 2022-10-18

## 2022-10-18 RX ORDER — NEBULIZER ACCESSORIES
KIT MISCELLANEOUS
Qty: 1 | Refills: 0 | Status: DISCONTINUED | COMMUNITY
Start: 2022-05-10 | End: 2022-10-18

## 2022-10-18 RX ORDER — AZITHROMYCIN 250 MG/1
250 TABLET, FILM COATED ORAL
Qty: 1 | Refills: 0 | Status: DISCONTINUED | COMMUNITY
Start: 2022-01-24 | End: 2022-10-18

## 2022-10-18 RX ORDER — AZITHROMYCIN 250 MG/1
250 TABLET, FILM COATED ORAL DAILY
Qty: 30 | Refills: 0 | Status: DISCONTINUED | COMMUNITY
Start: 2022-02-28 | End: 2022-10-18

## 2022-10-18 RX ORDER — ROFLUMILAST 250 UG/1
250 TABLET ORAL
Qty: 90 | Refills: 1 | Status: DISCONTINUED | COMMUNITY
Start: 2022-05-23 | End: 2022-10-18

## 2022-10-18 RX ORDER — PREDNISONE 10 MG/1
10 TABLET ORAL DAILY
Qty: 14 | Refills: 2 | Status: DISCONTINUED | COMMUNITY
Start: 2022-06-13 | End: 2022-10-18

## 2022-10-18 RX ORDER — AZITHROMYCIN 250 MG/1
250 TABLET, FILM COATED ORAL
Qty: 1 | Refills: 0 | Status: DISCONTINUED | COMMUNITY
Start: 2021-08-06 | End: 2022-10-18

## 2022-10-18 NOTE — ASSESSMENT
[FreeTextEntry1] : Ms. JANA OLIVARES, 81 year old female, former smoker (quit 6 months ago, 40PY), w/ hx of HLD, A-Fib on Coumadin, CAD (stents x 2 in 2006) on ASA, CHF, COPD/Asthma, MARIE, skin cancer, COVID Sept 2022 s/p MAB infusion, who presents with new lung nodule. \par \par ECHO on 6/29/22 at [facility]: EF 55-60%. Moderate Rt atrial enlargement; mild diastolic dysfunction.\par \par PFTs on 8/8/22: %, FEV1 88%, DLCO 60%.\par \par CT Chest on 9/30/22 at [facility] (compared to CT on 4/20/22):\par - bilateral apical pleural thickening; centrilobular emphysema\par - new 6 x 6mm slightly irregular nodule in the Rt apex (3:28), suspicious, previously noted as tiny Rt apical nodules\par - stable 6 x 3mm subpleural nodule in the RLL (3:82)\par - no mediastinal adenopathy\par \par I have independently reviewed the patient's medical records and diagnostic images at time of this office consultation and have made the following recommendation:\par 1. RTC post liquid biopsy\par \par I personally performed the services described in the documentation, reviewed the documentation recorded by the scribe in my presence and it accurately and completely records my words and actions.\par \par I, TENISHA Duggan, am scribing for and in the presence of OZIEL ReedIM, the following sections HISTORY OF PRESENT ILLNESS, PAST MEDICAL/FAMILY/SOCIAL HISTORY; REVIEW OF SYSTEMS; VITAL SIGNS; PHYSICAL EXAM; DISPOSITION.\par  \par  \par

## 2022-10-18 NOTE — PHYSICAL EXAM
[Restricted in physically strenuous activity but ambulatory and able to carry out work of a light or sedentary nature] : Status 1- Restricted in physically strenuous activity but ambulatory and able to carry out work of a light or sedentary nature, e.g., light house work, office work [General Appearance - In No Acute Distress] : in no acute distress [PERRL With Normal Accommodation] : pupils were equal in size, round, and reactive to light [Outer Ear] : the ears and nose were normal in appearance [Neck Appearance] : the appearance of the neck was normal [] : no respiratory distress [Heart Sounds] : normal S1 and S2 [Examination Of The Chest] : the chest was normal in appearance [2+] : left 2+ [Breast Appearance] : normal in appearance [Abdomen Soft] : soft [FreeTextEntry1] : Deferred [Cervical Lymph Nodes Enlarged Posterior Bilaterally] : posterior cervical [Cervical Lymph Nodes Enlarged Anterior Bilaterally] : anterior cervical [Supraclavicular Lymph Nodes Enlarged Bilaterally] : supraclavicular [No CVA Tenderness] : no ~M costovertebral angle tenderness [Abnormal Walk] : normal gait [Skin Turgor] : normal skin turgor [No Focal Deficits] : no focal deficits [Oriented To Time, Place, And Person] : oriented to person, place, and time

## 2022-10-18 NOTE — CONSULT LETTER
[Consult Letter:] : I had the pleasure of evaluating your patient, [unfilled]. [( Thank you for referring [unfilled] for consultation for _____ )] : Thank you for referring [unfilled] for consultation for [unfilled] [Please see my note below.] : Please see my note below. [Consult Closing:] : Thank you very much for allowing me to participate in the care of this patient.  If you have any questions, please do not hesitate to contact me. [Sincerely,] : Sincerely, [Dear  ___] : Dear  [unfilled], [FreeTextEntry2] : Dr. Petty Steen (Pulm/Referring)\par Dr. Yash Tim (Cardiology) [FreeTextEntry3] : Jaswant Yoon MD, FACS \par Chief, Division of Thoracic Surgery \par Director, Minimally Invasive Thoracic Surgery \par Department of Cardiovascular and Thoracic Surgery \par [facility] \par , Cardiovascular and Thoracic Surgery\par

## 2022-10-18 NOTE — HISTORY OF PRESENT ILLNESS
[FreeTextEntry1] : Ms. JANA OLIVARES, 81 year old female, former smoker (quit 6 months ago, 40PY), w/ hx of HLD, A-Fib on Coumadin, CAD (stents x 2 in 2006) on ASA, CHF, COPD/Asthma, MARIE, skin cancer, COVID Sept 2022 s/p MAB infusion, who presents with new lung nodule. \par \par ECHO on 6/29/22 at [facility]: EF 55-60%. Moderate Rt atrial enlargement; mild diastolic dysfunction.\par \par PFTs on 8/8/22: %, FEV1 88%, DLCO 60%.\par \par CT Chest on 9/30/22 at [facility] (compared to CT on 4/20/22):\par - bilateral apical pleural thickening; centrilobular emphysema\par - new 6 x 6mm slightly irregular nodule in the Rt apex (3:28), suspicious, previously noted as tiny Rt apical nodules\par - stable 6 x 3mm subpleural nodule in the RLL (3:82)\par - no mediastinal adenopathy\par \par Patient is here today for CT Sx consultation, referred by Dr. Steen. Patient denies worsening shortness of breath, cough, fever, chills, unintentional weight loss, hemoptysis. She endorses occasional right chest pain and fatigue. \par

## 2022-10-18 NOTE — DATA REVIEWED
[FreeTextEntry1] : I have independently reviewed the following:\par PFTs on 8/8/22: %, FEV1 88%, DLCO 60%.\par CT Chest on 9/30/22 at [facility]

## 2022-10-31 NOTE — REASON FOR VISIT
[Home] : at home, [unfilled] , at the time of the visit. [Medical Office: ([facility])___] : at the medical office located in  [Patient] : the patient 19 [Follow-Up: _____] : a [unfilled] follow-up visit

## 2022-11-01 ENCOUNTER — APPOINTMENT (OUTPATIENT)
Dept: THORACIC SURGERY | Facility: CLINIC | Age: 82
End: 2022-11-01

## 2022-11-01 PROCEDURE — 99214 OFFICE O/P EST MOD 30 MIN: CPT | Mod: 95

## 2022-11-01 NOTE — HISTORY OF PRESENT ILLNESS
[FreeTextEntry1] : \par Ms. JANA OLIVARES, 81 year old female, former smoker (quit 6 months ago, 40PY), w/ hx of HLD, A-Fib on Coumadin, CAD (stents x 2 in 2006) on ASA, CHF, COPD/Asthma, MARIE, skin cancer, COVID Sept 2022 s/p MAB infusion, who presents with new lung nodule. \par \par ECHO on 6/29/22 at [facility]: EF 55-60%. Moderate Rt atrial enlargement; mild diastolic dysfunction.\par \par PFTs on 8/8/22: %, FEV1 88%, DLCO 60%.\par \par CT Chest on 9/30/22 at [facility] (compared to CT on 4/20/22):\par - bilateral apical pleural thickening; centrilobular emphysema\par - new 6 x 6mm slightly irregular nodule in the Rt apex (3:28), suspicious, previously noted as tiny Rt apical nodules\par - stable 6 x 3mm subpleural nodule in the RLL (3:82)\par - no mediastinal adenopathy\par \par Nodify blood work on 10/21/22: No significant level of autoantibodies detected post Nodify CDT risk of malignancy\par \par Patient presents to office, via telehealth, to discuss blood work as well as further plan of care.

## 2022-11-01 NOTE — CONSULT LETTER
[Dear  ___] : Dear  [unfilled], [Consult Letter:] : I had the pleasure of evaluating your patient, [unfilled]. [( Thank you for referring [unfilled] for consultation for _____ )] : Thank you for referring [unfilled] for consultation for [unfilled] [Please see my note below.] : Please see my note below. [Consult Closing:] : Thank you very much for allowing me to participate in the care of this patient.  If you have any questions, please do not hesitate to contact me. [Sincerely,] : Sincerely, [FreeTextEntry2] : Dr. Petty Steen (Pulm/Referring)\par Dr. Yash Tim (Cardiology) [FreeTextEntry3] : Jaswant Yoon MD, FACS \par Chief, Division of Thoracic Surgery \par Director, Minimally Invasive Thoracic Surgery \par Department of Cardiovascular and Thoracic Surgery \par [facility] \par , Cardiovascular and Thoracic Surgery\par

## 2022-11-01 NOTE — ASSESSMENT
[FreeTextEntry1] : \par Ms. JANA OLIVARES, 81 year old female, former smoker (quit 6 months ago, 40PY), w/ hx of HLD, A-Fib on Coumadin, CAD (stents x 2 in 2006) on ASA, CHF, COPD/Asthma, MARIE, skin cancer, COVID Sept 2022 s/p MAB infusion, who presents with new lung nodule. \par \par ECHO on 6/29/22 at [facility]: EF 55-60%. Moderate Rt atrial enlargement; mild diastolic dysfunction.\par \par PFTs on 8/8/22: %, FEV1 88%, DLCO 60%.\par \par CT Chest on 9/30/22 [facility] (compared to CT on 4/20/22):\par - bilateral apical pleural thickening; centrilobular emphysema\par - new 6 x 6mm slightly irregular nodule in the Rt apex (3:28), suspicious, previously noted as tiny Rt apical nodules\par - stable 6 x 3mm subpleural nodule in the RLL (3:82)\par - no mediastinal adenopathy\par \par Nodify blood work on 10/21/22: No significant level of autoantibodies detected post Nodify CDT risk of malignancy\par \par I have reviewed the patient's medical records and diagnostic images at time of this office consultation and have made the following recommendation:\par 1. Nodify blood work reviewed and explained to patient and her family member, Nodify showed no significant risk for malignancy, however, RUL nodule is suspicious, will repeat CT chest in 3 months and RTC after for evaluation. \par \par I personally performed the services described in the documentation, reviewed the documentation recorded by the scribe in my presence and it accurately and completely records my words and actions.\par \par I, Lazara Larson, NP, am scribing for and the presence of BONNIE Reed, the following sections HISTORY OF PRESENT ILLNESS, PAST MEDICAL/FAMILY/SOCIAL HISTORY; REVIEW OF SYSTEMS; VITAL SIGNS; PHYSICAL EXAM; DISPOSITION. \par

## 2022-11-14 ENCOUNTER — APPOINTMENT (OUTPATIENT)
Dept: PULMONOLOGY | Facility: CLINIC | Age: 82
End: 2022-11-14

## 2022-11-14 VITALS
TEMPERATURE: 97.8 F | DIASTOLIC BLOOD PRESSURE: 73 MMHG | OXYGEN SATURATION: 98 % | HEIGHT: 61 IN | BODY MASS INDEX: 27.38 KG/M2 | WEIGHT: 145 LBS | HEART RATE: 101 BPM | SYSTOLIC BLOOD PRESSURE: 111 MMHG

## 2022-11-14 PROCEDURE — 99214 OFFICE O/P EST MOD 30 MIN: CPT

## 2022-11-14 NOTE — HISTORY OF PRESENT ILLNESS
[TextBox_4] : Saw Dr. Yoon for thoracic surgery evaluation, recommended repeating chest CT scan for follow-up in 3 months.

## 2022-11-14 NOTE — REASON FOR VISIT
[Follow-Up] : a follow-up visit [Cough] : cough [COPD] : COPD [Shortness of Breath] : shortness of breath [TextBox_44] : Shortness of breath is better

## 2022-11-14 NOTE — ASSESSMENT
[FreeTextEntry1] : Repeat chest CT scan for follow-up in 2 months\par Check PFT for follow-up in 1 month.\par  Continue Symbicort HFA for history of COPD\par Continue Spiriva Turbuhaler for COPD\par Albuterol via nebulizer 4 times daily\par Continue Lasix daily. \par \par Continue Proair Respiclick prn. \par Cardiology follow-up with Dr. Cho.\par Thoracic surgery follow-up with Dr. Yoon \par Repeat CT for f/u  2 months\par PFT in 1 month\par

## 2022-12-12 ENCOUNTER — APPOINTMENT (OUTPATIENT)
Dept: PULMONOLOGY | Facility: CLINIC | Age: 82
End: 2022-12-12

## 2022-12-12 VITALS
BODY MASS INDEX: 27.94 KG/M2 | DIASTOLIC BLOOD PRESSURE: 73 MMHG | TEMPERATURE: 97.8 F | HEART RATE: 80 BPM | HEIGHT: 61 IN | WEIGHT: 148 LBS | OXYGEN SATURATION: 97 % | SYSTOLIC BLOOD PRESSURE: 123 MMHG | RESPIRATION RATE: 15 BRPM

## 2022-12-12 LAB — POCT - HEMOGLOBIN (HGB), QUANTITATIVE, TRANSCUTANEOUS: 11.3

## 2022-12-12 PROCEDURE — 88738 HGB QUANT TRANSCUTANEOUS: CPT

## 2022-12-12 PROCEDURE — 94726 PLETHYSMOGRAPHY LUNG VOLUMES: CPT

## 2022-12-12 PROCEDURE — ZZZZZ: CPT

## 2022-12-12 PROCEDURE — 94060 EVALUATION OF WHEEZING: CPT

## 2022-12-12 PROCEDURE — 71046 X-RAY EXAM CHEST 2 VIEWS: CPT

## 2022-12-12 PROCEDURE — 94729 DIFFUSING CAPACITY: CPT

## 2022-12-12 PROCEDURE — 99214 OFFICE O/P EST MOD 30 MIN: CPT | Mod: 25

## 2022-12-12 NOTE — REASON FOR VISIT
[Follow-Up] : a follow-up visit [Cough] : cough [COPD] : COPD [Shortness of Breath] : shortness of breath [TextBox_44] : More shortness of breath and cough for 1 weeks

## 2022-12-12 NOTE — ASSESSMENT
[FreeTextEntry1] : Repeat chest CT scan for follow-up on history of lung nodules in 1 month\par Start doxycycline and prednisone taper.\par  Continue Symbicort HFA for history of COPD\par Continue Spiriva Turbuhaler for COPD\par Albuterol via nebulizer 4 times daily\par Continue Lasix daily. \par Continue Proair Respiclick prn. \par Cardiology follow-up with Dr. Cho.\par Thoracic surgery follow-up with Dr. Yoon \par Return for pulmonary follow-up in 6 weeks\par

## 2022-12-12 NOTE — DISCUSSION/SUMMARY
[FreeTextEntry1] : Shortness of breath and cough secondary to COPD exacerbation/CHF.  Hoarseness of the voice possibly secondary to oral candidiasis

## 2022-12-12 NOTE — PROCEDURE
[FreeTextEntry1] : Pulmonary function test performed in my office today: Spirometry shows moderate obstructive airway disease with significant improvement postbronchodilator, consistent with asthma; lung volumes within normal limits; resistance is increased; diffusion shows moderate impairment.\par \par \par \par \par  Xray Chest 2 Views PA/Lat             Final\par \par Chest x-ray PA and lateral views performed in my office today showed cardiomegaly, clear lungs, no evidence of infiltrates or pleural effusions. \par \par \par  Ordered by: SANDY ARIAS       Collected/Examined: [id number] 03:03PM       \par Verified by: SANDY ARIAS [id number] 04:19PM       \par  Result Communication: No patient communication needed at this time;\par Stage: Final       \par  Performed at: In Office       Performed by: SANDY ARIAS       Resulted: [id number] 03:03PM       Last Updated: 12Dec2022 04:19PM       Accession: [id number]

## 2023-01-09 ENCOUNTER — NON-APPOINTMENT (OUTPATIENT)
Age: 83
End: 2023-01-09

## 2023-01-11 ENCOUNTER — NON-APPOINTMENT (OUTPATIENT)
Age: 83
End: 2023-01-11

## 2023-01-17 ENCOUNTER — APPOINTMENT (OUTPATIENT)
Dept: THORACIC SURGERY | Facility: CLINIC | Age: 83
End: 2023-01-17
Payer: MEDICARE

## 2023-01-17 VITALS
WEIGHT: 149 LBS | OXYGEN SATURATION: 97 % | RESPIRATION RATE: 18 BRPM | SYSTOLIC BLOOD PRESSURE: 132 MMHG | HEIGHT: 62 IN | HEART RATE: 110 BPM | BODY MASS INDEX: 27.42 KG/M2 | DIASTOLIC BLOOD PRESSURE: 89 MMHG

## 2023-01-17 PROCEDURE — 99214 OFFICE O/P EST MOD 30 MIN: CPT

## 2023-01-23 ENCOUNTER — APPOINTMENT (OUTPATIENT)
Dept: PULMONOLOGY | Facility: CLINIC | Age: 83
End: 2023-01-23
Payer: MEDICARE

## 2023-01-23 VITALS
OXYGEN SATURATION: 97 % | DIASTOLIC BLOOD PRESSURE: 89 MMHG | SYSTOLIC BLOOD PRESSURE: 140 MMHG | WEIGHT: 149 LBS | BODY MASS INDEX: 27.42 KG/M2 | HEART RATE: 86 BPM | TEMPERATURE: 98.2 F | HEIGHT: 62 IN

## 2023-01-23 PROCEDURE — 71046 X-RAY EXAM CHEST 2 VIEWS: CPT

## 2023-01-23 PROCEDURE — 99214 OFFICE O/P EST MOD 30 MIN: CPT | Mod: 25

## 2023-01-24 NOTE — CONSULT LETTER
[Dear  ___] : Dear  [unfilled], [Courtesy Letter:] : I had the pleasure of seeing your patient, [unfilled], in my office today. [Please see my note below.] : Please see my note below. [Consult Closing:] : Thank you very much for allowing me to participate in the care of this patient.  If you have any questions, please do not hesitate to contact me. [Sincerely,] : Sincerely, [DrMirza  ___] : Dr. FRAZIER [FreeTextEntry3] : Dr. Petty Steen

## 2023-01-24 NOTE — HISTORY OF PRESENT ILLNESS
[TextBox_4] : Saw Dr. Yoon for thoracic surgery follow-up, who recommended right VATS for an increasing right upper lobe lung nodule shown on current chest CT scan..\francine Sanders has mild cough and exertional dyspnea.

## 2023-01-24 NOTE — REASON FOR VISIT
[Follow-Up] : a follow-up visit [Cough] : cough [COPD] : COPD [Shortness of Breath] : shortness of breath [Abnormal CXR/ Chest CT] : an abnormal CXR/ chest CT [TextBox_44] : More shortness of breath and cough for 1 weeks

## 2023-01-24 NOTE — PROCEDURE
[FreeTextEntry1] : CT scan performed on 25th 2023 showed continued interval increase in size of a lobulated minimally spiculated nodule in the right upper lobe which remains highly suspicious for malignancy.  It now measures 8 x 10 mm.  Further evaluation beginning with a PET/CT scan is suggested.  Wall thickening of mid to distal transverse colon.  Further evaluation with colonoscopy is suggested as indicated.\par ________________\par \par  Xray Chest 2 Views PA/Lat             Final\par \par   \par Chest x-ray PA and lateral views performed in my office today showed clear lungs, no evidence of infiltrates or pleural effusions. \par \par \par  Ordered by: SANDY ARIAS       Collected/Examined: 23Jan2023 10:22AM       \par Verification Required       Stage: Final       \par  Performed at: In Office       Performed by: SANDY ARIAS       Resulted: 23Jan2023 10:22AM       Last Updated: 23Jan2023 10:23AM

## 2023-01-24 NOTE — ASSESSMENT
[FreeTextEntry1] : Start doxycycline and prednisone taper for COPD exacerbation.\par Continue Symbicort HFA for history of COPD\par Continue Spiriva Turbuhaler for COPD\par Albuterol via nebulizer 4 times daily\par Continue Lasix daily. \par Continue Proair Respiclick prn. \par Cardiology follow-up with Dr. Cho.\par Marilyn is a patient who needs right VATS for an enlarging right upper lobe lung nodule shown on current chest CT scan.  Advised her to return for preoperative pulmonary function test in 3 weeks.  \par Thoracic surgery follow-up with Dr. Yoon .\par Will get PET scan and brain CT scan for possible malignancy work-up.

## 2023-01-24 NOTE — DISCUSSION/SUMMARY
[FreeTextEntry1] : Marilyn is a patient with an enlarged right upper lobe lung nodule on current repeat chest CT scan, suspicious for primary lung cancer.  Secondly, she is a patient with shortness of breath and cough secondary to COPD exacerbation/CHF.

## 2023-01-31 ENCOUNTER — APPOINTMENT (OUTPATIENT)
Dept: NUCLEAR MEDICINE | Facility: IMAGING CENTER | Age: 83
End: 2023-01-31
Payer: MEDICARE

## 2023-01-31 ENCOUNTER — APPOINTMENT (OUTPATIENT)
Dept: CT IMAGING | Facility: IMAGING CENTER | Age: 83
End: 2023-01-31
Payer: MEDICARE

## 2023-01-31 ENCOUNTER — OUTPATIENT (OUTPATIENT)
Dept: OUTPATIENT SERVICES | Facility: HOSPITAL | Age: 83
LOS: 1 days | End: 2023-01-31
Payer: MEDICARE

## 2023-01-31 ENCOUNTER — TRANSCRIPTION ENCOUNTER (OUTPATIENT)
Age: 83
End: 2023-01-31

## 2023-01-31 DIAGNOSIS — R91.8 OTHER NONSPECIFIC ABNORMAL FINDING OF LUNG FIELD: ICD-10-CM

## 2023-01-31 DIAGNOSIS — Z01.818 ENCOUNTER FOR OTHER PREPROCEDURAL EXAMINATION: ICD-10-CM

## 2023-01-31 PROCEDURE — 78815 PET IMAGE W/CT SKULL-THIGH: CPT

## 2023-01-31 PROCEDURE — A9552: CPT

## 2023-01-31 PROCEDURE — 70450 CT HEAD/BRAIN W/O DYE: CPT | Mod: 26,MH

## 2023-01-31 PROCEDURE — 78815 PET IMAGE W/CT SKULL-THIGH: CPT | Mod: 26,PI,MH

## 2023-01-31 PROCEDURE — 70450 CT HEAD/BRAIN W/O DYE: CPT

## 2023-02-06 RX ORDER — DOXYCYCLINE HYCLATE 100 MG/1
100 CAPSULE ORAL TWICE DAILY
Qty: 14 | Refills: 0 | Status: DISCONTINUED | COMMUNITY
Start: 2022-12-12 | End: 2023-02-06

## 2023-02-06 RX ORDER — APIXABAN 5 MG/1
5 TABLET, FILM COATED ORAL TWICE DAILY
Refills: 0 | Status: ACTIVE | COMMUNITY

## 2023-02-06 RX ORDER — TIOTROPIUM BROMIDE 18 UG/1
18 CAPSULE ORAL; RESPIRATORY (INHALATION) DAILY
Qty: 3 | Refills: 3 | Status: DISCONTINUED | COMMUNITY
Start: 2022-05-10 | End: 2023-02-06

## 2023-02-06 RX ORDER — FLUTICASONE FUROATE, UMECLIDINIUM BROMIDE AND VILANTEROL TRIFENATATE 100; 62.5; 25 UG/1; UG/1; UG/1
100-62.5-25 POWDER RESPIRATORY (INHALATION) DAILY
Qty: 1 | Refills: 2 | Status: DISCONTINUED | COMMUNITY
Start: 2021-06-25 | End: 2023-02-06

## 2023-02-06 RX ORDER — PREDNISONE 10 MG/1
10 TABLET ORAL
Qty: 18 | Refills: 0 | Status: DISCONTINUED | COMMUNITY
Start: 2022-12-12 | End: 2023-02-06

## 2023-02-06 RX ORDER — CHROMIUM 200 MCG
TABLET ORAL
Refills: 0 | Status: ACTIVE | COMMUNITY

## 2023-02-08 ENCOUNTER — APPOINTMENT (OUTPATIENT)
Dept: CARDIOLOGY | Facility: CLINIC | Age: 83
End: 2023-02-08
Payer: MEDICARE

## 2023-02-08 ENCOUNTER — NON-APPOINTMENT (OUTPATIENT)
Age: 83
End: 2023-02-08

## 2023-02-08 VITALS — WEIGHT: 150 LBS | BODY MASS INDEX: 27.44 KG/M2

## 2023-02-08 VITALS
SYSTOLIC BLOOD PRESSURE: 126 MMHG | OXYGEN SATURATION: 99 % | HEIGHT: 62 IN | DIASTOLIC BLOOD PRESSURE: 77 MMHG | TEMPERATURE: 97.5 F | BODY MASS INDEX: 27.44 KG/M2 | HEART RATE: 90 BPM

## 2023-02-08 DIAGNOSIS — Z13.6 ENCOUNTER FOR SCREENING FOR CARDIOVASCULAR DISORDERS: ICD-10-CM

## 2023-02-08 DIAGNOSIS — I48.91 UNSPECIFIED ATRIAL FIBRILLATION: ICD-10-CM

## 2023-02-08 DIAGNOSIS — F17.200 NICOTINE DEPENDENCE, UNSPECIFIED, UNCOMPLICATED: ICD-10-CM

## 2023-02-08 DIAGNOSIS — E78.5 HYPERLIPIDEMIA, UNSPECIFIED: ICD-10-CM

## 2023-02-08 DIAGNOSIS — I25.10 ATHEROSCLEROTIC HEART DISEASE OF NATIVE CORONARY ARTERY W/OUT ANGINA PECTORIS: ICD-10-CM

## 2023-02-08 DIAGNOSIS — I50.9 HEART FAILURE, UNSPECIFIED: ICD-10-CM

## 2023-02-08 DIAGNOSIS — Z01.810 ENCOUNTER FOR PREPROCEDURAL CARDIOVASCULAR EXAMINATION: ICD-10-CM

## 2023-02-08 PROCEDURE — 99204 OFFICE O/P NEW MOD 45 MIN: CPT

## 2023-02-08 PROCEDURE — 93000 ELECTROCARDIOGRAM COMPLETE: CPT

## 2023-02-08 RX ORDER — ASPIRIN ENTERIC COATED TABLETS 81 MG 81 MG/1
81 TABLET, DELAYED RELEASE ORAL DAILY
Qty: 90 | Refills: 3 | Status: ACTIVE | COMMUNITY
Start: 2023-02-08 | End: 1900-01-01

## 2023-02-10 LAB
CHOLEST SERPL-MCNC: 223 MG/DL
ESTIMATED AVERAGE GLUCOSE: 131 MG/DL
HBA1C MFR BLD HPLC: 6.2 %
HDLC SERPL-MCNC: 82 MG/DL
LDLC SERPL CALC-MCNC: 123 MG/DL
NONHDLC SERPL-MCNC: 141 MG/DL
TRIGL SERPL-MCNC: 91 MG/DL

## 2023-02-12 PROBLEM — Z01.810 PRE-OPERATIVE CARDIOVASCULAR EXAMINATION: Status: ACTIVE | Noted: 2023-02-12

## 2023-02-12 PROBLEM — I50.9 CHF (CONGESTIVE HEART FAILURE): Status: ACTIVE | Noted: 2022-06-13

## 2023-02-12 PROBLEM — F17.200 CURRENT SMOKER: Status: ACTIVE | Noted: 2022-01-31

## 2023-02-12 PROBLEM — Z13.6 ENCOUNTER FOR SCREENING FOR CARDIOVASCULAR DISORDERS: Status: ACTIVE | Noted: 2023-02-12

## 2023-02-13 ENCOUNTER — APPOINTMENT (OUTPATIENT)
Dept: PULMONOLOGY | Facility: CLINIC | Age: 83
End: 2023-02-13
Payer: MEDICARE

## 2023-02-13 VITALS
HEART RATE: 79 BPM | TEMPERATURE: 97.6 F | OXYGEN SATURATION: 96 % | BODY MASS INDEX: 27.6 KG/M2 | DIASTOLIC BLOOD PRESSURE: 57 MMHG | WEIGHT: 150 LBS | RESPIRATION RATE: 15 BRPM | SYSTOLIC BLOOD PRESSURE: 135 MMHG | HEIGHT: 62 IN

## 2023-02-13 PROCEDURE — 94729 DIFFUSING CAPACITY: CPT

## 2023-02-13 PROCEDURE — 88738 HGB QUANT TRANSCUTANEOUS: CPT

## 2023-02-13 PROCEDURE — 94060 EVALUATION OF WHEEZING: CPT

## 2023-02-13 PROCEDURE — 99214 OFFICE O/P EST MOD 30 MIN: CPT | Mod: 25

## 2023-02-13 PROCEDURE — 94726 PLETHYSMOGRAPHY LUNG VOLUMES: CPT

## 2023-02-13 PROCEDURE — ZZZZZ: CPT

## 2023-02-13 NOTE — DISCUSSION/SUMMARY
[FreeTextEntry1] : Marilyn is a patient with an enlarged right upper lobe lung nodule and FDG uptake on PET scan, suspicious for primary lung cancer.  Secondly, she is a patient with improved shortness of breath and cough secondary to COPD exacerbation/CHF.  Thirdly, she is a patient with FDG uptake in her GI system possibly secondary to esophagitis/colitis.

## 2023-02-13 NOTE — CONSULT LETTER
[Dear  ___] : Dear  [unfilled], [Courtesy Letter:] : I had the pleasure of seeing your patient, [unfilled], in my office today. [Please see my note below.] : Please see my note below. [Consult Closing:] : Thank you very much for allowing me to participate in the care of this patient.  If you have any questions, please do not hesitate to contact me. [Sincerely,] : Sincerely, [FreeTextEntry3] : Dr. Petty Steen [DrMirza  ___] : Dr. FRAZIER

## 2023-02-13 NOTE — ASSESSMENT
[FreeTextEntry1] : No acute or active pulmonary complications to the proposed right VATS on March 2, 2023.\par Start doxycycline and prednisone taper for COPD exacerbation.\par Continue Symbicort HFA for history of COPD\par Continue Spiriva Turbuhaler for COPD\par Albuterol via nebulizer 4 times daily\par Continue Lasix daily. \par Continue Proair Respiclick as needed.\par Cardiology follow-up.\par Thoracic surgery follow-up with Dr. Yoon .\par

## 2023-02-13 NOTE — PROCEDURE
[FreeTextEntry1] : Pulmonary Function Test performed in my office today: Spirometry: Within normal limits with no improvement postbronchodilator; Lung Volume: Within normal limits; increased resistance; diffusion: Moderate impairment.\par _______\par \par   PET/CT FDG Skull to Thigh ONC initial Treatment Strategy             Final\par \par No Documents Attached\par \par \par \par \par   EXAM: [id number] - PETCT SKUL-THI ONC FDG INIT  - ORDERED BY: PETTY STEEN\par \par \par PROCEDURE DATE:  01/31/2023\par \par \par \par INTERPRETATION:  CLINICAL INFORMATION: Enlarging right upper lobe lung nodule. Outside CT chest on 1/5/2023 showed minimally spiculated right upper lobe nodule suspicious for malignancy measuring 8 x 10 mm. Evaluate for malignancy.\par \par TREATMENT STRATEGY EVALUATION: Initial\par FASTING BLOOD SUGAR: 85 mg/dL\par RADIOPHARMACEUTICAL:  10.47 mCi F-18, FDG, I.V.\par UPTAKE PERIOD: 65 minutes\par SCANNER: [employer]\par ORAL CONTRAST: Patient drank BARIUM contrast during the uptake period.\par PHARMACOLOGIC INTERVENTION: None.\par \par TECHNIQUE: Following intravenous injection of radiopharmaceutical and above uptake period, PET/CT was obtained from base of skull  to mid-thigh. CT protocol was optimized for PET attenuation correction and anatomic localization and was not designed to produce and cannot replace state-of-the-art diagnostic CT images with specific imaging protocols for different body parts and indications. Images were reconstructed and reviewed in axial, coronal and sagittal views and three-dimensional MIP.\par \par The standardized uptake values (SUV) are normalized to patient body weight and indicate the highest activity concentration (SUVmax) in a given site. All image numbers refer to axial image number.\par \par COMPARISON:  No prior FDG-PET/CT\par \par OTHER STUDIES USED FOR CORRELATION: Report of CT chest from an outside institution dated 1/5/2023, CT chest dated 5/3/2022 and CT abdomen pelvis 8/27/2010.\par \par FINDINGS:\par \par HEAD/NECK: Physiologic FDG activity in visualized brain, head, and neck.\par \par THORAX: No abnormal FDG activity. No lymphadenopathy.\par \par LUNGS: FDG avid right upper lobe nodule measuring 0.8 x 1.0 cm (SUV 8.4, image 64), increased in size as compared with 5/3/2022, and corresponding to enlarging nodule noted in report of recent CT chest. The 6 x 3 mm subpleural right lower nodule noted on outside CT is not visualized, however, evaluation is limited due to CT technique. Minimal subpleural reticular opacities, unchanged compared to CT dated 4/22/2022. Emphysema.\par \par PLEURA/PERICARDIUM: No abnormal FDG activity. No pleural effusion. Trace pericardial fluid.\par \par HEPATOBILIARY/PANCREAS: Physiologic FDG activity.  For reference, normal liver demonstrates SUV mean 3.6.\par \par SPLEEN: Physiologic FDG activity. Normal in size.\par \par ADRENAL GLANDS: No abnormal FDG activity. No nodule.\par \par KIDNEYS/URINARY BLADDER: Physiologic excreted FDG activity. Photopenic peripherally calcified 2.5 cm left upper pole renal cyst, unchanged since 4/22/2022. Non-FDG avid right lower pole 2.6 cm renal cyst.\par \par REPRODUCTIVE ORGANS: No abnormal FDG activity. Status post total hysterectomy.\par \par ABDOMINOPELVIC LYMPH NODES/RETROPERITONEUM: No enlarged or FDG-avid lymph node.\par \par ESOPHAGUS/STOMACH/BOWEL/PERITONEUM/MESENTERY: Focal hypermetabolism in distal esophagus, not well delineated on CT (SUV 7.9; image 107). Heterogeneous increased FDG activity in ascending and transverse colon and diffusely increased activity in distal small bowel, without corresponding abnormalities on CT.\par \par VESSELS: Atherosclerotic changes.\par \par BONES/SOFT TISSUES: FDG-avid degenerative change, bilateral glenohumeral joints. Status post bilateral total hip arthroplasties. No periprosthetic hypermetabolism.\par \par IMPRESSION: Abnormal skull-to-thigh FDG-PET/CT scan.\par \par 1. FDG avid right upper lobe nodule, increased in size from 5/3/2022, suspicious for primary lung malignancy.\par \par 2. Indeterminate focal hypermetabolism in distal esophagus. Please correlate with endoscopy.\par \par 3. Nonspecific, heterogeneous increased FDG activity in ascending and transverse colon and diffusely increased activity in distal small bowel. Please correlate clinically and with colonoscopy as indicated.\par \par 4. No scan evidence of metastatic disease.\par \par Report emailed to Dr. Petty Steen.\par \par --- End of Report ---\par \par \par \par \par \par JESSICA MOJICA MD; Resident Radiologist\par This document has been electronically signed.\par DRAKE NELSON MD; Attending Nuclear Medicine\par This document has been electronically signed. Feb 1 2023  4:34PM\par \par  \par \par  Ordered by: PETTY STEEN       Collected/Examined: [id number] 07:18PM       \par Verified by: PETTY STEEN 04Feb2023 07:46PM       \par  Result Communication: No patient communication needed at this time;\par Stage: Final       \par  Performed at: [facility]       Resulted: 01Feb2023 12:18PM       Last Updated: 04Feb2023 07:46PM       Accession: [id number]       \par ___________\par \par   CT Head No Cont             Final\par \par No Documents Attached\par \par \par \par \par   EXAM: [id number] - CT BRAIN  - ORDERED BY: PETTY STEEN\par \par \par PROCEDURE DATE:  01/31/2023\par \par \par \par INTERPRETATION:  CLINICAL INDICATION:  Enlarging right upper lobe lung nodule, dizziness, rule out brain metastasis; Br\par \par TECHNIQUE: Noncontrast CT examination of the head was performed using contiguous 5 mm CT images.\par \par COMPARISON: 4/25/2022\par \par FINDINGS:\par \par There is no evidence of mass or acute intracranial hemorrhage. Ventricles and sulci are prominent consistent with age-related parenchymal volume loss. No midline shift or other significant mass effect is noted. There is no CT evidence of acute territorial infarct. There are periventricular white matter hypodensities that are nonspecific in nature but may reflect chronic ischemic microvascular disease.\par \par The visualized paranasal sinuses and tympanomastoid spaces are clear. Orbits and orbital contents are unremarkable.\par \par There is no depressed calvarial fracture.\par \par \par \par IMPRESSION:\par \par No evidence of acute intracranial hemorrhage, midline shift or CT evidence of acute territorial infarct.\par \par Please note that contrast-enhanced MRI is more sensitive for the evaluation of metastatic disease.\par \par --- End of Report ---\par \par \par \par \par \par \par DIXON ZHANG MD; Attending Radiologist\par This document has been electronically signed. Feb 1 2023  8:24AM\par \par  \par \par  Ordered by: PETTY STEEN       Collected/Examined: [id number] 06:25PM       \par Verified by: PETTY STEEN 04Feb2023 07:46PM       \par  Result Communication: No patient communication needed at this time;\par Stage: Final       \par  Performed at: [facility]       Resulted: [id number] 08:21AM       Last Updated: [id number] 07:46PM       Accession: [id number]

## 2023-02-13 NOTE — REASON FOR VISIT
[Follow-Up] : a follow-up visit [Abnormal CXR/ Chest CT] : an abnormal CXR/ chest CT [Cough] : cough [COPD] : COPD [Shortness of Breath] : shortness of breath [TextBox_44] : Shortness of breath and cough are better now

## 2023-02-16 ENCOUNTER — OUTPATIENT (OUTPATIENT)
Dept: OUTPATIENT SERVICES | Facility: HOSPITAL | Age: 83
LOS: 1 days | End: 2023-02-16

## 2023-02-16 VITALS
RESPIRATION RATE: 16 BRPM | HEART RATE: 86 BPM | DIASTOLIC BLOOD PRESSURE: 60 MMHG | SYSTOLIC BLOOD PRESSURE: 98 MMHG | OXYGEN SATURATION: 99 % | HEIGHT: 60 IN | WEIGHT: 158.95 LBS | TEMPERATURE: 97 F

## 2023-02-16 DIAGNOSIS — Z95.5 PRESENCE OF CORONARY ANGIOPLASTY IMPLANT AND GRAFT: ICD-10-CM

## 2023-02-16 DIAGNOSIS — I48.91 UNSPECIFIED ATRIAL FIBRILLATION: ICD-10-CM

## 2023-02-16 DIAGNOSIS — Z87.09 PERSONAL HISTORY OF OTHER DISEASES OF THE RESPIRATORY SYSTEM: ICD-10-CM

## 2023-02-16 DIAGNOSIS — G47.33 OBSTRUCTIVE SLEEP APNEA (ADULT) (PEDIATRIC): ICD-10-CM

## 2023-02-16 DIAGNOSIS — Z98.890 OTHER SPECIFIED POSTPROCEDURAL STATES: Chronic | ICD-10-CM

## 2023-02-16 DIAGNOSIS — Z96.641 PRESENCE OF RIGHT ARTIFICIAL HIP JOINT: Chronic | ICD-10-CM

## 2023-02-16 DIAGNOSIS — Z96.642 PRESENCE OF LEFT ARTIFICIAL HIP JOINT: Chronic | ICD-10-CM

## 2023-02-16 DIAGNOSIS — R91.8 OTHER NONSPECIFIC ABNORMAL FINDING OF LUNG FIELD: ICD-10-CM

## 2023-02-16 DIAGNOSIS — Z98.49 CATARACT EXTRACTION STATUS, UNSPECIFIED EYE: Chronic | ICD-10-CM

## 2023-02-16 DIAGNOSIS — I10 ESSENTIAL (PRIMARY) HYPERTENSION: ICD-10-CM

## 2023-02-16 LAB
ANION GAP SERPL CALC-SCNC: 13 MMOL/L — SIGNIFICANT CHANGE UP (ref 7–14)
BLD GP AB SCN SERPL QL: NEGATIVE — SIGNIFICANT CHANGE UP
BUN SERPL-MCNC: 19 MG/DL — SIGNIFICANT CHANGE UP (ref 7–23)
CALCIUM SERPL-MCNC: 9.8 MG/DL — SIGNIFICANT CHANGE UP (ref 8.4–10.5)
CHLORIDE SERPL-SCNC: 105 MMOL/L — SIGNIFICANT CHANGE UP (ref 98–107)
CO2 SERPL-SCNC: 23 MMOL/L — SIGNIFICANT CHANGE UP (ref 22–31)
CREAT SERPL-MCNC: 0.88 MG/DL — SIGNIFICANT CHANGE UP (ref 0.5–1.3)
EGFR: 66 ML/MIN/1.73M2 — SIGNIFICANT CHANGE UP
GLUCOSE SERPL-MCNC: 120 MG/DL — HIGH (ref 70–99)
HCT VFR BLD CALC: 34.3 % — LOW (ref 34.5–45)
HGB BLD-MCNC: 10.9 G/DL — LOW (ref 11.5–15.5)
MCHC RBC-ENTMCNC: 31.8 GM/DL — LOW (ref 32–36)
MCHC RBC-ENTMCNC: 33.1 PG — SIGNIFICANT CHANGE UP (ref 27–34)
MCV RBC AUTO: 104.3 FL — HIGH (ref 80–100)
NRBC # BLD: 0 /100 WBCS — SIGNIFICANT CHANGE UP (ref 0–0)
NRBC # FLD: 0 K/UL — SIGNIFICANT CHANGE UP (ref 0–0)
PLATELET # BLD AUTO: 240 K/UL — SIGNIFICANT CHANGE UP (ref 150–400)
POTASSIUM SERPL-MCNC: 3.8 MMOL/L — SIGNIFICANT CHANGE UP (ref 3.5–5.3)
POTASSIUM SERPL-SCNC: 3.8 MMOL/L — SIGNIFICANT CHANGE UP (ref 3.5–5.3)
RBC # BLD: 3.29 M/UL — LOW (ref 3.8–5.2)
RBC # FLD: 14.3 % — SIGNIFICANT CHANGE UP (ref 10.3–14.5)
RH IG SCN BLD-IMP: POSITIVE — SIGNIFICANT CHANGE UP
SODIUM SERPL-SCNC: 141 MMOL/L — SIGNIFICANT CHANGE UP (ref 135–145)
WBC # BLD: 8.5 K/UL — SIGNIFICANT CHANGE UP (ref 3.8–10.5)
WBC # FLD AUTO: 8.5 K/UL — SIGNIFICANT CHANGE UP (ref 3.8–10.5)

## 2023-02-16 RX ORDER — PANTOPRAZOLE SODIUM 20 MG/1
1 TABLET, DELAYED RELEASE ORAL
Qty: 0 | Refills: 0 | DISCHARGE

## 2023-02-16 RX ORDER — FUROSEMIDE 40 MG
1 TABLET ORAL
Qty: 0 | Refills: 0 | DISCHARGE

## 2023-02-16 RX ORDER — SODIUM CHLORIDE 9 MG/ML
1000 INJECTION, SOLUTION INTRAVENOUS
Refills: 0 | Status: DISCONTINUED | OUTPATIENT
Start: 2023-03-02 | End: 2023-03-03

## 2023-02-16 RX ORDER — POTASSIUM CHLORIDE 20 MEQ
1 PACKET (EA) ORAL
Qty: 0 | Refills: 0 | DISCHARGE

## 2023-02-16 RX ORDER — PREGABALIN 225 MG/1
1 CAPSULE ORAL
Qty: 0 | Refills: 0 | DISCHARGE

## 2023-02-16 RX ORDER — CHOLECALCIFEROL (VITAMIN D3) 125 MCG
1 CAPSULE ORAL
Qty: 0 | Refills: 0 | DISCHARGE

## 2023-02-16 RX ORDER — APIXABAN 2.5 MG/1
1 TABLET, FILM COATED ORAL
Qty: 0 | Refills: 0 | DISCHARGE

## 2023-02-16 NOTE — H&P PST ADULT - PROBLEM SELECTOR PLAN 3
Pt instructed by cardiologist to stop eliquis 2 days prior to procedure (last dose on 2/27/23).    Cardiac evaluation (echo 6/2022, cath 6/2022, ekg 2/2023) in chart.

## 2023-02-16 NOTE — H&P PST ADULT - NSICDXPASTSURGICALHX_GEN_ALL_CORE_FT
PAST SURGICAL HISTORY:  H/O cardiac radiofrequency ablation     History of Appendectomy childhood      History of appendectomy     History of  , , ,     History of cataract surgery     History of Cholecystectomy     History of cholecystectomy     History of Hysterectomy  for fibroids      History of total left hip replacement     History of total right hip replacement     Melanoma     S/P T&A childhood

## 2023-02-16 NOTE — H&P PST ADULT - CARDIOVASCULAR
normal/S1 S2 present/no gallops/no rub/no murmur/no pedal edema/Irregularly irregular rhythm/vascular details…

## 2023-02-16 NOTE — H&P PST ADULT - NSICDXPASTMEDICALHX_GEN_ALL_CORE_FT
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    Hypercholesterolemia     Lip Cancer resected 6 yrs ago (no chemo/rad)    Melanoma     Nodule of upper lobe of right lung     PUD (peptic ulcer disease)     Skin Cancer of Face removed 1 yr ago    Skin Cancer of Nose 1 yr ago- removed    Smoker      PAST MEDICAL HISTORY:  Afib x 15 yrs --on Coumadin  attempted cardioversion 13 yrs ago--failed    Asthma     CAD (coronary artery disease)     CHF (congestive heart failure)     Coronary Stent to RCA, LAD, and DIAG 9/25/06 at [facility]    Emphysema/COPD     GERD (Gastroesophageal Reflux Disease)     H/O: CVA pt unaware of CVA, yet 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

## 2023-02-16 NOTE — H&P PST ADULT - PROBLEM SELECTOR PLAN 5
Pt with recent COPD exacerbation in 2/2023 (increased cough and sputum production) treated with doxy and prednisone, reports relief of symptoms.    Pulmonary evaluation in chart.

## 2023-02-16 NOTE — H&P PST ADULT - PROBLEM SELECTOR PLAN 1
Patient tentatively scheduled for flexible bronchoscopy with right video assisted thoracoscopy lung resection with interventional marking at 8:30AM for 3/2/23. Pre-op instructions provided to patient and daughter. Pt given verbal and written instructions with teach back on chlorhexidine shampoo. Pt will take own pantoprazole on the morning of procedure for GI prophylaxis. Pt verbalized understanding with return demonstration.

## 2023-02-16 NOTE — H&P PST ADULT - HISTORY OF PRESENT ILLNESS
81 y/o female PMH HTN HLD CAD (2 cardiac stents 2006) AFib (ablation) on eliquis CHF (EF 55-60% on 6/2022 echo) COPD/Asthma and MARIE former smoker presents to presurgical testing with diagnosis of other nonspecified abnormal finding of lung field. Pt had COVID-19 infection in 9/2022, treated with MAB infusion. CT scan showing an increase in right upper lung nodule. Pt is scheduled for flexible bronchoscopy with right video assisted thoracoscopy lung resection with interventional marking at 8:30AM.  83 y/o female PMH HTN HLD CAD (2 cardiac stents 2006) AFib (failed ablation) on eliquis CHF (EF 55-60% on 6/2022 echo) COPD/Asthma and MARIE former smoker presents to presurgical testing with diagnosis of other nonspecified abnormal finding of lung field. Pt had COVID-19 infection in 9/2022, treated with MAB infusion. CT scan showing an increase in right upper lung nodule. Pt is scheduled for flexible bronchoscopy with right video assisted thoracoscopy lung resection with interventional marking at 8:30AM.

## 2023-02-22 NOTE — HISTORY OF PRESENT ILLNESS
[FreeTextEntry1] : Ms. JANA OLIVARES, 82 year old female, former smoker (quit 6 months ago, 40PY), w/ hx of HLD, A-Fib on Eliquis, CAD (stents x 2 in 2006) on ASA, CHF, COPD/Asthma, MARIE, skin cancer, COVID Sept 2022 s/p MAB infusion, who presents with new lung nodule. \par \par ECHO on 6/29/22 at [facility]: EF 55-60%. Moderate Rt atrial enlargement; mild diastolic dysfunction.\par \par PFTs on 8/8/22: %, FEV1 88%, DLCO 60%.\par \par CT Chest on 9/30/22 at [facility] (compared to CT on 4/20/22):\par - bilateral apical pleural thickening; centrilobular emphysema\par - new 6 x 6mm slightly irregular nodule in the Rt apex (3:28), suspicious, previously noted as tiny Rt apical nodules\par - stable 6 x 3mm subpleural nodule in the RLL (3:82)\par - no mediastinal adenopathy\par \par Nodify blood work on 10/21/22: No significant level of autoantibodies detected post Nodify CDT risk of malignancy\par \par CT Chest on 1/5/23:\par - Continued interval increase in size of lobulated minimally spiculated RUL nodule which remains highly suspicious for malignancy, now measuring 8 x 10 mm PET suggested\par - Unchanged RUL 6 x 3 mm subpleural nodule (image 87)\par - Small pericardial effusion\par \par Patient is here today for a follow up. Last visit 11/2022- Nodify blood work reviewed and explained to patient and her family member, Nodify showed no significant risk for malignancy, however, RUL nodule is suspicious, will repeat CT chest in 3 months and RTC after for evaluation. \par \par Today, patient denies worsening SOB, chest pain, cough, hemoptysis, fever, chills, night sweats, lightheadedness or dizziness.\par \par \par

## 2023-02-22 NOTE — ASSESSMENT
[FreeTextEntry1] : Ms. JANA OLIVARES, 82 year old female, former smoker (quit 6 months ago, 40PY), w/ hx of HLD, A-Fib on Eliquis, CAD (stents x 2 in 2006) on ASA, CHF, COPD/Asthma, MARIE, skin cancer, COVID Sept 2022 s/p MAB infusion, who presents with new lung nodule. \par \par I have reviewed the patient's medical records and diagnostic images at time of this office consultation and have made the following recommendation:\par - CT Chest reviewed and results relayed to patient. Continued interval increase in size of lobulated minimally spiculated RUL nodule which remains highly suspicious for malignancy. Discussed Flex bronch, right VATS, lung resection with IR marking for diagnostic and therapeutic purposes. Risks, benefits and alternatives explained to patient. She has an upcoming appointment with Dr. Steen next week. Will await his recs prior to scheduling surgery.\par - Cardiac clearance will be needed prior to procedure. Hold Eliquis 48 hours prior to procedure \par \par Recommendations reviewed with patient during this office visit, and all questions answered; Patient instructed on the importance of follow up and verbalizes understanding.\par \par I, OZIEL ReedIM, personally performed the evaluation and management (E/M) services for this established patient. That E/M includes conducting the examination, assessing all new/exacerbated conditions, and establishing a new plan of care. Today, My ACP, Peggy Teresa, was here to observe my evaluation and management services for this patient to be followed going forward.\par \par

## 2023-02-22 NOTE — CONSULT LETTER
[Dear  ___] : Dear  [unfilled], [Courtesy Letter:] : I had the pleasure of seeing your patient, [unfilled], in my office today. [( Thank you for referring [unfilled] for consultation for _____ )] : Thank you for referring [unfilled] for consultation for [unfilled] [Please see my note below.] : Please see my note below. [Sincerely,] : Sincerely, [FreeTextEntry2] : Dr. Petty Steen (Pulm/Referring)\par Dr. Yash Tim (Cardiology) [FreeTextEntry3] : Jaswant Yoon MD, FACS \par Chief, Division of Thoracic Surgery \par Director, Minimally Invasive Thoracic Surgery \par Department of Cardiovascular and Thoracic Surgery \par [facility] \par , Cardiovascular and Thoracic Surgery\par

## 2023-02-25 ENCOUNTER — NON-APPOINTMENT (OUTPATIENT)
Age: 83
End: 2023-02-25

## 2023-02-28 ENCOUNTER — APPOINTMENT (OUTPATIENT)
Dept: INTERVENTIONAL RADIOLOGY/VASCULAR | Facility: CLINIC | Age: 83
End: 2023-02-28
Payer: MEDICARE

## 2023-02-28 VITALS — BODY MASS INDEX: 29.45 KG/M2 | HEIGHT: 60 IN | WEIGHT: 150 LBS

## 2023-02-28 PROCEDURE — 99449 NTRPROF PH1/NTRNET/EHR 31/>: CPT | Mod: PD

## 2023-02-28 RX ORDER — DOXYCYCLINE HYCLATE 100 MG/1
100 CAPSULE ORAL
Qty: 10 | Refills: 0 | Status: COMPLETED | COMMUNITY
Start: 2023-02-13 | End: 2023-02-28

## 2023-02-28 RX ORDER — PREDNISONE 10 MG/1
10 TABLET ORAL
Qty: 12 | Refills: 0 | Status: COMPLETED | COMMUNITY
Start: 2023-02-13 | End: 2023-02-28

## 2023-02-28 NOTE — HISTORY OF PRESENT ILLNESS
[FreeTextEntry1] : Patient is a 82 year old female with a past medical history of former smoker (quit 6 months ago, 40PY), w/ hx of HLD, A-Fib on Coumadin, CAD (stents x 2 in 2006) on ASA, CHF, COPD/Asthma, MARIE, skin cancer, COVID Sept 2022 s/p MAB infusion and lung nodule. Patient has been referred to IR by Dr. Yoon for consultation regarding lung marking procedure. Patient is scheduled with Dr. Yoon for a Flex bronch, right VATS, lung resection with IR lung marking before on 3/2/23. \par \par Patient endorses chills, hoarse voice, shortness of breath, sore throat and diarrhea \par \par Patient denies recent fever, chest pain, nausea, vomiting \par \par \par PET/CT 1/31/23 demonstrates \par "FDG avid right upper lobe nodule, increased in size from 5/3/2022, suspicious for primary lung malignancy.\par \par 2. Indeterminate focal hypermetabolism in distal esophagus. Please correlate with endoscopy.\par \par 3. Nonspecific, heterogeneous increased FDG activity in ascending and transverse colon and diffusely increased activity in distal small bowel. Please correlate clinically and with colonoscopy as indicated.\par \par 4. No scan evidence of metastatic disease."

## 2023-02-28 NOTE — DATA REVIEWED
[FreeTextEntry1] : Pet/CT reviewed with patient at length. All questions answered at time of consultation.

## 2023-02-28 NOTE — REASON FOR VISIT
[Consultation] : a consultation visit [Home] : at home, [unfilled] , at the time of the visit. [Medical Office: ([facility])___] : at the medical office located in  [Verbal consent obtained from patient] : the patient, [unfilled] [Family Member] : family member [FreeTextEntry1] : Lung Marking

## 2023-02-28 NOTE — REVIEW OF SYSTEMS
[Chills] : chills [Sore Throat] : sore throat [Hoarseness] : hoarseness [Shortness Of Breath] : shortness of breath [Diarrhea] : diarrhea [Easy Bleeding] : a tendency for easy bleeding [Easy Bruising] : a tendency for easy bruising [Fever] : no fever [Feeling Tired] : not feeling tired [Nosebleeds] : no nosebleeds [Chest Pain] : no chest pain [Palpitations] : no palpitations [Wheezing] : no wheezing [Cough] : no cough [Abdominal Pain] : no abdominal pain [Vomiting] : no vomiting [Constipation] : no constipation

## 2023-03-01 PROBLEM — G47.33 OBSTRUCTIVE SLEEP APNEA (ADULT) (PEDIATRIC): Chronic | Status: ACTIVE | Noted: 2023-02-16

## 2023-03-01 PROBLEM — R91.1 SOLITARY PULMONARY NODULE: Chronic | Status: ACTIVE | Noted: 2023-02-16

## 2023-03-01 NOTE — ASU PATIENT PROFILE, ADULT - NSICDXPASTMEDICALHX_GEN_ALL_CORE_FT
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

## 2023-03-01 NOTE — ASU PATIENT PROFILE, ADULT - MEDICATION ADMINISTRATION INFO, PROFILE
Patient transferred from ER to unit via cart daughter Rupa at bedside and grand daughter. Levo infusing at 15mcg and blood transfusing. O2 and monitor on. Code status discussed and verified to be meds only status- Daughter aware. Wife Noni called an condition update given- agreement with Meds only code status. Noni was attempting to come to hospital to visit. Pt denies pain in abdomen, some knee pain and bed adjusted.    no concerns

## 2023-03-02 ENCOUNTER — RESULT REVIEW (OUTPATIENT)
Age: 83
End: 2023-03-02

## 2023-03-02 ENCOUNTER — INPATIENT (INPATIENT)
Facility: HOSPITAL | Age: 83
LOS: 1 days | Discharge: HOME | End: 2023-03-04
Attending: THORACIC SURGERY (CARDIOTHORACIC VASCULAR SURGERY) | Admitting: THORACIC SURGERY (CARDIOTHORACIC VASCULAR SURGERY)
Payer: MEDICARE

## 2023-03-02 ENCOUNTER — APPOINTMENT (OUTPATIENT)
Dept: THORACIC SURGERY | Facility: HOSPITAL | Age: 83
End: 2023-03-02

## 2023-03-02 VITALS
HEIGHT: 60 IN | WEIGHT: 158.95 LBS | HEART RATE: 91 BPM | TEMPERATURE: 98 F | RESPIRATION RATE: 18 BRPM | DIASTOLIC BLOOD PRESSURE: 81 MMHG | OXYGEN SATURATION: 97 % | SYSTOLIC BLOOD PRESSURE: 103 MMHG

## 2023-03-02 DIAGNOSIS — Z98.49 CATARACT EXTRACTION STATUS, UNSPECIFIED EYE: Chronic | ICD-10-CM

## 2023-03-02 DIAGNOSIS — Z96.641 PRESENCE OF RIGHT ARTIFICIAL HIP JOINT: Chronic | ICD-10-CM

## 2023-03-02 DIAGNOSIS — Z96.642 PRESENCE OF LEFT ARTIFICIAL HIP JOINT: Chronic | ICD-10-CM

## 2023-03-02 DIAGNOSIS — R91.8 OTHER NONSPECIFIC ABNORMAL FINDING OF LUNG FIELD: ICD-10-CM

## 2023-03-02 DIAGNOSIS — Z98.890 OTHER SPECIFIED POSTPROCEDURAL STATES: Chronic | ICD-10-CM

## 2023-03-02 LAB
ANION GAP SERPL CALC-SCNC: 11 MMOL/L — SIGNIFICANT CHANGE UP (ref 7–14)
BLOOD GAS ARTERIAL - LYTES,HGB,ICA,LACT RESULT: SIGNIFICANT CHANGE UP
BLOOD GAS ARTERIAL - LYTES,HGB,ICA,LACT RESULT: SIGNIFICANT CHANGE UP
BUN SERPL-MCNC: 12 MG/DL — SIGNIFICANT CHANGE UP (ref 7–23)
CALCIUM SERPL-MCNC: 8.6 MG/DL — SIGNIFICANT CHANGE UP (ref 8.4–10.5)
CHLORIDE SERPL-SCNC: 109 MMOL/L — HIGH (ref 98–107)
CO2 SERPL-SCNC: 22 MMOL/L — SIGNIFICANT CHANGE UP (ref 22–31)
CREAT SERPL-MCNC: 0.92 MG/DL — SIGNIFICANT CHANGE UP (ref 0.5–1.3)
EGFR: 63 ML/MIN/1.73M2 — SIGNIFICANT CHANGE UP
GAS PNL BLDA: SIGNIFICANT CHANGE UP
GLUCOSE BLDC GLUCOMTR-MCNC: 144 MG/DL — HIGH (ref 70–99)
GLUCOSE SERPL-MCNC: 204 MG/DL — HIGH (ref 70–99)
HCT VFR BLD CALC: 32.4 % — LOW (ref 34.5–45)
HGB BLD-MCNC: 10.3 G/DL — LOW (ref 11.5–15.5)
MAGNESIUM SERPL-MCNC: 1.9 MG/DL — SIGNIFICANT CHANGE UP (ref 1.6–2.6)
MCHC RBC-ENTMCNC: 31.8 GM/DL — LOW (ref 32–36)
MCHC RBC-ENTMCNC: 32.7 PG — SIGNIFICANT CHANGE UP (ref 27–34)
MCV RBC AUTO: 102.9 FL — HIGH (ref 80–100)
NRBC # BLD: 0 /100 WBCS — SIGNIFICANT CHANGE UP (ref 0–0)
NRBC # FLD: 0 K/UL — SIGNIFICANT CHANGE UP (ref 0–0)
PHOSPHATE SERPL-MCNC: 4 MG/DL — SIGNIFICANT CHANGE UP (ref 2.5–4.5)
PLATELET # BLD AUTO: 229 K/UL — SIGNIFICANT CHANGE UP (ref 150–400)
POTASSIUM SERPL-MCNC: 4.3 MMOL/L — SIGNIFICANT CHANGE UP (ref 3.5–5.3)
POTASSIUM SERPL-SCNC: 4.3 MMOL/L — SIGNIFICANT CHANGE UP (ref 3.5–5.3)
RBC # BLD: 3.15 M/UL — LOW (ref 3.8–5.2)
RBC # FLD: 14.4 % — SIGNIFICANT CHANGE UP (ref 10.3–14.5)
SODIUM SERPL-SCNC: 142 MMOL/L — SIGNIFICANT CHANGE UP (ref 135–145)
WBC # BLD: 13.67 K/UL — HIGH (ref 3.8–10.5)
WBC # FLD AUTO: 13.67 K/UL — HIGH (ref 3.8–10.5)

## 2023-03-02 PROCEDURE — 32999 UNLISTED PX LUNGS & PLEURA: CPT

## 2023-03-02 PROCEDURE — 32666 THORACOSCOPY W/WEDGE RESECT: CPT | Mod: RT

## 2023-03-02 PROCEDURE — 93010 ELECTROCARDIOGRAM REPORT: CPT

## 2023-03-02 PROCEDURE — 88331 PATH CONSLTJ SURG 1 BLK 1SPC: CPT | Mod: 26

## 2023-03-02 PROCEDURE — 99292 CRITICAL CARE ADDL 30 MIN: CPT

## 2023-03-02 PROCEDURE — 77012 CT SCAN FOR NEEDLE BIOPSY: CPT | Mod: 26

## 2023-03-02 PROCEDURE — 99291 CRITICAL CARE FIRST HOUR: CPT

## 2023-03-02 PROCEDURE — 71045 X-RAY EXAM CHEST 1 VIEW: CPT | Mod: 26

## 2023-03-02 PROCEDURE — 88307 TISSUE EXAM BY PATHOLOGIST: CPT | Mod: 26

## 2023-03-02 DEVICE — STAPLER ETHICON ECHELON ENDOPATH GRIP SURFACE 45MM BLACK RELOAD: Type: IMPLANTABLE DEVICE | Site: RIGHT | Status: FUNCTIONAL

## 2023-03-02 DEVICE — STAPLER ETHICON GST ECHELON 45MM GOLD RELOAD: Type: IMPLANTABLE DEVICE | Site: RIGHT | Status: FUNCTIONAL

## 2023-03-02 DEVICE — CHEST DRAIN THORACIC ARGYLE PVC 20FR STRAIGHT: Type: IMPLANTABLE DEVICE | Site: RIGHT | Status: FUNCTIONAL

## 2023-03-02 DEVICE — STAPLER ETHICON GST ECHELON 45MM GREEN RELOAD: Type: IMPLANTABLE DEVICE | Site: RIGHT | Status: FUNCTIONAL

## 2023-03-02 RX ORDER — SODIUM CHLORIDE 9 MG/ML
4 INJECTION INTRAMUSCULAR; INTRAVENOUS; SUBCUTANEOUS EVERY 6 HOURS
Refills: 0 | Status: DISCONTINUED | OUTPATIENT
Start: 2023-03-02 | End: 2023-03-04

## 2023-03-02 RX ORDER — ACETAMINOPHEN 500 MG
975 TABLET ORAL ONCE
Refills: 0 | Status: COMPLETED | OUTPATIENT
Start: 2023-03-02 | End: 2023-03-02

## 2023-03-02 RX ORDER — ALBUTEROL 90 UG/1
2.5 AEROSOL, METERED ORAL ONCE
Refills: 0 | Status: COMPLETED | OUTPATIENT
Start: 2023-03-02 | End: 2023-03-02

## 2023-03-02 RX ORDER — HEPARIN SODIUM 5000 [USP'U]/ML
5000 INJECTION INTRAVENOUS; SUBCUTANEOUS EVERY 8 HOURS
Refills: 0 | Status: DISCONTINUED | OUTPATIENT
Start: 2023-03-02 | End: 2023-03-03

## 2023-03-02 RX ORDER — SODIUM CHLORIDE 9 MG/ML
250 INJECTION, SOLUTION INTRAVENOUS ONCE
Refills: 0 | Status: COMPLETED | OUTPATIENT
Start: 2023-03-02 | End: 2023-03-02

## 2023-03-02 RX ORDER — HEPARIN SODIUM 5000 [USP'U]/ML
5000 INJECTION INTRAVENOUS; SUBCUTANEOUS ONCE
Refills: 0 | Status: COMPLETED | OUTPATIENT
Start: 2023-03-02 | End: 2023-03-02

## 2023-03-02 RX ORDER — LIDOCAINE 4 G/100G
1 CREAM TOPICAL DAILY
Refills: 0 | Status: DISCONTINUED | OUTPATIENT
Start: 2023-03-02 | End: 2023-03-04

## 2023-03-02 RX ORDER — NALOXONE HYDROCHLORIDE 4 MG/.1ML
0.1 SPRAY NASAL
Refills: 0 | Status: DISCONTINUED | OUTPATIENT
Start: 2023-03-02 | End: 2023-03-04

## 2023-03-02 RX ORDER — FENTANYL CITRATE 50 UG/ML
25 INJECTION INTRAVENOUS
Refills: 0 | Status: DISCONTINUED | OUTPATIENT
Start: 2023-03-02 | End: 2023-03-02

## 2023-03-02 RX ORDER — FENTANYL CITRATE 50 UG/ML
50 INJECTION INTRAVENOUS
Refills: 0 | Status: DISCONTINUED | OUTPATIENT
Start: 2023-03-02 | End: 2023-03-02

## 2023-03-02 RX ORDER — HYDROMORPHONE HYDROCHLORIDE 2 MG/ML
0.5 INJECTION INTRAMUSCULAR; INTRAVENOUS; SUBCUTANEOUS ONCE
Refills: 0 | Status: DISCONTINUED | OUTPATIENT
Start: 2023-03-02 | End: 2023-03-02

## 2023-03-02 RX ORDER — ALBUTEROL 90 UG/1
2.5 AEROSOL, METERED ORAL EVERY 6 HOURS
Refills: 0 | Status: DISCONTINUED | OUTPATIENT
Start: 2023-03-02 | End: 2023-03-03

## 2023-03-02 RX ORDER — NOREPINEPHRINE BITARTRATE/D5W 8 MG/250ML
0.05 PLASTIC BAG, INJECTION (ML) INTRAVENOUS
Qty: 8 | Refills: 0 | Status: DISCONTINUED | OUTPATIENT
Start: 2023-03-02 | End: 2023-03-03

## 2023-03-02 RX ORDER — ONDANSETRON 8 MG/1
4 TABLET, FILM COATED ORAL ONCE
Refills: 0 | Status: DISCONTINUED | OUTPATIENT
Start: 2023-03-02 | End: 2023-03-02

## 2023-03-02 RX ORDER — ALBUTEROL 90 UG/1
2 AEROSOL, METERED ORAL EVERY 6 HOURS
Refills: 0 | Status: DISCONTINUED | OUTPATIENT
Start: 2023-03-02 | End: 2023-03-02

## 2023-03-02 RX ORDER — ACETAMINOPHEN 500 MG
1000 TABLET ORAL EVERY 6 HOURS
Refills: 0 | Status: DISCONTINUED | OUTPATIENT
Start: 2023-03-02 | End: 2023-03-03

## 2023-03-02 RX ORDER — SODIUM CHLORIDE 9 MG/ML
4 INJECTION INTRAMUSCULAR; INTRAVENOUS; SUBCUTANEOUS EVERY 12 HOURS
Refills: 0 | Status: DISCONTINUED | OUTPATIENT
Start: 2023-03-02 | End: 2023-03-02

## 2023-03-02 RX ORDER — ALBUTEROL 90 UG/1
2.5 AEROSOL, METERED ORAL EVERY 6 HOURS
Refills: 0 | Status: DISCONTINUED | OUTPATIENT
Start: 2023-03-02 | End: 2023-03-02

## 2023-03-02 RX ORDER — POLYETHYLENE GLYCOL 3350 17 G/17G
17 POWDER, FOR SOLUTION ORAL DAILY
Refills: 0 | Status: DISCONTINUED | OUTPATIENT
Start: 2023-03-02 | End: 2023-03-04

## 2023-03-02 RX ORDER — BUDESONIDE AND FORMOTEROL FUMARATE DIHYDRATE 160; 4.5 UG/1; UG/1
2 AEROSOL RESPIRATORY (INHALATION)
Refills: 0 | Status: DISCONTINUED | OUTPATIENT
Start: 2023-03-02 | End: 2023-03-04

## 2023-03-02 RX ORDER — PANTOPRAZOLE SODIUM 20 MG/1
40 TABLET, DELAYED RELEASE ORAL
Refills: 0 | Status: DISCONTINUED | OUTPATIENT
Start: 2023-03-02 | End: 2023-03-04

## 2023-03-02 RX ORDER — ASPIRIN/CALCIUM CARB/MAGNESIUM 324 MG
81 TABLET ORAL DAILY
Refills: 0 | Status: DISCONTINUED | OUTPATIENT
Start: 2023-03-02 | End: 2023-03-04

## 2023-03-02 RX ORDER — ONDANSETRON 8 MG/1
4 TABLET, FILM COATED ORAL EVERY 6 HOURS
Refills: 0 | Status: DISCONTINUED | OUTPATIENT
Start: 2023-03-02 | End: 2023-03-04

## 2023-03-02 RX ORDER — HYDROMORPHONE HYDROCHLORIDE 2 MG/ML
30 INJECTION INTRAMUSCULAR; INTRAVENOUS; SUBCUTANEOUS
Refills: 0 | Status: DISCONTINUED | OUTPATIENT
Start: 2023-03-02 | End: 2023-03-03

## 2023-03-02 RX ORDER — HYDROMORPHONE HYDROCHLORIDE 2 MG/ML
0.25 INJECTION INTRAMUSCULAR; INTRAVENOUS; SUBCUTANEOUS
Refills: 0 | Status: DISCONTINUED | OUTPATIENT
Start: 2023-03-02 | End: 2023-03-03

## 2023-03-02 RX ORDER — NALBUPHINE HYDROCHLORIDE 10 MG/ML
2.5 INJECTION, SOLUTION INTRAMUSCULAR; INTRAVENOUS; SUBCUTANEOUS EVERY 6 HOURS
Refills: 0 | Status: DISCONTINUED | OUTPATIENT
Start: 2023-03-02 | End: 2023-03-04

## 2023-03-02 RX ORDER — SENNA PLUS 8.6 MG/1
2 TABLET ORAL AT BEDTIME
Refills: 0 | Status: DISCONTINUED | OUTPATIENT
Start: 2023-03-02 | End: 2023-03-04

## 2023-03-02 RX ORDER — ACETAMINOPHEN 500 MG
1000 TABLET ORAL ONCE
Refills: 0 | Status: DISCONTINUED | OUTPATIENT
Start: 2023-03-02 | End: 2023-03-04

## 2023-03-02 RX ORDER — DORNASE ALFA 1 MG/ML
2.5 SOLUTION RESPIRATORY (INHALATION) DAILY
Refills: 0 | Status: DISCONTINUED | OUTPATIENT
Start: 2023-03-02 | End: 2023-03-04

## 2023-03-02 RX ADMIN — ALBUTEROL 2.5 MILLIGRAM(S): 90 AEROSOL, METERED ORAL at 21:59

## 2023-03-02 RX ADMIN — HEPARIN SODIUM 5000 UNIT(S): 5000 INJECTION INTRAVENOUS; SUBCUTANEOUS at 10:39

## 2023-03-02 RX ADMIN — SODIUM CHLORIDE 4 MILLILITER(S): 9 INJECTION INTRAMUSCULAR; INTRAVENOUS; SUBCUTANEOUS at 17:48

## 2023-03-02 RX ADMIN — FENTANYL CITRATE 50 MICROGRAM(S): 50 INJECTION INTRAVENOUS at 16:45

## 2023-03-02 RX ADMIN — SODIUM CHLORIDE 4 MILLILITER(S): 9 INJECTION INTRAMUSCULAR; INTRAVENOUS; SUBCUTANEOUS at 21:59

## 2023-03-02 RX ADMIN — ALBUTEROL 2.5 MILLIGRAM(S): 90 AEROSOL, METERED ORAL at 18:04

## 2023-03-02 RX ADMIN — Medication 975 MILLIGRAM(S): at 08:24

## 2023-03-02 RX ADMIN — SENNA PLUS 2 TABLET(S): 8.6 TABLET ORAL at 22:29

## 2023-03-02 RX ADMIN — SODIUM CHLORIDE 250 MILLILITER(S): 9 INJECTION, SOLUTION INTRAVENOUS at 23:00

## 2023-03-02 RX ADMIN — FENTANYL CITRATE 50 MICROGRAM(S): 50 INJECTION INTRAVENOUS at 16:15

## 2023-03-02 RX ADMIN — HEPARIN SODIUM 5000 UNIT(S): 5000 INJECTION INTRAVENOUS; SUBCUTANEOUS at 17:50

## 2023-03-02 RX ADMIN — LIDOCAINE 1 PATCH: 4 CREAM TOPICAL at 19:14

## 2023-03-02 RX ADMIN — LIDOCAINE 1 PATCH: 4 CREAM TOPICAL at 18:29

## 2023-03-02 RX ADMIN — Medication 400 MILLIGRAM(S): at 17:27

## 2023-03-02 RX ADMIN — Medication 1000 MILLIGRAM(S): at 18:25

## 2023-03-02 RX ADMIN — HYDROMORPHONE HYDROCHLORIDE 0.5 MILLIGRAM(S): 2 INJECTION INTRAMUSCULAR; INTRAVENOUS; SUBCUTANEOUS at 17:20

## 2023-03-02 RX ADMIN — BUDESONIDE AND FORMOTEROL FUMARATE DIHYDRATE 2 PUFF(S): 160; 4.5 AEROSOL RESPIRATORY (INHALATION) at 22:02

## 2023-03-02 RX ADMIN — HYDROMORPHONE HYDROCHLORIDE 30 MILLILITER(S): 2 INJECTION INTRAMUSCULAR; INTRAVENOUS; SUBCUTANEOUS at 19:49

## 2023-03-02 RX ADMIN — HYDROMORPHONE HYDROCHLORIDE 30 MILLILITER(S): 2 INJECTION INTRAMUSCULAR; INTRAVENOUS; SUBCUTANEOUS at 17:15

## 2023-03-02 RX ADMIN — HYDROMORPHONE HYDROCHLORIDE 0.5 MILLIGRAM(S): 2 INJECTION INTRAMUSCULAR; INTRAVENOUS; SUBCUTANEOUS at 17:05

## 2023-03-02 RX ADMIN — SODIUM CHLORIDE 30 MILLILITER(S): 9 INJECTION, SOLUTION INTRAVENOUS at 20:15

## 2023-03-02 NOTE — BRIEF OPERATIVE NOTE - NSICDXBRIEFPROCEDURE_GEN_ALL_CORE_FT
PROCEDURES:  Flexible bronchoscopy 02-Mar-2023 16:21:22  Kira Herring  VATS, with lung wedge resection 02-Mar-2023 16:22:18  Kira Herring

## 2023-03-02 NOTE — PROGRESS NOTE ADULT - SUBJECTIVE AND OBJECTIVE BOX
CHIEF COMPLAINT: FOLLOW UP IN ICU FOR POSTOPERATIVE CARE OF PATIENT WHO IS S/P LUNG RESECTION      PROCEDURES: Uniportal RUL wedge resection  02-Mar-2023      ISSUES:   Acute hypercarbic respiratory failure  Lung nodule  Post op pain  Chest tube in place  COPD  HTN  Asthma  CAD s/p stents (2006)  Chronic atrial fibrillation on Apixaban - (hx of prior failed ablation and cardioversion)  Chronic diastolic heart failure (EF 55-60%)  MARIE  Obesity  HLD  GERD  Hx of peptic ulcer disease  Smoker  Hx of CVA        INTERVAL EVENTS:   OR today. Extubated in OR.   Upon extubation, patient hypotensive. Central line placed. Patient started on levophed and vasopressin.  Pt transferred to PACU.    In PACU, blood gas showed CO2 retention and hypercarbia.  Pt started on high flow nasal cannula as nasal CPAP.  Pain meds increased.  Nebulizers given.  Repeat blood showed improvement of CO2 retention    Received patient on levophed and vasopressin. These were weaned to maintain MAP 65 via arterial line monitoring.    Pt transferred to CTICU.      HISTORY:   Patient reports moderate pain at chest wall incision sites which is worse with coughing and deep breathing without associated fever or dyspnea. Pain is improved with use of pain meds.     PHYSICAL EXAM:   Gen: Comfortable, No acute distress  Eyes: Sclera white, Conjunctiva normal, Eyelids normal, Pupils symmetrical   ENT: Mucous membranes moist,  ,  ,    Neck: Trachea midline,  ,  ,  ,  ,  ,    CV: Rate regular, Rhythm regular,  ,  ,    Resp: Breath sounds clear, No accessory muscles use, R chest tube in place,  ,    Abd: Soft, Non-distended, Non-tender,   ,  ,  ,    Skin: Warm, No peripheral edema of lower extremities,  ,    : Redman in place  Neuro: Moving all 4 extremities,    Psych: A&Ox3      ASSESSMENT AND PLAN:     NEURO:  Post-operative Pain - Stable. Pain control with PCA and Tylenol IV PRN.     Hx of CVA - stable. Continue aspirin.        RESPIRATORY:  Acute hypercarbic respiratory failure - High Flow Nasal Cannula for use as Nasal CPAP.  Wean FIO2 for goal O2sat above 92. Obtain CXR. Incentive spirometry. Chest PT and frequent suctioning. Continue bronchodilators. OOB to chair & ambulate w/ assistance. Continuous pulse ox. Monitor blood gas.       Chest tube – Pleurevac regulated suctioning. Monitor chest tube output.    COPD - worsened by recent thoracic surgery. Continue home inhalers.       MARIE - stable. Monitor nocturnal O2sat.            CARDIOVASCULAR:  Post-operative Hypotension without shock requiring IV pressors - wean pressors for MAP goal of 65    Telemetry (medical test) - Reviewed by me today independently. Rate controlled atrial fibrillation  HTN - currently hypotensive. hold home antihypertensives.  CAD - stable. Continue aspirin.  Chronic Afib - stable. continue rhythm and rate control meds. Hold anticoagulation until chest tube removed.  CHF - stable. maintain euvolemia.         RENAL:  Stable - Monitor IOs and electrolytes. Keep K above 4.0 and Mg above 2.0.          GASTROINTESTINAL:  GI prophylaxis not indicated  Zofran and Reglan IV PRN for nausea  Clear liquid diet        GERD - stable. Continue pantoprazole            HEMATOLOGIC:  No signs of active bleeding. Monitor Hgb in CBC in AM  DVT prophylaxis with heparin subQ and SCDs.               INFECTIOUS DISEASE:  All surgical sites appear clean. No signs of active infection. Will monitor for fever and leukocytosis.       ENDOCRINE:  Stable – Monitor glucose fingersticks for goal 120-180.               ONCOLOGY:  Lung nodule - Improved. S/P resection. Follow up final pathology.         Pertinent clinical, laboratory, radiographic, hemodynamic, echocardiographic, respiratory data, microbiologic data and chart were reviewed by myself and analyzed frequently throughout the course of the day and night by myself.    Plan discussed at length with the CTICU staff and Attending CT Surgeon -   Dr Jaswant Yoon.      Patient's status was discussed with patient at bedside.    Patient required critical care management.  75 minutes were spent evaluating, managing, providing, coordinating, and documenting care for this patient, exclusive of procedures from  530PM to 1159PM.	      ________________________________________________      _________________________  VITAL SIGNS:  Vital Signs Last 24 Hrs  T(C): 36.5 (02 Mar 2023 19:00), Max: 36.6 (02 Mar 2023 07:43)  T(F): 97.7 (02 Mar 2023 19:00), Max: 97.9 (02 Mar 2023 07:43)  HR: 90 (02 Mar 2023 19:13) (69 - 95)  BP: 122/83 (02 Mar 2023 19:00) (93/65 - 130/77)  BP(mean): 90 (02 Mar 2023 19:00) (72 - 99)  RR: 18 (02 Mar 2023 19:13) (12 - 23)  SpO2: 99% (02 Mar 2023 19:13) (91% - 100%)    Parameters below as of 02 Mar 2023 19:13  Patient On (Oxygen Delivery Method): nasal cannula, high flow  O2 Flow (L/min): 40  O2 Concentration (%): 40  I/Os:   I&O's Detail    02 Mar 2023 07:01  -  02 Mar 2023 20:47  --------------------------------------------------------  IN:  Total IN: 0 mL    OUT:    Chest Tube (mL): 5 mL    Indwelling Catheter - Urethral (mL): 100 mL  Total OUT: 105 mL    Total NET: -105 mL              MEDICATIONS:  MEDICATIONS  (STANDING):  acetaminophen   IVPB .. 1000 milliGRAM(s) IV Intermittent once  acetaminophen   IVPB .. 1000 milliGRAM(s) IV Intermittent every 6 hours  albuterol    0.083% 2.5 milliGRAM(s) Nebulizer every 6 hours  aspirin enteric coated 81 milliGRAM(s) Oral daily  budesonide 160 MICROgram(s)/formoterol 4.5 MICROgram(s) Inhaler 2 Puff(s) Inhalation two times a day  dornase olga Solution 2.5 milliGRAM(s) Inhalation daily  heparin   Injectable 5000 Unit(s) SubCutaneous every 8 hours  HYDROmorphone PCA (1 mG/mL) 30 milliLiter(s) PCA Continuous PCA Continuous  lactated ringers. 1000 milliLiter(s) (30 mL/Hr) IV Continuous <Continuous>  lidocaine   4% Patch 1 Patch Transdermal daily  norepinephrine Infusion 0.05 MICROgram(s)/kG/Min (6.76 mL/Hr) IV Continuous <Continuous>  pantoprazole    Tablet 40 milliGRAM(s) Oral before breakfast  polyethylene glycol 3350 17 Gram(s) Oral daily  senna 2 Tablet(s) Oral at bedtime  sodium chloride 3%  Inhalation 4 milliLiter(s) Inhalation every 6 hours    MEDICATIONS  (PRN):  HYDROmorphone PCA (1 mG/mL) Rescue Clinician Bolus 0.25 milliGRAM(s) IV Push every 15 minutes PRN for Pain Scale GREATER THAN 6  nalbuphine Injectable 2.5 milliGRAM(s) IV Push every 6 hours PRN Pruritus  naloxone Injectable 0.1 milliGRAM(s) IV Push every 3 minutes PRN For ANY of the following changes in patient status:  A. RR LESS THAN 10 breaths per minute, B. Oxygen saturation LESS THAN 90%, C. Sedation score of 6  ondansetron Injectable 4 milliGRAM(s) IV Push every 6 hours PRN Nausea      LABS:  Laboratory data was independently reviewed by me today.                           10.3   13.67 )-----------( 229      ( 02 Mar 2023 16:39 )             32.4     03-02    142  |  109<H>  |  12  ----------------------------<  204<H>  4.3   |  22  |  0.92    Ca    8.6      02 Mar 2023 16:39  Phos  4.0     03-02  Mg     1.90     03-02          ABG - ( 02 Mar 2023 18:10 )  pH, Arterial: 7.32  pH, Blood: x     /  pCO2: 43    /  pO2: 200   / HCO3: 22    / Base Excess: -3.8  /  SaO2: 99.4                  RADIOLOGY:   Radiology images were independently reviewed by me today. Reports were reviewed by me today.    Post op CXR 3/2/23 - R chest tube in place. No pneumothorax. Right upper field hazy opacity. No pleural effusions. 	    CHIEF COMPLAINT: FOLLOW UP IN ICU FOR POSTOPERATIVE CARE OF PATIENT WHO IS S/P LUNG RESECTION      PROCEDURES: Uniportal RUL wedge resection  02-Mar-2023      ISSUES:   Acute hypercarbic respiratory failure requiring CPAP  Lung nodule  Post op pain  Chest tube in place  COPD  HTN  Asthma  CAD s/p stents (2006)  Chronic atrial fibrillation on Apixaban - (hx of prior failed ablation and cardioversion)  Chronic diastolic heart failure (EF 55-60%)  MARIE  Obesity  HLD  GERD  Hx of peptic ulcer disease  Smoker  Hx of CVA        INTERVAL EVENTS:   OR today. Extubated in OR.   Upon extubation, patient hypotensive. Central line placed. Patient started on levophed and vasopressin.  Pt transferred to PACU.    In PACU, blood gas showed CO2 retention and hypercarbia.  Pt started on high flow nasal cannula as nasal CPAP.  Pain meds increased.  Nebulizers given.  Repeat blood showed improvement of CO2 retention    Received patient on levophed and vasopressin. These were weaned to maintain MAP 65 via arterial line monitoring.    Pt transferred to CTICU.      HISTORY:   Patient reports moderate pain at chest wall incision sites which is worse with coughing and deep breathing without associated fever or dyspnea. Pain is improved with use of pain meds.     PHYSICAL EXAM:   Gen: Comfortable, No acute distress  Eyes: Sclera white, Conjunctiva normal, Eyelids normal, Pupils symmetrical   ENT: Mucous membranes moist,  ,  ,    Neck: Trachea midline,  ,  ,  ,  ,  ,    CV: Rate regular, Rhythm regular,  ,  ,    Resp: Breath sounds clear, No accessory muscles use, R chest tube in place,  ,    Abd: Soft, Non-distended, Non-tender,   ,  ,  ,    Skin: Warm, No peripheral edema of lower extremities,  ,    : Redman in place  Neuro: Moving all 4 extremities,    Psych: A&Ox3      ASSESSMENT AND PLAN:     NEURO:  Post-operative Pain - Stable. Pain control with PCA and Tylenol IV PRN.     Hx of CVA - stable. Continue aspirin.        RESPIRATORY:  Acute hypercarbic respiratory failure - High Flow Nasal Cannula for use as Nasal CPAP.  Wean FIO2 for goal O2sat above 92. Obtain CXR. Incentive spirometry. Chest PT and frequent suctioning. Continue bronchodilators. OOB to chair & ambulate w/ assistance. Continuous pulse ox. Monitor blood gas.       Chest tube – Pleurevac regulated suctioning. Monitor chest tube output.    COPD - worsened by recent thoracic surgery. Continue home inhalers.       MARIE - stable. Monitor nocturnal O2sat.            CARDIOVASCULAR:  Post-operative Hypotension without shock requiring IV pressors - wean pressors for MAP goal of 65    Telemetry (medical test) - Reviewed by me today independently. Rate controlled atrial fibrillation  HTN - currently hypotensive. hold home antihypertensives.  CAD - stable. Continue aspirin.  Chronic Afib - stable. continue rhythm and rate control meds. Hold anticoagulation until chest tube removed.  CHF - stable. maintain euvolemia.         RENAL:  Stable - Monitor IOs and electrolytes. Keep K above 4.0 and Mg above 2.0.          GASTROINTESTINAL:  GI prophylaxis not indicated  Zofran and Reglan IV PRN for nausea  Clear liquid diet        GERD - stable. Continue pantoprazole            HEMATOLOGIC:  No signs of active bleeding. Monitor Hgb in CBC in AM  DVT prophylaxis with heparin subQ and SCDs.               INFECTIOUS DISEASE:  All surgical sites appear clean. No signs of active infection. Will monitor for fever and leukocytosis.       ENDOCRINE:  Stable – Monitor glucose fingersticks for goal 120-180.               ONCOLOGY:  Lung nodule - Improved. S/P resection. Follow up final pathology.         Pertinent clinical, laboratory, radiographic, hemodynamic, echocardiographic, respiratory data, microbiologic data and chart were reviewed by myself and analyzed frequently throughout the course of the day and night by myself.    Plan discussed at length with the CTICU staff and Attending CT Surgeon -   Dr Jaswant Yoon.      Patient's status was discussed with patient at bedside.    Patient required critical care management.  75 minutes were spent evaluating, managing, providing, coordinating, and documenting care for this patient, exclusive of procedures from  530PM to 1159PM.	      ________________________________________________      _________________________  VITAL SIGNS:  Vital Signs Last 24 Hrs  T(C): 36.5 (02 Mar 2023 19:00), Max: 36.6 (02 Mar 2023 07:43)  T(F): 97.7 (02 Mar 2023 19:00), Max: 97.9 (02 Mar 2023 07:43)  HR: 90 (02 Mar 2023 19:13) (69 - 95)  BP: 122/83 (02 Mar 2023 19:00) (93/65 - 130/77)  BP(mean): 90 (02 Mar 2023 19:00) (72 - 99)  RR: 18 (02 Mar 2023 19:13) (12 - 23)  SpO2: 99% (02 Mar 2023 19:13) (91% - 100%)    Parameters below as of 02 Mar 2023 19:13  Patient On (Oxygen Delivery Method): nasal cannula, high flow  O2 Flow (L/min): 40  O2 Concentration (%): 40  I/Os:   I&O's Detail    02 Mar 2023 07:01  -  02 Mar 2023 20:47  --------------------------------------------------------  IN:  Total IN: 0 mL    OUT:    Chest Tube (mL): 5 mL    Indwelling Catheter - Urethral (mL): 100 mL  Total OUT: 105 mL    Total NET: -105 mL              MEDICATIONS:  MEDICATIONS  (STANDING):  acetaminophen   IVPB .. 1000 milliGRAM(s) IV Intermittent once  acetaminophen   IVPB .. 1000 milliGRAM(s) IV Intermittent every 6 hours  albuterol    0.083% 2.5 milliGRAM(s) Nebulizer every 6 hours  aspirin enteric coated 81 milliGRAM(s) Oral daily  budesonide 160 MICROgram(s)/formoterol 4.5 MICROgram(s) Inhaler 2 Puff(s) Inhalation two times a day  dornase olga Solution 2.5 milliGRAM(s) Inhalation daily  heparin   Injectable 5000 Unit(s) SubCutaneous every 8 hours  HYDROmorphone PCA (1 mG/mL) 30 milliLiter(s) PCA Continuous PCA Continuous  lactated ringers. 1000 milliLiter(s) (30 mL/Hr) IV Continuous <Continuous>  lidocaine   4% Patch 1 Patch Transdermal daily  norepinephrine Infusion 0.05 MICROgram(s)/kG/Min (6.76 mL/Hr) IV Continuous <Continuous>  pantoprazole    Tablet 40 milliGRAM(s) Oral before breakfast  polyethylene glycol 3350 17 Gram(s) Oral daily  senna 2 Tablet(s) Oral at bedtime  sodium chloride 3%  Inhalation 4 milliLiter(s) Inhalation every 6 hours    MEDICATIONS  (PRN):  HYDROmorphone PCA (1 mG/mL) Rescue Clinician Bolus 0.25 milliGRAM(s) IV Push every 15 minutes PRN for Pain Scale GREATER THAN 6  nalbuphine Injectable 2.5 milliGRAM(s) IV Push every 6 hours PRN Pruritus  naloxone Injectable 0.1 milliGRAM(s) IV Push every 3 minutes PRN For ANY of the following changes in patient status:  A. RR LESS THAN 10 breaths per minute, B. Oxygen saturation LESS THAN 90%, C. Sedation score of 6  ondansetron Injectable 4 milliGRAM(s) IV Push every 6 hours PRN Nausea      LABS:  Laboratory data was independently reviewed by me today.                           10.3   13.67 )-----------( 229      ( 02 Mar 2023 16:39 )             32.4     03-02    142  |  109<H>  |  12  ----------------------------<  204<H>  4.3   |  22  |  0.92    Ca    8.6      02 Mar 2023 16:39  Phos  4.0     03-02  Mg     1.90     03-02          ABG - ( 02 Mar 2023 18:10 )  pH, Arterial: 7.32  pH, Blood: x     /  pCO2: 43    /  pO2: 200   / HCO3: 22    / Base Excess: -3.8  /  SaO2: 99.4                  RADIOLOGY:   Radiology images were independently reviewed by me today. Reports were reviewed by me today.    Post op CXR 3/2/23 - R chest tube in place. No pneumothorax. Right upper field hazy opacity. No pleural effusions. 	    CHIEF COMPLAINT: FOLLOW UP IN ICU FOR POSTOPERATIVE CARE OF PATIENT WHO IS S/P LUNG RESECTION      PROCEDURES: Uniportal RUL wedge resection  02-Mar-2023      ISSUES:   Acute hypercarbic respiratory failure requiring CPAP  Post operative hypotension without shock requiring IV pressors  Lung nodule  Post op pain  Chest tube in place  COPD  HTN  Asthma  CAD s/p stents (2006)  Chronic atrial fibrillation on Apixaban - (hx of prior failed ablation and cardioversion)  Chronic diastolic heart failure (EF 55-60%)  MARIE  Obesity  HLD  GERD  Hx of peptic ulcer disease  Smoker  Hx of CVA        INTERVAL EVENTS:   OR today. Extubated in OR.   Upon extubation, patient hypotensive. Central line placed. Patient started on levophed and vasopressin.  Pt transferred to PACU.    In PACU, blood gas showed CO2 retention and hypercarbia.  Pt started on high flow nasal cannula as nasal CPAP.  Pain meds increased.  Nebulizers given.  Repeat blood showed improvement of CO2 retention    Received patient on levophed and vasopressin. These were weaned to maintain MAP 65 via arterial line monitoring.    Pt transferred to CTICU.      HISTORY:   Patient reports moderate pain at chest wall incision sites which is worse with coughing and deep breathing without associated fever or dyspnea. Pain is improved with use of pain meds.     PHYSICAL EXAM:   Gen: Comfortable, No acute distress  Eyes: Sclera white, Conjunctiva normal, Eyelids normal, Pupils symmetrical   ENT: Mucous membranes moist,  ,  ,    Neck: Trachea midline,  ,  ,  ,  ,  ,    CV: Rate regular, Rhythm regular,  ,  ,    Resp: Breath sounds clear, No accessory muscles use, R chest tube in place,  ,    Abd: Soft, Non-distended, Non-tender,   ,  ,  ,    Skin: Warm, No peripheral edema of lower extremities,  ,    : Redman in place  Neuro: Moving all 4 extremities,    Psych: A&Ox3      ASSESSMENT AND PLAN:     NEURO:  Post-operative Pain - Stable. Pain control with PCA and Tylenol IV PRN.     Hx of CVA - stable. Continue aspirin.        RESPIRATORY:  Acute hypercarbic respiratory failure - High Flow Nasal Cannula for use as Nasal CPAP.  Wean FIO2 for goal O2sat above 92. Obtain CXR. Incentive spirometry. Chest PT and frequent suctioning. Continue bronchodilators. OOB to chair & ambulate w/ assistance. Continuous pulse ox. Monitor blood gas.       Chest tube – Pleurevac regulated suctioning. Monitor chest tube output.    COPD - worsened by recent thoracic surgery. Continue home inhalers.       MARIE - stable. Monitor nocturnal O2sat.            CARDIOVASCULAR:  Post-operative Hypotension without shock requiring IV pressors - wean pressors for MAP goal of 65    Telemetry (medical test) - Reviewed by me today independently. Rate controlled atrial fibrillation  HTN - currently hypotensive. hold home antihypertensives.  CAD - stable. Continue aspirin.  Chronic Afib - stable. continue rhythm and rate control meds. Hold anticoagulation until chest tube removed.  CHF - stable. maintain euvolemia.         RENAL:  Stable - Monitor IOs and electrolytes. Keep K above 4.0 and Mg above 2.0.          GASTROINTESTINAL:  GI prophylaxis not indicated  Zofran and Reglan IV PRN for nausea  Clear liquid diet        GERD - stable. Continue pantoprazole            HEMATOLOGIC:  No signs of active bleeding. Monitor Hgb in CBC in AM  DVT prophylaxis with heparin subQ and SCDs.               INFECTIOUS DISEASE:  All surgical sites appear clean. No signs of active infection. Will monitor for fever and leukocytosis.       ENDOCRINE:  Stable – Monitor glucose fingersticks for goal 120-180.               ONCOLOGY:  Lung nodule - Improved. S/P resection. Follow up final pathology.         Pertinent clinical, laboratory, radiographic, hemodynamic, echocardiographic, respiratory data, microbiologic data and chart were reviewed by myself and analyzed frequently throughout the course of the day and night by myself.    Plan discussed at length with the CTICU staff and Attending CT Surgeon -   Dr Jaswant Yoon.      Patient's status was discussed with patient at bedside.    Patient required critical care management.  75 minutes were spent evaluating, managing, providing, coordinating, and documenting care for this patient, exclusive of procedures from  530PM to 1159PM.	      ________________________________________________      _________________________  VITAL SIGNS:  Vital Signs Last 24 Hrs  T(C): 36.5 (02 Mar 2023 19:00), Max: 36.6 (02 Mar 2023 07:43)  T(F): 97.7 (02 Mar 2023 19:00), Max: 97.9 (02 Mar 2023 07:43)  HR: 90 (02 Mar 2023 19:13) (69 - 95)  BP: 122/83 (02 Mar 2023 19:00) (93/65 - 130/77)  BP(mean): 90 (02 Mar 2023 19:00) (72 - 99)  RR: 18 (02 Mar 2023 19:13) (12 - 23)  SpO2: 99% (02 Mar 2023 19:13) (91% - 100%)    Parameters below as of 02 Mar 2023 19:13  Patient On (Oxygen Delivery Method): nasal cannula, high flow  O2 Flow (L/min): 40  O2 Concentration (%): 40  I/Os:   I&O's Detail    02 Mar 2023 07:01  -  02 Mar 2023 20:47  --------------------------------------------------------  IN:  Total IN: 0 mL    OUT:    Chest Tube (mL): 5 mL    Indwelling Catheter - Urethral (mL): 100 mL  Total OUT: 105 mL    Total NET: -105 mL              MEDICATIONS:  MEDICATIONS  (STANDING):  acetaminophen   IVPB .. 1000 milliGRAM(s) IV Intermittent once  acetaminophen   IVPB .. 1000 milliGRAM(s) IV Intermittent every 6 hours  albuterol    0.083% 2.5 milliGRAM(s) Nebulizer every 6 hours  aspirin enteric coated 81 milliGRAM(s) Oral daily  budesonide 160 MICROgram(s)/formoterol 4.5 MICROgram(s) Inhaler 2 Puff(s) Inhalation two times a day  dornase olga Solution 2.5 milliGRAM(s) Inhalation daily  heparin   Injectable 5000 Unit(s) SubCutaneous every 8 hours  HYDROmorphone PCA (1 mG/mL) 30 milliLiter(s) PCA Continuous PCA Continuous  lactated ringers. 1000 milliLiter(s) (30 mL/Hr) IV Continuous <Continuous>  lidocaine   4% Patch 1 Patch Transdermal daily  norepinephrine Infusion 0.05 MICROgram(s)/kG/Min (6.76 mL/Hr) IV Continuous <Continuous>  pantoprazole    Tablet 40 milliGRAM(s) Oral before breakfast  polyethylene glycol 3350 17 Gram(s) Oral daily  senna 2 Tablet(s) Oral at bedtime  sodium chloride 3%  Inhalation 4 milliLiter(s) Inhalation every 6 hours    MEDICATIONS  (PRN):  HYDROmorphone PCA (1 mG/mL) Rescue Clinician Bolus 0.25 milliGRAM(s) IV Push every 15 minutes PRN for Pain Scale GREATER THAN 6  nalbuphine Injectable 2.5 milliGRAM(s) IV Push every 6 hours PRN Pruritus  naloxone Injectable 0.1 milliGRAM(s) IV Push every 3 minutes PRN For ANY of the following changes in patient status:  A. RR LESS THAN 10 breaths per minute, B. Oxygen saturation LESS THAN 90%, C. Sedation score of 6  ondansetron Injectable 4 milliGRAM(s) IV Push every 6 hours PRN Nausea      LABS:  Laboratory data was independently reviewed by me today.                           10.3   13.67 )-----------( 229      ( 02 Mar 2023 16:39 )             32.4     03-02    142  |  109<H>  |  12  ----------------------------<  204<H>  4.3   |  22  |  0.92    Ca    8.6      02 Mar 2023 16:39  Phos  4.0     03-02  Mg     1.90     03-02          ABG - ( 02 Mar 2023 18:10 )  pH, Arterial: 7.32  pH, Blood: x     /  pCO2: 43    /  pO2: 200   / HCO3: 22    / Base Excess: -3.8  /  SaO2: 99.4                  RADIOLOGY:   Radiology images were independently reviewed by me today. Reports were reviewed by me today.    Post op CXR 3/2/23 - R chest tube in place. No pneumothorax. Right upper field hazy opacity. No pleural effusions. 	    CHIEF COMPLAINT: FOLLOW UP IN ICU FOR POSTOPERATIVE CARE OF PATIENT WHO IS S/P LUNG RESECTION      PROCEDURES: Uniportal RUL wedge resection  02-Mar-2023      ISSUES:   Acute hypercarbic respiratory failure requiring CPAP  Post operative hypotension without shock requiring IV pressors  Lung nodule  Post op pain  Chest tube in place  COPD  HTN  Asthma  CAD s/p stents (2006)  Chronic atrial fibrillation on Apixaban - (hx of prior failed ablation and cardioversion)  Chronic diastolic heart failure (EF 55-60%)  MARIE  Obesity  HLD  GERD  Hx of peptic ulcer disease  Smoker  Hx of CVA        INTERVAL EVENTS:   OR today. Extubated in OR.   Upon extubation, patient hypotensive. Central line placed. Patient started on levophed and vasopressin.  Pt transferred to PACU.    In PACU, blood gas showed CO2 retention and acidemia.   I was called to evaluate patient at bedside in the PACU while she was waiting for a bed in CTICU because of the hypercapnia on 2 repeat blood gases.     I started pt on high flow nasal cannula as nasal CPAP.  Pain meds increased.  Nebulizers given.  Repeat blood showed improvement of CO2 retention    Received patient on levophed and vasopressin. These were weaned to maintain MAP 65 via arterial line monitoring.    Pt transferred to CTICU.      HISTORY:   Patient reports moderate pain at chest wall incision sites which is worse with coughing and deep breathing without associated fever or dyspnea. Pain is improved with use of pain meds.     PHYSICAL EXAM:   Gen: Comfortable, No acute distress  Eyes: Sclera white, Conjunctiva normal, Eyelids normal, Pupils symmetrical   ENT: Mucous membranes moist,  ,  ,    Neck: Trachea midline,  ,  ,  ,  ,  ,    CV: Rate regular, Rhythm regular,  ,  ,    Resp: Breath sounds clear, No accessory muscles use, R chest tube in place,  ,    Abd: Soft, Non-distended, Non-tender,   ,  ,  ,    Skin: Warm, No peripheral edema of lower extremities,  ,    : Redman in place  Neuro: Moving all 4 extremities,    Psych: A&Ox3      ASSESSMENT AND PLAN:     NEURO:  Post-operative Pain - Stable. Pain control with PCA and Tylenol IV PRN.     Hx of CVA - stable. Continue aspirin.        RESPIRATORY:  Acute hypercarbic respiratory failure - High Flow Nasal Cannula for use as Nasal CPAP.  Wean FIO2 for goal O2sat above 92. Obtain CXR. Incentive spirometry. Chest PT and frequent suctioning. Continue bronchodilators. OOB to chair & ambulate w/ assistance. Continuous pulse ox. Monitor blood gas.       Chest tube – Pleurevac regulated suctioning. Monitor chest tube output.    COPD - worsened by recent thoracic surgery. Continue home inhalers.       MARIE - stable. Monitor nocturnal O2sat.            CARDIOVASCULAR:  Post-operative Hypotension without shock requiring IV pressors - wean pressors for MAP goal of 65    Telemetry (medical test) - Reviewed by me today independently. Rate controlled atrial fibrillation  HTN - currently hypotensive. hold home antihypertensives.  CAD - stable. Continue aspirin.  Chronic Afib - stable. continue rhythm and rate control meds. Hold anticoagulation until chest tube removed.  CHF - stable. maintain euvolemia.         RENAL:  Stable - Monitor IOs and electrolytes. Keep K above 4.0 and Mg above 2.0.          GASTROINTESTINAL:  GI prophylaxis not indicated  Zofran and Reglan IV PRN for nausea  Clear liquid diet        GERD - stable. Continue pantoprazole            HEMATOLOGIC:  No signs of active bleeding. Monitor Hgb in CBC in AM  DVT prophylaxis with heparin subQ and SCDs.               INFECTIOUS DISEASE:  All surgical sites appear clean. No signs of active infection. Will monitor for fever and leukocytosis.       ENDOCRINE:  Stable – Monitor glucose fingersticks for goal 120-180.               ONCOLOGY:  Lung nodule - Improved. S/P resection. Follow up final pathology.         Pertinent clinical, laboratory, radiographic, hemodynamic, echocardiographic, respiratory data, microbiologic data and chart were reviewed by myself and analyzed frequently throughout the course of the day and night by myself.    Plan discussed at length with the CTICU staff and Attending CT Surgeon -   Dr Jaswant Yoon.      Patient's status was discussed with patient at bedside.    Patient required critical care management.  75 minutes were spent evaluating, managing, providing, coordinating, and documenting care for this patient, exclusive of procedures from  530PM to 1159PM.	      ________________________________________________      _________________________  VITAL SIGNS:  Vital Signs Last 24 Hrs  T(C): 36.5 (02 Mar 2023 19:00), Max: 36.6 (02 Mar 2023 07:43)  T(F): 97.7 (02 Mar 2023 19:00), Max: 97.9 (02 Mar 2023 07:43)  HR: 90 (02 Mar 2023 19:13) (69 - 95)  BP: 122/83 (02 Mar 2023 19:00) (93/65 - 130/77)  BP(mean): 90 (02 Mar 2023 19:00) (72 - 99)  RR: 18 (02 Mar 2023 19:13) (12 - 23)  SpO2: 99% (02 Mar 2023 19:13) (91% - 100%)    Parameters below as of 02 Mar 2023 19:13  Patient On (Oxygen Delivery Method): nasal cannula, high flow  O2 Flow (L/min): 40  O2 Concentration (%): 40  I/Os:   I&O's Detail    02 Mar 2023 07:01  -  02 Mar 2023 20:47  --------------------------------------------------------  IN:  Total IN: 0 mL    OUT:    Chest Tube (mL): 5 mL    Indwelling Catheter - Urethral (mL): 100 mL  Total OUT: 105 mL    Total NET: -105 mL              MEDICATIONS:  MEDICATIONS  (STANDING):  acetaminophen   IVPB .. 1000 milliGRAM(s) IV Intermittent once  acetaminophen   IVPB .. 1000 milliGRAM(s) IV Intermittent every 6 hours  albuterol    0.083% 2.5 milliGRAM(s) Nebulizer every 6 hours  aspirin enteric coated 81 milliGRAM(s) Oral daily  budesonide 160 MICROgram(s)/formoterol 4.5 MICROgram(s) Inhaler 2 Puff(s) Inhalation two times a day  dornase olga Solution 2.5 milliGRAM(s) Inhalation daily  heparin   Injectable 5000 Unit(s) SubCutaneous every 8 hours  HYDROmorphone PCA (1 mG/mL) 30 milliLiter(s) PCA Continuous PCA Continuous  lactated ringers. 1000 milliLiter(s) (30 mL/Hr) IV Continuous <Continuous>  lidocaine   4% Patch 1 Patch Transdermal daily  norepinephrine Infusion 0.05 MICROgram(s)/kG/Min (6.76 mL/Hr) IV Continuous <Continuous>  pantoprazole    Tablet 40 milliGRAM(s) Oral before breakfast  polyethylene glycol 3350 17 Gram(s) Oral daily  senna 2 Tablet(s) Oral at bedtime  sodium chloride 3%  Inhalation 4 milliLiter(s) Inhalation every 6 hours    MEDICATIONS  (PRN):  HYDROmorphone PCA (1 mG/mL) Rescue Clinician Bolus 0.25 milliGRAM(s) IV Push every 15 minutes PRN for Pain Scale GREATER THAN 6  nalbuphine Injectable 2.5 milliGRAM(s) IV Push every 6 hours PRN Pruritus  naloxone Injectable 0.1 milliGRAM(s) IV Push every 3 minutes PRN For ANY of the following changes in patient status:  A. RR LESS THAN 10 breaths per minute, B. Oxygen saturation LESS THAN 90%, C. Sedation score of 6  ondansetron Injectable 4 milliGRAM(s) IV Push every 6 hours PRN Nausea      LABS:  Laboratory data was independently reviewed by me today.                           10.3   13.67 )-----------( 229      ( 02 Mar 2023 16:39 )             32.4     03-02    142  |  109<H>  |  12  ----------------------------<  204<H>  4.3   |  22  |  0.92    Ca    8.6      02 Mar 2023 16:39  Phos  4.0     03-02  Mg     1.90     03-02          ABG - ( 02 Mar 2023 18:10 )  pH, Arterial: 7.32  pH, Blood: x     /  pCO2: 43    /  pO2: 200   / HCO3: 22    / Base Excess: -3.8  /  SaO2: 99.4                  RADIOLOGY:   Radiology images were independently reviewed by me today. Reports were reviewed by me today.    Post op CXR 3/2/23 - R chest tube in place. No pneumothorax. Right upper field hazy opacity. No pleural effusions.

## 2023-03-03 LAB
ANION GAP SERPL CALC-SCNC: 11 MMOL/L — SIGNIFICANT CHANGE UP (ref 7–14)
BLOOD GAS ARTERIAL - LYTES,HGB,ICA,LACT RESULT: SIGNIFICANT CHANGE UP
BUN SERPL-MCNC: 14 MG/DL — SIGNIFICANT CHANGE UP (ref 7–23)
CALCIUM SERPL-MCNC: 8.6 MG/DL — SIGNIFICANT CHANGE UP (ref 8.4–10.5)
CHLORIDE SERPL-SCNC: 107 MMOL/L — SIGNIFICANT CHANGE UP (ref 98–107)
CO2 SERPL-SCNC: 21 MMOL/L — LOW (ref 22–31)
CREAT SERPL-MCNC: 0.77 MG/DL — SIGNIFICANT CHANGE UP (ref 0.5–1.3)
EGFR: 77 ML/MIN/1.73M2 — SIGNIFICANT CHANGE UP
GLUCOSE SERPL-MCNC: 161 MG/DL — HIGH (ref 70–99)
HCT VFR BLD CALC: 30.5 % — LOW (ref 34.5–45)
HGB BLD-MCNC: 9.9 G/DL — LOW (ref 11.5–15.5)
MAGNESIUM SERPL-MCNC: 2 MG/DL — SIGNIFICANT CHANGE UP (ref 1.6–2.6)
MCHC RBC-ENTMCNC: 32.5 GM/DL — SIGNIFICANT CHANGE UP (ref 32–36)
MCHC RBC-ENTMCNC: 33.3 PG — SIGNIFICANT CHANGE UP (ref 27–34)
MCV RBC AUTO: 102.7 FL — HIGH (ref 80–100)
NRBC # BLD: 0 /100 WBCS — SIGNIFICANT CHANGE UP (ref 0–0)
NRBC # FLD: 0 K/UL — SIGNIFICANT CHANGE UP (ref 0–0)
PHOSPHATE SERPL-MCNC: 3.6 MG/DL — SIGNIFICANT CHANGE UP (ref 2.5–4.5)
PLATELET # BLD AUTO: 212 K/UL — SIGNIFICANT CHANGE UP (ref 150–400)
POTASSIUM SERPL-MCNC: 4.6 MMOL/L — SIGNIFICANT CHANGE UP (ref 3.5–5.3)
POTASSIUM SERPL-SCNC: 4.6 MMOL/L — SIGNIFICANT CHANGE UP (ref 3.5–5.3)
RBC # BLD: 2.97 M/UL — LOW (ref 3.8–5.2)
RBC # FLD: 14.3 % — SIGNIFICANT CHANGE UP (ref 10.3–14.5)
SODIUM SERPL-SCNC: 139 MMOL/L — SIGNIFICANT CHANGE UP (ref 135–145)
WBC # BLD: 13.69 K/UL — HIGH (ref 3.8–10.5)
WBC # FLD AUTO: 13.69 K/UL — HIGH (ref 3.8–10.5)

## 2023-03-03 PROCEDURE — 99223 1ST HOSP IP/OBS HIGH 75: CPT

## 2023-03-03 PROCEDURE — 71045 X-RAY EXAM CHEST 1 VIEW: CPT | Mod: 26

## 2023-03-03 PROCEDURE — 99291 CRITICAL CARE FIRST HOUR: CPT

## 2023-03-03 RX ORDER — SODIUM CHLORIDE 9 MG/ML
250 INJECTION, SOLUTION INTRAVENOUS ONCE
Refills: 0 | Status: COMPLETED | OUTPATIENT
Start: 2023-03-03 | End: 2023-03-03

## 2023-03-03 RX ORDER — ACETAMINOPHEN 500 MG
1000 TABLET ORAL ONCE
Refills: 0 | Status: COMPLETED | OUTPATIENT
Start: 2023-03-03 | End: 2023-03-03

## 2023-03-03 RX ORDER — APIXABAN 2.5 MG/1
5 TABLET, FILM COATED ORAL
Refills: 0 | Status: DISCONTINUED | OUTPATIENT
Start: 2023-03-03 | End: 2023-03-04

## 2023-03-03 RX ORDER — FUROSEMIDE 40 MG
10 TABLET ORAL ONCE
Refills: 0 | Status: COMPLETED | OUTPATIENT
Start: 2023-03-03 | End: 2023-03-03

## 2023-03-03 RX ORDER — VERAPAMIL HCL 240 MG
40 CAPSULE, EXTENDED RELEASE PELLETS 24 HR ORAL EVERY 8 HOURS
Refills: 0 | Status: DISCONTINUED | OUTPATIENT
Start: 2023-03-03 | End: 2023-03-04

## 2023-03-03 RX ORDER — OXYCODONE HYDROCHLORIDE 5 MG/1
2.5 TABLET ORAL
Refills: 0 | Status: DISCONTINUED | OUTPATIENT
Start: 2023-03-03 | End: 2023-03-04

## 2023-03-03 RX ORDER — VERAPAMIL HCL 240 MG
120 CAPSULE, EXTENDED RELEASE PELLETS 24 HR ORAL DAILY
Refills: 0 | Status: DISCONTINUED | OUTPATIENT
Start: 2023-03-03 | End: 2023-03-03

## 2023-03-03 RX ADMIN — Medication 400 MILLIGRAM(S): at 11:30

## 2023-03-03 RX ADMIN — Medication 40 MILLIGRAM(S): at 19:04

## 2023-03-03 RX ADMIN — SENNA PLUS 2 TABLET(S): 8.6 TABLET ORAL at 21:41

## 2023-03-03 RX ADMIN — LIDOCAINE 1 PATCH: 4 CREAM TOPICAL at 13:56

## 2023-03-03 RX ADMIN — Medication 10 MILLIGRAM(S): at 19:03

## 2023-03-03 RX ADMIN — ALBUTEROL 2.5 MILLIGRAM(S): 90 AEROSOL, METERED ORAL at 08:59

## 2023-03-03 RX ADMIN — SODIUM CHLORIDE 4 MILLILITER(S): 9 INJECTION INTRAMUSCULAR; INTRAVENOUS; SUBCUTANEOUS at 21:28

## 2023-03-03 RX ADMIN — LIDOCAINE 1 PATCH: 4 CREAM TOPICAL at 19:30

## 2023-03-03 RX ADMIN — Medication 400 MILLIGRAM(S): at 20:00

## 2023-03-03 RX ADMIN — OXYCODONE HYDROCHLORIDE 2.5 MILLIGRAM(S): 5 TABLET ORAL at 16:30

## 2023-03-03 RX ADMIN — APIXABAN 5 MILLIGRAM(S): 2.5 TABLET, FILM COATED ORAL at 21:49

## 2023-03-03 RX ADMIN — Medication 400 MILLIGRAM(S): at 00:10

## 2023-03-03 RX ADMIN — PANTOPRAZOLE SODIUM 40 MILLIGRAM(S): 20 TABLET, DELAYED RELEASE ORAL at 06:02

## 2023-03-03 RX ADMIN — SODIUM CHLORIDE 4 MILLILITER(S): 9 INJECTION INTRAMUSCULAR; INTRAVENOUS; SUBCUTANEOUS at 09:00

## 2023-03-03 RX ADMIN — SODIUM CHLORIDE 4 MILLILITER(S): 9 INJECTION INTRAMUSCULAR; INTRAVENOUS; SUBCUTANEOUS at 04:56

## 2023-03-03 RX ADMIN — BUDESONIDE AND FORMOTEROL FUMARATE DIHYDRATE 2 PUFF(S): 160; 4.5 AEROSOL RESPIRATORY (INHALATION) at 09:48

## 2023-03-03 RX ADMIN — SODIUM CHLORIDE 30 MILLILITER(S): 9 INJECTION, SOLUTION INTRAVENOUS at 07:29

## 2023-03-03 RX ADMIN — LIDOCAINE 1 PATCH: 4 CREAM TOPICAL at 07:37

## 2023-03-03 RX ADMIN — Medication 400 MILLIGRAM(S): at 05:43

## 2023-03-03 RX ADMIN — OXYCODONE HYDROCHLORIDE 2.5 MILLIGRAM(S): 5 TABLET ORAL at 17:30

## 2023-03-03 RX ADMIN — HEPARIN SODIUM 5000 UNIT(S): 5000 INJECTION INTRAVENOUS; SUBCUTANEOUS at 02:56

## 2023-03-03 RX ADMIN — POLYETHYLENE GLYCOL 3350 17 GRAM(S): 17 POWDER, FOR SOLUTION ORAL at 13:56

## 2023-03-03 RX ADMIN — Medication 81 MILLIGRAM(S): at 13:56

## 2023-03-03 RX ADMIN — Medication 1000 MILLIGRAM(S): at 20:15

## 2023-03-03 RX ADMIN — BUDESONIDE AND FORMOTEROL FUMARATE DIHYDRATE 2 PUFF(S): 160; 4.5 AEROSOL RESPIRATORY (INHALATION) at 21:31

## 2023-03-03 RX ADMIN — HEPARIN SODIUM 5000 UNIT(S): 5000 INJECTION INTRAVENOUS; SUBCUTANEOUS at 13:57

## 2023-03-03 RX ADMIN — HYDROMORPHONE HYDROCHLORIDE 30 MILLILITER(S): 2 INJECTION INTRAMUSCULAR; INTRAVENOUS; SUBCUTANEOUS at 07:27

## 2023-03-03 RX ADMIN — ALBUTEROL 2.5 MILLIGRAM(S): 90 AEROSOL, METERED ORAL at 04:56

## 2023-03-03 RX ADMIN — Medication 40 MILLIGRAM(S): at 12:10

## 2023-03-03 RX ADMIN — Medication 1000 MILLIGRAM(S): at 12:00

## 2023-03-03 RX ADMIN — SODIUM CHLORIDE 250 MILLILITER(S): 9 INJECTION, SOLUTION INTRAVENOUS at 04:37

## 2023-03-03 RX ADMIN — DORNASE ALFA 2.5 MILLIGRAM(S): 1 SOLUTION RESPIRATORY (INHALATION) at 09:00

## 2023-03-03 NOTE — PROGRESS NOTE ADULT - SUBJECTIVE AND OBJECTIVE BOX
RECORD, JANA Changy   Female   MRN-[id number]         No Known Allergies             Daily     Daily Weight in k.8 (02 Mar 2023 20:00)Drug Dosing Weight  Height (cm): 152.4 (02 Mar 2023 07:43)  Weight (kg): 72.1 (02 Mar 2023 07:43)  BMI (kg/m2): 31 (02 Mar 2023 07:43)  BSA (m2): 1.69 (02 Mar 2023 07:43)    HPI:      CHIEF COMPLAINT: Follow up in ICU  for postoperative care of patient who is s/p     Procedure: Uniportal RUL wedge resection  02-Mar-2023                       Issues:  Acute hypercarbic respiratory failure requiring high flow  Post operative hypotension without shock requiring IV pressors - Resolved  Lung nodule  Post op pain  Chest tube in place  COPD  HTN  Asthma  CAD s/p stents ()  Chronic atrial fibrillation on Apixaban - (hx of prior failed ablation and cardioversion)  Chronic diastolic heart failure (EF 55-60%)  MARIE  Obesity  HLD  GERD  Hx of peptic ulcer disease  Smoker  Hx of CVA      Postop course:     Patient reports moderate pain at chest wall incision sites which is worse with coughing and deep breathing without associated fever or dyspnea. Pain is improved with use of PCA and  oral pain meds.         Home Medications:  albuterol 90 mcg/inh inhalation aerosol: 2 puff(s) inhaled every 6 hours, As Needed (02 Mar 2023 07:58)  aspirin 81 mg oral tablet: 1 tab(s) orally once a day (02 Mar 2023 07:58)  doxycycline: 100 milligram(s) orally 2 times a day  to be completed by 23 (02 Mar 2023 07:58)  Eliquis 5 mg oral tablet: 1 tab(s) orally 2 times a day  last dose on 23 (02 Mar 2023 07:58)  pantoprazole 40 mg oral delayed release tablet: 1 tab(s) orally once a day (02 Mar 2023 07:58)  predniSONE 10 mg oral tablet: 1 tab(s) orally as directed LD 2023 (02 Mar 2023 07:58)  Symbicort 160 mcg-4.5 mcg/inh inhalation aerosol: 2 puff(s) inhaled 2 times a day (02 Mar 2023 07:58)  verapamil 240 mg/24 hours oral capsule, extended release: 1 cap(s) orally once a day (02 Mar 2023 07:58)    PAST MEDICAL & SURGICAL HISTORY:  Coronary Stent  to RCA, LAD, and DIAG 06 at [facility]      Afib  x 15 yrs --on Coumadin  attempted cardioversion 13 yrs ago--failed      GERD (Gastroesophageal Reflux Disease)      Hypercholesterolemia      Emphysema/COPD      Lip Cancer  resected 6 yrs ago (no chemo/rad)      Skin Cancer of Face  removed 1 yr ago      Skin Cancer of Nose  1 yr ago- removed      H/O: CVA  pt unaware of CVA, yet showed up on CT of head as old CVA      CHF (congestive heart failure)      PUD (peptic ulcer disease)      Asthma      Smoker      CAD (coronary artery disease)      Melanoma      Nodule of upper lobe of right lung      MARIE (obstructive sleep apnea)      History of Hysterectomy   for fibroids        History of   , , ,       History of Cholecystectomy        History of Appendectomy  childhood        S/P T&amp;A  childhood        History of cholecystectomy      History of appendectomy      Melanoma      History of total right hip replacement      History of total left hip replacement      History of cataract surgery      H/O cardiac radiofrequency ablation        Vital Signs Last 24 Hrs  T(C): 36.2 (03 Mar 2023 08:00), Max: 36.6 (02 Mar 2023 17:00)  T(F): 97.2 (03 Mar 2023 08:00), Max: 97.8 (02 Mar 2023 17:00)  HR: 99 (03 Mar 2023 09:00) (69 - 103)  BP: 118/86 (03 Mar 2023 00:00) (93/65 - 130/96)  BP(mean): 97 (03 Mar 2023 00:00) (72 - 106)  RR: 18 (03 Mar 2023 10:00) (10 - 24)  SpO2: 96% (03 Mar 2023 10:00) (91% - 100%)    Parameters below as of 03 Mar 2023 10:00  Patient On (Oxygen Delivery Method): nasal cannula w/ humidification  O2 Flow (L/min): 4    I&O's Detail    02 Mar 2023 07:01  -  03 Mar 2023 07:00  --------------------------------------------------------  IN:    IV PiggyBack: 100 mL    Lactated Ringers: 360 mL    Lactated Ringers Bolus: 250 mL  Total IN: 710 mL    OUT:    Chest Tube (mL): 45 mL    Indwelling Catheter - Urethral (mL): 445 mL  Total OUT: 490 mL    Total NET: 220 mL      03 Mar 2023 07:01  -  03 Mar 2023 10:31  --------------------------------------------------------  IN:    Lactated Ringers: 60 mL  Total IN: 60 mL    OUT:    Chest Tube (mL): 10 mL    Indwelling Catheter - Urethral (mL): 90 mL  Total OUT: 100 mL    Total NET: -40 mL        CAPILLARY BLOOD GLUCOSE      POCT Blood Glucose.: 144 mg/dL (02 Mar 2023 20:25)    Home Medications:  albuterol 90 mcg/inh inhalation aerosol: 2 puff(s) inhaled every 6 hours, As Needed (02 Mar 2023 07:58)  aspirin 81 mg oral tablet: 1 tab(s) orally once a day (02 Mar 2023 07:58)  doxycycline: 100 milligram(s) orally 2 times a day  to be completed by 23 (02 Mar 2023 07:58)  Eliquis 5 mg oral tablet: 1 tab(s) orally 2 times a day  last dose on 23 (02 Mar 2023 07:58)  pantoprazole 40 mg oral delayed release tablet: 1 tab(s) orally once a day (02 Mar 2023 07:58)  predniSONE 10 mg oral tablet: 1 tab(s) orally as directed LD 2023 (02 Mar 2023 07:58)  Symbicort 160 mcg-4.5 mcg/inh inhalation aerosol: 2 puff(s) inhaled 2 times a day (02 Mar 2023 07:58)  verapamil 240 mg/24 hours oral capsule, extended release: 1 cap(s) orally once a day (02 Mar 2023 07:58)    MEDICATIONS  (STANDING):  acetaminophen   IVPB .. 1000 milliGRAM(s) IV Intermittent once  acetaminophen   IVPB .. 1000 milliGRAM(s) IV Intermittent once  aspirin enteric coated 81 milliGRAM(s) Oral daily  budesonide 160 MICROgram(s)/formoterol 4.5 MICROgram(s) Inhaler 2 Puff(s) Inhalation two times a day  dornase olga Solution 2.5 milliGRAM(s) Inhalation daily  heparin   Injectable 5000 Unit(s) SubCutaneous every 8 hours  HYDROmorphone PCA (1 mG/mL) 30 milliLiter(s) PCA Continuous PCA Continuous  lactated ringers. 1000 milliLiter(s) (30 mL/Hr) IV Continuous <Continuous>  lidocaine   4% Patch 1 Patch Transdermal daily  norepinephrine Infusion 0.05 MICROgram(s)/kG/Min (6.76 mL/Hr) IV Continuous <Continuous>  pantoprazole    Tablet 40 milliGRAM(s) Oral before breakfast  polyethylene glycol 3350 17 Gram(s) Oral daily  senna 2 Tablet(s) Oral at bedtime  sodium chloride 3%  Inhalation 4 milliLiter(s) Inhalation every 6 hours  verapamil 40 milliGRAM(s) Oral every 8 hours    MEDICATIONS  (PRN):  HYDROmorphone PCA (1 mG/mL) Rescue Clinician Bolus 0.25 milliGRAM(s) IV Push every 15 minutes PRN for Pain Scale GREATER THAN 6  nalbuphine Injectable 2.5 milliGRAM(s) IV Push every 6 hours PRN Pruritus  naloxone Injectable 0.1 milliGRAM(s) IV Push every 3 minutes PRN For ANY of the following changes in patient status:  A. RR LESS THAN 10 breaths per minute, B. Oxygen saturation LESS THAN 90%, C. Sedation score of 6  ondansetron Injectable 4 milliGRAM(s) IV Push every 6 hours PRN Nausea        Physical exam:                             General:               Pt is awake, alert,  appears to be in pain but not in distress                                                  Neuro:                  Nonfocal                             Psych:                   A&Ox3                          Cardiovascular:   S1 & S2, regular                           Respiratory:         Air entry is fair and equal on both sides, has bilateral conducted sounds                           GI:                          Soft, nondistended and nontender, Bowel sounds active                            Ext:                        No cyanosis or edema     Labs:                                                                           9.9    13.69 )-----------( 212      ( 03 Mar 2023 02:59 )             30.5             03-03    139  |  107  |  14  ----------------------------<  161<H>  4.6   |  21<L>  |  0.77    Ca    8.6      03 Mar 2023 02:59  Phos  3.6     03-03  Mg     2.00     -03                      CXR:  < from: Xray Chest 1 View- PORTABLE-Routine (23 @ 06:06) >   at 4:27 PM:  Right IJ catheter and right-sided chest tube are present. Increasing   interstitial opacities in the right upper lobe since surgery. Remainder   of both lungs are clear, the heart is not enlarged and there is no   effusion or pneumothorax.    March 3 at 5:46 AM:  No interval change. Right-sided chest tube remains in place with chain   sutures overlying the right upper lobe. Mild interstitial prominence in   the visible right lung. Heart size is stable.    No effusions or pneumothorax.        COMPARISON: 2023        IMPRESSION: Status post right thoracotomy with chest tube.      Plan:  General: 82yFemale s/p Uniportal RUL wedge resection  02-Mar-2023 , experiencing  pain with deep breathing.                             Neuro:                                         Pain control with PCA  /  Tylenol PRN                            Cardiovascular:                                          Telemetry (medical test) - Reviewed by me today independently. Normal sinus rhythm /  with some PVCs / A-fib.     Hypotension immediately after surgery, most likely Anesthesia / narcotic related with Co2 retention - Resolved     Hx of A-fib: Restart Verapamil 40mg po q8hrs for rate control  May restart Eliquis after chest tube removal.                                          CAD: Continue ASA   Continue hemodynamic monitoring to prevent decompensation.   Monitor one more day in the hospital - d/w Dr. Yoon                            Respiratory:                                         Postop hypoxemia requiring O2 via high flow nasal cannula 30L/30% - Wean nasal cannula for goal O2sat above 92%.                                              Obtain CXR after CT removal. Encourage incentive spirometry.                                                   Chest PT and frequent suctioning. Continue bronchodilators, Pulmozyme and inhaled 3% saline inhalations.                                                      OOB to chair & ambulate w/ assistance.                                                           Continuous pulse oximetry for support & to prevent decompensation.                                         Asthma: Was on Prednisone 10mg at home. Restart     Monitor chest tube output                                         Chest tube to water seal. Chest tube to be d/cd later                                                                                            GI                                         On  DASH diet as tolerated                                         Continue Zofran / Reglan for nausea - PRN                                         Continue bowel regimen. 	                                                                 Renal:                                                                                  Monitor I/Os and electrolytes                                                                                        Hem/ Onc:                                         DVT prophylaxis with SQ Heparin and SCDs                                         Monitor chest tube output &  signs of bleeding.                                          Follow CBC in AM                           Infectious disease:                                            Monitor for fever / leukocytosis.                                          All surgical incision / chest tube  sites look clean                            Endocrine                                             DM-2 / Hyperglycemia: Continue Accu-Checks with coverage                                           Hypothyroidism: Continue Synthroid       Pertinent clinical, laboratory, radiographic, hemodynamic, echocardiographic, respiratory data, microbiologic data and chart were reviewed and analyzed frequently throughout the course of the day and night.     Patient seen, examined and plan discussed with CT Surgeon Dr. Yoon / CTICU team during rounds.    OOB to chair and ambulate with physical therapy as tolerated.     Status discussed with patient and updated plan of care.     I have spent 40 minutes of critical care time with this pt between  7am and 11.59pm monitoring hemodynamic status, respiratory status. managing high flow and preventing deterioration of respiratory status.           Thomas Ponce MD

## 2023-03-03 NOTE — CONSULT NOTE ADULT - SUBJECTIVE AND OBJECTIVE BOX
CHIEF COMPLAINT: s/p surgery    HISTORY OF PRESENT ILLNESS:  81 y/o female PMH HTN HLD CAD (2 cardiac stents ) AFib (failed ablation) on eliquis CHF (EF 55-60% on 2022 echo) COPD/Asthma and MARIE former smoker presents to presurgical testing with diagnosis of other nonspecified abnormal finding of lung field. Pt had COVID-19 infection in 2022, treated with MAB infusion. CT scan showing an increase in right upper lung nodule. Pt iss/p flexible bronchoscopy with right video assisted thoracoscopy lung resection denies any cp/sob/palps/dizziness      Allergies    No Known Allergies    Intolerances    	    MEDICATIONS:  aspirin enteric coated 81 milliGRAM(s) Oral daily  heparin   Injectable 5000 Unit(s) SubCutaneous every 8 hours  norepinephrine Infusion 0.05 MICROgram(s)/kG/Min IV Continuous <Continuous>      albuterol    0.083% 2.5 milliGRAM(s) Nebulizer every 6 hours  budesonide 160 MICROgram(s)/formoterol 4.5 MICROgram(s) Inhaler 2 Puff(s) Inhalation two times a day  dornase olga Solution 2.5 milliGRAM(s) Inhalation daily  sodium chloride 3%  Inhalation 4 milliLiter(s) Inhalation every 6 hours    acetaminophen   IVPB .. 1000 milliGRAM(s) IV Intermittent once  acetaminophen   IVPB .. 1000 milliGRAM(s) IV Intermittent every 6 hours  HYDROmorphone PCA (1 mG/mL) 30 milliLiter(s) PCA Continuous PCA Continuous  HYDROmorphone PCA (1 mG/mL) Rescue Clinician Bolus 0.25 milliGRAM(s) IV Push every 15 minutes PRN  nalbuphine Injectable 2.5 milliGRAM(s) IV Push every 6 hours PRN  ondansetron Injectable 4 milliGRAM(s) IV Push every 6 hours PRN    pantoprazole    Tablet 40 milliGRAM(s) Oral before breakfast  polyethylene glycol 3350 17 Gram(s) Oral daily  senna 2 Tablet(s) Oral at bedtime      lactated ringers. 1000 milliLiter(s) IV Continuous <Continuous>  lidocaine   4% Patch 1 Patch Transdermal daily      PAST MEDICAL & SURGICAL HISTORY:  Coronary Stent  to RCA, LAD, and DIAG 06 at [facility]      Afib  x 15 yrs --on Coumadin  attempted cardioversion 13 yrs ago--failed      GERD (Gastroesophageal Reflux Disease)      Hypercholesterolemia      Emphysema/COPD      Lip Cancer  resected 6 yrs ago (no chemo/rad)      Skin Cancer of Face  removed 1 yr ago      Skin Cancer of Nose  1 yr ago- removed      H/O: CVA  pt unaware of CVA, yet showed up on CT of head as old CVA      CHF (congestive heart failure)      PUD (peptic ulcer disease)      Asthma      Smoker      CAD (coronary artery disease)      Melanoma      Nodule of upper lobe of right lung      MARIE (obstructive sleep apnea)      History of Hysterectomy   for fibroids        History of   , , ,       History of Cholecystectomy        History of Appendectomy  childhood        S/P T&amp;A  childhood        History of cholecystectomy      History of appendectomy      Melanoma      History of total right hip replacement      History of total left hip replacement      History of cataract surgery      H/O cardiac radiofrequency ablation          FAMILY HISTORY:      SOCIAL HISTORY:    former smoker. indep in adl      REVIEW OF SYSTEMS:  See HPI, otherwise complete 10 point review of systems negative    [ ] All others negative	      PHYSICAL EXAM:  T(C): 36.2 (23 @ 05:00), Max: 36.6 (23 @ 07:43)  HR: 89 (23 @ 07:00) (69 - 103)  BP: 118/86 (23 @ 00:00) (93/65 - 130/96)  RR: 20 (23 @ 07:17) (10 - 24)  SpO2: 98% (23 @ 07:17) (91% - 100%)  Wt(kg): --  I&O's Summary    02 Mar 2023 07:01  -  03 Mar 2023 07:00  --------------------------------------------------------  IN: 710 mL / OUT: 490 mL / NET: 220 mL        Appearance: No Acute Distress	  HEENT:  Normal oral mucosa, PERRL, EOMI	  Cardiovascular: Normal S1 S2, No JVD, No murmurs/rubs/gallops  Respiratory: Lungs clear to auscultation bilaterally  Gastrointestinal:  Soft, Non-tender, + BS	  Skin: No rashes, No ecchymoses, No cyanosis	  Neurologic: Non-focal  Extremities: No clubbing, cyanosis or edema  Vascular: Peripheral pulses palpable 2+ bilaterally  Psychiatry: A & O x 3, Mood & affect appropriate    Laboratory Data:	 	    CBC Full  -  ( 03 Mar 2023 02:59 )  WBC Count : 13.69 K/uL  Hemoglobin : 9.9 g/dL  Hematocrit : 30.5 %  Platelet Count - Automated : 212 K/uL  Mean Cell Volume : 102.7 fL  Mean Cell Hemoglobin : 33.3 pg  Mean Cell Hemoglobin Concentration : 32.5 gm/dL  Auto Neutrophil # : x  Auto Lymphocyte # : x  Auto Monocyte # : x  Auto Eosinophil # : x  Auto Basophil # : x  Auto Neutrophil % : x  Auto Lymphocyte % : x  Auto Monocyte % : x  Auto Eosinophil % : x  Auto Basophil % : x    03-03    139  |  107  |  14  ----------------------------<  161<H>  4.6   |  21<L>  |  0.77  03-02    142  |  109<H>  |  12  ----------------------------<  204<H>  4.3   |  22  |  0.92    Ca    8.6      03 Mar 2023 02:59  Ca    8.6      02 Mar 2023 16:39  Phos  3.6     03-03  Phos  4.0     03-02  Mg     2.00     03-03  Mg     1.90     03-02        proBNP:   Lipid Profile:   HgA1c:   TSH:       CARDIAC MARKERS:            Interpretation of Telemetry: 	    ECG:  	  RADIOLOGY:  OTHER: 	    PREVIOUS DIAGNOSTIC TESTING:    [ ] Echocardiogram:  [ ] Catheterization:  [ ] Stress Test:  	    Assessment:  81 y/o female PMH HTN HLD CAD (2 cardiac stents ) AFib (failed ablation) on eliquis CHF (EF 55-60% on 2022 echo) COPD/Asthma and MARIE former smoker, CT scan showing an increase in right upper lung nodule. Pt is s/p flexible bronchoscopy with right video assisted thoracoscopy lung resection    Recs:  cardiac stable  postop hypotension likely 2/2 vasoplegia, sedation and hypovolemia --> resolved  no postop cardiac events noted  cw antiplatelet and statin. resume antianginal/rate control meds when able  resume AC for persistent afib when able  maintain euvolemic  care per cts  will follow      Advanced care planning forms were discussed. Code status including forceful chest compressions, defibrillation and intubation were discussed. The risks benefits and alternatives to pertinent cardiac procedures and interventions were discussed in detail and all questions were answered. Duration: 15 minutes.    Greater than 90 minutes spent on total encounter; more than 50% of the visit was spent counseling and/or coordinating care by the attending physician.   	  Bill Cho MD   Cardiovascular Diseases  [phone]

## 2023-03-03 NOTE — PROGRESS NOTE ADULT - SUBJECTIVE AND OBJECTIVE BOX
PULMONARY PROGRESS NOTE    RECORD, JANA  MRN-[id number]    Patient is a 82y old  Female who presents with a chief complaint of     HPI:    -known to my partner   -discomfort at ct site    ACTIVE MEDICATIONS:  MEDICATIONS  (STANDING):  acetaminophen   IVPB .. 1000 milliGRAM(s) IV Intermittent once  acetaminophen   IVPB .. 1000 milliGRAM(s) IV Intermittent once  aspirin enteric coated 81 milliGRAM(s) Oral daily  budesonide 160 MICROgram(s)/formoterol 4.5 MICROgram(s) Inhaler 2 Puff(s) Inhalation two times a day  dornase olga Solution 2.5 milliGRAM(s) Inhalation daily  heparin   Injectable 5000 Unit(s) SubCutaneous every 8 hours  lactated ringers. 1000 milliLiter(s) (30 mL/Hr) IV Continuous <Continuous>  lidocaine   4% Patch 1 Patch Transdermal daily  norepinephrine Infusion 0.05 MICROgram(s)/kG/Min (6.76 mL/Hr) IV Continuous <Continuous>  pantoprazole    Tablet 40 milliGRAM(s) Oral before breakfast  polyethylene glycol 3350 17 Gram(s) Oral daily  senna 2 Tablet(s) Oral at bedtime  sodium chloride 3%  Inhalation 4 milliLiter(s) Inhalation every 6 hours  verapamil 40 milliGRAM(s) Oral every 8 hours    MEDICATIONS  (PRN):  nalbuphine Injectable 2.5 milliGRAM(s) IV Push every 6 hours PRN Pruritus  naloxone Injectable 0.1 milliGRAM(s) IV Push every 3 minutes PRN For ANY of the following changes in patient status:  A. RR LESS THAN 10 breaths per minute, B. Oxygen saturation LESS THAN 90%, C. Sedation score of 6  ondansetron Injectable 4 milliGRAM(s) IV Push every 6 hours PRN Nausea  oxyCODONE    IR 2.5 milliGRAM(s) Oral every 3 hours PRN Moderate-Severe Pain (4 - 10)      EXAM:  Vital Signs Last 24 Hrs  T(C): 36.2 (03 Mar 2023 08:00), Max: 36.6 (02 Mar 2023 17:00)  T(F): 97.2 (03 Mar 2023 08:00), Max: 97.8 (02 Mar 2023 17:00)  HR: 96 (03 Mar 2023 10:00) (73 - 103)  BP: 118/86 (03 Mar 2023 00:00) (93/65 - 130/96)  BP(mean): 97 (03 Mar 2023 00:00) (72 - 106)  RR: 18 (03 Mar 2023 10:00) (10 - 24)  SpO2: 96% (03 Mar 2023 10:00) (91% - 100%)    Parameters below as of 03 Mar 2023 10:00  Patient On (Oxygen Delivery Method): nasal cannula w/ humidification  O2 Flow (L/min): 4      LUNGS: Clear to auscultation without wheezing, rales or rhonchi; respirations unlabored    LABS/IMAGING: reviewed                        9.9    13.69 )-----------( 212      ( 03 Mar 2023 02:59 )             30.5   03-03    139  |  107  |  14  ----------------------------<  161<H>  4.6   |  21<L>  |  0.77    Ca    8.6      03 Mar 2023 02:59  Phos  3.6     03-03  Mg     2.00     03-03    < from: Xray Chest 1 View- PORTABLE-Routine (03.03.23 @ 06:06) >    ACC: [id number] EXAM:  XR CHEST PORTABLE ROUTINE 1V   ORDERED BY: MELINA PANTOJA     ACC: [id number] EXAM:  XR CHEST PORTABLE URGENT 1V   ORDERED BY: CELINA PATEL     PROCEDURE DATE:  03/02/2023          INTERPRETATION:  CLINICAL INFORMATION: Postop    TIME OF EXAMINATION: March 2, 2023 at 4:27 PM in March 3 at 5:46 AM    EXAM: Portable chest    FINDINGS:  March 2 at 4:27 PM:  Right IJ catheter and right-sided chest tube are present. Increasing   interstitial opacities in the right upper lobe since surgery. Remainder   of both lungs are clear, the heart is not enlarged and there is no   effusion or pneumothorax.    March 3 at 5:46 AM:  No interval change. Right-sided chest tube remains in place with chain   sutures overlying the right upper lobe. Mild interstitial prominence in   the visible right lung. Heart size is stable.    No effusions or pneumothorax.        COMPARISON: January 12, 2023        IMPRESSION: Status post right thoracotomy with chest tube.    --- End of Report ---            < end of copied text >      PROBLEM LIST:  82y Female with HEALTH ISSUES - PROBLEM Dx:        RECS:  -fu path  -pain control  -appreciate CTICU care  -fu with  after dc      Saloni Pan MD  [phone]

## 2023-03-03 NOTE — PROGRESS NOTE ADULT - SUBJECTIVE AND OBJECTIVE BOX
Anesthesia Pain Management Service    SUBJECTIVE: Patient reports pain and IV PCA does not help. Patient also reports she is scared to use opioids. Discussed with patient opioids are safe to take in a monitored setting and appropriate for post-op pain control, along with prescribed non-opioid adjuvants.    Pain Scale Score	At rest: _105/10__ 	With Activity: ___ 	[X ] Refer to charted pain scores    THERAPY:    [ ] IV PCA Morphine		[ ] 5 mg/mL	[ ] 1 mg/mL  [X ] IV PCA Hydromorphone	[ ] 5 mg/mL	[X ] 1 mg/mL  [ ] IV PCA Fentanyl		[ ] 50 micrograms/mL    Demand dose __0.2_ lockout __6_ (minutes) Continuous Rate _0__ Total: _2.2__   mg used (in past 24 hrs)      MEDICATIONS  (STANDING):  acetaminophen   IVPB .. 1000 milliGRAM(s) IV Intermittent once  acetaminophen   IVPB .. 1000 milliGRAM(s) IV Intermittent once  aspirin enteric coated 81 milliGRAM(s) Oral daily  budesonide 160 MICROgram(s)/formoterol 4.5 MICROgram(s) Inhaler 2 Puff(s) Inhalation two times a day  dornase olga Solution 2.5 milliGRAM(s) Inhalation daily  heparin   Injectable 5000 Unit(s) SubCutaneous every 8 hours  lactated ringers. 1000 milliLiter(s) (30 mL/Hr) IV Continuous <Continuous>  lidocaine   4% Patch 1 Patch Transdermal daily  norepinephrine Infusion 0.05 MICROgram(s)/kG/Min (6.76 mL/Hr) IV Continuous <Continuous>  pantoprazole    Tablet 40 milliGRAM(s) Oral before breakfast  polyethylene glycol 3350 17 Gram(s) Oral daily  senna 2 Tablet(s) Oral at bedtime  sodium chloride 3%  Inhalation 4 milliLiter(s) Inhalation every 6 hours  verapamil 40 milliGRAM(s) Oral every 8 hours    MEDICATIONS  (PRN):  nalbuphine Injectable 2.5 milliGRAM(s) IV Push every 6 hours PRN Pruritus  naloxone Injectable 0.1 milliGRAM(s) IV Push every 3 minutes PRN For ANY of the following changes in patient status:  A. RR LESS THAN 10 breaths per minute, B. Oxygen saturation LESS THAN 90%, C. Sedation score of 6  ondansetron Injectable 4 milliGRAM(s) IV Push every 6 hours PRN Nausea  oxyCODONE    IR 2.5 milliGRAM(s) Oral every 3 hours PRN Moderate-Severe Pain (4 - 10)      OBJECTIVE: Patient sitting up in chair. CTx1    Sedation Score:	[ X] Alert	[ ] Drowsy 	[ ] Arousable	[ ] Asleep	[ ] Unresponsive    Side Effects:	[X ] None	[ ] Nausea	[ ] Vomiting	[ ] Pruritus  		[ ] Other:    Vital Signs Last 24 Hrs  T(C): 36.2 (03 Mar 2023 08:00), Max: 36.6 (02 Mar 2023 17:00)  T(F): 97.2 (03 Mar 2023 08:00), Max: 97.8 (02 Mar 2023 17:00)  HR: 96 (03 Mar 2023 10:00) (73 - 103)  BP: 118/86 (03 Mar 2023 00:00) (93/65 - 130/96)  BP(mean): 97 (03 Mar 2023 00:00) (72 - 106)  RR: 18 (03 Mar 2023 10:00) (10 - 24)  SpO2: 96% (03 Mar 2023 10:00) (91% - 100%)    Parameters below as of 03 Mar 2023 10:00  Patient On (Oxygen Delivery Method): nasal cannula w/ humidification  O2 Flow (L/min): 4      ASSESSMENT/ PLAN    Therapy to  be:	[ ] Continue   [ X] Discontinued   [X ] Change to prn Analgesics    Documentation and Verification of current medications:   [X] Done	[ ] Not done, not elligible    Comments: Discussed patient with CTICU team, IV PCA discontinued. PRN Oral/IV opioids and/or Adjuvant non-opioid medication to be ordered at this point.    Progress Note written now but Patient was seen earlier. Anesthesia Pain Management Service    SUBJECTIVE: Patient reports pain and IV PCA does not help. Patient also reports she is scared to use opioids and does not take medications at home. Discussed with patient opioids are safe to take in a monitored setting and appropriate for post-op pain control, along with prescribed non-opioid adjuvants.    Pain Scale Score	At rest: _105/10__ 	With Activity: ___ 	[X ] Refer to charted pain scores    THERAPY:    [ ] IV PCA Morphine		[ ] 5 mg/mL	[ ] 1 mg/mL  [X ] IV PCA Hydromorphone	[ ] 5 mg/mL	[X ] 1 mg/mL  [ ] IV PCA Fentanyl		[ ] 50 micrograms/mL    Demand dose __0.2_ lockout __6_ (minutes) Continuous Rate _0__ Total: _2.2__   mg used (in past 24 hrs)      MEDICATIONS  (STANDING):  acetaminophen   IVPB .. 1000 milliGRAM(s) IV Intermittent once  acetaminophen   IVPB .. 1000 milliGRAM(s) IV Intermittent once  aspirin enteric coated 81 milliGRAM(s) Oral daily  budesonide 160 MICROgram(s)/formoterol 4.5 MICROgram(s) Inhaler 2 Puff(s) Inhalation two times a day  dornase olga Solution 2.5 milliGRAM(s) Inhalation daily  heparin   Injectable 5000 Unit(s) SubCutaneous every 8 hours  lactated ringers. 1000 milliLiter(s) (30 mL/Hr) IV Continuous <Continuous>  lidocaine   4% Patch 1 Patch Transdermal daily  norepinephrine Infusion 0.05 MICROgram(s)/kG/Min (6.76 mL/Hr) IV Continuous <Continuous>  pantoprazole    Tablet 40 milliGRAM(s) Oral before breakfast  polyethylene glycol 3350 17 Gram(s) Oral daily  senna 2 Tablet(s) Oral at bedtime  sodium chloride 3%  Inhalation 4 milliLiter(s) Inhalation every 6 hours  verapamil 40 milliGRAM(s) Oral every 8 hours    MEDICATIONS  (PRN):  nalbuphine Injectable 2.5 milliGRAM(s) IV Push every 6 hours PRN Pruritus  naloxone Injectable 0.1 milliGRAM(s) IV Push every 3 minutes PRN For ANY of the following changes in patient status:  A. RR LESS THAN 10 breaths per minute, B. Oxygen saturation LESS THAN 90%, C. Sedation score of 6  ondansetron Injectable 4 milliGRAM(s) IV Push every 6 hours PRN Nausea  oxyCODONE    IR 2.5 milliGRAM(s) Oral every 3 hours PRN Moderate-Severe Pain (4 - 10)      OBJECTIVE: Patient sitting up in chair. CTx1    Sedation Score:	[ X] Alert	[ ] Drowsy 	[ ] Arousable	[ ] Asleep	[ ] Unresponsive    Side Effects:	[X ] None	[ ] Nausea	[ ] Vomiting	[ ] Pruritus  		[ ] Other:    Vital Signs Last 24 Hrs  T(C): 36.2 (03 Mar 2023 08:00), Max: 36.6 (02 Mar 2023 17:00)  T(F): 97.2 (03 Mar 2023 08:00), Max: 97.8 (02 Mar 2023 17:00)  HR: 96 (03 Mar 2023 10:00) (73 - 103)  BP: 118/86 (03 Mar 2023 00:00) (93/65 - 130/96)  BP(mean): 97 (03 Mar 2023 00:00) (72 - 106)  RR: 18 (03 Mar 2023 10:00) (10 - 24)  SpO2: 96% (03 Mar 2023 10:00) (91% - 100%)    Parameters below as of 03 Mar 2023 10:00  Patient On (Oxygen Delivery Method): nasal cannula w/ humidification  O2 Flow (L/min): 4      ASSESSMENT/ PLAN    Therapy to  be:	[ ] Continue   [ X] Discontinued   [X ] Change to prn Analgesics    Documentation and Verification of current medications:   [X] Done	[ ] Not done, not elligible    Comments: Discussed patient with CTICU team, IV PCA discontinued. PRN Oral/IV opioids and/or Adjuvant non-opioid medication to be ordered at this point.    Progress Note written now but Patient was seen earlier.

## 2023-03-04 ENCOUNTER — TRANSCRIPTION ENCOUNTER (OUTPATIENT)
Age: 83
End: 2023-03-04

## 2023-03-04 VITALS — OXYGEN SATURATION: 96 %

## 2023-03-04 LAB
ANION GAP SERPL CALC-SCNC: 11 MMOL/L — SIGNIFICANT CHANGE UP (ref 7–14)
BUN SERPL-MCNC: 17 MG/DL — SIGNIFICANT CHANGE UP (ref 7–23)
CALCIUM SERPL-MCNC: 8.8 MG/DL — SIGNIFICANT CHANGE UP (ref 8.4–10.5)
CHLORIDE SERPL-SCNC: 105 MMOL/L — SIGNIFICANT CHANGE UP (ref 98–107)
CO2 SERPL-SCNC: 25 MMOL/L — SIGNIFICANT CHANGE UP (ref 22–31)
CREAT SERPL-MCNC: 0.84 MG/DL — SIGNIFICANT CHANGE UP (ref 0.5–1.3)
EGFR: 70 ML/MIN/1.73M2 — SIGNIFICANT CHANGE UP
GLUCOSE SERPL-MCNC: 121 MG/DL — HIGH (ref 70–99)
HCT VFR BLD CALC: 27.8 % — LOW (ref 34.5–45)
HGB BLD-MCNC: 8.9 G/DL — LOW (ref 11.5–15.5)
MAGNESIUM SERPL-MCNC: 1.9 MG/DL — SIGNIFICANT CHANGE UP (ref 1.6–2.6)
MCHC RBC-ENTMCNC: 32 GM/DL — SIGNIFICANT CHANGE UP (ref 32–36)
MCHC RBC-ENTMCNC: 33.3 PG — SIGNIFICANT CHANGE UP (ref 27–34)
MCV RBC AUTO: 104.1 FL — HIGH (ref 80–100)
NRBC # BLD: 0 /100 WBCS — SIGNIFICANT CHANGE UP (ref 0–0)
NRBC # FLD: 0 K/UL — SIGNIFICANT CHANGE UP (ref 0–0)
PHOSPHATE SERPL-MCNC: 3 MG/DL — SIGNIFICANT CHANGE UP (ref 2.5–4.5)
PLATELET # BLD AUTO: 189 K/UL — SIGNIFICANT CHANGE UP (ref 150–400)
POTASSIUM SERPL-MCNC: 3.8 MMOL/L — SIGNIFICANT CHANGE UP (ref 3.5–5.3)
POTASSIUM SERPL-SCNC: 3.8 MMOL/L — SIGNIFICANT CHANGE UP (ref 3.5–5.3)
RBC # BLD: 2.67 M/UL — LOW (ref 3.8–5.2)
RBC # FLD: 14.4 % — SIGNIFICANT CHANGE UP (ref 10.3–14.5)
SODIUM SERPL-SCNC: 141 MMOL/L — SIGNIFICANT CHANGE UP (ref 135–145)
WBC # BLD: 14.79 K/UL — HIGH (ref 3.8–10.5)
WBC # FLD AUTO: 14.79 K/UL — HIGH (ref 3.8–10.5)

## 2023-03-04 PROCEDURE — 99233 SBSQ HOSP IP/OBS HIGH 50: CPT

## 2023-03-04 PROCEDURE — 93010 ELECTROCARDIOGRAM REPORT: CPT

## 2023-03-04 PROCEDURE — 71045 X-RAY EXAM CHEST 1 VIEW: CPT | Mod: 26

## 2023-03-04 RX ORDER — POTASSIUM CHLORIDE 20 MEQ
40 PACKET (EA) ORAL ONCE
Refills: 0 | Status: COMPLETED | OUTPATIENT
Start: 2023-03-04 | End: 2023-03-04

## 2023-03-04 RX ORDER — MAGNESIUM SULFATE 500 MG/ML
1 VIAL (ML) INJECTION ONCE
Refills: 0 | Status: COMPLETED | OUTPATIENT
Start: 2023-03-04 | End: 2023-03-04

## 2023-03-04 RX ORDER — ACETAMINOPHEN 500 MG
1000 TABLET ORAL ONCE
Refills: 0 | Status: COMPLETED | OUTPATIENT
Start: 2023-03-04 | End: 2023-03-04

## 2023-03-04 RX ADMIN — Medication 1000 MILLIGRAM(S): at 02:15

## 2023-03-04 RX ADMIN — BUDESONIDE AND FORMOTEROL FUMARATE DIHYDRATE 2 PUFF(S): 160; 4.5 AEROSOL RESPIRATORY (INHALATION) at 10:05

## 2023-03-04 RX ADMIN — Medication 100 GRAM(S): at 06:02

## 2023-03-04 RX ADMIN — Medication 40 MILLIEQUIVALENT(S): at 06:02

## 2023-03-04 RX ADMIN — PANTOPRAZOLE SODIUM 40 MILLIGRAM(S): 20 TABLET, DELAYED RELEASE ORAL at 06:04

## 2023-03-04 RX ADMIN — Medication 400 MILLIGRAM(S): at 02:00

## 2023-03-04 RX ADMIN — Medication 400 MILLIGRAM(S): at 07:53

## 2023-03-04 RX ADMIN — DORNASE ALFA 2.5 MILLIGRAM(S): 1 SOLUTION RESPIRATORY (INHALATION) at 10:04

## 2023-03-04 RX ADMIN — APIXABAN 5 MILLIGRAM(S): 2.5 TABLET, FILM COATED ORAL at 05:59

## 2023-03-04 RX ADMIN — Medication 1000 MILLIGRAM(S): at 08:15

## 2023-03-04 RX ADMIN — LIDOCAINE 1 PATCH: 4 CREAM TOPICAL at 11:43

## 2023-03-04 RX ADMIN — Medication 81 MILLIGRAM(S): at 11:43

## 2023-03-04 RX ADMIN — LIDOCAINE 1 PATCH: 4 CREAM TOPICAL at 00:58

## 2023-03-04 RX ADMIN — SODIUM CHLORIDE 4 MILLILITER(S): 9 INJECTION INTRAMUSCULAR; INTRAVENOUS; SUBCUTANEOUS at 03:28

## 2023-03-04 RX ADMIN — POLYETHYLENE GLYCOL 3350 17 GRAM(S): 17 POWDER, FOR SOLUTION ORAL at 11:43

## 2023-03-04 RX ADMIN — Medication 40 MILLIGRAM(S): at 02:04

## 2023-03-04 NOTE — DISCHARGE NOTE PROVIDER - HOSPITAL COURSE
81 y/o F with PMHx of Former smoker, HLD, A Fib on Eliquis, CAD (s/p PCI x2) on ASA, CHF, COPD/asthma, MARIE, skin cancer, COVID, RUL nodule. She is now s/p FB, BAL, R VATS, RUL wedge resection on 3/2/23. She was requiring some pressor support during the procedure but was weaned off. Chest tube was removed and post x-ray was reviewed. *****Home o2? Patient is stable for discharge home with out patient follow up 81 y/o F with PMHx of Former smoker, HLD, A Fib on Eliquis, CAD (s/p PCI x2) on ASA, CHF, COPD/asthma, MARIE, skin cancer, COVID, RUL nodule. She is now s/p FB, BAL, R VATS, RUL wedge resection on 3/2/23. She was requiring some pressor support during the procedure but was weaned off. Chest tube was removed and post x-ray was reviewed. She was evaluated by cardiology & pulmonary and was cleared for discharge. On 3/4 she was discharged home.

## 2023-03-04 NOTE — DISCHARGE NOTE PROVIDER - NSDCCPTREATMENT_GEN_ALL_CORE_FT
PRINCIPAL PROCEDURE  Procedure: VATS, with lung wedge resection  Findings and Treatment:       SECONDARY PROCEDURE  Procedure: Flexible bronchoscopy  Findings and Treatment:

## 2023-03-04 NOTE — DISCHARGE NOTE PROVIDER - CARE PROVIDER_API CALL
Jaswant Yoon)  Surgery; Thoracic Surgery  [id number] [place] [address] [place] [address]  Phone: [phone]  Fax: [phone]  Follow Up Time:    Jaswant Yoon)  Surgery; Thoracic Surgery  [address] [facility] [place] [address]  Phone: [phone]  Fax: [phone]  Follow Up Time: 2 weeks

## 2023-03-04 NOTE — DISCHARGE NOTE PROVIDER - NSDCMRMEDTOKEN_GEN_ALL_CORE_FT
albuterol 90 mcg/inh inhalation aerosol: 2 puff(s) inhaled every 6 hours, As Needed  aspirin 81 mg oral tablet: 1 tab(s) orally once a day  doxycycline: 100 milligram(s) orally 2 times a day  to be completed by 2/20/23  Eliquis 5 mg oral tablet: 1 tab(s) orally 2 times a day  last dose on 2/27/23  pantoprazole 40 mg oral delayed release tablet: 1 tab(s) orally once a day  predniSONE 10 mg oral tablet: 1 tab(s) orally as directed LD 2/19/2023  Symbicort 160 mcg-4.5 mcg/inh inhalation aerosol: 2 puff(s) inhaled 2 times a day  verapamil 240 mg/24 hours oral capsule, extended release: 1 cap(s) orally once a day

## 2023-03-04 NOTE — DISCHARGE NOTE PROVIDER - NSDCFUADDINST_GEN_ALL_CORE_FT
Follow up with Dr. Yoon in 12-14 days. Call Thoracic Surgery office [phone] to schedule an appointment. Please, obtain a CXR 1-2 days prior to your appointment and bring a copy with you.  Please, make an appointment with your Primary care provider.  Blue Stitch will be removed by Dr. Yoon in the office.  You may shower in 24 hours with dressing and remove in the shower. Keep the wound clean and dry it well after showering.  It’s OK if some fluid comes out of the chest tube hole unless blood or purulent.  No Driving while taking pain meds. Some coughing and discomfort is expected.

## 2023-03-04 NOTE — PROGRESS NOTE ADULT - SUBJECTIVE AND OBJECTIVE BOX
Cardiovascular Disease Progress Note    Overnight events: No acute events overnight.  no cp/sob/palps  Otherwise review of systems negative    Objective Findings:  T(C): 36.4 (23 @ 08:00), Max: 36.7 (23 @ 00:00)  HR: 90 (23 @ 09:00) (80 - 128)  BP: 112/84 (23 @ 09:00) (83/66 - 135/80)  RR: 20 (23 @ 09:00) (11 - 32)  SpO2: 97% (23 @ 09:00) (95% - 100%)  Wt(kg): --  Daily     Daily Weight in k.6 (04 Mar 2023 06:00)      Physical Exam:  Gen: NAD  HEENT: EOMI  CV: RRR, normal S1 + S2, no m/r/g  Lungs: CTAB  Abd: soft, non-tender  Ext: No edema    Telemetry:    Laboratory Data:                        8.9    14.79 )-----------( 189      ( 04 Mar 2023 02:30 )             27.8     03-04    141  |  105  |  17  ----------------------------<  121<H>  3.8   |  25  |  0.84    Ca    8.8      04 Mar 2023 02:30  Phos  3.0     03-04  Mg     1.90     03-04                Inpatient Medications:  MEDICATIONS  (STANDING):  acetaminophen   IVPB .. 1000 milliGRAM(s) IV Intermittent once  apixaban 5 milliGRAM(s) Oral two times a day  aspirin enteric coated 81 milliGRAM(s) Oral daily  budesonide 160 MICROgram(s)/formoterol 4.5 MICROgram(s) Inhaler 2 Puff(s) Inhalation two times a day  dornase olga Solution 2.5 milliGRAM(s) Inhalation daily  lidocaine   4% Patch 1 Patch Transdermal daily  pantoprazole    Tablet 40 milliGRAM(s) Oral before breakfast  polyethylene glycol 3350 17 Gram(s) Oral daily  senna 2 Tablet(s) Oral at bedtime  sodium chloride 3%  Inhalation 4 milliLiter(s) Inhalation every 6 hours  verapamil 40 milliGRAM(s) Oral every 8 hours      Assessment:  83 y/o female PMH HTN HLD CAD (2 cardiac stents ) AFib (failed ablation) on eliquis CHF (EF 55-60% on 2022 echo) COPD/Asthma and MARIE former smoker, CT scan showing an increase in right upper lung nodule. Pt is s/p flexible bronchoscopy with right video assisted thoracoscopy lung resection    Recs:  cardiac stable  postop hypotension likely 2/2 vasoplegia, sedation and hypovolemia, possible component of RV dysfunction contributing --> resolved  no postop cardiac events noted  recent tte preserved lvef, mod-severe TR, mild RV dysfunction/RVE  cw antiplatelet and statin. resume antianginal/rate control meds when able  cw AC for persistent afib   maintain euvolemic  care per cts  will follow  dcp          Over 35 minutes spent on total encounter; more than 50% of the visit was spent counseling and/or coordinating care by the attending physician.      Bill Cho MD   Cardiovascular Disease  [phone]

## 2023-03-04 NOTE — DISCHARGE NOTE NURSING/CASE MANAGEMENT/SOCIAL WORK - PATIENT PORTAL LINK FT
You can access the FollowMyHealth Patient Portal offered by [facility] by registering at the following website: [email or web address]. By joining [employer]’s FollowMyHealth portal, you will also be able to view your health information using other applications (apps) compatible with our system.

## 2023-03-04 NOTE — DISCHARGE NOTE NURSING/CASE MANAGEMENT/SOCIAL WORK - NSDCPEFALRISK_GEN_ALL_CORE
For information on Fall & Injury Prevention, visit: [email or web address] OR  [email or web address] OR  https://www.cdc.gov/steadi/patient.html

## 2023-03-04 NOTE — DISCHARGE NOTE PROVIDER - NSDCCPCAREPLAN_GEN_ALL_CORE_FT
PRINCIPAL DISCHARGE DIAGNOSIS  Diagnosis: Lung nodule  Assessment and Plan of Treatment: Please follow up with Dr. Yoon in the office in 2 weeks. Please call and make an appointment. You may shower but remove all dressings first. Leave the white steri strips in place, they can get wet and they will fall off on their own. You may eat a regular diet. Please remain active & ambulatory but refrain from any heavy lifting.

## 2023-03-04 NOTE — PATIENT PROFILE ADULT - FALL HARM RISK - HARM RISK INTERVENTIONS

## 2023-03-04 NOTE — PROGRESS NOTE ADULT - SUBJECTIVE AND OBJECTIVE BOX
CHIEF COMPLAINT: FOLLOW UP IN ICU FOR POSTOPERATIVE CARE OF PATIENT WHO IS S/P LUNG RESECTION      PROCEDURES: Uniportal RUL wedge resection  02-Mar-2023      ISSUES:   Lung nodule  Post op pain  COPD  HTN  Asthma  CAD s/p stents (2006)  Chronic atrial fibrillation on Apixaban - (hx of prior failed ablation and cardioversion)  Chronic diastolic heart failure (EF 55-60%)  MARIE  Obesity  HLD  GERD  Hx of peptic ulcer disease  Smoker  Hx of CVA        INTERVAL EVENTS:   Weaned off high flow nasal cannula and oxygen  Chest tube removed      HISTORY:   Patient reports moderate pain at chest wall incision sites which is worse with coughing and deep breathing without associated fever or dyspnea. Pain is improved with use of pain meds.     PHYSICAL EXAM:   Gen: Comfortable, No acute distress  Eyes: Sclera white, Conjunctiva normal, Eyelids normal, Pupils symmetrical   ENT: Mucous membranes moist,  ,  ,    Neck: Trachea midline,  ,  ,  ,  ,  ,    CV: Rate regular, Rhythm regular,  ,  ,    Resp: Breath sounds clear, No accessory muscles use,   Abd: Soft, Non-distended, Non-tender,   ,  ,  ,    Skin: Warm, No peripheral edema of lower extremities,  ,    : No jackson  Neuro: Moving all 4 extremities,    Psych: A&Ox3      ASSESSMENT AND PLAN:     NEURO:  Post-operative Pain - Stable. Pain control with PCA and Tylenol IV PRN.     Hx of CVA - stable. Continue aspirin.        RESPIRATORY:  Stable on room air - Incentive spirometry. Chest PT and suctioning of secretions. Out of bed to chair and ambulate with assistance. Continuous pulse oximetry for support & to prevent decompensation.    COPD - worsened by recent thoracic surgery. Continue home inhalers.       MARIE - stable. Monitor nocturnal O2sat.            CARDIOVASCULAR:  Hemodynamically stable - Not on pressors. Continue hemodynamic monitoring.    Telemetry (medical test) - Reviewed by me today independently. Rate controlled atrial fibrillation  HTN - stable. continue verapamil.     CAD - stable. Continue aspirin.  Chronic Afib - stable. continue rhythm and rate control meds. Continue Eliquis.    CHF - stable. maintain euvolemia.         RENAL:  Stable - Monitor IOs and electrolytes. Keep K above 4.0 and Mg above 2.0.          GASTROINTESTINAL:  GI prophylaxis not indicated  Zofran and Reglan IV PRN for nausea  Regular diet        GERD - stable. Continue pantoprazole            HEMATOLOGIC:  No signs of active bleeding. Monitor Hgb in CBC in AM  DVT prophylaxis with heparin subQ and SCDs.               INFECTIOUS DISEASE:  All surgical sites appear clean. No signs of active infection. Will monitor for fever and leukocytosis.       ENDOCRINE:  Stable – Monitor glucose fingersticks for goal 120-180.               ONCOLOGY:  Lung nodule - Improved. S/P resection. Follow up final pathology.         Pertinent clinical, laboratory, radiographic, hemodynamic, echocardiographic, respiratory data, microbiologic data and chart were reviewed by myself and analyzed frequently throughout the course of the day and night by myself.    Plan discussed at length with the CTICU staff and Attending CT Surgeon -   Dr Jaswant Yoon.      Patient's status was discussed with patient at bedside.      _________________________  VITAL SIGNS:  Vital Signs Last 24 Hrs  T(C): 36.4 (04 Mar 2023 08:00), Max: 36.7 (04 Mar 2023 00:00)  T(F): 97.5 (04 Mar 2023 08:00), Max: 98 (04 Mar 2023 00:00)  HR: 89 (04 Mar 2023 10:05) (80 - 128)  BP: 101/77 (04 Mar 2023 10:00) (83/66 - 135/80)  BP(mean): 84 (04 Mar 2023 10:00) (70 - 96)  RR: 18 (04 Mar 2023 10:00) (14 - 32)  SpO2: 96% (04 Mar 2023 10:05) (94% - 100%)    Parameters below as of 04 Mar 2023 10:05  Patient On (Oxygen Delivery Method): room air      I/Os:   I&O's Detail    03 Mar 2023 07:01  -  04 Mar 2023 07:00  --------------------------------------------------------  IN:    IV PiggyBack: 400 mL    Lactated Ringers: 240 mL  Total IN: 640 mL    OUT:    Chest Tube (mL): 20 mL    Indwelling Catheter - Urethral (mL): 2385 mL  Total OUT: 2405 mL    Total NET: -1765 mL      04 Mar 2023 07:01  -  04 Mar 2023 13:39  --------------------------------------------------------  IN:  Total IN: 0 mL    OUT:    Indwelling Catheter - Urethral (mL): 150 mL    Voided (mL): 350 mL  Total OUT: 500 mL    Total NET: -500 mL              MEDICATIONS:  MEDICATIONS  (STANDING):  acetaminophen   IVPB .. 1000 milliGRAM(s) IV Intermittent once  apixaban 5 milliGRAM(s) Oral two times a day  aspirin enteric coated 81 milliGRAM(s) Oral daily  budesonide 160 MICROgram(s)/formoterol 4.5 MICROgram(s) Inhaler 2 Puff(s) Inhalation two times a day  dornase olga Solution 2.5 milliGRAM(s) Inhalation daily  lidocaine   4% Patch 1 Patch Transdermal daily  pantoprazole    Tablet 40 milliGRAM(s) Oral before breakfast  polyethylene glycol 3350 17 Gram(s) Oral daily  senna 2 Tablet(s) Oral at bedtime  sodium chloride 3%  Inhalation 4 milliLiter(s) Inhalation every 6 hours  verapamil 40 milliGRAM(s) Oral every 8 hours    MEDICATIONS  (PRN):  nalbuphine Injectable 2.5 milliGRAM(s) IV Push every 6 hours PRN Pruritus  naloxone Injectable 0.1 milliGRAM(s) IV Push every 3 minutes PRN For ANY of the following changes in patient status:  A. RR LESS THAN 10 breaths per minute, B. Oxygen saturation LESS THAN 90%, C. Sedation score of 6  ondansetron Injectable 4 milliGRAM(s) IV Push every 6 hours PRN Nausea  oxyCODONE    IR 2.5 milliGRAM(s) Oral every 3 hours PRN Moderate-Severe Pain (4 - 10)      LABS:  Laboratory data was independently reviewed by me today.                           8.9    14.79 )-----------( 189      ( 04 Mar 2023 02:30 )             27.8     03-04    141  |  105  |  17  ----------------------------<  121<H>  3.8   |  25  |  0.84    Ca    8.8      04 Mar 2023 02:30  Phos  3.0     03-04  Mg     1.90     03-04          ABG - ( 03 Mar 2023 09:00 )  pH, Arterial: 7.40  pH, Blood: x     /  pCO2: 37    /  pO2: 109   / HCO3: 23    / Base Excess: -1.6  /  SaO2: 98.6                  RADIOLOGY:   Radiology images were independently reviewed by me today. Reports were reviewed by me today.    Xray Chest 1 View- PORTABLE-Urgent:   ACC: [id number] EXAM:  XR CHEST PORTABLE URGENT 1V   ORDERED BY: TONYA MARTINEZ     PROCEDURE DATE:  03/03/2023          INTERPRETATION:  EXAMINATION: XR CHEST URGENT    CLINICAL INDICATION: chest tube removal    TECHNIQUE: Single frontal, portable view of the chest was obtained.    COMPARISON: Chest x-ray 3/3/2023 at 10:21 AM.    FINDINGS:  Right dialysis catheter terminates in the SVC.  Interval removal of the right-sided chest tube with a small right apical   pneumothorax.  Surgical chain sutures project over the right lung.  Coronary stents are present.  Heart size is not assessed on this projection.  Mildly increased reticular opacities which may represent pulmonary   vascular congestion.    IMPRESSION:  Interval removal of right-sided chest tube with a small right apical   pneumothorax.    --- End of Report ---          TONI MOSER MD; Resident Radiologist  This document has been electronically signed.  NAZIA MERA MD; Attending Radiologist  This document has been electronically signed. Mar  3 2023  4:14PM (03-03-23 @ 13:01)  Xray Chest 1 View- PORTABLE-Urgent:   ACC: [id number] EXAM:  XR CHEST PORTABLE URGENT 1V   ORDERED BY: TONYA MARTINEZ     PROCEDURE DATE:  03/03/2023          INTERPRETATION:  CLINICAL INFORMATION: Right upper lobe wedge resection.    EXAM: Frontal radiograph of the chest.    COMPARISON: Chest radiograph from 3/3/2023.    FINDINGS:  Right central venous catheter tip overlies SVC.  Postsurgical change to the right upper lobe. Right upper lobe chest tube,   unchanged. No pneumothorax.  The heart size is not well evaluated on this projection. Coronary artery   stenting/calcification.    IMPRESSION: Status post right thoracotomy with chest tube.      --- End of Report ---          SAMREEN BONE MD; Resident Radiologist  This document has been electronically signed.  NAZIA MERA MD; Attending Radiologist  This document has been electronically signed. Mar  3 2023 12:24PM (03-03-23 @ 10:50)  Xray Chest 1 View- PORTABLE-Routine:   ACC: [id number] EXAM:  XR CHEST PORTABLE ROUTINE 1V   ORDERED BY: MELINA PANTOJA     ACC: [id number] EXAM:  XR CHEST PORTABLE URGENT 1V   ORDERED BY: CELINA PATEL     PROCEDURE DATE:  03/02/2023          INTERPRETATION:  CLINICAL INFORMATION: Postop    TIME OF EXAMINATION: March 2, 2023 at 4:27 PM in March 3 at 5:46 AM    EXAM: Portable chest    FINDINGS:  March 2 at 4:27 PM:  Right IJ catheter and right-sided chest tube are present. Increasing   interstitial opacities in the right upper lobe since surgery. Remainder   of both lungs are clear, the heart is not enlarged and there is no   effusion or pneumothorax.    March 3 at 5:46 AM:  No interval change. Right-sided chest tube remains in place with chain   sutures overlying the right upper lobe. Mild interstitial prominence in   the visible right lung. Heart size is stable.    No effusions or pneumothorax.        COMPARISON: January 12, 2023        IMPRESSION: Status post right thoracotomy with chest tube.    --- End of Report ---            NAZIA MERA MD; Attending Radiologist  This document has been electronically signed. Mar  3 2023  7:31AM (03-03-23 @ 06:06)

## 2023-03-06 ENCOUNTER — NON-APPOINTMENT (OUTPATIENT)
Age: 83
End: 2023-03-06

## 2023-03-06 DIAGNOSIS — G89.18 OTHER ACUTE POSTPROCEDURAL PAIN: ICD-10-CM

## 2023-03-06 RX ORDER — IBUPROFEN 600 MG/1
600 TABLET, FILM COATED ORAL 3 TIMES DAILY
Qty: 90 | Refills: 0 | Status: ACTIVE | COMMUNITY
Start: 2023-03-06 | End: 1900-01-01

## 2023-03-07 LAB — SURGICAL PATHOLOGY STUDY: SIGNIFICANT CHANGE UP

## 2023-03-09 ENCOUNTER — NON-APPOINTMENT (OUTPATIENT)
Age: 83
End: 2023-03-09

## 2023-03-09 NOTE — ASSESSMENT
[FreeTextEntry1] : Ms. JANA OLIVARES, 82 year old female, former smoker (quit 6 months ago, 40PY), w/ hx of HLD, A-Fib on Eliquis, CAD (stents x 2 in 2006) on ASA, CHF, COPD/Asthma, MARIE, skin cancer, COVID Sept 2022 s/p MAB infusion, who presents with new lung nodule. \par \par ECHO on 6/29/22 at [facility]: EF 55-60%. Moderate Rt atrial enlargement; mild diastolic dysfunction.\par \par PFTs on 8/8/22: %, FEV1 88%, DLCO 60%.\par \par CT Chest on 9/30/22 at [facility] (compared to CT on 4/20/22):\par - bilateral apical pleural thickening; centrilobular emphysema\par - new 6 x 6mm slightly irregular nodule in the Rt apex (3:28), suspicious, previously noted as tiny Rt apical nodules\par - stable 6 x 3mm subpleural nodule in the RLL (3:82)\par - no mediastinal adenopathy\par \par Nodify blood work on 10/21/22: No significant level of autoantibodies detected post Nodify CDT risk of malignancy\par \par CT Chest on 1/5/23:\par - Continued interval increase in size of lobulated minimally spiculated RUL nodule which remains highly suspicious for malignancy, now measuring 8 x 10 mm PET suggested\par - Unchanged RUL 6 x 3 mm subpleural nodule (image 87)\par - Small pericardial effusion\par \par PFTs on 2/13/23: FVC: 2.4 (100%); FEV1: 1.53 (88%); DLCO: 11.73 (64%)\par \par Now, s/p Flex bronch, uniportal Right VATS, wedge resection of RUL x1 on 3/2/23. 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

## 2023-03-09 NOTE — REASON FOR VISIT
[Follow-Up: _____] : a [unfilled] follow-up visit [de-identified] : Flexible bronchoscopy, uniportal right video-assisted thoracic surgery, wedge resection of right upper lobe x1, intercostal nerve block.\par \par  [de-identified] : 3/2/23

## 2023-03-11 ENCOUNTER — INPATIENT (INPATIENT)
Facility: HOSPITAL | Age: 83
LOS: 4 days | Discharge: HOME HEALTH | End: 2023-03-16
Attending: GENERAL PRACTICE | Admitting: GENERAL PRACTICE
Payer: MEDICARE

## 2023-03-11 VITALS
RESPIRATION RATE: 18 BRPM | SYSTOLIC BLOOD PRESSURE: 149 MMHG | HEART RATE: 120 BPM | HEIGHT: 60 IN | OXYGEN SATURATION: 99 % | TEMPERATURE: 98 F | DIASTOLIC BLOOD PRESSURE: 98 MMHG

## 2023-03-11 DIAGNOSIS — Z96.641 PRESENCE OF RIGHT ARTIFICIAL HIP JOINT: Chronic | ICD-10-CM

## 2023-03-11 DIAGNOSIS — I25.10 ATHEROSCLEROTIC HEART DISEASE OF NATIVE CORONARY ARTERY WITHOUT ANGINA PECTORIS: ICD-10-CM

## 2023-03-11 DIAGNOSIS — Z98.49 CATARACT EXTRACTION STATUS, UNSPECIFIED EYE: Chronic | ICD-10-CM

## 2023-03-11 DIAGNOSIS — J44.9 CHRONIC OBSTRUCTIVE PULMONARY DISEASE, UNSPECIFIED: ICD-10-CM

## 2023-03-11 DIAGNOSIS — Z96.642 PRESENCE OF LEFT ARTIFICIAL HIP JOINT: Chronic | ICD-10-CM

## 2023-03-11 DIAGNOSIS — M79.603 PAIN IN ARM, UNSPECIFIED: ICD-10-CM

## 2023-03-11 DIAGNOSIS — Z98.890 OTHER SPECIFIED POSTPROCEDURAL STATES: Chronic | ICD-10-CM

## 2023-03-11 DIAGNOSIS — I48.20 CHRONIC ATRIAL FIBRILLATION, UNSPECIFIED: ICD-10-CM

## 2023-03-11 DIAGNOSIS — C34.90 MALIGNANT NEOPLASM OF UNSPECIFIED PART OF UNSPECIFIED BRONCHUS OR LUNG: ICD-10-CM

## 2023-03-11 DIAGNOSIS — D64.9 ANEMIA, UNSPECIFIED: ICD-10-CM

## 2023-03-11 DIAGNOSIS — M79.601 PAIN IN RIGHT ARM: ICD-10-CM

## 2023-03-11 LAB
ALBUMIN SERPL ELPH-MCNC: 3.8 G/DL — SIGNIFICANT CHANGE UP (ref 3.3–5)
ALP SERPL-CCNC: 69 U/L — SIGNIFICANT CHANGE UP (ref 40–120)
ALT FLD-CCNC: 14 U/L — SIGNIFICANT CHANGE UP (ref 4–33)
ANION GAP SERPL CALC-SCNC: 14 MMOL/L — SIGNIFICANT CHANGE UP (ref 7–14)
APTT BLD: 30.4 SEC — SIGNIFICANT CHANGE UP (ref 27–36.3)
AST SERPL-CCNC: 15 U/L — SIGNIFICANT CHANGE UP (ref 4–32)
BASE EXCESS BLDV CALC-SCNC: -2.5 MMOL/L — LOW (ref -2–3)
BASOPHILS # BLD AUTO: 0.14 K/UL — SIGNIFICANT CHANGE UP (ref 0–0.2)
BASOPHILS NFR BLD AUTO: 1.3 % — SIGNIFICANT CHANGE UP (ref 0–2)
BILIRUB SERPL-MCNC: 0.9 MG/DL — SIGNIFICANT CHANGE UP (ref 0.2–1.2)
BLD GP AB SCN SERPL QL: NEGATIVE — SIGNIFICANT CHANGE UP
BLOOD GAS VENOUS COMPREHENSIVE RESULT: SIGNIFICANT CHANGE UP
BUN SERPL-MCNC: 19 MG/DL — SIGNIFICANT CHANGE UP (ref 7–23)
CALCIUM SERPL-MCNC: 9.3 MG/DL — SIGNIFICANT CHANGE UP (ref 8.4–10.5)
CHLORIDE BLDV-SCNC: 105 MMOL/L — SIGNIFICANT CHANGE UP (ref 96–108)
CHLORIDE SERPL-SCNC: 103 MMOL/L — SIGNIFICANT CHANGE UP (ref 98–107)
CO2 BLDV-SCNC: 25.1 MMOL/L — SIGNIFICANT CHANGE UP (ref 22–26)
CO2 SERPL-SCNC: 20 MMOL/L — LOW (ref 22–31)
CREAT SERPL-MCNC: 0.88 MG/DL — SIGNIFICANT CHANGE UP (ref 0.5–1.3)
EGFR: 66 ML/MIN/1.73M2 — SIGNIFICANT CHANGE UP
EOSINOPHIL # BLD AUTO: 0.42 K/UL — SIGNIFICANT CHANGE UP (ref 0–0.5)
EOSINOPHIL NFR BLD AUTO: 3.9 % — SIGNIFICANT CHANGE UP (ref 0–6)
FLUAV AG NPH QL: SIGNIFICANT CHANGE UP
FLUBV AG NPH QL: SIGNIFICANT CHANGE UP
GAS PNL BLDV: 135 MMOL/L — LOW (ref 136–145)
GLUCOSE BLDV-MCNC: 133 MG/DL — HIGH (ref 70–99)
GLUCOSE SERPL-MCNC: 134 MG/DL — HIGH (ref 70–99)
HCO3 BLDV-SCNC: 24 MMOL/L — SIGNIFICANT CHANGE UP (ref 22–29)
HCT VFR BLD CALC: 32.8 % — LOW (ref 34.5–45)
HCT VFR BLDA CALC: 32 % — LOW (ref 34.5–46.5)
HGB BLD CALC-MCNC: 10.6 G/DL — LOW (ref 11.7–16.1)
HGB BLD-MCNC: 10.3 G/DL — LOW (ref 11.5–15.5)
IANC: 7.43 K/UL — HIGH (ref 1.8–7.4)
IMM GRANULOCYTES NFR BLD AUTO: 1.4 % — HIGH (ref 0–0.9)
INR BLD: 1.58 RATIO — HIGH (ref 0.88–1.16)
LACTATE BLDV-MCNC: 1.5 MMOL/L — SIGNIFICANT CHANGE UP (ref 0.5–2)
LYMPHOCYTES # BLD AUTO: 1.24 K/UL — SIGNIFICANT CHANGE UP (ref 1–3.3)
LYMPHOCYTES # BLD AUTO: 11.5 % — LOW (ref 13–44)
MAGNESIUM SERPL-MCNC: 2.2 MG/DL — SIGNIFICANT CHANGE UP (ref 1.6–2.6)
MCHC RBC-ENTMCNC: 31.4 GM/DL — LOW (ref 32–36)
MCHC RBC-ENTMCNC: 33.2 PG — SIGNIFICANT CHANGE UP (ref 27–34)
MCV RBC AUTO: 105.8 FL — HIGH (ref 80–100)
MONOCYTES # BLD AUTO: 1.39 K/UL — HIGH (ref 0–0.9)
MONOCYTES NFR BLD AUTO: 12.9 % — SIGNIFICANT CHANGE UP (ref 2–14)
NEUTROPHILS # BLD AUTO: 7.43 K/UL — HIGH (ref 1.8–7.4)
NEUTROPHILS NFR BLD AUTO: 69 % — SIGNIFICANT CHANGE UP (ref 43–77)
NRBC # BLD: 0 /100 WBCS — SIGNIFICANT CHANGE UP (ref 0–0)
NRBC # FLD: 0 K/UL — SIGNIFICANT CHANGE UP (ref 0–0)
PCO2 BLDV: 46 MMHG — SIGNIFICANT CHANGE UP (ref 39–52)
PH BLDV: 7.32 — SIGNIFICANT CHANGE UP (ref 7.32–7.43)
PLATELET # BLD AUTO: 303 K/UL — SIGNIFICANT CHANGE UP (ref 150–400)
PO2 BLDV: 36 MMHG — SIGNIFICANT CHANGE UP (ref 25–45)
POTASSIUM BLDV-SCNC: 3.9 MMOL/L — SIGNIFICANT CHANGE UP (ref 3.5–5.1)
POTASSIUM SERPL-MCNC: 4.1 MMOL/L — SIGNIFICANT CHANGE UP (ref 3.5–5.3)
POTASSIUM SERPL-SCNC: 4.1 MMOL/L — SIGNIFICANT CHANGE UP (ref 3.5–5.3)
PROT SERPL-MCNC: 6.8 G/DL — SIGNIFICANT CHANGE UP (ref 6–8.3)
PROTHROM AB SERPL-ACNC: 18.4 SEC — HIGH (ref 10.5–13.4)
RBC # BLD: 3.1 M/UL — LOW (ref 3.8–5.2)
RBC # FLD: 14.1 % — SIGNIFICANT CHANGE UP (ref 10.3–14.5)
RH IG SCN BLD-IMP: POSITIVE — SIGNIFICANT CHANGE UP
RSV RNA NPH QL NAA+NON-PROBE: SIGNIFICANT CHANGE UP
SAO2 % BLDV: 61.5 % — LOW (ref 67–88)
SARS-COV-2 RNA SPEC QL NAA+PROBE: SIGNIFICANT CHANGE UP
SODIUM SERPL-SCNC: 137 MMOL/L — SIGNIFICANT CHANGE UP (ref 135–145)
TROPONIN T, HIGH SENSITIVITY RESULT: 28 NG/L — SIGNIFICANT CHANGE UP
TROPONIN T, HIGH SENSITIVITY RESULT: 30 NG/L — SIGNIFICANT CHANGE UP
WBC # BLD: 10.77 K/UL — HIGH (ref 3.8–10.5)
WBC # FLD AUTO: 10.77 K/UL — HIGH (ref 3.8–10.5)

## 2023-03-11 PROCEDURE — 76937 US GUIDE VASCULAR ACCESS: CPT | Mod: 26,59

## 2023-03-11 PROCEDURE — 99223 1ST HOSP IP/OBS HIGH 75: CPT

## 2023-03-11 PROCEDURE — 74174 CTA ABD&PLVS W/CONTRAST: CPT | Mod: 26,MA

## 2023-03-11 PROCEDURE — 71275 CT ANGIOGRAPHY CHEST: CPT | Mod: 26,MA

## 2023-03-11 PROCEDURE — 99285 EMERGENCY DEPT VISIT HI MDM: CPT | Mod: 25,GC

## 2023-03-11 PROCEDURE — 36000 PLACE NEEDLE IN VEIN: CPT

## 2023-03-11 PROCEDURE — 36569 INSJ PICC 5 YR+ W/O IMAGING: CPT

## 2023-03-11 RX ORDER — ASPIRIN/CALCIUM CARB/MAGNESIUM 324 MG
1 TABLET ORAL
Qty: 0 | Refills: 0 | DISCHARGE

## 2023-03-11 RX ORDER — OXYCODONE HYDROCHLORIDE 5 MG/1
5 TABLET ORAL EVERY 4 HOURS
Refills: 0 | Status: DISCONTINUED | OUTPATIENT
Start: 2023-03-11 | End: 2023-03-16

## 2023-03-11 RX ORDER — APIXABAN 2.5 MG/1
5 TABLET, FILM COATED ORAL
Refills: 0 | Status: DISCONTINUED | OUTPATIENT
Start: 2023-03-11 | End: 2023-03-12

## 2023-03-11 RX ORDER — BUDESONIDE AND FORMOTEROL FUMARATE DIHYDRATE 160; 4.5 UG/1; UG/1
2 AEROSOL RESPIRATORY (INHALATION)
Refills: 0 | Status: DISCONTINUED | OUTPATIENT
Start: 2023-03-11 | End: 2023-03-16

## 2023-03-11 RX ORDER — LANOLIN ALCOHOL/MO/W.PET/CERES
3 CREAM (GRAM) TOPICAL AT BEDTIME
Refills: 0 | Status: DISCONTINUED | OUTPATIENT
Start: 2023-03-11 | End: 2023-03-16

## 2023-03-11 RX ORDER — ALBUTEROL 90 UG/1
2 AEROSOL, METERED ORAL EVERY 6 HOURS
Refills: 0 | Status: DISCONTINUED | OUTPATIENT
Start: 2023-03-11 | End: 2023-03-16

## 2023-03-11 RX ORDER — PANTOPRAZOLE SODIUM 20 MG/1
40 TABLET, DELAYED RELEASE ORAL
Refills: 0 | Status: DISCONTINUED | OUTPATIENT
Start: 2023-03-11 | End: 2023-03-16

## 2023-03-11 RX ORDER — BUDESONIDE AND FORMOTEROL FUMARATE DIHYDRATE 160; 4.5 UG/1; UG/1
2 AEROSOL RESPIRATORY (INHALATION)
Qty: 0 | Refills: 0 | DISCHARGE

## 2023-03-11 RX ORDER — ACETAMINOPHEN 500 MG
650 TABLET ORAL EVERY 6 HOURS
Refills: 0 | Status: DISCONTINUED | OUTPATIENT
Start: 2023-03-11 | End: 2023-03-16

## 2023-03-11 RX ORDER — APIXABAN 2.5 MG/1
1 TABLET, FILM COATED ORAL
Qty: 0 | Refills: 0 | DISCHARGE

## 2023-03-11 RX ORDER — VERAPAMIL HCL 240 MG
1 CAPSULE, EXTENDED RELEASE PELLETS 24 HR ORAL
Qty: 0 | Refills: 0 | DISCHARGE

## 2023-03-11 RX ORDER — PANTOPRAZOLE SODIUM 20 MG/1
1 TABLET, DELAYED RELEASE ORAL
Qty: 0 | Refills: 0 | DISCHARGE

## 2023-03-11 RX ORDER — ENOXAPARIN SODIUM 100 MG/ML
40 INJECTION SUBCUTANEOUS EVERY 24 HOURS
Refills: 0 | Status: DISCONTINUED | OUTPATIENT
Start: 2023-03-11 | End: 2023-03-11

## 2023-03-11 RX ORDER — ACETAMINOPHEN 500 MG
1000 TABLET ORAL ONCE
Refills: 0 | Status: COMPLETED | OUTPATIENT
Start: 2023-03-11 | End: 2023-03-11

## 2023-03-11 RX ORDER — ASPIRIN/CALCIUM CARB/MAGNESIUM 324 MG
81 TABLET ORAL DAILY
Refills: 0 | Status: DISCONTINUED | OUTPATIENT
Start: 2023-03-11 | End: 2023-03-16

## 2023-03-11 RX ORDER — AZITHROMYCIN 500 MG/1
500 TABLET, FILM COATED ORAL EVERY 24 HOURS
Refills: 0 | Status: DISCONTINUED | OUTPATIENT
Start: 2023-03-12 | End: 2023-03-15

## 2023-03-11 RX ORDER — AZITHROMYCIN 500 MG/1
500 TABLET, FILM COATED ORAL ONCE
Refills: 0 | Status: COMPLETED | OUTPATIENT
Start: 2023-03-11 | End: 2023-03-11

## 2023-03-11 RX ORDER — CEFTRIAXONE 500 MG/1
1000 INJECTION, POWDER, FOR SOLUTION INTRAMUSCULAR; INTRAVENOUS EVERY 24 HOURS
Refills: 0 | Status: COMPLETED | OUTPATIENT
Start: 2023-03-11 | End: 2023-03-15

## 2023-03-11 RX ORDER — VERAPAMIL HCL 240 MG
240 CAPSULE, EXTENDED RELEASE PELLETS 24 HR ORAL DAILY
Refills: 0 | Status: DISCONTINUED | OUTPATIENT
Start: 2023-03-11 | End: 2023-03-16

## 2023-03-11 RX ORDER — OXYCODONE HYDROCHLORIDE 5 MG/1
5 TABLET ORAL ONCE
Refills: 0 | Status: DISCONTINUED | OUTPATIENT
Start: 2023-03-11 | End: 2023-03-11

## 2023-03-11 RX ORDER — ALBUTEROL 90 UG/1
2 AEROSOL, METERED ORAL
Qty: 0 | Refills: 0 | DISCHARGE

## 2023-03-11 RX ORDER — AZITHROMYCIN 500 MG/1
TABLET, FILM COATED ORAL
Refills: 0 | Status: DISCONTINUED | OUTPATIENT
Start: 2023-03-11 | End: 2023-03-15

## 2023-03-11 RX ADMIN — AZITHROMYCIN 255 MILLIGRAM(S): 500 TABLET, FILM COATED ORAL at 17:46

## 2023-03-11 RX ADMIN — OXYCODONE HYDROCHLORIDE 5 MILLIGRAM(S): 5 TABLET ORAL at 18:25

## 2023-03-11 RX ADMIN — Medication 400 MILLIGRAM(S): at 02:44

## 2023-03-11 RX ADMIN — APIXABAN 5 MILLIGRAM(S): 2.5 TABLET, FILM COATED ORAL at 19:57

## 2023-03-11 RX ADMIN — BUDESONIDE AND FORMOTEROL FUMARATE DIHYDRATE 2 PUFF(S): 160; 4.5 AEROSOL RESPIRATORY (INHALATION) at 22:52

## 2023-03-11 RX ADMIN — OXYCODONE HYDROCHLORIDE 5 MILLIGRAM(S): 5 TABLET ORAL at 05:03

## 2023-03-11 RX ADMIN — CEFTRIAXONE 100 MILLIGRAM(S): 500 INJECTION, POWDER, FOR SOLUTION INTRAMUSCULAR; INTRAVENOUS at 15:17

## 2023-03-11 RX ADMIN — OXYCODONE HYDROCHLORIDE 5 MILLIGRAM(S): 5 TABLET ORAL at 12:08

## 2023-03-11 RX ADMIN — OXYCODONE HYDROCHLORIDE 5 MILLIGRAM(S): 5 TABLET ORAL at 13:34

## 2023-03-11 NOTE — H&P ADULT - NSHPLABSRESULTS_GEN_ALL_CORE
LABS:                        10.3   10.77 )-----------( 303      ( 11 Mar 2023 02:03 )             32.8     03-11    137  |  103  |  19  ----------------------------<  134<H>  4.1   |  20<L>  |  0.88    Ca    9.3      11 Mar 2023 02:03  Mg     2.20     03-11    TPro  6.8  /  Alb  3.8  /  TBili  0.9  /  DBili  x   /  AST  15  /  ALT  14  /  AlkPhos  69  03-11      PT/INR - ( 11 Mar 2023 02:03 )   PT: 18.4 sec;   INR: 1.58 ratio         PTT - ( 11 Mar 2023 02:03 )  PTT:30.4 sec          VBG 03-11 @ 02:00  pH: 7.32/pCO2: 46 /pO2: 36/HCO3: 24/lactate: 1.5        EKG: reviewed, chronic Afib , nonspecific ST/T changes    RADIOLOGY & ADDITIONAL TESTS:  < from: CT Angio Abdomen and Pelvis w/ IV Cont (03.11.23 @ 02:27) >    CHEST:  LUNGS AND LARGE AIRWAYS: Patent central airways. Patient is status post   right upper lobe wedge resection with dystrophic calcifications noted in   the surgical bed. Groundglass airspace opacities are noted in the right   upper lobe.  PLEURA: Small right pleural effusion. Fluid tracks alongthe right major   fissure.  VESSELS: No intrathoracic aortic dissection. Coronary artery   calcifications.  HEART: Heart size is stable, enlarged. No pericardial effusion.  MEDIASTINUM AND SEAN: No lymphadenopathy.  CHEST WALL AND LOWER NECK: Within normal limits.    ABDOMEN AND PELVIS:  LIVER: Within normal limits.  BILE DUCTS: Normal caliber.  GALLBLADDER: Not well-visualized.  SPLEEN: Within normal limits.  PANCREAS: Fatty atrophy of the pancreas.  ADRENALS: Within normal limits.  KIDNEYS/URETERS: Bilateral renal cysts with a 1.4 cm cyst noted in the   upper pole of the right kidney and a 2.6 cm cyst in the right inferior   pole. A partially rim calcified, exophytic 2.5 cm cyst is noted in the   upper pole of the left kidney.    BLADDER: Limited evaluation due to streak artifact from bilateral hip   arthroplasties.  REPRODUCTIVE ORGANS: Limited evaluation due to streak artifact from   bilateral hip arthroplasties. Note patient has a reported history of   acquired absence of cervix and uterus.    BOWEL: No bowel obstruction. Colonic diverticulosis. Appendix is normal.  PERITONEUM: No ascites.  VESSELS: No intra-abdominal aortic dissection. Atherosclerotic changes.   Celiac artery, SMA, and ALINA are grossly patent.  RETROPERITONEUM/LYMPH NODES: No lymphadenopathy.  ABDOMINAL WALL: Within normal limits.  BONES: Degenerative changes of the spine are noted. Streak artifact   secondary to bilateral hip arthroplasties.    IMPRESSION:  No aortic dissection.    Small right pleural effusion with fluid tracking along the right major   fissure. Right upper lobe groundglass airspace opacities may represent   infectious/inflammatory process.    1- Right wedge resection right upper lobe   Final Diagnosis   1. Lung, right upper lobe, wedge resection   - Invasive squamous cell carcinoma, 1.1 cm.   - Margins negative for carcinoma (0.9 cm to margin).   - Emphysema.   - One negative intraparenchymal lymph node.   - Fibroelastosis (apical cap).

## 2023-03-11 NOTE — H&P ADULT - PROBLEM SELECTOR PLAN 1
-pt with VATS with lung wedge resection of RUL nodule on 3/2, path came back invasive squamous cell carcinoma with negative margin  -called [facility] surgery to see her  -pt with right arm/shoulder pain, denies trauma, will get xray right shoulder and also MRI cervical spine to r/o cervical mets since she's complaining of RUE weakness/pain/numbness of fingers -pt with VATS with lung wedge resection of RUL nodule on 3/2, path came back invasive squamous cell carcinoma with negative margin  -I called [facility]TS surgery to see her  -pt with right arm/shoulder pain, denies trauma, will get xray right shoulder and also MRI cervical spine to r/o cervical mets since she's complaining of RUE weakness/pain/numbness of fingers  -CT chest/abd revealed small R pleural effusion with fluid tracking along the right major fissure. RUL groundglass airspace opacities may represent infectious/inflammatory process. Pt has productive cough with yellow-green sputum,, streaked with minimal blood. Will treat with Ceftriaxone/zithromax for possible pneumonia. Check sputum culture and urine legionella. -pt with VATS with lung wedge resection of RUL nodule on 3/2, path came back invasive squamous cell carcinoma with negative margin  -I called [facility] surgery to see her  -pt with right arm/shoulder pain, denies trauma, will get xray right shoulder and also MRI cervical spine to r/o cervical mets since she's complaining of RUE weakness/pain/numbness of fingers  -check ESR, CRP in AM  -CT chest/abd revealed small R pleural effusion with fluid tracking along the right major fissure. RUL groundglass airspace opacities may represent infectious/inflammatory process. Pt has productive cough with yellow-green sputum,, streaked with minimal blood. Will treat with Ceftriaxone/zithromax for possible pneumonia. Check sputum culture and urine legionella.

## 2023-03-11 NOTE — ED ADULT TRIAGE NOTE - CHIEF COMPLAINT QUOTE
Pt st" I had Surgery for a rt sided  lung nodule removed on 3/2, I have bad pain in rt arm , chest pain, and feel short of breath.....cant lift the arm it is killing me. " Pt hx of Afib, on eloquis, Lung ca, skin ca.

## 2023-03-11 NOTE — CONSULT NOTE ADULT - SUBJECTIVE AND OBJECTIVE BOX
HPI:  82 year old female with history of HLD, chronic AFib on Eliquis, CAD (s/p PCI x2) 10 yrs ago on ASA, CHF, COPD/asthma, MARIE not on CPAP skin cancer, COVID, RUL nodule s/p VATS ~1 wk ago on 3/2/2023 with Dr. Yoon, path+ invasive squamous cell carcinoma,  presenting with severe RUE pain X1 day. States having progressive pain to entire RUE with some associated weakness/numbness of fingers, denies chest pain/sob/n/v, fevers, chills. Pt denies trauma or falls, but reports she lifted her daughter's baby dog. Pt has productive cough with minimal blood. In ED, CT negative for aortic dissection or PE, but small R pleural effusion with fluid tracking along the right major fissure. RUL groundglass airspace opacities may represent infectious/inflammatory process.    (11 Mar 2023 13:55) Pt had appointment to follow up with surgeon, Dr Yoon this Tuesday in office to discuss path results.   Pt admitted to medical service for R arm pain workup.  antibiotics for PNA on CT chest        PAST MEDICAL & SURGICAL HISTORY:  Coronary Stent  to RCA, LAD, and DIAG 06 at [facility]      Afib  x 15 yrs --on Coumadin  attempted cardioversion 13 yrs ago--failed      GERD (Gastroesophageal Reflux Disease)      Hypercholesterolemia      Emphysema/COPD      Lip Cancer  resected 6 yrs ago (no chemo/rad)      Skin Cancer of Face  removed 1 yr ago      Skin Cancer of Nose  1 yr ago- removed      H/O: CVA  pt unaware of CVA, yet showed up on CT of head as old CVA      CHF (congestive heart failure)      PUD (peptic ulcer disease)      Asthma      Smoker      CAD (coronary artery disease)      Melanoma      Nodule of upper lobe of right lung      MARIE (obstructive sleep apnea)      History of Hysterectomy   for fibroids        History of   , , 1970,       History of Cholecystectomy  1975      History of Appendectomy  childhood        S/P T&amp;A  childhood        History of cholecystectomy      History of appendectomy      Melanoma      History of total right hip replacement      History of total left hip replacement      History of cataract surgery      H/O cardiac radiofrequency ablation          Review of Systems:   CONSTITUTIONAL: No fever, weight loss, or fatigue  EYES: No eye pain, visual disturbances, or discharge  ENMT:  No difficulty hearing, tinnitus, vertigo; No sinus or throat pain  NECK: No pain or stiffness  BREASTS: No pain, masses, or nipple discharge  RESPIRATORY: +cough, no wheezing, chills + hemoptysis; + shortness of breath  CARDIOVASCULAR: No chest pain, palpitations, dizziness, or leg swelling  GASTROINTESTINAL: No abdominal or epigastric pain. No nausea, vomiting, or hematemesis; No diarrhea or constipation. No melena or hematochezia.  GENITOURINARY: No dysuria, frequency, hematuria, or incontinence  NEUROLOGICAL: No headaches, memory loss, loss of strength, +numbness/weakness of R hand fingers, no tremor  SKIN: No itching, burning, rashes, or lesions   LYMPH NODES: No enlarged glands  ENDOCRINE: No heat or cold intolerance; No hair loss  MUSCULOSKELETAL: right arm/shoulder pain,   PSYCHIATRIC: No depression, anxiety, mood swings, or difficulty sleeping  HEME/LYMPH: No easy bruising, or bleeding gums  ALLERGY AND IMMUNOLOGIC: No hives or eczema  Other Review of Systems: All other review of systems negative, except as noted in HPI          MEDICATIONS  (STANDING):  apixaban 5 milliGRAM(s) Oral two times a day  aspirin  chewable 81 milliGRAM(s) Oral daily  azithromycin  IVPB 500 milliGRAM(s) IV Intermittent once  azithromycin  IVPB      budesonide 160 MICROgram(s)/formoterol 4.5 MICROgram(s) Inhaler 2 Puff(s) Inhalation two times a day  cefTRIAXone   IVPB 1000 milliGRAM(s) IV Intermittent every 24 hours  pantoprazole    Tablet 40 milliGRAM(s) Oral before breakfast  verapamil  milliGRAM(s) Oral daily    MEDICATIONS  (PRN):  acetaminophen     Tablet .. 650 milliGRAM(s) Oral every 6 hours PRN Temp greater or equal to 38C (100.4F), Mild Pain (1 - 3)  albuterol    90 MICROgram(s) HFA Inhaler 2 Puff(s) Inhalation every 6 hours PRN Shortness of Breath and/or Wheezing  melatonin 3 milliGRAM(s) Oral at bedtime PRN Insomnia  oxyCODONE    IR 5 milliGRAM(s) Oral every 4 hours PRN Severe Pain (7 - 10)      Allergies  No Known Allergies  Intolerances         Social History:  · Substance use	No  · Social History (marital status, living situation, occupation, and sexual history)	former smoker 1 ppd x40 years, denies alcohol use  lives at home with daughter and son          FAMILY HISTORY:    unless noted, no significant family hx with Mother, Father, Siblings    Vital Signs Last 24 Hrs  T(C): 36.7 (11 Mar 2023 05:07), Max: 36.7 (11 Mar 2023 04:18)  T(F): 98 (11 Mar 2023 05:07), Max: 98 (11 Mar 2023 04:18)  HR: 67 (11 Mar 2023 05:07) (67 - 120)  BP: 111/71 (11 Mar 2023 05:07) (111/71 - 149/98)  BP(mean): --  RR: 18 (11 Mar 2023 05:07) (16 - 18)  SpO2: 100% (11 Mar 2023 05:07) (99% - 100%)    Parameters below as of 11 Mar 2023 05:07  Patient On (Oxygen Delivery Method): room air          PHYSICAL EXAM:  GENERAL: NAD, well-developed  HEAD:  Atraumatic, Normocephalic  EYES: EOMI, PERRLA, conjunctiva and sclera clear  NECK: Supple, No JVD  CHEST/LUNG: mainly clear, decreased air entry  HEART: Regular rate and rhythm; No murmurs, rubs, or gallops  ABDOMEN: Soft, Nontender, Nondistended; Bowel sounds present  EXTREMITIES:  2+ Peripheral Pulses, No clubbing, cyanosis, or edema, unable to abduct right arm above head d/t pain  PSYCH: AAOx3  NEUROLOGY: numbness R fingers especially first to third digits  SKIN: No rashes or   RVATS site: c,d,i; suture intact    LABS:                        10.3   10.77 )-----------( 303      ( 11 Mar 2023 02:03 )             32.8     03-11    137  |  103  |  19  ----------------------------<  134<H>  4.1   |  20<L>  |  0.88    Ca    9.3      11 Mar 2023 02:03  Mg     2.20     03-11    TPro  6.8  /  Alb  3.8  /  TBili  0.9  /  DBili  x   /  AST  15  /  ALT  14  /  AlkPhos  69  03-11    PT/INR - ( 11 Mar 2023 02:03 )   PT: 18.4 sec;   INR: 1.58 ratio         PTT - ( 11 Mar 2023 02:03 )  PTT:30.4 sec      RADIOLOGY & ADDITIONAL STUDIES:    ASSESSMENT:   82 year old female with history of HLD, chronic AFib on Eliquis, CAD (s/p PCI x2) 10 yrs ago on ASA, CHF, COPD/asthma, MARIE not on CPAP skin cancer, COVID, RVATS RUL wedge resection 3/2/2023 w thoracic surgery, Dr Yoon.       HEALTH ISSUES - PROBLEM Dx:  Right arm pain  recent lung resection       PLAN:  Dr Yoon notified that pt admitted to [facility]  will follow while in hospital  recommendations to follow  continue care per primary medical team    Sol MIRAMONTES [id number]

## 2023-03-11 NOTE — ED PROVIDER NOTE - ATTENDING CONTRIBUTION TO CARE
HPI: 82 year old female with history of HLD, AFib on Eliquis, CAD (s/p PCI x2) on ASA, CHF, COPD/asthma, MARIE, skin cancer, COVID, RUL nodule s/p VATS ~1 wk ago with Dr. Yoon, presenting with RUE pain. States having progressive pain to entire RUE with some associated weakness/numbness, denies chest pain/sob/n/v, fevers, chills.  EXAM: HOlding RUE guarding, normal pulses, BUE with diff BPs L>R, pulses and equal, warm and well perfused, no signs ischemia on physical exam of BUE, heart RRR, no pulsatile mass in abd exam, abd soft, lungs crackles bases.   MDM: pt with multiple medical problems on eliquis recently stopped for procedure but re-started that presents with right arm pain. Concern in this F with multiple medical problems for ACS vs dissection given symptoms, recent partial VATS. Will require emergent CTA r/o dissection vs AAA, work up for ACS and likely admit.

## 2023-03-11 NOTE — ED PROVIDER NOTE - PHYSICAL EXAMINATION
Gen - NAD; well-appearing; A+Ox3   HEENT - NCAT, EOMI, moist mucous membranes  Neck - supple  Resp - CTAB, no increased WOB  CV -  RRR, no m/r/g  Abd - soft, NT, ND; no guarding or rebound  Extrem - no LE edema/erythema/tenderness; equal 1+ radial pulses b/l, pain with gross manipulation to RUE but no deformity, FROM  Neuro - sensation intact through b/l UEs/LEs, strength equal  Skin - warm, well perfused, cap refill <3 sec b/l hands

## 2023-03-11 NOTE — H&P ADULT - PROBLEM SELECTOR PLAN 5
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

## 2023-03-11 NOTE — H&P ADULT - PROBLEM SELECTOR PLAN 3
-h/o CAD, stents x2 10 years ago  -cont ASA  -not on statin or beta blocker, check lipid profile in AM

## 2023-03-11 NOTE — ED PROCEDURE NOTE - ATTENDING CONTRIBUTION TO CARE
DR. BETANCOURT, ATTENDING MD-  I was in the exam room and observed and supervised the resident when they were completing the key portions of this procedure.

## 2023-03-11 NOTE — H&P ADULT - PROBLEM SELECTOR PLAN 2
-stable, chronic Afib on EKG  -cardiology recs appreciated, recent TTE preserved lvef, mod-severe TR, mild RV dysfunction/RVE)  -c/w eliquis 5 mg bid and verapamil ER 240mg qd

## 2023-03-11 NOTE — H&P ADULT - NSHPREVIEWOFSYSTEMS_GEN_ALL_CORE
CONSTITUTIONAL: No fever, weight loss, or fatigue  EYES: No eye pain, visual disturbances, or discharge  ENMT:  No difficulty hearing, tinnitus, vertigo; No sinus or throat pain  NECK: No pain or stiffness  BREASTS: No pain, masses, or nipple discharge  RESPIRATORY: +cough, no wheezing, chills + hemoptysis; + shortness of breath  CARDIOVASCULAR: No chest pain, palpitations, dizziness, or leg swelling  GASTROINTESTINAL: No abdominal or epigastric pain. No nausea, vomiting, or hematemesis; No diarrhea or constipation. No melena or hematochezia.  GENITOURINARY: No dysuria, frequency, hematuria, or incontinence  NEUROLOGICAL: No headaches, memory loss, loss of strength, +numbness/weakness of R hand fingers, no tremor  SKIN: No itching, burning, rashes, or lesions   LYMPH NODES: No enlarged glands  ENDOCRINE: No heat or cold intolerance; No hair loss  MUSCULOSKELETAL: right arm/shoulder pain,   PSYCHIATRIC: No depression, anxiety, mood swings, or difficulty sleeping  HEME/LYMPH: No easy bruising, or bleeding gums  ALLERGY AND IMMUNOLOGIC: No hives or eczema

## 2023-03-11 NOTE — PATIENT PROFILE ADULT - FUNCTIONAL SCREEN CURRENT LEVEL: SWALLOWING (IF SCORE 2 OR MORE FOR ANY ITEM, CONSULT REHAB SERVICES), MLM)
patient stable no acute events, transferred for SI/psychosiss voluntary 0 = swallows foods/liquids without difficulty

## 2023-03-11 NOTE — ED PROVIDER NOTE - PROGRESS NOTE DETAILS
Masoud PGY-3: Patient/daughter states she is currently in between PMDs but sees cardiologist Dr. Adame. On chart review patient previously admitted to Dr. Gutiérrez. Spoke with Dr. Gutiérrez for admission.

## 2023-03-11 NOTE — ED PROVIDER NOTE - NSICDXPASTMEDICALHX_GEN_ALL_CORE_FT
PAST MEDICAL HISTORY:  Afib x 15 yrs --on Coumadin  attempted cardioversion 13 yrs ago--failed    Asthma     CAD (coronary artery disease)     CHF (congestive heart failure)     Coronary Stent to RCA, LAD, and DIAG 9/25/06 at [facility]    Emphysema/COPD     GERD (Gastroesophageal Reflux Disease)     H/O: CVA pt unaware of CVA, yet 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

## 2023-03-11 NOTE — ED ADULT NURSE NOTE - OBJECTIVE STATEMENT
pt received to rm 1  , a&ox 4, ambulatory , pmh of afib on Eliquis, asthma, CHF, on 3/2 R lung nodule removal  , p/w R arm pain with decrease ROM, SOB, CP  . Pt breathing even and unlabored on room air. NSR on cardiac monitor. Denies fever, chills, cough, dizziness, N/V/D, constipation, numbness, tingling. pt educated on fall precautions and confirms understanding via teach back method. Stretcher locked in lowest position with siderails up x2. Call bell and personal items within reach.

## 2023-03-11 NOTE — PATIENT PROFILE ADULT - FUNCTIONAL ASSESSMENT - BASIC MOBILITY 6.
3-calculated by average/Not able to assess (calculate score using [facility] averaging method)

## 2023-03-11 NOTE — H&P ADULT - ASSESSMENT
82 year old female with history of HLD, chronic AFib on Eliquis, CAD (s/p PCI x2) 10 yrs ago on ASA, CHF, COPD/asthma, MARIE not on CPAP skin cancer, COVID, RUL nodule s/p VATS ~1 wk ago with Dr. Yoon, path+ invasive squamous cell carcinoma,  presenting with RUE pain X1 day, CT c/f pneumonia, also endorses R finger numbness/RUE weakness, need to r/o cervical mets

## 2023-03-11 NOTE — CONSULT NOTE ADULT - SUBJECTIVE AND OBJECTIVE BOX
CHIEF COMPLAINT: cp    HISTORY OF PRESENT ILLNESS:  82 year old female with history of HLD, AFib on Eliquis, CAD (s/p PCI x2) on ASA, CHF, COPD/asthma, MARIE, skin cancer, COVID, RUL nodule s/p VATS ~1 wk ago with Dr. Yoon, presenting with RUE pain. States having progressive pain to entire RUE with some associated weakness/numbness, denies chest pain/sob/n/v, fevers, chills.        Allergies    No Known Allergies    Intolerances    	    MEDICATIONS:  see med rec                PAST MEDICAL & SURGICAL HISTORY:  Coronary Stent  to RCA, LAD, and DIAG 06 at [facility]      Afib  x 15 yrs --on Coumadin  attempted cardioversion 13 yrs ago--failed      GERD (Gastroesophageal Reflux Disease)      Hypercholesterolemia      Emphysema/COPD      Lip Cancer  resected 6 yrs ago (no chemo/rad)      Skin Cancer of Face  removed 1 yr ago      Skin Cancer of Nose  1 yr ago- removed      H/O: CVA  pt unaware of CVA, yet showed up on CT of head as old CVA      CHF (congestive heart failure)      PUD (peptic ulcer disease)      Asthma      Smoker      CAD (coronary artery disease)      Melanoma      Nodule of upper lobe of right lung      MARIE (obstructive sleep apnea)      History of Hysterectomy   for fibroids        History of   , , 1970,       History of Cholecystectomy        History of Appendectomy  childhood        S/P T&amp;A  childhood        History of cholecystectomy      History of appendectomy      Melanoma      History of total right hip replacement      History of total left hip replacement      History of cataract surgery      H/O cardiac radiofrequency ablation          FAMILY HISTORY:      SOCIAL HISTORY:    former smoker. indep in adl    REVIEW OF SYSTEMS:  See HPI, otherwise complete 10 point review of systems negative    [ ] All others negative	      PHYSICAL EXAM:  T(C): 36.7 (23 @ 05:07), Max: 36.7 (23 @ 04:18)  HR: 67 (23 @ 05:07) (67 - 120)  BP: 111/71 (23 @ 05:07) (111/71 - 149/98)  RR: 18 (23 @ 05:07) (16 - 18)  SpO2: 100% (23 @ 05:07) (99% - 100%)  Wt(kg): --  I&O's Summary      Appearance: No Acute Distress	  HEENT:  Normal oral mucosa, PERRL, EOMI	  Cardiovascular: Normal S1 S2, No JVD, No murmurs/rubs/gallops  Respiratory: Lungs clear to auscultation bilaterally  Gastrointestinal:  Soft, Non-tender, + BS	  Skin: No rashes, No ecchymoses, No cyanosis	  Neurologic: Non-focal  Extremities: No clubbing, cyanosis or edema  Vascular: Peripheral pulses palpable 2+ bilaterally  Psychiatry: A & O x 3, Mood & affect appropriate    Laboratory Data:	 	    CBC Full  -  ( 11 Mar 2023 02:03 )  WBC Count : 10.77 K/uL  Hemoglobin : 10.3 g/dL  Hematocrit : 32.8 %  Platelet Count - Automated : 303 K/uL  Mean Cell Volume : 105.8 fL  Mean Cell Hemoglobin : 33.2 pg  Mean Cell Hemoglobin Concentration : 31.4 gm/dL  Auto Neutrophil # : 7.43 K/uL  Auto Lymphocyte # : 1.24 K/uL  Auto Monocyte # : 1.39 K/uL  Auto Eosinophil # : 0.42 K/uL  Auto Basophil # : 0.14 K/uL  Auto Neutrophil % : 69.0 %  Auto Lymphocyte % : 11.5 %  Auto Monocyte % : 12.9 %  Auto Eosinophil % : 3.9 %  Auto Basophil % : 1.3 %    03-11    137  |  103  |  19  ----------------------------<  134<H>  4.1   |  20<L>  |  0.88    Ca    9.3      11 Mar 2023 02:03  Mg     2.20     03-11    TPro  6.8  /  Alb  3.8  /  TBili  0.9  /  DBili  x   /  AST  15  /  ALT  14  /  AlkPhos  69  03-11      proBNP:   Lipid Profile:   HgA1c:   TSH:       CARDIAC MARKERS:            Interpretation of Telemetry: 	    ECG:  	  RADIOLOGY:  OTHER: 	    PREVIOUS DIAGNOSTIC TESTING:    [ ] Echocardiogram:  [ ] Catheterization:  [ ] Stress Test:  	    Assessment:  82 year old female with history of HLD, AFib on Eliquis, CAD (s/p PCI x2) on ASA, CHF, COPD/asthma, MARIE, skin cancer, COVID, RUL nodule s/p VATS ~1 wk ago with Dr. Yoon, presenting with RUE pain    Recs:  cardiac stable  cp atypical, no e/o ACS  shoulder pain likely 2/2 orthopedic issue vs referred pain from recent surgery/right postop pleural effusion  suggest orthopedic evaluation and pain control  recent tte preserved lvef, mod-severe TR, mild RV dysfunction/RVE  cw antiplatelet, statin and antianginal/rate control meds  cw AC for persistent afib   diuretics prn to maintain euvolemic  will follow        Greater than 60 minutes spent on total encounter; more than 50% of the visit was spent counseling and/or coordinating care by the attending physician.   	  Bill Cho MD   Cardiovascular Diseases  [phone]

## 2023-03-11 NOTE — PATIENT PROFILE ADULT - FALL HARM RISK - HARM RISK INTERVENTIONS
Private Vehicle
Assistance with ambulation/Assistance OOB with selected safe patient handling equipment/Communicate Risk of Fall with Harm to all staff/Reinforce activity limits and safety measures with patient and family/Review medications for side effects contributing to fall risk/Sit up slowly, dangle for a short time, stand at bedside before walking/Tailored Fall Risk Interventions/Use of alarms - bed, chair and/or voice tab/Visual Cue: Yellow wristband and red socks/Bed in lowest position, wheels locked, appropriate side rails in place/Call bell, personal items and telephone in reach/Instruct patient to call for assistance before getting out of bed or chair/Non-slip footwear when patient is out of bed/[place] to call system/Physically safe environment - no spills, clutter or unnecessary equipment/Purposeful Proactive Rounding/Room/bathroom lighting operational, light cord in reach

## 2023-03-11 NOTE — ED PROVIDER NOTE - CLINICAL SUMMARY MEDICAL DECISION MAKING FREE TEXT BOX
82 year old female with history of HLD, AFib on Eliquis, CAD (s/p PCI x2) on ASA, CHF, COPD/asthma, MARIE, skin cancer, COVID, RUL nodule s/p VATS ~1 wk ago with Dr. Yoon, presenting with RUE pain. Nontoxic here, afib nonischemic to 100s on ekg, concern for dissection vs acs, will obtain CTA aorta to r/o, likely admit for acs r/o given high risk

## 2023-03-11 NOTE — H&P ADULT - NSICDXPASTMEDICALHX_GEN_ALL_CORE_FT
PAST MEDICAL HISTORY:  Afib x 15 yrs --on Coumadin  attempted cardioversion 13 yrs ago--failed    Asthma     CAD (coronary artery disease)     CHF (congestive heart failure)     Coronary Stent to RCA, LAD, and DIAG 9/25/06 at [facility]    Emphysema/COPD     GERD (Gastroesophageal Reflux Disease)     H/O: CVA pt unaware of CVA, yet 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

## 2023-03-11 NOTE — H&P ADULT - HISTORY OF PRESENT ILLNESS
82 year old female with history of HLD, chronic AFib on Eliquis, CAD (s/p PCI x2) 10 yrs ago on ASA, CHF, COPD/asthma, MARIE not on CPAP skin cancer, COVID, RUL nodule s/p VATS ~1 wk ago with Dr. Yoon, path+ invasive squamous cell carcinoma,  presenting with RUE pain X1 day. States having progressive pain to entire RUE with some associated weakness/numbness of fingers, denies chest pain/sob/n/v, fevers, chills. Pt has productive cough with minimal blood. In ED, CT negative for aortic dissection or PE, but small R pleural effusion with fluid tracking along the right major fissure. RUL groundglass airspace opacities may represent infectious/inflammatory process.    82 year old female with history of HLD, chronic AFib on Eliquis, CAD (s/p PCI x2) 10 yrs ago on ASA, CHF, COPD/asthma, MARIE not on CPAP skin cancer, COVID, RUL nodule s/p VATS ~1 wk ago with Dr. Yoon, path+ invasive squamous cell carcinoma,  presenting with severe RUE pain X1 day. States having progressive pain to entire RUE with some associated weakness/numbness of fingers, denies chest pain/sob/n/v, fevers, chills. Pt denies trauma or falls, but reports she lifted her daughter's baby dog. Pt has productive cough with minimal blood. In ED, CT negative for aortic dissection or PE, but small R pleural effusion with fluid tracking along the right major fissure. RUL groundglass airspace opacities may represent infectious/inflammatory process.

## 2023-03-11 NOTE — ED PROVIDER NOTE - OBJECTIVE STATEMENT
82 year old female with history of HLD, AFib on Eliquis, CAD (s/p PCI x2) on ASA, CHF, COPD/asthma, MARIE, skin cancer, COVID, RUL nodule s/p VATS ~1 wk ago with Dr. Yoon, presenting with RUE pain. States having progressive pain to entire RUE with some associated weakness/numbness, denies chest pain/sob/n/v, fevers, chills.

## 2023-03-12 LAB
ANION GAP SERPL CALC-SCNC: 14 MMOL/L — SIGNIFICANT CHANGE UP (ref 7–14)
BUN SERPL-MCNC: 14 MG/DL — SIGNIFICANT CHANGE UP (ref 7–23)
CALCIUM SERPL-MCNC: 9.3 MG/DL — SIGNIFICANT CHANGE UP (ref 8.4–10.5)
CHLORIDE SERPL-SCNC: 102 MMOL/L — SIGNIFICANT CHANGE UP (ref 98–107)
CHOLEST SERPL-MCNC: 129 MG/DL — SIGNIFICANT CHANGE UP
CO2 SERPL-SCNC: 21 MMOL/L — LOW (ref 22–31)
CREAT SERPL-MCNC: 0.76 MG/DL — SIGNIFICANT CHANGE UP (ref 0.5–1.3)
CRP SERPL-MCNC: 51 MG/L — HIGH
EGFR: 79 ML/MIN/1.73M2 — SIGNIFICANT CHANGE UP
ERYTHROCYTE [SEDIMENTATION RATE] IN BLOOD: 65 MM/HR — HIGH (ref 4–25)
FERRITIN SERPL-MCNC: 404 NG/ML — HIGH (ref 15–150)
FOLATE SERPL-MCNC: >20 NG/ML — HIGH (ref 3.1–17.5)
GLUCOSE SERPL-MCNC: 93 MG/DL — SIGNIFICANT CHANGE UP (ref 70–99)
GRAM STN FLD: SIGNIFICANT CHANGE UP
HCT VFR BLD CALC: 34.3 % — LOW (ref 34.5–45)
HDLC SERPL-MCNC: 46 MG/DL — LOW
HGB BLD-MCNC: 10.9 G/DL — LOW (ref 11.5–15.5)
IRON SATN MFR SERPL: 16 % — SIGNIFICANT CHANGE UP (ref 14–50)
IRON SATN MFR SERPL: 39 UG/DL — SIGNIFICANT CHANGE UP (ref 30–160)
LEGIONELLA AG UR QL: NEGATIVE — SIGNIFICANT CHANGE UP
LIPID PNL WITH DIRECT LDL SERPL: 62 MG/DL — SIGNIFICANT CHANGE UP
MAGNESIUM SERPL-MCNC: 2.2 MG/DL — SIGNIFICANT CHANGE UP (ref 1.6–2.6)
MCHC RBC-ENTMCNC: 31.8 GM/DL — LOW (ref 32–36)
MCHC RBC-ENTMCNC: 33.4 PG — SIGNIFICANT CHANGE UP (ref 27–34)
MCV RBC AUTO: 105.2 FL — HIGH (ref 80–100)
NON HDL CHOLESTEROL: 83 MG/DL — SIGNIFICANT CHANGE UP
NRBC # BLD: 0 /100 WBCS — SIGNIFICANT CHANGE UP (ref 0–0)
NRBC # FLD: 0 K/UL — SIGNIFICANT CHANGE UP (ref 0–0)
PHOSPHATE SERPL-MCNC: 3.2 MG/DL — SIGNIFICANT CHANGE UP (ref 2.5–4.5)
PLATELET # BLD AUTO: 312 K/UL — SIGNIFICANT CHANGE UP (ref 150–400)
POTASSIUM SERPL-MCNC: 4.2 MMOL/L — SIGNIFICANT CHANGE UP (ref 3.5–5.3)
POTASSIUM SERPL-SCNC: 4.2 MMOL/L — SIGNIFICANT CHANGE UP (ref 3.5–5.3)
RBC # BLD: 3.26 M/UL — LOW (ref 3.8–5.2)
RBC # FLD: 14.2 % — SIGNIFICANT CHANGE UP (ref 10.3–14.5)
SODIUM SERPL-SCNC: 137 MMOL/L — SIGNIFICANT CHANGE UP (ref 135–145)
SPECIMEN SOURCE: SIGNIFICANT CHANGE UP
TIBC SERPL-MCNC: 244 UG/DL — SIGNIFICANT CHANGE UP (ref 220–430)
TRIGL SERPL-MCNC: 104 MG/DL — SIGNIFICANT CHANGE UP
TSH SERPL-MCNC: 5.98 UIU/ML — HIGH (ref 0.27–4.2)
UIBC SERPL-MCNC: 205 UG/DL — SIGNIFICANT CHANGE UP (ref 110–370)
VIT B12 SERPL-MCNC: 473 PG/ML — SIGNIFICANT CHANGE UP (ref 200–900)
WBC # BLD: 6.57 K/UL — SIGNIFICANT CHANGE UP (ref 3.8–10.5)
WBC # FLD AUTO: 6.57 K/UL — SIGNIFICANT CHANGE UP (ref 3.8–10.5)

## 2023-03-12 PROCEDURE — 73030 X-RAY EXAM OF SHOULDER: CPT | Mod: 26,RT

## 2023-03-12 PROCEDURE — 99223 1ST HOSP IP/OBS HIGH 75: CPT

## 2023-03-12 RX ORDER — SENNA PLUS 8.6 MG/1
2 TABLET ORAL AT BEDTIME
Refills: 0 | Status: DISCONTINUED | OUTPATIENT
Start: 2023-03-12 | End: 2023-03-16

## 2023-03-12 RX ORDER — POLYETHYLENE GLYCOL 3350 17 G/17G
17 POWDER, FOR SOLUTION ORAL DAILY
Refills: 0 | Status: COMPLETED | OUTPATIENT
Start: 2023-03-12 | End: 2023-03-15

## 2023-03-12 RX ADMIN — Medication 240 MILLIGRAM(S): at 06:43

## 2023-03-12 RX ADMIN — BUDESONIDE AND FORMOTEROL FUMARATE DIHYDRATE 2 PUFF(S): 160; 4.5 AEROSOL RESPIRATORY (INHALATION) at 21:51

## 2023-03-12 RX ADMIN — BUDESONIDE AND FORMOTEROL FUMARATE DIHYDRATE 2 PUFF(S): 160; 4.5 AEROSOL RESPIRATORY (INHALATION) at 11:13

## 2023-03-12 RX ADMIN — CEFTRIAXONE 100 MILLIGRAM(S): 500 INJECTION, POWDER, FOR SOLUTION INTRAMUSCULAR; INTRAVENOUS at 15:56

## 2023-03-12 RX ADMIN — AZITHROMYCIN 255 MILLIGRAM(S): 500 TABLET, FILM COATED ORAL at 15:55

## 2023-03-12 RX ADMIN — PANTOPRAZOLE SODIUM 40 MILLIGRAM(S): 20 TABLET, DELAYED RELEASE ORAL at 06:11

## 2023-03-12 RX ADMIN — OXYCODONE HYDROCHLORIDE 5 MILLIGRAM(S): 5 TABLET ORAL at 11:13

## 2023-03-12 RX ADMIN — OXYCODONE HYDROCHLORIDE 5 MILLIGRAM(S): 5 TABLET ORAL at 07:10

## 2023-03-12 RX ADMIN — SENNA PLUS 2 TABLET(S): 8.6 TABLET ORAL at 21:50

## 2023-03-12 RX ADMIN — Medication 81 MILLIGRAM(S): at 11:15

## 2023-03-12 RX ADMIN — Medication 3 MILLIGRAM(S): at 21:50

## 2023-03-12 RX ADMIN — POLYETHYLENE GLYCOL 3350 17 GRAM(S): 17 POWDER, FOR SOLUTION ORAL at 11:17

## 2023-03-12 RX ADMIN — APIXABAN 5 MILLIGRAM(S): 2.5 TABLET, FILM COATED ORAL at 06:11

## 2023-03-12 RX ADMIN — OXYCODONE HYDROCHLORIDE 5 MILLIGRAM(S): 5 TABLET ORAL at 06:10

## 2023-03-12 RX ADMIN — OXYCODONE HYDROCHLORIDE 5 MILLIGRAM(S): 5 TABLET ORAL at 12:13

## 2023-03-12 NOTE — PROGRESS NOTE ADULT - ASSESSMENT
_________________________________________________________________________________________  ========>>  M E D I C A L   A T T E N D I N G    F O L L O W  U P  N O T E  <<=========  -----------------------------------------------------------------------------------------------------    - Patient seen and examined by me earlier today.   - In summary,  JANA OLIVARES is a 82y year old woman admitted with pain in Rt arm an d flank..   - Patient today overall doing ok, comfortable, eating OK.     ==================>> REVIEW OF SYSTEM <<=================    GEN: no fever, no chills, still with pain as above : controlled with pain medications   RESP: no SOB, no cough, no sputum  CVS: no chest pain, no palpitations, no edema  GI: no abdominal pain, no nausea, no constipation, no diarrhea  : no dysuria, no frequency, no hematuria  Neuro: no headache, no dizziness  Derm : no itching, no rash    ==================>> PHYSICAL EXAM <<=================    GEN: A&O X 3 , NAD , somewhat uncomfortable with movement, pleasant, calm   HEENT: NCAT, PERRL, MMM, hearing intact  Neck: supple , no JVD appreciated  CVS: S1S2 , regular , No M/R/G appreciated  PULM: CTA B/L,  no W/R/R appreciated  ABD.: soft. non tender, non distended,  bowel sounds present  Extrem: intact pulses , no edema   PSYCH : normal mood,  not anxious                            ( Note Written / Date of service :  03-12-23 )    ==================>> MEDICATIONS <<====================    MEDICATIONS  (STANDING):  aspirin  chewable 81 milliGRAM(s) Oral daily  azithromycin  IVPB      azithromycin  IVPB 500 milliGRAM(s) IV Intermittent every 24 hours  budesonide 160 MICROgram(s)/formoterol 4.5 MICROgram(s) Inhaler 2 Puff(s) Inhalation two times a day  cefTRIAXone   IVPB 1000 milliGRAM(s) IV Intermittent every 24 hours  pantoprazole    Tablet 40 milliGRAM(s) Oral before breakfast  polyethylene glycol 3350 17 Gram(s) Oral daily  senna 2 Tablet(s) Oral at bedtime  verapamil  milliGRAM(s) Oral daily    MEDICATIONS  (PRN):  acetaminophen     Tablet .. 650 milliGRAM(s) Oral every 6 hours PRN Temp greater or equal to 38C (100.4F), Mild Pain (1 - 3)  albuterol    90 MICROgram(s) HFA Inhaler 2 Puff(s) Inhalation every 6 hours PRN Shortness of Breath and/or Wheezing  melatonin 3 milliGRAM(s) Oral at bedtime PRN Insomnia  oxyCODONE    IR 5 milliGRAM(s) Oral every 4 hours PRN Severe Pain (7 - 10)    ___________  Active diet:  Diet, DASH/TLC:   Sodium & Cholesterol Restricted  ___________________    ==================>> VITAL SIGNS <<==================    T(C): 36.9 (03-12-23 @ 06:00), Max: 36.9 (03-12-23 @ 06:00)  HR: 101 (03-12-23 @ 06:00) (90 - 103)  BP: 135/82 (03-12-23 @ 06:00) (101/71 - 135/82)  RR: 18 (03-12-23 @ 06:00) (18 - 20)  SpO2: 99% (03-12-23 @ 06:00) (98% - 100%)      ==================>> LAB AND IMAGING <<==================                        10.9   6.57  )-----------( 312      ( 12 Mar 2023 04:43 )             34.3        03-12    137  |  102  |  14  ----------------------------<  93  4.2   |  21<L>  |  0.76    Ca    9.3      12 Mar 2023 04:43  Phos  3.2     03-12  Mg     2.20     03-12    TPro  6.8  /  Alb  3.8  /  TBili  0.9  /  DBili  x   /  AST  15  /  ALT  14  /  AlkPhos  69  03-11    PT/INR - ( 11 Mar 2023 02:03 )   PT: 18.4 sec;   INR: 1.58 ratio    PTT - ( 11 Mar 2023 02:03 )  PTT:30.4 sec              TSH:      5.98   (03-12-23)           Lipid profile:  (03-12-23)     Total: 129     LDL  : (p)     HDL  :46     TG   :104    < from: CT Angio Jose Abdomen and Pelvis w/ IV Cont (03.11.23 @ 02:27) >  IMPRESSION:  No aortic dissection.  Small right pleural effusion with fluid tracking along the right major   fissure. Right upper lobe groundglass airspace opacities may represent   infectious/inflammatory process.  < end of copied text >     ___________________________________________________________________________________  ===============>>  A S S E S S M E N T   A N D   P L A N <<===============  ------------------------------------------------------------------------------------------    · Assessment      82 year old female with history of HLD, chronic AFib on Eliquis, CAD (s/p PCI x2) 10 yrs ago on ASA, CHF, COPD/asthma, MARIE not on CPAP skin cancer, COVID, RUL nodule s/p VATS ~1 wk ago with Dr. Yoon, path+ invasive squamous cell carcinoma,  presenting with RUE pain X1 day, CT c/f pneumonia, also endorses R finger numbness/RUE weakness, need to r/o cervical mets     Problem/Plan - 1:  ·  Problem: Right arm pain.   -pt with VATS with lung wedge resection of RUL nodule on 3/2, path came back invasive squamous cell carcinoma with negative margin  -CTS following appreciated   -pt with right arm/shoulder pain, denies trauma, will get xray right shoulder and also MRI cervical spine to r/o cervical mets since she's complaining of RUE weakness/pain/numbness of fingers  -suspect MSK pain from positioning during sx?      ( inflam. markers up from recent Sx)   ^^^ Inflammatory markers :  ^^^  C R P :          51.0 (03-12-23)  <<--  E S R :        65 (03-12-23)   Ferritin :         404 (03-12-23) <<--  -CT chest/abd revealed small R pleural effusion with fluid tracking along the right major fissure. RUL groundglass airspace opacities may represent infectious/inflammatory process.       Pt reportedly had productive cough with yellow-green sputum,, streaked with minimal blood. Will treat with Ceftriaxone/zithromax for possible pneumonia. Check sputum culture and urine legionella.    no leukocytosis, no fever !     vascular called by ortho to evaluation fr hematoma ?     Problem/Plan - 2:  ·  Problem: Chronic atrial fibrillation.   ·  Plan: -stable, chronic Afib on EKG  -cardiology recs appreciated, recent TTE preserved lvef, mod-severe TR, mild RV dysfunction/RVE)  -on eliquis 5 mg bid at home and verapamil ER 240mg qd.  cardiologist following     Problem/Plan - 3:  ·  Problem: CAD (coronary artery disease).   ·  Plan: -h/o CAD, stents x2 10 years ago  -cont ASA  -not on statin or beta blocker, check lipid profile in AM.    Problem/Plan - 4:  ·  Problem: COPD (chronic obstructive pulmonary disease).   ·  Plan: c/w symbicort and albuterol inhaler prn.    Problem/Plan - 5:  ·  Problem: Lung cancer.   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    Problem/Plan - 6:  ·  Problem: Anemia.   ·  Plan: macrocytic anemia, trend CBC, transfuse prn       03-12-23 @ 04:43  Iron:     39 ug/dL  Iron %:   16 %  TIBC:     244 ug/dL       03-12-23 @ 04:43  Vit B12:  473 pg/mL  Folate:   >20.0 ng/mL    -GI/DVT Prophylaxis per protocol.    --------------------------------------------  Case discussed with patient, med team..   Education given on findings and plan of care  ___________________________  H. АЛЕКСАНДР Gutiérrez.  Pager: [phone]

## 2023-03-12 NOTE — PHYSICAL THERAPY INITIAL EVALUATION ADULT - PERTINENT HX OF CURRENT PROBLEM, REHAB EVAL
Patient is 82 year old female with history of HLD, chronic AFib on Eliquis, CAD (s/p PCI x2) 10 yrs ago on ASA, CHF, COPD/asthma, MARIE not on CPAP skin cancer, COVID, s/p RVATS RUL wedge resection 3/2/2023.

## 2023-03-12 NOTE — PHYSICAL THERAPY INITIAL EVALUATION ADULT - ACTIVE RANGE OF MOTION EXAMINATION, REHAB EVAL
right elbow and wrist WFL/Left UE Active ROM was WFL (within functional limits)/bilateral  lower extremity Active ROM was WFL (within functional limits)

## 2023-03-12 NOTE — CONSULT NOTE ADULT - ATTENDING COMMENTS
R axillary artery hematoma  no neurologic deficit  palpable pulses at the wrist  hematoma is not expanding  CT reviewed    Plan  hold a/c  serial cbc  will cont to monitor

## 2023-03-12 NOTE — PROCEDURE NOTE - NSPROCDETAILS_GEN_ALL_CORE
location identified, draped/prepped, sterile technique used/sterile dressing applied/sterile technique, catheter placed
blood seen on insertion/dressing applied/flushes easily/secured in place

## 2023-03-12 NOTE — CONSULT NOTE ADULT - SUBJECTIVE AND OBJECTIVE BOX
RECORD, JANA [id number]  82y Female  1d    HPI:  82 year old female with history of HLD, chronic AFib on Eliquis, CAD (s/p PCI x2) 10 yrs ago on ASA, CHF, COPD/asthma, MARIE not on CPAP skin cancer, COVID, RUL nodule s/p VATS ~1 wk ago with Dr. Yoon, path+ invasive squamous cell carcinoma,  presenting with severe RUE pain X1 day. States having progressive pain to entire RUE with some associated weakness/numbness of fingers, denies chest pain/sob/n/v, fevers, chills. Pt denies trauma or falls, but reports she lifted her daughter's baby dog. Pt has productive cough with minimal blood. In ED, CT negative for aortic dissection or PE, but small R pleural effusion with fluid tracking along the right major fissure. RUL groundglass airspace opacities may represent infectious/inflammatory process.    (11 Mar 2023 13:55)    Vascular surgery was consulted today as on review of CT scan this morning, was noted that patient has right axillary hematoma. Patient reported that she has had no trauma to the right arm/chest, no lines were placed in the right axilla during her previous hospital stay and her pain started 2 days ago. Discussed with radiology regarding finding, there is a hyperdense hematoma at the area, cannot rule out an active extravasation.     PAST MEDICAL & SURGICAL HISTORY:  Coronary Stent  to RCA, LAD, and DIAG 06 at [facility]  Afib  x 15 yrs --on Coumadin  attempted cardioversion 13 yrs ago--failed  GERD (Gastroesophageal Reflux Disease)  Hypercholesterolemia  Emphysema/COPD  Lip Cancer  resected 6 yrs ago (no chemo/rad)  Skin Cancer of Face  removed 1 yr ago  Skin Cancer of Nose  1 yr ago- removed  H/O: CVA  pt unaware of CVA, yet showed up on CT of head as old CVA  CHF (congestive heart failure)  PUD (peptic ulcer disease)  Asthma  Smoker  CAD (coronary artery disease)  Melanoma  Nodule of upper lobe of right lung  MARIE (obstructive sleep apnea)  History of Hysterectomy   for fibroids  History of   , , ,   History of Cholecystectomy    History of Appendectomy  childhood  S/P T&amp;A  childhood  History of cholecystectomy  History of appendectomy  Melanoma  History of total right hip replacement  History of total left hip replacement  History of cataract surgery  H/O cardiac radiofrequency ablation    MEDICATIONS  (STANDING):  aspirin  chewable 81 milliGRAM(s) Oral daily  azithromycin  IVPB      azithromycin  IVPB 500 milliGRAM(s) IV Intermittent every 24 hours  budesonide 160 MICROgram(s)/formoterol 4.5 MICROgram(s) Inhaler 2 Puff(s) Inhalation two times a day  cefTRIAXone   IVPB 1000 milliGRAM(s) IV Intermittent every 24 hours  pantoprazole    Tablet 40 milliGRAM(s) Oral before breakfast  polyethylene glycol 3350 17 Gram(s) Oral daily  senna 2 Tablet(s) Oral at bedtime  verapamil  milliGRAM(s) Oral daily    MEDICATIONS  (PRN):  acetaminophen     Tablet .. 650 milliGRAM(s) Oral every 6 hours PRN Temp greater or equal to 38C (100.4F), Mild Pain (1 - 3)  albuterol    90 MICROgram(s) HFA Inhaler 2 Puff(s) Inhalation every 6 hours PRN Shortness of Breath and/or Wheezing  melatonin 3 milliGRAM(s) Oral at bedtime PRN Insomnia  oxyCODONE    IR 5 milliGRAM(s) Oral every 4 hours PRN Severe Pain (7 - 10)      Allergies    No Known Allergies    Intolerances        REVIEW OF SYSTEMS    [ x ] A ten-point review of systems was otherwise negative except as noted.  [ ] Due to altered mental status/intubation, subjective information were not able to be obtained from the patient. History was obtained, to the extent possible, from review of the chart and collateral sources of information.      Vital Signs Last 24 Hrs  T(C): 36.9 (12 Mar 2023 06:00), Max: 36.9 (12 Mar 2023 06:00)  T(F): 98.5 (12 Mar 2023 06:00), Max: 98.5 (12 Mar 2023 06:00)  HR: 101 (12 Mar 2023 06:00) (90 - 103)  BP: 135/82 (12 Mar 2023 06:00) (101/71 - 135/82)  BP(mean): --  RR: 18 (12 Mar 2023 06:00) (18 - 20)  SpO2: 99% (12 Mar 2023 06:00) (98% - 100%)    Parameters below as of 12 Mar 2023 06:00  Patient On (Oxygen Delivery Method): room air        PHYSICAL EXAM:  GENERAL: NAD, well-appearing  CHEST/LUNG: Clear to auscultation bilaterally  HEART: Regular rate and rhythm  ABDOMEN: Soft, Nontender, Nondistended;   EXTREMITIES: right axilla soft, tender to palpation, no palpable mass, ecchymosis, shoulder abduction limited 2/2 pain but patient was able to complete most of ROM, normal ROM elbow/wrist/hand, no numbness or tingling in the arm/forearm/hand, palpable distal radial pulse      LABS:  Labs:  CAPILLARY BLOOD GLUCOSE                              10.9   6.57  )-----------( 312      ( 12 Mar 2023 04:43 )             34.3             137  |  102  |  14  ----------------------------<  93  4.2   |  21<L>  |  0.76      Calcium, Total Serum: 9.3 mg/dL (23 @ 04:43)      LFTs:             6.8  | 0.9  | 15       ------------------[69      ( 11 Mar 2023 02:03 )  3.8  | x    | 14          Lipase:x      Amylase:x         Blood Gas Venous - Lactate: 1.5 mmol/L (23 @ 02:00)      Coags:     18.4   ----< 1.58    ( 11 Mar 2023 02:03 )     30.4      RADIOLOGY & ADDITIONAL STUDIES:

## 2023-03-12 NOTE — CONSULT NOTE ADULT - ASSESSMENT
Assessment:  82y Female with right axillary hematoma, spontaneous. H/H stable. Shoulder abduction limited 2/2 pain, no distal motor/sensory deficit, distal pulses intact.    Plan:  - hold eliquis  - monitor H/H  - will monitor right axilla closely  - plan dw fellow on behalf of attending    Vascular  [id number]

## 2023-03-12 NOTE — PROGRESS NOTE ADULT - ASSESSMENT
This is an 82 year old female with history of HLD, chronic AFib on Eliquis, CAD (s/p PCI x2) 10 yrs ago on ASA, CHF, COPD/asthma, MARIE not on CPAP skin cancer, COVID, s/p RVATS RUL wedge resection 3/2/2023 w thoracic surgery, with pre-op IR localization; after review from the Ct scan obtained earlier there appears to be hematoma along the subclavian vasculature; imaging re-reviewed with Radiology along with Dr. Villeda    -Vascular Surgery consulted and will review images and make further recs  -No thoracic intervention warranted  -D/w Primary team    [id number]

## 2023-03-13 LAB
ANION GAP SERPL CALC-SCNC: 16 MMOL/L — HIGH (ref 7–14)
BUN SERPL-MCNC: 12 MG/DL — SIGNIFICANT CHANGE UP (ref 7–23)
CALCIUM SERPL-MCNC: 9 MG/DL — SIGNIFICANT CHANGE UP (ref 8.4–10.5)
CHLORIDE SERPL-SCNC: 104 MMOL/L — SIGNIFICANT CHANGE UP (ref 98–107)
CO2 SERPL-SCNC: 19 MMOL/L — LOW (ref 22–31)
CREAT SERPL-MCNC: 0.69 MG/DL — SIGNIFICANT CHANGE UP (ref 0.5–1.3)
EGFR: 87 ML/MIN/1.73M2 — SIGNIFICANT CHANGE UP
GLUCOSE SERPL-MCNC: 116 MG/DL — HIGH (ref 70–99)
GRAM STN FLD: SIGNIFICANT CHANGE UP
HCT VFR BLD CALC: 31 % — LOW (ref 34.5–45)
HGB BLD-MCNC: 9.6 G/DL — LOW (ref 11.5–15.5)
MAGNESIUM SERPL-MCNC: 2.2 MG/DL — SIGNIFICANT CHANGE UP (ref 1.6–2.6)
MCHC RBC-ENTMCNC: 31 GM/DL — LOW (ref 32–36)
MCHC RBC-ENTMCNC: 32.8 PG — SIGNIFICANT CHANGE UP (ref 27–34)
MCV RBC AUTO: 105.8 FL — HIGH (ref 80–100)
NRBC # BLD: 0 /100 WBCS — SIGNIFICANT CHANGE UP (ref 0–0)
NRBC # FLD: 0 K/UL — SIGNIFICANT CHANGE UP (ref 0–0)
PHOSPHATE SERPL-MCNC: 3.5 MG/DL — SIGNIFICANT CHANGE UP (ref 2.5–4.5)
PLATELET # BLD AUTO: 263 K/UL — SIGNIFICANT CHANGE UP (ref 150–400)
POTASSIUM SERPL-MCNC: 4.4 MMOL/L — SIGNIFICANT CHANGE UP (ref 3.5–5.3)
POTASSIUM SERPL-SCNC: 4.4 MMOL/L — SIGNIFICANT CHANGE UP (ref 3.5–5.3)
RBC # BLD: 2.93 M/UL — LOW (ref 3.8–5.2)
RBC # FLD: 14 % — SIGNIFICANT CHANGE UP (ref 10.3–14.5)
SODIUM SERPL-SCNC: 139 MMOL/L — SIGNIFICANT CHANGE UP (ref 135–145)
SPECIMEN SOURCE: SIGNIFICANT CHANGE UP
WBC # BLD: 8.45 K/UL — SIGNIFICANT CHANGE UP (ref 3.8–10.5)
WBC # FLD AUTO: 8.45 K/UL — SIGNIFICANT CHANGE UP (ref 3.8–10.5)

## 2023-03-13 RX ADMIN — POLYETHYLENE GLYCOL 3350 17 GRAM(S): 17 POWDER, FOR SOLUTION ORAL at 12:53

## 2023-03-13 RX ADMIN — CEFTRIAXONE 100 MILLIGRAM(S): 500 INJECTION, POWDER, FOR SOLUTION INTRAMUSCULAR; INTRAVENOUS at 13:34

## 2023-03-13 RX ADMIN — Medication 81 MILLIGRAM(S): at 12:53

## 2023-03-13 RX ADMIN — SENNA PLUS 2 TABLET(S): 8.6 TABLET ORAL at 22:06

## 2023-03-13 RX ADMIN — BUDESONIDE AND FORMOTEROL FUMARATE DIHYDRATE 2 PUFF(S): 160; 4.5 AEROSOL RESPIRATORY (INHALATION) at 12:52

## 2023-03-13 RX ADMIN — PANTOPRAZOLE SODIUM 40 MILLIGRAM(S): 20 TABLET, DELAYED RELEASE ORAL at 05:46

## 2023-03-13 RX ADMIN — AZITHROMYCIN 255 MILLIGRAM(S): 500 TABLET, FILM COATED ORAL at 14:31

## 2023-03-13 RX ADMIN — Medication 240 MILLIGRAM(S): at 05:46

## 2023-03-13 RX ADMIN — BUDESONIDE AND FORMOTEROL FUMARATE DIHYDRATE 2 PUFF(S): 160; 4.5 AEROSOL RESPIRATORY (INHALATION) at 22:05

## 2023-03-13 RX ADMIN — Medication 3 MILLIGRAM(S): at 22:05

## 2023-03-13 NOTE — PROGRESS NOTE ADULT - ASSESSMENT
_________________________________________________________________________________________  ========>>  M E D I C A L   A T T E N D I N G    F O L L O W  U P  N O T E  <<=========  -----------------------------------------------------------------------------------------------------    - Patient seen and examined by me earlier today.   - In summary,  JANA OLIVARES is a 82y year old woman admitted with pain in Rt arm an d flank..   - Patient today overall doing ok, comfortable, eating OK.     ==================>> REVIEW OF SYSTEM <<=================    GEN: no fever, no chills, still with pain as above : controlled with pain medications   RESP: no SOB, no cough, no sputum  CVS: no chest pain, no palpitations, no edema  GI: no abdominal pain, no nausea, no constipation, no diarrhea  : no dysuria, no frequency, no hematuria  Neuro: no headache, no dizziness  Derm : no itching, no rash    ==================>> PHYSICAL EXAM <<=================    GEN: A&O X 3 , NAD , somewhat uncomfortable with movement, pleasant, calm   HEENT: NCAT, PERRL, MMM, hearing intact  Neck: supple , no JVD appreciated  CVS: S1S2 , regular , No M/R/G appreciated  PULM: CTA B/L,  no W/R/R appreciated  ABD.: soft. non tender, non distended,  bowel sounds present  Extrem: intact pulses , no edema   PSYCH : normal mood,  not anxious                              ( Note written / Date of service 03-13-23 )    ==================>> MEDICATIONS <<====================    aspirin  chewable 81 milliGRAM(s) Oral daily  azithromycin  IVPB      azithromycin  IVPB 500 milliGRAM(s) IV Intermittent every 24 hours  budesonide 160 MICROgram(s)/formoterol 4.5 MICROgram(s) Inhaler 2 Puff(s) Inhalation two times a day  cefTRIAXone   IVPB 1000 milliGRAM(s) IV Intermittent every 24 hours  pantoprazole    Tablet 40 milliGRAM(s) Oral before breakfast  polyethylene glycol 3350 17 Gram(s) Oral daily  senna 2 Tablet(s) Oral at bedtime  verapamil  milliGRAM(s) Oral daily    MEDICATIONS  (PRN):  acetaminophen     Tablet .. 650 milliGRAM(s) Oral every 6 hours PRN Temp greater or equal to 38C (100.4F), Mild Pain (1 - 3)  albuterol    90 MICROgram(s) HFA Inhaler 2 Puff(s) Inhalation every 6 hours PRN Shortness of Breath and/or Wheezing  melatonin 3 milliGRAM(s) Oral at bedtime PRN Insomnia  oxyCODONE    IR 5 milliGRAM(s) Oral every 4 hours PRN Severe Pain (7 - 10)    ___________  Active diet:  Diet, DASH/TLC:   Sodium & Cholesterol Restricted  ___________________    ==================>> VITAL SIGNS <<==================  Height (cm): 152.4  Weight (kg): 68.039  BMI (kg/m2): 29.3  Vital Signs Last 24 HrsT(C): 36.7 (03-13-23 @ 12:00)  T(F): 98 (03-13-23 @ 12:00), Max: 98.5 (03-12-23 @ 22:02)  HR: 83 (03-13-23 @ 12:00) (83 - 101)  BP: 103/64 (03-13-23 @ 12:00)  RR: 17 (03-13-23 @ 12:00) (16 - 17)  SpO2: 98% (03-13-23 @ 12:00) (98% - 100%)      CAPILLARY BLOOD GLUCOSE         ==================>> LAB AND IMAGING <<==================                        9.6    8.45  )-----------( 263      ( 13 Mar 2023 05:15 )             31.0        03-13    139  |  104  |  12  ----------------------------<  116<H>  4.4   |  19<L>  |  0.69    Ca    9.0      13 Mar 2023 05:15  Phos  3.5     03-13  Mg     2.20     03-13      WBC count:   8.45 <<== ,  6.57 <<== ,  10.77 <<==   Hemoglobin:   9.6 <<==,  10.9 <<==,  10.3 <<==  platelets:  263 <==, 312 <==, 303 <==    Creatinine:  0.69  <<==, 0.76  <<==, 0.88  <<==  Sodium:   139  <==, 137  <==, 137  <==       AST:          15 <==      ALT:        14  <==      AP:        69  <=     Bili:        0.9  <=    ____________________________    M I C R O B I O L O G Y :    Culture - Sputum (collected 12 Mar 2023 20:16)  Source: .Sputum Sputum  Gram Stain (13 Mar 2023 11:33):    No polymorphonuclear leukocytes per low power field    Rare Squamous epithelial cells per low power field    Few Yeast like cells per oil power field    Few Gram Positive Rods per oil power field    Rare Gram positive cocci in pairs per oil power field    Rare Gram Negative Rods per oil power field    Culture - Sputum (collected 12 Mar 2023 15:07)  Source: .Sputum Sputum  Gram Stain (12 Mar 2023 23:10):    Rare polymorphonuclear leukocytes per low power field    Rare Squamous epithelial cells per low power field    Few Gram positive cocci in pairs per oil power field    Few Gram Variable Rods per oil power field    Rare Yeast like cells per oil power field  Preliminary Report (13 Mar 2023 17:02):    Normal Respiratory Nicky present          < from: CT Angio Jose Abdomen and Pelvis w/ IV Cont (03.11.23 @ 02:27) >  IMPRESSION:  No aortic dissection.  Small right pleural effusion with fluid tracking along the right major   fissure. Right upper lobe groundglass airspace opacities may represent   infectious/inflammatory process.  < end of copied text >     ___________________________________________________________________________________  ===============>>  A S S E S S M E N T   A N D   P L A N <<===============  ------------------------------------------------------------------------------------------    · Assessment	  82 year old female with history of HLD, chronic AFib on Eliquis, CAD (s/p PCI x2) 10 yrs ago on ASA, CHF, COPD/asthma, MARIE not on CPAP skin cancer, COVID, RUL nodule s/p VATS ~1 wk ago with Dr. Yoon, path+ invasive squamous cell carcinoma,  presenting with RUE pain X1 day, CT c/f pneumonia, also endorses R finger numbness/RUE weakness, need to r/o cervical mets     Problem/Plan - 1:  ·  Problem: Right arm pain.   -pt with VATS with lung wedge resection of RUL nodule on 3/2, path came back invasive squamous cell carcinoma with negative margin  -CTS following appreciated   -pt with right arm/shoulder pain, denies trauma, will get xray right shoulder and also MRI cervical spine to r/o cervical mets since she's complaining of RUE weakness/pain/numbness of fingers  -suspect MSK pain from positioning during sx?      ( inflam. markers up from recent Sx)   ^^^ Inflammatory markers :  ^^^  C R P :          51.0 (03-12-23)  <<--  E S R :        65 (03-12-23)   Ferritin :         404 (03-12-23) <<--  -CT chest/abd revealed small R pleural effusion with fluid tracking along the right major fissure. RUL groundglass airspace opacities may represent infectious/inflammatory process.       Pt reportedly had productive cough with yellow-green sputum,, streaked with minimal blood. Will treat with Ceftriaxone/zithromax for possible pneumonia. Check sputum culture and urine legionella.    no leukocytosis, no fever !     vascular called by ortho to evaluation fr hematoma ?     Problem/Plan - 2:  ·  Problem: Chronic atrial fibrillation.   ·  Plan: -stable, chronic Afib on EKG  -cardiology recs appreciated, recent TTE preserved lvef, mod-severe TR, mild RV dysfunction/RVE)  -on eliquis 5 mg bid at home and verapamil ER 240mg qd.  cardiologist following     Problem/Plan - 3:  ·  Problem: CAD (coronary artery disease).   ·  Plan: -h/o CAD, stents x2 10 years ago  -cont ASA  -not on statin or beta blocker, check lipid profile in AM.    Problem/Plan - 4:  ·  Problem: COPD (chronic obstructive pulmonary disease).   ·  Plan: c/w symbicort and albuterol inhaler prn.    Problem/Plan - 5:  ·  Problem: Lung cancer.   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    Problem/Plan - 6:  ·  Problem: Anemia.   ·  Plan: macrocytic anemia, trend CBC, transfuse prn       03-12-23 @ 04:43  Iron:     39 ug/dL  Iron %:   16 %  TIBC:     244 ug/dL       03-12-23 @ 04:43  Vit B12:  473 pg/mL  Folate:   >20.0 ng/mL    -GI/DVT Prophylaxis per protocol.    --------------------------------------------  Case discussed with patient, med team..   Education given on findings and plan of care  ___________________________  H. АЛЕКСАНДР Gutiérrez.  Pager: [phone]       _________________________________________________________________________________________  ========>>  M E D I C A L   A T T E N D I N G    F O L L O W  U P  N O T E  <<=========  -----------------------------------------------------------------------------------------------------    - Patient seen and examined by me earlier today.   - In summary,  JANA OLIVARES is a 82y year old woman admitted with pain in Rt arm an d flank..   - Patient today overall doing ok, comfortable, eating poorly / doesn't like the food     ==================>> REVIEW OF SYSTEM <<=================    GEN: no fever, no chills, still with pain as above : not taking pain medications >> patient encouraged to take with food to avoid nausea..   RESP: no SOB, no cough, no sputum  CVS: no chest pain, no palpitations, no edema  GI: no abdominal pain, no nausea, no constipation, no diarrhea  : no dysuria, no frequency, no hematuria  Neuro: no headache, no dizziness, + some weakness of Rt arm.. unclear if related to pain or otherwise ... awaiting MRI C-spine   Derm : no itching, no rash    ==================>> PHYSICAL EXAM <<=================    GEN: A&O X 3 , NAD , somewhat uncomfortable with movement, pleasant, calm   HEENT: NCAT, PERRL, MMM, hearing intact  Neck: supple , no JVD appreciated  CVS: S1S2 , regular , No M/R/G appreciated  PULM: CTA B/L,  no W/R/R appreciated  ABD.: soft. non tender, non distended,  bowel sounds present  Extrem: intact pulses , no edema   PSYCH : normal mood,  not anxious                              ( Note written / Date of service 03-13-23 )    ==================>> MEDICATIONS <<====================    aspirin  chewable 81 milliGRAM(s) Oral daily  azithromycin  IVPB      azithromycin  IVPB 500 milliGRAM(s) IV Intermittent every 24 hours  budesonide 160 MICROgram(s)/formoterol 4.5 MICROgram(s) Inhaler 2 Puff(s) Inhalation two times a day  cefTRIAXone   IVPB 1000 milliGRAM(s) IV Intermittent every 24 hours  pantoprazole    Tablet 40 milliGRAM(s) Oral before breakfast  polyethylene glycol 3350 17 Gram(s) Oral daily  senna 2 Tablet(s) Oral at bedtime  verapamil  milliGRAM(s) Oral daily    MEDICATIONS  (PRN):  acetaminophen     Tablet .. 650 milliGRAM(s) Oral every 6 hours PRN Temp greater or equal to 38C (100.4F), Mild Pain (1 - 3)  albuterol    90 MICROgram(s) HFA Inhaler 2 Puff(s) Inhalation every 6 hours PRN Shortness of Breath and/or Wheezing  melatonin 3 milliGRAM(s) Oral at bedtime PRN Insomnia  oxyCODONE    IR 5 milliGRAM(s) Oral every 4 hours PRN Severe Pain (7 - 10)    ___________  Active diet:  Diet, DASH/TLC:   Sodium & Cholesterol Restricted  ___________________    ==================>> VITAL SIGNS <<==================    Height (cm): 152.4  Weight (kg): 68.039  BMI (kg/m2): 29.3  Vital Signs Last 24 HrsT(C): 36.7 (03-13-23 @ 12:00)  T(F): 98 (03-13-23 @ 12:00), Max: 98.5 (03-12-23 @ 22:02)  HR: 83 (03-13-23 @ 12:00) (83 - 101)  BP: 103/64 (03-13-23 @ 12:00)  RR: 17 (03-13-23 @ 12:00) (16 - 17)  SpO2: 98% (03-13-23 @ 12:00) (98% - 100%)       ==================>> LAB AND IMAGING <<==================                        9.6    8.45  )-----------( 263      ( 13 Mar 2023 05:15 )             31.0        03-13    139  |  104  |  12  ----------------------------<  116<H>  4.4   |  19<L>  |  0.69    Ca    9.0      13 Mar 2023 05:15  Phos  3.5     03-13  Mg     2.20     03-13    WBC count:   8.45 <<== ,  6.57 <<== ,  10.77 <<==   Hemoglobin:   9.6 <<==,  10.9 <<==,  10.3 <<==  platelets:  263 <==, 312 <==, 303 <==    Creatinine:  0.69  <<==, 0.76  <<==, 0.88  <<==  Sodium:   139  <==, 137  <==, 137  <==    ____________________________    M I C R O B I O L O G Y :    Culture - Sputum (collected 12 Mar 2023 20:16)  Source: .Sputum Sputum  Gram Stain (13 Mar 2023 11:33):    No polymorphonuclear leukocytes per low power field    Rare Squamous epithelial cells per low power field    Few Yeast like cells per oil power field    Few Gram Positive Rods per oil power field    Rare Gram positive cocci in pairs per oil power field    Rare Gram Negative Rods per oil power field    Culture - Sputum (collected 12 Mar 2023 15:07)  Source: .Sputum Sputum  Gram Stain (12 Mar 2023 23:10):    Rare polymorphonuclear leukocytes per low power field    Rare Squamous epithelial cells per low power field    Few Gram positive cocci in pairs per oil power field    Few Gram Variable Rods per oil power field    Rare Yeast like cells per oil power field  Preliminary Report (13 Mar 2023 17:02):    Normal Respiratory Nicky present      < from: CT Angio Jose Abdomen and Pelvis w/ IV Cont (03.11.23 @ 02:27) >  IMPRESSION:  No aortic dissection.  Small right pleural effusion with fluid tracking along the right major   fissure. Right upper lobe groundglass airspace opacities may represent   infectious/inflammatory process.  < end of copied text >     ___________________________________________________________________________________  ===============>>  A S S E S S M E N T   A N D   P L A N <<===============  ------------------------------------------------------------------------------------------    · Assessment	  82 year old female with history of HLD, chronic AFib on Eliquis, CAD (s/p PCI x2) 10 yrs ago on ASA, CHF, COPD/asthma, MARIE not on CPAP skin cancer, COVID, RUL nodule s/p VATS ~1 wk ago with Dr. Yoon, path+ invasive squamous cell carcinoma,  presenting with RUE pain X1 day, CT c/f pneumonia, also endorses R finger numbness/RUE weakness, need to r/o cervical mets     Problem/Plan - 1:  ·  Problem: Right arm pain ( and weakness) .   -pt with VATS with lung wedge resection of RUL nodule on 3/2, path came back invasive squamous cell carcinoma with negative margin  -CTS and vascular appreciated    -pt with right arm/shoulder pain, denies trauma, will get xray right shoulder and also MRI cervical spine to r/o cervical mets or other pathology since she's complaining of RUE weakness/pain/numbness of fingers  -suspect MSK pain from positioning during sx?      ( inflam. markers up from recent Sx)   ^^^ Inflammatory markers :  ^^^  C R P :          51.0 (03-12-23)  <<--  E S R :        65 (03-12-23)   Ferritin :         404 (03-12-23) <<--  -CT chest/abd revealed small R pleural effusion with fluid tracking along the right major fissure. RUL groundglass airspace opacities may represent infectious/inflammatory process.       Pt reportedly had productive cough with yellow-green sputum,, streaked with minimal blood. Will treat with Ceftriaxone/zithromax for possible pneumonia. Check sputum culture and urine legionella.    no leukocytosis, no fever !   >> likely will DC antibiotics soon    Problem/Plan - 2:  ·  Problem: Chronic atrial fibrillation.   ·  Plan: -stable, chronic Afib on EKG  -cardiology recs appreciated, recent TTE preserved lvef, mod-severe TR, mild RV dysfunction/RVE)  -on eliquis 5 mg bid at home and verapamil ER 240mg qd.  cardiologist following     Problem/Plan - 3:  ·  Problem: CAD (coronary artery disease).   ·  Plan: -h/o CAD, stents x2 10 years ago  -cont ASA  -not on statin or beta blocker, check lipid profile in AM.    Problem/Plan - 4:  ·  Problem: COPD (chronic obstructive pulmonary disease).   ·  Plan: c/w symbicort and albuterol inhaler prn.    Problem/Plan - 5:  ·  Problem: Lung cancer.   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    Problem/Plan - 6:  ·  Problem: Anemia.   ·  Plan: macrocytic anemia, trend CBC, transfuse prn       03-12-23 @ 04:43  Iron:     39 ug/dL  Iron %:   16 %  TIBC:     244 ug/dL       03-12-23 @ 04:43  Vit B12:  473 pg/mL  Folate:   >20.0 ng/mL    -GI/DVT Prophylaxis per protocol.    --------------------------------------------  Case discussed with patient, med team.. called daughter several times, no answer...  will retry  Education given on findings and plan of care  ___________________________  H. АЛЕКСАНДР Gutiérrez.  Pager: [phone]

## 2023-03-13 NOTE — PROGRESS NOTE ADULT - ASSESSMENT
82y Female with right axillary hematoma, spontaneous. H/H stable. Shoulder abduction limited 2/2 pain, no distal motor/sensory deficit, distal pulses intact.    Plan:  - hold eliquis  - monitor H/H  - f/u RUE exam closely  - care per primary team  - vascula will follow up    Vascular  [id number]

## 2023-03-14 ENCOUNTER — APPOINTMENT (OUTPATIENT)
Dept: THORACIC SURGERY | Facility: CLINIC | Age: 83
End: 2023-03-14

## 2023-03-14 LAB
-  AMIKACIN: SIGNIFICANT CHANGE UP
-  AZTREONAM: SIGNIFICANT CHANGE UP
-  CEFEPIME: SIGNIFICANT CHANGE UP
-  CEFTAZIDIME: SIGNIFICANT CHANGE UP
-  CIPROFLOXACIN: SIGNIFICANT CHANGE UP
-  GENTAMICIN: SIGNIFICANT CHANGE UP
-  IMIPENEM: SIGNIFICANT CHANGE UP
-  LEVOFLOXACIN: SIGNIFICANT CHANGE UP
-  MEROPENEM: SIGNIFICANT CHANGE UP
-  PIPERACILLIN/TAZOBACTAM: SIGNIFICANT CHANGE UP
-  TOBRAMYCIN: SIGNIFICANT CHANGE UP
ANION GAP SERPL CALC-SCNC: 11 MMOL/L — SIGNIFICANT CHANGE UP (ref 7–14)
APTT BLD: 19.5 SEC — LOW (ref 27–36.3)
BASOPHILS # BLD AUTO: 0.11 K/UL — SIGNIFICANT CHANGE UP (ref 0–0.2)
BASOPHILS NFR BLD AUTO: 1.3 % — SIGNIFICANT CHANGE UP (ref 0–2)
BUN SERPL-MCNC: 13 MG/DL — SIGNIFICANT CHANGE UP (ref 7–23)
CALCIUM SERPL-MCNC: 9.1 MG/DL — SIGNIFICANT CHANGE UP (ref 8.4–10.5)
CHLORIDE SERPL-SCNC: 106 MMOL/L — SIGNIFICANT CHANGE UP (ref 98–107)
CO2 SERPL-SCNC: 25 MMOL/L — SIGNIFICANT CHANGE UP (ref 22–31)
CREAT SERPL-MCNC: 0.78 MG/DL — SIGNIFICANT CHANGE UP (ref 0.5–1.3)
EGFR: 76 ML/MIN/1.73M2 — SIGNIFICANT CHANGE UP
EOSINOPHIL # BLD AUTO: 0.35 K/UL — SIGNIFICANT CHANGE UP (ref 0–0.5)
EOSINOPHIL NFR BLD AUTO: 4.3 % — SIGNIFICANT CHANGE UP (ref 0–6)
GLUCOSE SERPL-MCNC: 154 MG/DL — HIGH (ref 70–99)
HCT VFR BLD CALC: 29.2 % — LOW (ref 34.5–45)
HCT VFR BLD CALC: 31.9 % — LOW (ref 34.5–45)
HGB BLD-MCNC: 9.2 G/DL — LOW (ref 11.5–15.5)
HGB BLD-MCNC: 9.7 G/DL — LOW (ref 11.5–15.5)
IANC: 4.55 K/UL — SIGNIFICANT CHANGE UP (ref 1.8–7.4)
IMM GRANULOCYTES NFR BLD AUTO: 0.7 % — SIGNIFICANT CHANGE UP (ref 0–0.9)
LYMPHOCYTES # BLD AUTO: 1.81 K/UL — SIGNIFICANT CHANGE UP (ref 1–3.3)
LYMPHOCYTES # BLD AUTO: 22.2 % — SIGNIFICANT CHANGE UP (ref 13–44)
MCHC RBC-ENTMCNC: 30.4 GM/DL — LOW (ref 32–36)
MCHC RBC-ENTMCNC: 31.5 GM/DL — LOW (ref 32–36)
MCHC RBC-ENTMCNC: 32.4 PG — SIGNIFICANT CHANGE UP (ref 27–34)
MCHC RBC-ENTMCNC: 32.5 PG — SIGNIFICANT CHANGE UP (ref 27–34)
MCV RBC AUTO: 103.2 FL — HIGH (ref 80–100)
MCV RBC AUTO: 106.7 FL — HIGH (ref 80–100)
METHOD TYPE: SIGNIFICANT CHANGE UP
MONOCYTES # BLD AUTO: 1.27 K/UL — HIGH (ref 0–0.9)
MONOCYTES NFR BLD AUTO: 15.6 % — HIGH (ref 2–14)
NEUTROPHILS # BLD AUTO: 4.55 K/UL — SIGNIFICANT CHANGE UP (ref 1.8–7.4)
NEUTROPHILS NFR BLD AUTO: 55.9 % — SIGNIFICANT CHANGE UP (ref 43–77)
NRBC # BLD: 0 /100 WBCS — SIGNIFICANT CHANGE UP (ref 0–0)
NRBC # BLD: 0 /100 WBCS — SIGNIFICANT CHANGE UP (ref 0–0)
NRBC # FLD: 0 K/UL — SIGNIFICANT CHANGE UP (ref 0–0)
NRBC # FLD: 0 K/UL — SIGNIFICANT CHANGE UP (ref 0–0)
PLATELET # BLD AUTO: 362 K/UL — SIGNIFICANT CHANGE UP (ref 150–400)
PLATELET # BLD AUTO: 364 K/UL — SIGNIFICANT CHANGE UP (ref 150–400)
POTASSIUM SERPL-MCNC: 3.9 MMOL/L — SIGNIFICANT CHANGE UP (ref 3.5–5.3)
POTASSIUM SERPL-SCNC: 3.9 MMOL/L — SIGNIFICANT CHANGE UP (ref 3.5–5.3)
RBC # BLD: 2.83 M/UL — LOW (ref 3.8–5.2)
RBC # BLD: 2.99 M/UL — LOW (ref 3.8–5.2)
RBC # FLD: 14 % — SIGNIFICANT CHANGE UP (ref 10.3–14.5)
RBC # FLD: 14.2 % — SIGNIFICANT CHANGE UP (ref 10.3–14.5)
SODIUM SERPL-SCNC: 142 MMOL/L — SIGNIFICANT CHANGE UP (ref 135–145)
WBC # BLD: 7.52 K/UL — SIGNIFICANT CHANGE UP (ref 3.8–10.5)
WBC # BLD: 8.15 K/UL — SIGNIFICANT CHANGE UP (ref 3.8–10.5)
WBC # FLD AUTO: 7.52 K/UL — SIGNIFICANT CHANGE UP (ref 3.8–10.5)
WBC # FLD AUTO: 8.15 K/UL — SIGNIFICANT CHANGE UP (ref 3.8–10.5)

## 2023-03-14 PROCEDURE — 73630 X-RAY EXAM OF FOOT: CPT | Mod: 26,50

## 2023-03-14 RX ORDER — ONDANSETRON 8 MG/1
4 TABLET, FILM COATED ORAL EVERY 4 HOURS
Refills: 0 | Status: DISCONTINUED | OUTPATIENT
Start: 2023-03-14 | End: 2023-03-16

## 2023-03-14 RX ORDER — COLCHICINE 0.6 MG
0.6 TABLET ORAL DAILY
Refills: 0 | Status: COMPLETED | OUTPATIENT
Start: 2023-03-14 | End: 2023-03-15

## 2023-03-14 RX ORDER — HEPARIN SODIUM 5000 [USP'U]/ML
5500 INJECTION INTRAVENOUS; SUBCUTANEOUS EVERY 6 HOURS
Refills: 0 | Status: DISCONTINUED | OUTPATIENT
Start: 2023-03-14 | End: 2023-03-16

## 2023-03-14 RX ORDER — HEPARIN SODIUM 5000 [USP'U]/ML
2500 INJECTION INTRAVENOUS; SUBCUTANEOUS EVERY 6 HOURS
Refills: 0 | Status: DISCONTINUED | OUTPATIENT
Start: 2023-03-14 | End: 2023-03-16

## 2023-03-14 RX ORDER — HEPARIN SODIUM 5000 [USP'U]/ML
INJECTION INTRAVENOUS; SUBCUTANEOUS
Qty: 25000 | Refills: 0 | Status: DISCONTINUED | OUTPATIENT
Start: 2023-03-14 | End: 2023-03-16

## 2023-03-14 RX ADMIN — BUDESONIDE AND FORMOTEROL FUMARATE DIHYDRATE 2 PUFF(S): 160; 4.5 AEROSOL RESPIRATORY (INHALATION) at 21:23

## 2023-03-14 RX ADMIN — Medication 81 MILLIGRAM(S): at 13:46

## 2023-03-14 RX ADMIN — Medication 0.6 MILLIGRAM(S): at 18:08

## 2023-03-14 RX ADMIN — Medication 650 MILLIGRAM(S): at 08:29

## 2023-03-14 RX ADMIN — HEPARIN SODIUM 1200 UNIT(S)/HR: 5000 INJECTION INTRAVENOUS; SUBCUTANEOUS at 21:46

## 2023-03-14 RX ADMIN — AZITHROMYCIN 255 MILLIGRAM(S): 500 TABLET, FILM COATED ORAL at 14:20

## 2023-03-14 RX ADMIN — Medication 650 MILLIGRAM(S): at 21:21

## 2023-03-14 RX ADMIN — BUDESONIDE AND FORMOTEROL FUMARATE DIHYDRATE 2 PUFF(S): 160; 4.5 AEROSOL RESPIRATORY (INHALATION) at 08:30

## 2023-03-14 RX ADMIN — CEFTRIAXONE 100 MILLIGRAM(S): 500 INJECTION, POWDER, FOR SOLUTION INTRAMUSCULAR; INTRAVENOUS at 13:45

## 2023-03-14 RX ADMIN — POLYETHYLENE GLYCOL 3350 17 GRAM(S): 17 POWDER, FOR SOLUTION ORAL at 13:46

## 2023-03-14 RX ADMIN — Medication 650 MILLIGRAM(S): at 22:21

## 2023-03-14 RX ADMIN — Medication 240 MILLIGRAM(S): at 05:51

## 2023-03-14 RX ADMIN — Medication 650 MILLIGRAM(S): at 09:25

## 2023-03-14 RX ADMIN — PANTOPRAZOLE SODIUM 40 MILLIGRAM(S): 20 TABLET, DELAYED RELEASE ORAL at 05:51

## 2023-03-14 RX ADMIN — Medication 3 MILLIGRAM(S): at 21:24

## 2023-03-14 NOTE — CHART NOTE - NSCHARTNOTEFT_GEN_A_CORE
Call received from vascular surgery resident to start heparin drip for Afib  as H &H remains stable. As per vascular surgery hold restarting Eliquis at this time. Attending Dr. Gutiérrez made aware recommended to start heparin drip without bolus. Will continue to monitor.

## 2023-03-14 NOTE — PROGRESS NOTE ADULT - ASSESSMENT
82y Female with right axillary hematoma, spontaneous. H/H stable. Shoulder abduction limited 2/2 pain, no distal motor/sensory deficit, distal pulses intact.    Plan:  - f/u RUE exam closely  - ok to restart heparin if H/H stable   - care per primary team  - vascula will follow up    Vascular  [id number]   82y Female with right axillary hematoma, spontaneous. H/H stable. Shoulder abduction limited 2/2 pain, no distal motor/sensory deficit, distal pulses intact.    Plan:  - f/u RUE exam closely  - ok to restart heparin if H/H stable   - care per primary team  - vascular will follow up    Vascular  [id number]   No

## 2023-03-14 NOTE — CHART NOTE - NSCHARTNOTEFT_GEN_A_CORE
Pt seen. Rt arm pain improved slightly.   Pt expressing frustration with hospitalization,   difficulty getting tests done.   Pt concerned that arm still has a lot of pain  Rt VATS remains c/d/i  Old CT suture removed  Rt Axilla w soft ecchymosis  Dr. Yoon to see pt tomorrow  Pt aware

## 2023-03-14 NOTE — PROGRESS NOTE ADULT - ASSESSMENT
_________________________________________________________________________________________  ========>>  M E D I C A L   A T T E N D I N G    F O L L O W  U P  N O T E  <<=========  -----------------------------------------------------------------------------------------------------    - Patient seen and examined by me earlier today.   - In summary,  JANA OLIVARES is a 82y year old woman admitted with pain in Rt arm an d flank..   - Patient today overall doing ok, comfortable, eating poorly / doesn't like the food     ==================>> REVIEW OF SYSTEM <<=================    GEN: no fever, no chills, still with pain as above : not taking pain medications >> patient encouraged to take with food to avoid nausea..   RESP: no SOB, no cough, no sputum  CVS: no chest pain, no palpitations, no edema  GI: no abdominal pain, no nausea, no constipation, no diarrhea  : no dysuria, no frequency, no hematuria  Neuro: no headache, no dizziness, + some weakness of Rt arm.. unclear if related to pain or otherwise ... awaiting MRI C-spine   Derm : no itching, no rash    ==================>> PHYSICAL EXAM <<=================    GEN: A&O X 3 , NAD , somewhat uncomfortable with movement, pleasant, calm   HEENT: NCAT, PERRL, MMM, hearing intact  Neck: supple , no JVD appreciated  CVS: S1S2 , regular , No M/R/G appreciated  PULM: CTA B/L,  no W/R/R appreciated  ABD.: soft. non tender, non distended,  bowel sounds present  Extrem: intact pulses , no edema   PSYCH : normal mood,  not anxious                             ( Note written / Date of service 03-14-23 )    ==================>> MEDICATIONS <<====================    aspirin  chewable 81 milliGRAM(s) Oral daily  azithromycin  IVPB      azithromycin  IVPB 500 milliGRAM(s) IV Intermittent every 24 hours  budesonide 160 MICROgram(s)/formoterol 4.5 MICROgram(s) Inhaler 2 Puff(s) Inhalation two times a day  cefTRIAXone   IVPB 1000 milliGRAM(s) IV Intermittent every 24 hours  colchicine 0.6 milliGRAM(s) Oral daily  pantoprazole    Tablet 40 milliGRAM(s) Oral before breakfast  polyethylene glycol 3350 17 Gram(s) Oral daily  senna 2 Tablet(s) Oral at bedtime  verapamil  milliGRAM(s) Oral daily    MEDICATIONS  (PRN):  acetaminophen     Tablet .. 650 milliGRAM(s) Oral every 6 hours PRN Temp greater or equal to 38C (100.4F), Mild Pain (1 - 3)  albuterol    90 MICROgram(s) HFA Inhaler 2 Puff(s) Inhalation every 6 hours PRN Shortness of Breath and/or Wheezing  melatonin 3 milliGRAM(s) Oral at bedtime PRN Insomnia  ondansetron Injectable 4 milliGRAM(s) IV Push every 4 hours PRN Nausea and/or Vomiting  oxyCODONE    IR 5 milliGRAM(s) Oral every 4 hours PRN Severe Pain (7 - 10)    ___________  Active diet:  Diet, DASH/TLC:   Sodium & Cholesterol Restricted  ___________________    ==================>> VITAL SIGNS <<==================    Vital Signs Last 24 HrsT(C): 36.5 (03-14-23 @ 12:52)  T(F): 97.7 (03-14-23 @ 12:52), Max: 98.6 (03-14-23 @ 05:40)  HR: 89 (03-14-23 @ 12:52) (89 - 100)  BP: 122/75 (03-14-23 @ 12:52)  RR: 18 (03-14-23 @ 12:52) (16 - 18)  SpO2: 97% (03-14-23 @ 12:52) (97% - 99%)      CAPILLARY BLOOD GLUCOSE         ==================>> LAB AND IMAGING <<==================                        9.7    8.15  )-----------( 364      ( 14 Mar 2023 16:19 )             31.9        03-14    142  |  106  |  13  ----------------------------<  154<H>  3.9   |  25  |  0.78    Ca    9.1      14 Mar 2023 09:08  Phos  3.5     03-13  Mg     2.20     03-13      WBC count:   8.15 <<== ,  7.52 <<== ,  8.45 <<== ,  6.57 <<== ,  10.77 <<==   Hemoglobin:   9.7 <<==,  9.2 <<==,  9.6 <<==,  10.9 <<==,  10.3 <<==  platelets:  364 <==, 362 <==, 263 <==, 312 <==, 303 <==    Creatinine:  0.78  <<==, 0.69  <<==, 0.76  <<==, 0.88  <<==  Sodium:   142  <==, 139  <==, 137  <==, 137  <==       AST:          15 <==      ALT:        14  <==      AP:        69  <=     Bili:        0.9  <=    ____________________________    M I C R O B I O L O G Y :    Culture - Sputum (collected 12 Mar 2023 20:16)  Source: .Sputum Sputum  Gram Stain (13 Mar 2023 11:33):    No polymorphonuclear leukocytes per low power field    Rare Squamous epithelial cells per low power field    Few Yeast like cells per oil power field    Few Gram Positive Rods per oil power field    Rare Gram positive cocci in pairs per oil power field    Rare Gram Negative Rods per oil power field  Preliminary Report (14 Mar 2023 12:05):    Rare Pseudomonas aeruginosa    Normal Respiratory Nicky present    Culture - Sputum (collected 12 Mar 2023 15:07)  Source: .Sputum Sputum  Gram Stain (12 Mar 2023 23:10):    Rare polymorphonuclear leukocytes per low power field    Rare Squamous epithelial cells per low power field    Few Gram positive cocci in pairs per oil power field    Few Gram Variable Rods per oil power field    Rare Yeast like cells per oil power field  Preliminary Report (14 Mar 2023 15:32):    Few Pseudomonas aeruginosa    Normal Respiratory Nicky present  Organism: Pseudomonas aeruginosa (14 Mar 2023 15:30)  Organism: Pseudomonas aeruginosa (14 Mar 2023 15:30)    Sensitivities:      -  Amikacin: S <=16      -  Aztreonam: S <=4      -  Cefepime: S <=2      -  Ceftazidime: S 4      -  Ciprofloxacin: S <=0.25      -  Gentamicin: S <=2      -  Imipenem: S <=1      -  Levofloxacin: S <=0.5      -  Meropenem: S <=1      -  Piperacillin/Tazobactam: S <=8      -  Tobramycin: S <=2      Method Type: ROCKY           < from: CT Angio Jose Abdomen and Pelvis w/ IV Cont (03.11.23 @ 02:27) >  IMPRESSION:  No aortic dissection.  Small right pleural effusion with fluid tracking along the right major   fissure. Right upper lobe groundglass airspace opacities may represent   infectious/inflammatory process.  < end of copied text >     ___________________________________________________________________________________  ===============>>  A S S E S S M E N T   A N D   P L A N <<===============  ------------------------------------------------------------------------------------------    · Assessment	  82 year old female with history of HLD, chronic AFib on Eliquis, CAD (s/p PCI x2) 10 yrs ago on ASA, CHF, COPD/asthma, MARIE not on CPAP skin cancer, COVID, RUL nodule s/p VATS ~1 wk ago with Dr. Yoon, path+ invasive squamous cell carcinoma,  presenting with RUE pain X1 day, CT c/f pneumonia, also endorses R finger numbness/RUE weakness, need to r/o cervical mets     Problem/Plan - 1:  ·  Problem: Right arm pain ( and weakness) .   -pt with VATS with lung wedge resection of RUL nodule on 3/2, path came back invasive squamous cell carcinoma with negative margin  -CTS and vascular appreciated    -pt with right arm/shoulder pain, denies trauma, will get xray right shoulder and also MRI cervical spine to r/o cervical mets or other pathology since she's complaining of RUE weakness/pain/numbness of fingers  -suspect MSK pain from positioning during sx?      ( inflam. markers up from recent Sx)   ^^^ Inflammatory markers :  ^^^  C R P :          51.0 (03-12-23)  <<--  E S R :        65 (03-12-23)   Ferritin :         404 (03-12-23) <<--  -CT chest/abd revealed small R pleural effusion with fluid tracking along the right major fissure. RUL groundglass airspace opacities may represent infectious/inflammatory process.       Pt reportedly had productive cough with yellow-green sputum,, streaked with minimal blood. Will treat with Ceftriaxone/zithromax for possible pneumonia. Check sputum culture and urine legionella.    no leukocytosis, no fever !   >> likely will DC antibiotics soon    Problem/Plan - 2:  ·  Problem: Chronic atrial fibrillation.   ·  Plan: -stable, chronic Afib on EKG  -cardiology recs appreciated, recent TTE preserved lvef, mod-severe TR, mild RV dysfunction/RVE)  -on eliquis 5 mg bid at home and verapamil ER 240mg qd.  cardiologist following     Problem/Plan - 3:  ·  Problem: CAD (coronary artery disease).   ·  Plan: -h/o CAD, stents x2 10 years ago  -cont ASA  -not on statin or beta blocker, check lipid profile in AM.    Problem/Plan - 4:  ·  Problem: COPD (chronic obstructive pulmonary disease).   ·  Plan: c/w symbicort and albuterol inhaler prn.    Problem/Plan - 5:  ·  Problem: Lung cancer.   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    Problem/Plan - 6:  ·  Problem: Anemia.   ·  Plan: macrocytic anemia, trend CBC, transfuse prn       03-12-23 @ 04:43  Iron:     39 ug/dL  Iron %:   16 %  TIBC:     244 ug/dL       03-12-23 @ 04:43  Vit B12:  473 pg/mL  Folate:   >20.0 ng/mL    -GI/DVT Prophylaxis per protocol.    --------------------------------------------  Case discussed with patient, med team.. called daughter several times, no answer...  will retry  Education given on findings and plan of care  ___________________________  H. АЛЕКСАНДР Gutiérrez.  Pager: [phone]       _________________________________________________________________________________________  ========>>  M E D I C A L   A T T E N D I N G    F O L L O W  U P  N O T E  <<=========  -----------------------------------------------------------------------------------------------------    - Patient seen and examined by me earlier today.   - In summary,  JANA OLIVARES is a 82y year old woman admitted with pain in Rt arm an d flank..   - Patient today overall doing ok, comfortable, eating poorly / doesn't like the food >> Patient encouraged to eat more    Patient with you complain of pain in the right toe since last night: on exam consistent with acute get out of the first right toe >> Prescribed colchicine     ==================>> REVIEW OF SYSTEM <<=================    GEN: no fever, no chills, still with pain as above : not taking pain medications >> patient encouraged to take with food to avoid nausea..   RESP: no SOB, no cough, no sputum  CVS: no chest pain, no palpitations, no edema  GI: no abdominal pain, no nausea, no constipation, no diarrhea  : no dysuria, no frequency, no hematuria  Neuro: no headache, no dizziness, + some weakness of Rt arm.. Overall Significantly improved since admission  Derm : no itching, no rash    ==================>> PHYSICAL EXAM <<=================    GEN: A&O X 3 , NAD , somewhat uncomfortable with movement, pleasant, calm   HEENT: NCAT, PERRL, MMM, hearing intact  Neck: supple , no JVD appreciated  CVS: S1S2 , regular , No M/R/G appreciated  PULM: CTA B/L,  no W/R/R appreciated  ABD.: soft. non tender, non distended,  bowel sounds present  Extrem: intact pulses , no edema      Acute gout involving the first right toe  PSYCH : normal mood,  not anxious                             ( Note written / Date of service 03-14-23 )    ==================>> MEDICATIONS <<====================    aspirin  chewable 81 milliGRAM(s) Oral daily  azithromycin  IVPB      azithromycin  IVPB 500 milliGRAM(s) IV Intermittent every 24 hours  budesonide 160 MICROgram(s)/formoterol 4.5 MICROgram(s) Inhaler 2 Puff(s) Inhalation two times a day  cefTRIAXone   IVPB 1000 milliGRAM(s) IV Intermittent every 24 hours  colchicine 0.6 milliGRAM(s) Oral daily  pantoprazole    Tablet 40 milliGRAM(s) Oral before breakfast  polyethylene glycol 3350 17 Gram(s) Oral daily  senna 2 Tablet(s) Oral at bedtime  verapamil  milliGRAM(s) Oral daily    MEDICATIONS  (PRN):  acetaminophen     Tablet .. 650 milliGRAM(s) Oral every 6 hours PRN Temp greater or equal to 38C (100.4F), Mild Pain (1 - 3)  albuterol    90 MICROgram(s) HFA Inhaler 2 Puff(s) Inhalation every 6 hours PRN Shortness of Breath and/or Wheezing  melatonin 3 milliGRAM(s) Oral at bedtime PRN Insomnia  ondansetron Injectable 4 milliGRAM(s) IV Push every 4 hours PRN Nausea and/or Vomiting  oxyCODONE    IR 5 milliGRAM(s) Oral every 4 hours PRN Severe Pain (7 - 10)    ___________  Active diet:  Diet, DASH/TLC:   Sodium & Cholesterol Restricted  ___________________    ==================>> VITAL SIGNS <<==================    Vital Signs Last 24 HrsT(C): 36.5 (03-14-23 @ 12:52)  T(F): 97.7 (03-14-23 @ 12:52), Max: 98.6 (03-14-23 @ 05:40)  HR: 89 (03-14-23 @ 12:52) (89 - 100)  BP: 122/75 (03-14-23 @ 12:52)  RR: 18 (03-14-23 @ 12:52) (16 - 18)  SpO2: 97% (03-14-23 @ 12:52) (97% - 99%)       ==================>> LAB AND IMAGING <<==================                        9.7    8.15  )-----------( 364      ( 14 Mar 2023 16:19 )             31.9        03-14    142  |  106  |  13  ----------------------------<  154<H>  3.9   |  25  |  0.78    Ca    9.1      14 Mar 2023 09:08  Phos  3.5     03-13  Mg     2.20     03-13    WBC count:   8.15 <<== ,  7.52 <<== ,  8.45 <<== ,  6.57 <<== ,  10.77 <<==   Hemoglobin:   9.7 <<==,  9.2 <<==,  9.6 <<==,  10.9 <<==,  10.3 <<==  platelets:  364 <==, 362 <==, 263 <==, 312 <==, 303 <==    Creatinine:  0.78  <<==, 0.69  <<==, 0.76  <<==, 0.88  <<==  Sodium:   142  <==, 139  <==, 137  <==, 137  <==    ___________________________    M I C R O B I O L O G Y :    Culture - Sputum (collected 12 Mar 2023 20:16)  Source: .Sputum Sputum  Gram Stain (13 Mar 2023 11:33):    No polymorphonuclear leukocytes per low power field    Rare Squamous epithelial cells per low power field    Few Yeast like cells per oil power field    Few Gram Positive Rods per oil power field    Rare Gram positive cocci in pairs per oil power field    Rare Gram Negative Rods per oil power field  Preliminary Report (14 Mar 2023 12:05):    Rare Pseudomonas aeruginosa    Normal Respiratory Nicky present    Culture - Sputum (collected 12 Mar 2023 15:07)  Source: .Sputum Sputum  Gram Stain (12 Mar 2023 23:10):    Rare polymorphonuclear leukocytes per low power field    Rare Squamous epithelial cells per low power field    Few Gram positive cocci in pairs per oil power field    Few Gram Variable Rods per oil power field    Rare Yeast like cells per oil power field  Preliminary Report (14 Mar 2023 15:32):    Few Pseudomonas aeruginosa    Normal Respiratory Nicky present  Organism: Pseudomonas aeruginosa (14 Mar 2023 15:30)  Organism: Pseudomonas aeruginosa (14 Mar 2023 15:30)    Sensitivities:      -  Amikacin: S <=16      -  Aztreonam: S <=4      -  Cefepime: S <=2      -  Ceftazidime: S 4      -  Ciprofloxacin: S <=0.25      -  Gentamicin: S <=2      -  Imipenem: S <=1      -  Levofloxacin: S <=0.5      -  Meropenem: S <=1      -  Piperacillin/Tazobactam: S <=8      -  Tobramycin: S <=2      Method Type: ROCKY         < from: CT Angio Jose Abdomen and Pelvis w/ IV Cont (03.11.23 @ 02:27) >  IMPRESSION:  No aortic dissection.  Small right pleural effusion with fluid tracking along the right major   fissure. Right upper lobe groundglass airspace opacities may represent   infectious/inflammatory process.  < end of copied text >     ___________________________________________________________________________________  ===============>>  A S S E S S M E N T   A N D   P L A N <<===============  ------------------------------------------------------------------------------------------    · Assessment	  82 year old female with history of HLD, chronic AFib on Eliquis, CAD (s/p PCI x2) 10 yrs ago on ASA, CHF, COPD/asthma, MARIE not on CPAP skin cancer, COVID, RUL nodule s/p VATS ~1 wk ago with Dr. Yoon, path+ invasive squamous cell carcinoma,  presenting with RUE pain X1 day, CT c/f pneumonia, also endorses R finger numbness/RUE weakness, need to r/o cervical mets     Problem/Plan - 1:  ·  Problem: Right arm pain ( and weakness) .   -pt with VATS with lung wedge resection of RUL nodule on 3/2, path came back invasive squamous cell carcinoma with negative margin  -CTS and vascular appreciated    -pt with right arm/shoulder pain, denies trauma, will get xray right shoulder and also MRI cervical spine to r/o cervical mets or other pathology since she's complaining of RUE weakness/pain/numbness of fingers  -suspect MSK pain from positioning during sx?      ( inflam. markers up from recent Sx)   -CT chest/abd revealed small R pleural effusion with fluid tracking along the right major fissure. RUL groundglass airspace opacities may represent infectious/inflammatory process.       Pt reportedly had productive cough with yellow-green sputum,, streaked with minimal blood. Will treat with Ceftriaxone/zithromax for possible pneumonia. Check sputum culture and urine legionella.    no leukocytosis, no fever !   >> likely will DC antibiotics Tomorrow  *** patient with small amount of hematoma at around the side of the surgical wound, Vascular surgery following Originally recommended to hold the AC however today there okay To resume     >>> If MRI of the cervical spine is negative, as patient has clinically significantly improved, patient may be discharged home tomorrow.    Problem/Plan - 2:  ·  Problem: Chronic atrial fibrillation.   ·  Plan: -stable, chronic Afib on EKG  -cardiology recs appreciated, recent TTE preserved lvef, mod-severe TR, mild RV dysfunction/RVE)  -on eliquis 5 mg bid at home and verapamil ER 240mg qd.  cardiologist following   As above    Problem/Plan - 3:  ·  Problem: CAD (coronary artery disease).   ·  Plan: -h/o CAD, stents x2 10 years ago  -cont ASA  -not on statin or beta blocker, check lipid profile in AM.    Problem/Plan - 4:  ·  Problem: COPD (chronic obstructive pulmonary disease).   ·  Plan: c/w symbicort and albuterol inhaler prn.    Problem/Plan - 5:  ·  Problem: Lung cancer.   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    Problem/Plan - 6:  ·  Problem: Anemia.   ·  Plan: macrocytic anemia, trend CBC, transfuse prn       03-12-23 @ 04:43  Iron:     39 ug/dL  Iron %:   16 %  TIBC:     244 ug/dL       03-12-23 @ 04:43  Vit B12:  473 pg/mL  Folate:   >20.0 ng/mL    *** Acute gouty arthritis  Colchicine Ordered   monitor   pain management  supportive care      -GI/DVT Prophylaxis per protocol.    --------------------------------------------  Case discussed with patient, med team.. PMD      I had a long discussion with patients daughter at Bed side, all questions answered.  Education given on findings and plan of care  ___________________________  H. АЛЕКСАНДР Gutiérrez.  Pager: [phone]

## 2023-03-15 ENCOUNTER — TRANSCRIPTION ENCOUNTER (OUTPATIENT)
Age: 83
End: 2023-03-15

## 2023-03-15 LAB
-  AMIKACIN: SIGNIFICANT CHANGE UP
-  AMIKACIN: SIGNIFICANT CHANGE UP
-  AZTREONAM: SIGNIFICANT CHANGE UP
-  AZTREONAM: SIGNIFICANT CHANGE UP
-  CEFEPIME: SIGNIFICANT CHANGE UP
-  CEFEPIME: SIGNIFICANT CHANGE UP
-  CEFTAZIDIME: SIGNIFICANT CHANGE UP
-  CEFTAZIDIME: SIGNIFICANT CHANGE UP
-  CIPROFLOXACIN: SIGNIFICANT CHANGE UP
-  CIPROFLOXACIN: SIGNIFICANT CHANGE UP
-  GENTAMICIN: SIGNIFICANT CHANGE UP
-  GENTAMICIN: SIGNIFICANT CHANGE UP
-  IMIPENEM: SIGNIFICANT CHANGE UP
-  IMIPENEM: SIGNIFICANT CHANGE UP
-  LEVOFLOXACIN: SIGNIFICANT CHANGE UP
-  LEVOFLOXACIN: SIGNIFICANT CHANGE UP
-  MEROPENEM: SIGNIFICANT CHANGE UP
-  MEROPENEM: SIGNIFICANT CHANGE UP
-  PIPERACILLIN/TAZOBACTAM: SIGNIFICANT CHANGE UP
-  PIPERACILLIN/TAZOBACTAM: SIGNIFICANT CHANGE UP
-  TOBRAMYCIN: SIGNIFICANT CHANGE UP
-  TOBRAMYCIN: SIGNIFICANT CHANGE UP
ANION GAP SERPL CALC-SCNC: 12 MMOL/L — SIGNIFICANT CHANGE UP (ref 7–14)
APTT BLD: 107.2 SEC — HIGH (ref 27–36.3)
APTT BLD: 30.3 SEC — SIGNIFICANT CHANGE UP (ref 27–36.3)
APTT BLD: 62 SEC — HIGH (ref 27–36.3)
BUN SERPL-MCNC: 12 MG/DL — SIGNIFICANT CHANGE UP (ref 7–23)
CALCIUM SERPL-MCNC: 9.1 MG/DL — SIGNIFICANT CHANGE UP (ref 8.4–10.5)
CHLORIDE SERPL-SCNC: 105 MMOL/L — SIGNIFICANT CHANGE UP (ref 98–107)
CO2 SERPL-SCNC: 22 MMOL/L — SIGNIFICANT CHANGE UP (ref 22–31)
CREAT SERPL-MCNC: 0.73 MG/DL — SIGNIFICANT CHANGE UP (ref 0.5–1.3)
CULTURE RESULTS: SIGNIFICANT CHANGE UP
CULTURE RESULTS: SIGNIFICANT CHANGE UP
EGFR: 83 ML/MIN/1.73M2 — SIGNIFICANT CHANGE UP
GLUCOSE BLDC GLUCOMTR-MCNC: 124 MG/DL — HIGH (ref 70–99)
GLUCOSE SERPL-MCNC: 110 MG/DL — HIGH (ref 70–99)
HCT VFR BLD CALC: 27.9 % — LOW (ref 34.5–45)
HGB BLD-MCNC: 8.8 G/DL — LOW (ref 11.5–15.5)
MAGNESIUM SERPL-MCNC: 2.4 MG/DL — SIGNIFICANT CHANGE UP (ref 1.6–2.6)
MCHC RBC-ENTMCNC: 31.5 GM/DL — LOW (ref 32–36)
MCHC RBC-ENTMCNC: 32.7 PG — SIGNIFICANT CHANGE UP (ref 27–34)
MCV RBC AUTO: 103.7 FL — HIGH (ref 80–100)
METHOD TYPE: SIGNIFICANT CHANGE UP
METHOD TYPE: SIGNIFICANT CHANGE UP
NRBC # BLD: 0 /100 WBCS — SIGNIFICANT CHANGE UP (ref 0–0)
NRBC # FLD: 0 K/UL — SIGNIFICANT CHANGE UP (ref 0–0)
ORGANISM # SPEC MICROSCOPIC CNT: SIGNIFICANT CHANGE UP
PHOSPHATE SERPL-MCNC: 4.6 MG/DL — HIGH (ref 2.5–4.5)
PLATELET # BLD AUTO: 343 K/UL — SIGNIFICANT CHANGE UP (ref 150–400)
POTASSIUM SERPL-MCNC: 4.1 MMOL/L — SIGNIFICANT CHANGE UP (ref 3.5–5.3)
POTASSIUM SERPL-SCNC: 4.1 MMOL/L — SIGNIFICANT CHANGE UP (ref 3.5–5.3)
RBC # BLD: 2.69 M/UL — LOW (ref 3.8–5.2)
RBC # FLD: 13.9 % — SIGNIFICANT CHANGE UP (ref 10.3–14.5)
SODIUM SERPL-SCNC: 139 MMOL/L — SIGNIFICANT CHANGE UP (ref 135–145)
SPECIMEN SOURCE: SIGNIFICANT CHANGE UP
SPECIMEN SOURCE: SIGNIFICANT CHANGE UP
WBC # BLD: 6.07 K/UL — SIGNIFICANT CHANGE UP (ref 3.8–10.5)
WBC # FLD AUTO: 6.07 K/UL — SIGNIFICANT CHANGE UP (ref 3.8–10.5)

## 2023-03-15 PROCEDURE — 99222 1ST HOSP IP/OBS MODERATE 55: CPT | Mod: FS

## 2023-03-15 PROCEDURE — 72156 MRI NECK SPINE W/O & W/DYE: CPT | Mod: 26

## 2023-03-15 RX ORDER — SIMETHICONE 80 MG/1
80 TABLET, CHEWABLE ORAL EVERY 6 HOURS
Refills: 0 | Status: DISCONTINUED | OUTPATIENT
Start: 2023-03-15 | End: 2023-03-16

## 2023-03-15 RX ADMIN — AZITHROMYCIN 255 MILLIGRAM(S): 500 TABLET, FILM COATED ORAL at 15:40

## 2023-03-15 RX ADMIN — Medication 3 MILLIGRAM(S): at 21:15

## 2023-03-15 RX ADMIN — CEFTRIAXONE 100 MILLIGRAM(S): 500 INJECTION, POWDER, FOR SOLUTION INTRAMUSCULAR; INTRAVENOUS at 14:18

## 2023-03-15 RX ADMIN — HEPARIN SODIUM 1500 UNIT(S)/HR: 5000 INJECTION INTRAVENOUS; SUBCUTANEOUS at 11:42

## 2023-03-15 RX ADMIN — HEPARIN SODIUM 5500 UNIT(S): 5000 INJECTION INTRAVENOUS; SUBCUTANEOUS at 11:46

## 2023-03-15 RX ADMIN — BUDESONIDE AND FORMOTEROL FUMARATE DIHYDRATE 2 PUFF(S): 160; 4.5 AEROSOL RESPIRATORY (INHALATION) at 21:12

## 2023-03-15 RX ADMIN — Medication 240 MILLIGRAM(S): at 05:27

## 2023-03-15 RX ADMIN — PANTOPRAZOLE SODIUM 40 MILLIGRAM(S): 20 TABLET, DELAYED RELEASE ORAL at 05:27

## 2023-03-15 RX ADMIN — Medication 81 MILLIGRAM(S): at 14:17

## 2023-03-15 RX ADMIN — HEPARIN SODIUM 1400 UNIT(S)/HR: 5000 INJECTION INTRAVENOUS; SUBCUTANEOUS at 19:13

## 2023-03-15 RX ADMIN — BUDESONIDE AND FORMOTEROL FUMARATE DIHYDRATE 2 PUFF(S): 160; 4.5 AEROSOL RESPIRATORY (INHALATION) at 08:20

## 2023-03-15 RX ADMIN — HEPARIN SODIUM 1200 UNIT(S)/HR: 5000 INJECTION INTRAVENOUS; SUBCUTANEOUS at 04:50

## 2023-03-15 RX ADMIN — HEPARIN SODIUM 1400 UNIT(S)/HR: 5000 INJECTION INTRAVENOUS; SUBCUTANEOUS at 18:41

## 2023-03-15 RX ADMIN — SIMETHICONE 80 MILLIGRAM(S): 80 TABLET, CHEWABLE ORAL at 15:57

## 2023-03-15 RX ADMIN — Medication 650 MILLIGRAM(S): at 17:29

## 2023-03-15 RX ADMIN — Medication 0.6 MILLIGRAM(S): at 14:17

## 2023-03-15 RX ADMIN — SENNA PLUS 2 TABLET(S): 8.6 TABLET ORAL at 21:12

## 2023-03-15 RX ADMIN — Medication 650 MILLIGRAM(S): at 08:19

## 2023-03-15 RX ADMIN — HEPARIN SODIUM 1200 UNIT(S)/HR: 5000 INJECTION INTRAVENOUS; SUBCUTANEOUS at 07:19

## 2023-03-15 RX ADMIN — Medication 650 MILLIGRAM(S): at 18:29

## 2023-03-15 RX ADMIN — Medication 650 MILLIGRAM(S): at 09:18

## 2023-03-15 RX ADMIN — HEPARIN SODIUM 1400 UNIT(S)/HR: 5000 INJECTION INTRAVENOUS; SUBCUTANEOUS at 20:38

## 2023-03-15 RX ADMIN — POLYETHYLENE GLYCOL 3350 17 GRAM(S): 17 POWDER, FOR SOLUTION ORAL at 14:18

## 2023-03-15 NOTE — DISCHARGE NOTE PROVIDER - NSDCCAREPROVSEEN_GEN_ALL_CORE_FT
Herlinda Gutiérrez Hoorbod Delshadfar  Hoorbod Delshadfar  Hoorbod Delshadfar  Felicia Meredith  User ADM  Bill Awan  Team [facility] Medicine ACP      [ Greater than 35 min spent for discharge services.   H. Santosshadfar ]

## 2023-03-15 NOTE — DISCHARGE NOTE PROVIDER - PROVIDER TOKENS
PROVIDER:[TOKEN:[[id number]],FOLLOWUP:[1 week]],PROVIDER:[TOKEN:[[id number]],FOLLOWUP:[1 week]],PROVIDER:[TOKEN:[[id number]],FOLLOWUP:[1 week]]

## 2023-03-15 NOTE — PROGRESS NOTE ADULT - ASSESSMENT
_________________________________________________________________________________________  ========>>  M E D I C A L   A T T E N D I N G    F O L L O W  U P  N O T E  <<=========  -----------------------------------------------------------------------------------------------------    - Patient seen and examined by me earlier today.   - In summary,  JANA OLIVARES is a 82y year old woman admitted with pain in Rt arm an d flank..   - Patient today overall doing ok, comfortable, eating poorly / doesn't like the food >> Patient encouraged to eat more    Patient with you complain of pain in the right toe since last night: on exam consistent with acute get out of the first right toe >> Prescribed colchicine     ==================>> REVIEW OF SYSTEM <<=================    GEN: no fever, no chills, still with pain as above : not taking pain medications >> patient encouraged to take with food to avoid nausea..   RESP: no SOB, no cough, no sputum  CVS: no chest pain, no palpitations, no edema  GI: no abdominal pain, no nausea, no constipation, no diarrhea  : no dysuria, no frequency, no hematuria  Neuro: no headache, no dizziness, + some weakness of Rt arm.. Overall Significantly improved since admission  Derm : no itching, no rash    ==================>> PHYSICAL EXAM <<=================    GEN: A&O X 3 , NAD , somewhat uncomfortable with movement, pleasant, calm   HEENT: NCAT, PERRL, MMM, hearing intact  Neck: supple , no JVD appreciated  CVS: S1S2 , regular , No M/R/G appreciated  PULM: CTA B/L,  no W/R/R appreciated  ABD.: soft. non tender, non distended,  bowel sounds present  Extrem: intact pulses , no edema      Acute gout involving the first right toe  PSYCH : normal mood,  not anxious                               ( Note written / Date of service 03-15-23 )    ==================>> MEDICATIONS <<====================    aspirin  chewable 81 milliGRAM(s) Oral daily  azithromycin  IVPB      azithromycin  IVPB 500 milliGRAM(s) IV Intermittent every 24 hours  budesonide 160 MICROgram(s)/formoterol 4.5 MICROgram(s) Inhaler 2 Puff(s) Inhalation two times a day  heparin  Infusion.  Unit(s)/Hr IV Continuous <Continuous>  pantoprazole    Tablet 40 milliGRAM(s) Oral before breakfast  senna 2 Tablet(s) Oral at bedtime  verapamil  milliGRAM(s) Oral daily    MEDICATIONS  (PRN):  acetaminophen     Tablet .. 650 milliGRAM(s) Oral every 6 hours PRN Temp greater or equal to 38C (100.4F), Mild Pain (1 - 3)  albuterol    90 MICROgram(s) HFA Inhaler 2 Puff(s) Inhalation every 6 hours PRN Shortness of Breath and/or Wheezing  heparin   Injectable 5500 Unit(s) IV Push every 6 hours PRN For aPTT less than 40  heparin   Injectable 2500 Unit(s) IV Push every 6 hours PRN For aPTT between 40 - 57  melatonin 3 milliGRAM(s) Oral at bedtime PRN Insomnia  ondansetron Injectable 4 milliGRAM(s) IV Push every 4 hours PRN Nausea and/or Vomiting  oxyCODONE    IR 5 milliGRAM(s) Oral every 4 hours PRN Severe Pain (7 - 10)  simethicone 80 milliGRAM(s) Chew every 6 hours PRN Gas    ___________  Active diet:  Diet, DASH/TLC:   Sodium & Cholesterol Restricted  ___________________    ==================>> VITAL SIGNS <<==================    Vital Signs Last 24 HrsT(C): 36.2 (03-15-23 @ 17:29)  T(F): 97.2 (03-15-23 @ 17:29), Max: 97.7 (03-14-23 @ 22:00)  HR: 84 (03-15-23 @ 17:29) (78 - 100)  BP: 102/71 (03-15-23 @ 17:29)  RR: 17 (03-15-23 @ 17:29) (17 - 18)  SpO2: 100% (03-15-23 @ 17:29) (99% - 100%)      CAPILLARY BLOOD GLUCOSE      POCT Blood Glucose.: 124 mg/dL (15 Mar 2023 12:00)     ==================>> LAB AND IMAGING <<==================                        8.8    6.07  )-----------( 343      ( 15 Mar 2023 04:15 )             27.9        03-15    139  |  105  |  12  ----------------------------<  110<H>  4.1   |  22  |  0.73    Ca    9.1      15 Mar 2023 04:15  Phos  4.6     03-15  Mg     2.40     03-15      WBC count:   6.07 <<== ,  8.15 <<== ,  7.52 <<== ,  8.45 <<== ,  6.57 <<== ,  10.77 <<==   Hemoglobin:   8.8 <<==,  9.7 <<==,  9.2 <<==,  9.6 <<==,  10.9 <<==,  10.3 <<==  platelets:  343 <==, 364 <==, 362 <==, 263 <==, 312 <==, 303 <==    Creatinine:  0.73  <<==, 0.78  <<==, 0.69  <<==, 0.76  <<==, 0.88  <<==  Sodium:   139  <==, 142  <==, 139  <==, 137  <==, 137  <==       AST:          15 <==      ALT:        14  <==      AP:        69  <=     Bili:        0.9  <=    ____________________________    M I C R O B I O L O G Y :    Culture - Sputum (collected 12 Mar 2023 20:16)  Source: .Sputum Sputum  Gram Stain (13 Mar 2023 11:33):    No polymorphonuclear leukocytes per low power field    Rare Squamous epithelial cells per low power field    Few Yeast like cells per oil power field    Few Gram Positive Rods per oil power field    Rare Gram positive cocci in pairs per oil power field    Rare Gram Negative Rods per oil power field  Final Report (15 Mar 2023 09:53):    Rare Pseudomonas aeruginosa    Normal Respiratory Nicky present  Organism: Pseudomonas aeruginosa (15 Mar 2023 09:53)  Organism: Pseudomonas aeruginosa (15 Mar 2023 09:53)    Sensitivities:      -  Amikacin: S <=16      -  Aztreonam: S <=4      -  Cefepime: S 8      -  Ceftazidime: S 4      -  Ciprofloxacin: S 0.5      -  Gentamicin: S 4      -  Imipenem: S <=1      -  Levofloxacin: S 1      -  Meropenem: S <=1      -  Piperacillin/Tazobactam: S <=8      -  Tobramycin: S <=2      Method Type: ROCKY    Culture - Sputum (collected 12 Mar 2023 15:07)  Source: .Sputum Sputum  Gram Stain (12 Mar 2023 23:10):    Rare polymorphonuclear leukocytes per low power field    Rare Squamous epithelial cells per low power field    Few Gram positive cocci in pairs per oil power field    Few Gram Variable Rods per oil power field    Rare Yeast like cells per oil power field  Final Report (15 Mar 2023 16:38):    Few Pseudomonas aeruginosa    Normal Respiratory Nicky present  Organism: Pseudomonas aeruginosa  Pseudomonas aeruginosa (15 Mar 2023 16:38)  Organism: Pseudomonas aeruginosa (15 Mar 2023 16:38)    Sensitivities:      -  Amikacin: S <=16      -  Aztreonam: S <=4      -  Cefepime: S 8      -  Ceftazidime: S 4      -  Ciprofloxacin: S <=0.25      -  Gentamicin: S 4      -  Imipenem: S <=1      -  Levofloxacin: S <=0.5      -  Meropenem: S <=1      -  Piperacillin/Tazobactam: S <=8      -  Tobramycin: S <=2      Method Type: ROCKY  Organism: Pseudomonas aeruginosa (14 Mar 2023 15:30)    Sensitivities:      -  Amikacin: S <=16      -  Aztreonam: S <=4      -  Cefepime: S <=2      -  Ceftazidime: S 4      -  Ciprofloxacin: S <=0.25      -  Gentamicin: S <=2      -  Imipenem: S <=1      -  Levofloxacin: S <=0.5      -  Meropenem: S <=1      -  Piperacillin/Tazobactam: S <=8      -  Tobramycin: S <=2      Method Type: ROCKY           < from: CT Angio Jose Abdomen and Pelvis w/ IV Cont (03.11.23 @ 02:27) >  IMPRESSION:  No aortic dissection.  Small right pleural effusion with fluid tracking along the right major   fissure. Right upper lobe groundglass airspace opacities may represent   infectious/inflammatory process.  < end of copied text >     ___________________________________________________________________________________  ===============>>  A S S E S S M E N T   A N D   P L A N <<===============  ------------------------------------------------------------------------------------------    · Assessment	  82 year old female with history of HLD, chronic AFib on Eliquis, CAD (s/p PCI x2) 10 yrs ago on ASA, CHF, COPD/asthma, MARIE not on CPAP skin cancer, COVID, RUL nodule s/p VATS ~1 wk ago with Dr. Yoon, path+ invasive squamous cell carcinoma,  presenting with RUE pain X1 day, CT c/f pneumonia, also endorses R finger numbness/RUE weakness, need to r/o cervical mets     Problem/Plan - 1:  ·  Problem: Right arm pain ( and weakness) .   -pt with VATS with lung wedge resection of RUL nodule on 3/2, path came back invasive squamous cell carcinoma with negative margin  -CTS and vascular appreciated    -pt with right arm/shoulder pain, denies trauma, will get xray right shoulder and also MRI cervical spine to r/o cervical mets or other pathology since she's complaining of RUE weakness/pain/numbness of fingers  -suspect MSK pain from positioning during sx?      ( inflam. markers up from recent Sx)   -CT chest/abd revealed small R pleural effusion with fluid tracking along the right major fissure. RUL groundglass airspace opacities may represent infectious/inflammatory process.       Pt reportedly had productive cough with yellow-green sputum,, streaked with minimal blood. Will treat with Ceftriaxone/zithromax for possible pneumonia. Check sputum culture and urine legionella.    no leukocytosis, no fever !   >> likely will DC antibiotics Tomorrow  *** patient with small amount of hematoma at around the side of the surgical wound, Vascular surgery following Originally recommended to hold the AC however today there okay To resume     >>> If MRI of the cervical spine is negative, as patient has clinically significantly improved, patient may be discharged home tomorrow.    Problem/Plan - 2:  ·  Problem: Chronic atrial fibrillation.   ·  Plan: -stable, chronic Afib on EKG  -cardiology recs appreciated, recent TTE preserved lvef, mod-severe TR, mild RV dysfunction/RVE)  -on eliquis 5 mg bid at home and verapamil ER 240mg qd.  cardiologist following   As above    Problem/Plan - 3:  ·  Problem: CAD (coronary artery disease).   ·  Plan: -h/o CAD, stents x2 10 years ago  -cont ASA  -not on statin or beta blocker, check lipid profile in AM.    Problem/Plan - 4:  ·  Problem: COPD (chronic obstructive pulmonary disease).   ·  Plan: c/w symbicort and albuterol inhaler prn.    Problem/Plan - 5:  ·  Problem: Lung cancer.   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    Problem/Plan - 6:  ·  Problem: Anemia.   ·  Plan: macrocytic anemia, trend CBC, transfuse prn       03-12-23 @ 04:43  Iron:     39 ug/dL  Iron %:   16 %  TIBC:     244 ug/dL       03-12-23 @ 04:43  Vit B12:  473 pg/mL  Folate:   >20.0 ng/mL    *** Acute gouty arthritis  Colchicine Ordered   monitor   pain management  supportive care      -GI/DVT Prophylaxis per protocol.    --------------------------------------------  Case discussed with patient, med team.. PMD      I had a long discussion with patients daughter at Bed side, all questions answered.  Education given on findings and plan of care  ___________________________  H. АЛЕКСАНДР Gutiérrez.  Pager: [phone]       _________________________________________________________________________________________  ========>>  M E D I C A L   A T T E N D I N G    F O L L O W  U P  N O T E  <<=========  -----------------------------------------------------------------------------------------------------    - Patient seen and examined by me earlier today.   - In summary,  JANA OLIVARES is a 82y year old woman admitted with pain in Rt arm an d flank..   - Patient today overall doing ok, comfortable, eating fairly    pain in right toe resolved after 2 doses of colchicine   Patient to the complainant right upper abdominal pain most of the day >> sono ordered     ==================>> REVIEW OF SYSTEM <<=================    GEN: no fever, no chills, still with pain Overall improved , patient able to move Rt hand    RESP: no SOB, no cough, no sputum  CVS: no chest pain, no palpitations, no edema  GI: no abdominal pain, no nausea, no constipation, no diarrhea  : no dysuria, no frequency, no hematuria  Neuro: no headache, no dizziness, + some weakness of Rt arm.. Overall improved since admission  Derm : no itching, no rash    ==================>> PHYSICAL EXAM <<=================    GEN: A&O X 3 , NAD , somewhat uncomfortable with movement, pleasant, calm   HEENT: NCAT, PERRL, MMM, hearing intact  Neck: supple , no JVD appreciated  CVS: S1S2 , regular , No M/R/G appreciated  PULM: CTA B/L,  no W/R/R appreciated  ABD.: soft. non tender, non distended,  bowel sounds present  Extrem: intact pulses , no edema      Acute gout involving the first right toe  PSYCH : normal mood,  not anxious                               ( Note written / Date of service 03-15-23 )    ==================>> MEDICATIONS <<====================    aspirin  chewable 81 milliGRAM(s) Oral daily  azithromycin  IVPB      azithromycin  IVPB 500 milliGRAM(s) IV Intermittent every 24 hours  budesonide 160 MICROgram(s)/formoterol 4.5 MICROgram(s) Inhaler 2 Puff(s) Inhalation two times a day  heparin  Infusion.  Unit(s)/Hr IV Continuous <Continuous>  pantoprazole    Tablet 40 milliGRAM(s) Oral before breakfast  senna 2 Tablet(s) Oral at bedtime  verapamil  milliGRAM(s) Oral daily    MEDICATIONS  (PRN):  acetaminophen     Tablet .. 650 milliGRAM(s) Oral every 6 hours PRN Temp greater or equal to 38C (100.4F), Mild Pain (1 - 3)  albuterol    90 MICROgram(s) HFA Inhaler 2 Puff(s) Inhalation every 6 hours PRN Shortness of Breath and/or Wheezing  heparin   Injectable 5500 Unit(s) IV Push every 6 hours PRN For aPTT less than 40  heparin   Injectable 2500 Unit(s) IV Push every 6 hours PRN For aPTT between 40 - 57  melatonin 3 milliGRAM(s) Oral at bedtime PRN Insomnia  ondansetron Injectable 4 milliGRAM(s) IV Push every 4 hours PRN Nausea and/or Vomiting  oxyCODONE    IR 5 milliGRAM(s) Oral every 4 hours PRN Severe Pain (7 - 10)  simethicone 80 milliGRAM(s) Chew every 6 hours PRN Gas    ___________  Active diet:  Diet, DASH/TLC:   Sodium & Cholesterol Restricted  ___________________    ==================>> VITAL SIGNS <<==================    Vital Signs Last 24 HrsT(C): 36.2 (03-15-23 @ 17:29)  T(F): 97.2 (03-15-23 @ 17:29), Max: 97.7 (03-14-23 @ 22:00)  HR: 84 (03-15-23 @ 17:29) (78 - 100)  BP: 102/71 (03-15-23 @ 17:29)  RR: 17 (03-15-23 @ 17:29) (17 - 18)  SpO2: 100% (03-15-23 @ 17:29) (99% - 100%)        POCT Blood Glucose.: 124 mg/dL (15 Mar 2023 12:00)     ==================>> LAB AND IMAGING <<==================                        8.8    6.07  )-----------( 343      ( 15 Mar 2023 04:15 )             27.9        03-15    139  |  105  |  12  ----------------------------<  110<H>  4.1   |  22  |  0.73    Ca    9.1      15 Mar 2023 04:15  Phos  4.6     03-15  Mg     2.40     03-15      WBC count:   6.07 <<== ,  8.15 <<== ,  7.52 <<== ,  8.45 <<== ,  6.57 <<== ,  10.77 <<==   Hemoglobin:   8.8 <<==,  9.7 <<==,  9.2 <<==,  9.6 <<==,  10.9 <<==,  10.3 <<==  platelets:  343 <==, 364 <==, 362 <==, 263 <==, 312 <==, 303 <==    Creatinine:  0.73  <<==, 0.78  <<==, 0.69  <<==, 0.76  <<==, 0.88  <<==  Sodium:   139  <==, 142  <==, 139  <==, 137  <==, 137  <==      ____________________________    M I C R O B I O L O G Y :    Culture - Sputum (collected 12 Mar 2023 20:16)  Source: .Sputum Sputum  Gram Stain (13 Mar 2023 11:33):    No polymorphonuclear leukocytes per low power field    Rare Squamous epithelial cells per low power field    Few Yeast like cells per oil power field    Few Gram Positive Rods per oil power field    Rare Gram positive cocci in pairs per oil power field    Rare Gram Negative Rods per oil power field  Final Report (15 Mar 2023 09:53):    Rare Pseudomonas aeruginosa    Normal Respiratory Nicky present  Organism: Pseudomonas aeruginosa (15 Mar 2023 09:53)  Organism: Pseudomonas aeruginosa (15 Mar 2023 09:53)    Sensitivities:      -  Amikacin: S <=16      -  Aztreonam: S <=4      -  Cefepime: S 8      -  Ceftazidime: S 4      -  Ciprofloxacin: S 0.5      -  Gentamicin: S 4      -  Imipenem: S <=1      -  Levofloxacin: S 1      -  Meropenem: S <=1      -  Piperacillin/Tazobactam: S <=8      -  Tobramycin: S <=2      Method Type: ROCKY    Culture - Sputum (collected 12 Mar 2023 15:07)  Source: .Sputum Sputum  Gram Stain (12 Mar 2023 23:10):    Rare polymorphonuclear leukocytes per low power field    Rare Squamous epithelial cells per low power field    Few Gram positive cocci in pairs per oil power field    Few Gram Variable Rods per oil power field    Rare Yeast like cells per oil power field  Final Report (15 Mar 2023 16:38):    Few Pseudomonas aeruginosa    Normal Respiratory Nicky present  Organism: Pseudomonas aeruginosa  Pseudomonas aeruginosa (15 Mar 2023 16:38)  Organism: Pseudomonas aeruginosa (15 Mar 2023 16:38)    Sensitivities:      -  Amikacin: S <=16      -  Aztreonam: S <=4      -  Cefepime: S 8      -  Ceftazidime: S 4      -  Ciprofloxacin: S <=0.25      -  Gentamicin: S 4      -  Imipenem: S <=1      -  Levofloxacin: S <=0.5      -  Meropenem: S <=1      -  Piperacillin/Tazobactam: S <=8      -  Tobramycin: S <=2      Method Type: ROCKY  Organism: Pseudomonas aeruginosa (14 Mar 2023 15:30)    Sensitivities:      -  Amikacin: S <=16      -  Aztreonam: S <=4      -  Cefepime: S <=2      -  Ceftazidime: S 4      -  Ciprofloxacin: S <=0.25      -  Gentamicin: S <=2      -  Imipenem: S <=1      -  Levofloxacin: S <=0.5      -  Meropenem: S <=1      -  Piperacillin/Tazobactam: S <=8      -  Tobramycin: S <=2      Method Type: ROCKY        < from: MR Cervical Spine w/wo IV Cont (03.15.23 @ 10:35) >  IMPRESSION:  1. Straightening of lordosis may reflect muscle spasm.  2. Minimal grade 1 anterolisthesis C3 over C4, C6 over C7 and C7 over T1.  3. C4-C5 disc bulge, effacing the subarachnoid space, resulting in mild   central canal, severe left and moderate right neural foramen stenosis   with uncovertebralspurring and facet arthrosis.  4. C5-C6 disc bulge, effacing the subarachnoid space, resulting in severe   bilateral neural foramen stenosis with uncovertebral spurring and facet  arthrosis.  5. C6-C7 disc bulge, effacing the subarachnoid space, resulting in severe   bilateral neural foramen stenosis with uncovertebral spurring and facet  arthrosis.  6. No evidence of leptomeningeal carcinomatosis, epidural or paraspinal   mass. No evidence of osseous metastatic disease.  7. Additional findingsdescribed in detail above.  < end of copied text >       < from: CT Angio Jose Abdomen and Pelvis w/ IV Cont (03.11.23 @ 02:27) >  IMPRESSION:  No aortic dissection.  Small right pleural effusion with fluid tracking along the right major   fissure. Right upper lobe groundglass airspace opacities may represent   infectious/inflammatory process.  < end of copied text >     ___________________________________________________________________________________  ===============>>  A S S E S S M E N T   A N D   P L A N <<===============  ------------------------------------------------------------------------------------------    · Assessment	  82 year old female with history of HLD, chronic AFib on Eliquis, CAD (s/p PCI x2) 10 yrs ago on ASA, CHF, COPD/asthma, MARIE not on CPAP skin cancer, COVID, RUL nodule s/p VATS ~1 wk ago with Dr. Yoon, path+ invasive squamous cell carcinoma,  presenting with RUE pain X1 day, CT c/f pneumonia, also endorses R finger numbness/RUE weakness, need to r/o cervical mets     Problem/Plan - 1:  ·  Problem: Right arm pain ( and weakness) .   -pt with VATS with lung wedge resection of RUL nodule on 3/2, path came back invasive squamous cell carcinoma with negative margin  -CTS and vascular appreciated    -pt with right arm/shoulder pain, denies trauma, will get xray right shoulder and also MRI cervical spine to r/o cervical mets or other pathology since she's complaining of RUE weakness/pain/numbness of fingers  -pain and discomfort multifactorial : MSK pain from positioning during sx, small hematoma at site of sx, cervical disk/spine disease causing nerve compression as above    continue pain management as needed  ortho-spine Sx appreciated >> only recommending outpatient follow up   patient on heparin drip per vascular >> likely change back to oral medications tomorrow for DC planing if remains stable     Problem/Plan - 2:  ·  Problem: Chronic atrial fibrillation.   ·  Plan: -stable, chronic Afib on EKG  -cardiology recs appreciated, recent TTE preserved lvef, mod-severe TR, mild RV dysfunction/RVE)  -on eliquis 5 mg bid at home and verapamil ER 240mg qd.  cardiologist following   As above    Problem/Plan - 3:  ·  Problem: CAD (coronary artery disease).   ·  Plan: -h/o CAD, stents x2 10 years ago  -cont ASA  -not on statin or beta blocker, check lipid profile in AM.    Problem/Plan - 4:  ·  Problem: COPD (chronic obstructive pulmonary disease).   ·  Plan: c/w symbicort and albuterol inhaler prn.    Problem/Plan - 5:  ·  Problem: Lung cancer.   ·  Plan: as above, CTS surgery to follow for new invasive squamous cell carcinoma of RUL nodule  follow up as outpatient    Problem/Plan - 6:  ·  Problem: Anemia.   ·  Plan: macrocytic anemia, trend CBC, transfuse prn       03-12-23 @ 04:43  Iron:     39 ug/dL  Iron %:   16 %  TIBC:     244 ug/dL       03-12-23 @ 04:43  Vit B12:  473 pg/mL  Folate:   >20.0 ng/mL    *** Acute gouty arthritis  Colchicine Ordered   monitor   pain management  supportive care      Discharge planning as above  --------------------------------------------  Case discussed with patient, daughter, med team..       I had a long discussion with patients daughter at Bed side, all questions answered.  Education given on findings and plan of care  ___________________________  H. АЛЕКСАНДР Gutiérrez.  Pager: [phone]       _________________________________________________________________________________________  ========>>  M E D I C A L   A T T E N D I N G    F O L L O W  U P  N O T E  <<=========  -----------------------------------------------------------------------------------------------------    - Patient seen and examined by me earlier today.   - In summary,  JANA OLIVARES is a 82y year old woman admitted with pain in Rt arm an d flank..   - Patient today overall doing ok, comfortable, eating fairly    pain in right toe resolved after 2 doses of colchicine   Patient to the complainant right upper abdominal pain most of the day >> sono ordered     ==================>> REVIEW OF SYSTEM <<=================    GEN: no fever, no chills, still with pain Overall improved , patient able to move Rt hand    RESP: no SOB, no cough, no sputum  CVS: no chest pain, no palpitations, no edema  GI: no abdominal pain, no nausea, no constipation, no diarrhea  : no dysuria, no frequency, no hematuria  Neuro: no headache, no dizziness, + some weakness of Rt arm.. Overall improved since admission  Derm : no itching, no rash    ==================>> PHYSICAL EXAM <<=================    GEN: A&O X 3 , NAD , somewhat uncomfortable with movement, pleasant, calm   HEENT: NCAT, PERRL, MMM, hearing intact  Neck: supple , no JVD appreciated  CVS: S1S2 , regular , No M/R/G appreciated  PULM: CTA B/L,  no W/R/R appreciated  ABD.: soft. non tender, non distended,  bowel sounds present  Extrem: intact pulses , no edema      Acute gout involving the first right toe  PSYCH : normal mood,  not anxious                               ( Note written / Date of service 03-15-23 )    ==================>> MEDICATIONS <<====================    aspirin  chewable 81 milliGRAM(s) Oral daily  azithromycin  IVPB      azithromycin  IVPB 500 milliGRAM(s) IV Intermittent every 24 hours  budesonide 160 MICROgram(s)/formoterol 4.5 MICROgram(s) Inhaler 2 Puff(s) Inhalation two times a day  heparin  Infusion.  Unit(s)/Hr IV Continuous <Continuous>  pantoprazole    Tablet 40 milliGRAM(s) Oral before breakfast  senna 2 Tablet(s) Oral at bedtime  verapamil  milliGRAM(s) Oral daily    MEDICATIONS  (PRN):  acetaminophen     Tablet .. 650 milliGRAM(s) Oral every 6 hours PRN Temp greater or equal to 38C (100.4F), Mild Pain (1 - 3)  albuterol    90 MICROgram(s) HFA Inhaler 2 Puff(s) Inhalation every 6 hours PRN Shortness of Breath and/or Wheezing  heparin   Injectable 5500 Unit(s) IV Push every 6 hours PRN For aPTT less than 40  heparin   Injectable 2500 Unit(s) IV Push every 6 hours PRN For aPTT between 40 - 57  melatonin 3 milliGRAM(s) Oral at bedtime PRN Insomnia  ondansetron Injectable 4 milliGRAM(s) IV Push every 4 hours PRN Nausea and/or Vomiting  oxyCODONE    IR 5 milliGRAM(s) Oral every 4 hours PRN Severe Pain (7 - 10)  simethicone 80 milliGRAM(s) Chew every 6 hours PRN Gas    ___________  Active diet:  Diet, DASH/TLC:   Sodium & Cholesterol Restricted  ___________________    ==================>> VITAL SIGNS <<==================    Vital Signs Last 24 HrsT(C): 36.2 (03-15-23 @ 17:29)  T(F): 97.2 (03-15-23 @ 17:29), Max: 97.7 (03-14-23 @ 22:00)  HR: 84 (03-15-23 @ 17:29) (78 - 100)  BP: 102/71 (03-15-23 @ 17:29)  RR: 17 (03-15-23 @ 17:29) (17 - 18)  SpO2: 100% (03-15-23 @ 17:29) (99% - 100%)        POCT Blood Glucose.: 124 mg/dL (15 Mar 2023 12:00)     ==================>> LAB AND IMAGING <<==================                        8.8    6.07  )-----------( 343      ( 15 Mar 2023 04:15 )             27.9        03-15    139  |  105  |  12  ----------------------------<  110<H>  4.1   |  22  |  0.73    Ca    9.1      15 Mar 2023 04:15  Phos  4.6     03-15  Mg     2.40     03-15      WBC count:   6.07 <<== ,  8.15 <<== ,  7.52 <<== ,  8.45 <<== ,  6.57 <<== ,  10.77 <<==   Hemoglobin:   8.8 <<==,  9.7 <<==,  9.2 <<==,  9.6 <<==,  10.9 <<==,  10.3 <<==  platelets:  343 <==, 364 <==, 362 <==, 263 <==, 312 <==, 303 <==    Creatinine:  0.73  <<==, 0.78  <<==, 0.69  <<==, 0.76  <<==, 0.88  <<==  Sodium:   139  <==, 142  <==, 139  <==, 137  <==, 137  <==      ____________________________    M I C R O B I O L O G Y :    Culture - Sputum (collected 12 Mar 2023 20:16)  Source: .Sputum Sputum  Gram Stain (13 Mar 2023 11:33):    No polymorphonuclear leukocytes per low power field    Rare Squamous epithelial cells per low power field    Few Yeast like cells per oil power field    Few Gram Positive Rods per oil power field    Rare Gram positive cocci in pairs per oil power field    Rare Gram Negative Rods per oil power field  Final Report (15 Mar 2023 09:53):    Rare Pseudomonas aeruginosa    Normal Respiratory Nicky present  Organism: Pseudomonas aeruginosa (15 Mar 2023 09:53)  Organism: Pseudomonas aeruginosa (15 Mar 2023 09:53)    Sensitivities:      -  Amikacin: S <=16      -  Aztreonam: S <=4      -  Cefepime: S 8      -  Ceftazidime: S 4      -  Ciprofloxacin: S 0.5      -  Gentamicin: S 4      -  Imipenem: S <=1      -  Levofloxacin: S 1      -  Meropenem: S <=1      -  Piperacillin/Tazobactam: S <=8      -  Tobramycin: S <=2      Method Type: ROCKY    Culture - Sputum (collected 12 Mar 2023 15:07)  Source: .Sputum Sputum  Gram Stain (12 Mar 2023 23:10):    Rare polymorphonuclear leukocytes per low power field    Rare Squamous epithelial cells per low power field    Few Gram positive cocci in pairs per oil power field    Few Gram Variable Rods per oil power field    Rare Yeast like cells per oil power field  Final Report (15 Mar 2023 16:38):    Few Pseudomonas aeruginosa    Normal Respiratory Nicky present  Organism: Pseudomonas aeruginosa  Pseudomonas aeruginosa (15 Mar 2023 16:38)  Organism: Pseudomonas aeruginosa (15 Mar 2023 16:38)    Sensitivities:      -  Amikacin: S <=16      -  Aztreonam: S <=4      -  Cefepime: S 8      -  Ceftazidime: S 4      -  Ciprofloxacin: S <=0.25      -  Gentamicin: S 4      -  Imipenem: S <=1      -  Levofloxacin: S <=0.5      -  Meropenem: S <=1      -  Piperacillin/Tazobactam: S <=8      -  Tobramycin: S <=2      Method Type: ROCKY  Organism: Pseudomonas aeruginosa (14 Mar 2023 15:30)    Sensitivities:      -  Amikacin: S <=16      -  Aztreonam: S <=4      -  Cefepime: S <=2      -  Ceftazidime: S 4      -  Ciprofloxacin: S <=0.25      -  Gentamicin: S <=2      -  Imipenem: S <=1      -  Levofloxacin: S <=0.5      -  Meropenem: S <=1      -  Piperacillin/Tazobactam: S <=8      -  Tobramycin: S <=2      Method Type: ROCKY        < from: MR Cervical Spine w/wo IV Cont (03.15.23 @ 10:35) >  IMPRESSION:  1. Straightening of lordosis may reflect muscle spasm.  2. Minimal grade 1 anterolisthesis C3 over C4, C6 over C7 and C7 over T1.  3. C4-C5 disc bulge, effacing the subarachnoid space, resulting in mild   central canal, severe left and moderate right neural foramen stenosis   with uncovertebralspurring and facet arthrosis.  4. C5-C6 disc bulge, effacing the subarachnoid space, resulting in severe   bilateral neural foramen stenosis with uncovertebral spurring and facet  arthrosis.  5. C6-C7 disc bulge, effacing the subarachnoid space, resulting in severe   bilateral neural foramen stenosis with uncovertebral spurring and facet  arthrosis.  6. No evidence of leptomeningeal carcinomatosis, epidural or paraspinal   mass. No evidence of osseous metastatic disease.  7. Additional findingsdescribed in detail above.  < end of copied text >       < from: CT Angio Jose Abdomen and Pelvis w/ IV Cont (03.11.23 @ 02:27) >  IMPRESSION:  No aortic dissection.  Small right pleural effusion with fluid tracking along the right major   fissure. Right upper lobe groundglass airspace opacities may represent   infectious/inflammatory process.  < end of copied text >     ___________________________________________________________________________________  ===============>>  A S S E S S M E N T   A N D   P L A N <<===============  ------------------------------------------------------------------------------------------    · Assessment	  82 year old female with history of HLD, chronic AFib on Eliquis, CAD (s/p PCI x2) 10 yrs ago on ASA, CHF, COPD/asthma, MARIE not on CPAP skin cancer, COVID, RUL nodule s/p VATS ~1 wk ago with Dr. Yoon, path+ invasive squamous cell carcinoma,  presenting with RUE pain X1 day, CT c/f pneumonia, also endorses R finger numbness/RUE weakness, need to r/o cervical mets     Problem/Plan - 1:  ·  Problem: Right arm pain ( and weakness) .   -pt with VATS with lung wedge resection of RUL nodule on 3/2, path came back invasive squamous cell carcinoma with negative margin  -CTS and vascular appreciated    -pt with right arm/shoulder pain, denies trauma, will get xray right shoulder and also MRI cervical spine to r/o cervical mets or other pathology since she's complaining of RUE weakness/pain/numbness of fingers  -pain and discomfort multifactorial : MSK pain from positioning during sx, small hematoma at site of sx, cervical disk/spine disease causing nerve compression as above    continue pain management as needed  ortho-spine Sx appreciated >> only recommending outpatient follow up   patient on heparin drip per vascular >> likely change back to oral medications tomorrow for DC planing if remains stable       **pain in Rt side of abdomen     ?referred pain >?    patient post cholecystectomy    patient having BMs    will check sono any way     monitor     supportive care    Problem/Plan - 2:  ·  Problem: Chronic atrial fibrillation.   ·  Plan: -stable, chronic Afib on EKG  -cardiology recs appreciated, recent TTE preserved lvef, mod-severe TR, mild RV dysfunction/RVE)  -on eliquis 5 mg bid at home and verapamil ER 240mg qd.  cardiologist following   As above    Problem/Plan - 3:  ·  Problem: CAD (coronary artery disease).   ·  Plan: -h/o CAD, stents x2 10 years ago  -cont ASA  -not on statin or beta blocker, check lipid profile in AM.    Problem/Plan - 4:  ·  Problem: COPD (chronic obstructive pulmonary disease).   ·  Plan: c/w symbicort and albuterol inhaler prn.    Problem/Plan - 5:  ·  Problem: Lung cancer.   ·  Plan: as above, CTS surgery to follow for new invasive squamous cell carcinoma of RUL nodule  follow up as outpatient    Problem/Plan - 6:  ·  Problem: Anemia.   ·  Plan: macrocytic anemia, trend CBC, transfuse prn       03-12-23 @ 04:43  Iron:     39 ug/dL  Iron %:   16 %  TIBC:     244 ug/dL       03-12-23 @ 04:43  Vit B12:  473 pg/mL  Folate:   >20.0 ng/mL    *** Acute gouty arthritis  Colchicine Ordered   monitor   pain management  supportive care      Discharge planning as above  --------------------------------------------  Case discussed with patient, daughter, med team..       I had a long discussion with patients daughter at Bed side, all questions answered.  Education given on findings and plan of care  ___________________________  H. АЛЕКСАНДР Gutiérrez.  Pager: [phone]

## 2023-03-15 NOTE — CHART NOTE - NSCHARTNOTEFT_GEN_A_CORE
Paged by RN pt is waiting for MRI however on a hep gtt for aib, discussed with vascular surgery, monitor H/H on hep gtt today, then transition to eliquis tmr if H/H stays stable.    Arvin BELLO [id number] Paged by RN pt is waiting for MRI however on a hep gtt for afib, discussed with vascular surgery, monitor H/H on hep gtt today, then transition to eliquis tmr if H/H stays stable.   Called MRI, takes ~ 30 minutes, will put in an order ok to be off hep gtt for MRI and to restart once completed, notified attending Dr. Gutiérrez.     Arvin [facility] [id number]

## 2023-03-15 NOTE — CONSULT NOTE ADULT - PROVIDER SPECIALTY LIST ADULT
Question following Office Visit  When did you see your provider: 9/17/19  What is your question: Calling to see if there is an answer for a different medication for her neuropathy, discussed at visit on 9/17/19  Okay to leave a detailed message: Yes    
Thoracic Surgery
Cardiology
Vascular Surgery
Orthopedics

## 2023-03-15 NOTE — CONSULT NOTE ADULT - SUBJECTIVE AND OBJECTIVE BOX
Orthopedics Spine Consult: INCOMPLETE  83 y/o Female with PMH of Afib on Eliquis, CAD (s/p stents x2), CHF, COPD/asthma, MARIE not on CPAP, skin cancer, RUL nodule s/p VATS procedure with Dr. Yoon (CT Surgeon) on 3/2/23 who presents c/o -- sp --. Denies HS/LOC. Denies pain/injury elsewhere. Denies numbness/tingling/paresthesias/weakness. Denies bowel/bladder incontinence. Denies fevers/chills. No other complaints at this time.    PAST MEDICAL & SURGICAL HISTORY:  CAD (Coronary Artery Disease)  Stent to RCA, LAD 06 at [facility]  Afib x15yrs  GERD (Gastroesophageal Reflux Disease)  Hypercholesterolemia  Emphysema/COPD  CHF (congestive heart failure)  PUD (peptic ulcer disease)  Asthma  Smoker  Melanoma  Nodule of upper lobe of right lung  MARIE (obstructive sleep apnea)  H/O: CVA  History of Appendectomy  History of Hysterectomy  for fibroids  History of  , , ,    History of Cholecystectomy        MEDICATIONS  (STANDING):  aspirin  chewable 81 milliGRAM(s) Oral daily  azithromycin  IVPB      azithromycin  IVPB 500 milliGRAM(s) IV Intermittent every 24 hours  budesonide 160 MICROgram(s)/formoterol 4.5 MICROgram(s) Inhaler 2 Puff(s) Inhalation two times a day  cefTRIAXone   IVPB 1000 milliGRAM(s) IV Intermittent every 24 hours  colchicine 0.6 milliGRAM(s) Oral daily  heparin  Infusion.  Unit(s)/Hr IV Continuous <Continuous>  pantoprazole    Tablet 40 milliGRAM(s) Oral before breakfast  polyethylene glycol 3350 17 Gram(s) Oral daily  senna 2 Tablet(s) Oral at bedtime  verapamil  milliGRAM(s) Oral daily      Allergies  No Known Allergies  Intolerances    Vital Signs Last 24 Hrs  T(C): 36.3 (03-15-23 @ 13:39), Max: 36.5 (23 @ 22:00)  T(F): 97.3 (03-15-23 @ 13:39), Max: 97.7 (23 @ 22:00)  HR: 78 (03-15-23 @ 13:39) (78 - 100)  BP: 110/61 (03-15-23 @ 13:39) (107/72 - 145/61)  BP(mean): --  RR: 18 (03-15-23 @ 13:39) (18 - 18)  SpO2: 100% (03-15-23 @ 13:39) (97% - 100%)      Labs:                         8.8    6.07  )-----------( 343      ( 15 Mar 2023 04:15 )             27.9     15 Mar 2023 04:15    139    |  105    |  12     ----------------------------<  110    4.1     |  22     |  0.73     Ca    9.1        15 Mar 2023 04:15  Phos  4.6       15 Mar 2023 04:15  Mg     2.40      15 Mar 2023 04:15      PTT - ( 15 Mar 2023 10:49 )  PTT:30.3 sec        Physical Exam: INCOMPLETE  Gen: NAD  Spine PE:  Skin intact  No gross deformity/bony step offs  No midline/paraspinal TTP C/T/L/S spine  Negative clonus/babinski  Negative bobo  + rectal tone  No saddle anesthesia    Motor:               Deltoid       Bicep       Tricep     Wrist Ext      Wrist Flex    Finger Flex      Finger Abd  R             5/5            5/5           5/5            5/5                 5/5	           5/5                   5/5  L              5/5            5/5           5/5            5/5                 5/5               5/5                   5/5                Hip Flex        Quad      Ankle DF     Ankle PF     Toe Ext      Hamstring    R            5/5               5/5            5/5               5/5              5/5	        5/5  L             5/5               5/5            5/5               5/5              5/5               5/5    Sensory:   (0 = absent, 1 = impaired, 2 = normal, NT = not testable)              C5         C6     C7      C8       T1          R         2            2       2        2         2  L          2            2       2        2         2               L2          L3         L4      L5       S1     R         2            2           2         2        2  L          2            2           2        2         2      Imaging:   < from: MR Cervical Spine w/wo IV Cont (03.15.23 @ 10:35) >    ACC: [id number] EXAM:  MR SPINE CERVICAL WAW IC   ORDERED BY: LEEANNA ORDOÑEZ     PROCEDURE DATE:  03/15/2023          INTERPRETATION:  CLINICAL STATEMENT: Recent diagnosis of lung carcinoma.   Right upper extremity pain with finger numbness.    TECHNIQUE: Multiplanar multisequence MRI cervical spine, WITHOUT and WITH   intravenous contrast. No complications to the intravenous administration   of 7.5 cc of gadolinium-based contrast (Gadavist). 0 cc discarded.   Multiple images degraded by motion artifact.  COMPARISON: No similar prior studies for comparison.    FINDINGS:    No compression fracture or evidence of marrow replacement process. No   visualized intrathecal or paraspinal mass.    Partially imaged right pleural fluid collection. Partial opacification   right inferior mastoid air cells. No gross altered cord signal.    Straightening of lordosis. Minimal grade 1 anterolisthesis C3 over C4, C6   over C7 and C7 over T1. Multilevel disc space narrowing, most pronounced   and severe at C3-C4,with partial vertebral body ankylosis as well as   left C3-C4 posterior facet articulation ankylosis at this level. Varying   degrees of diffuse disc desiccation with multilevel endplate and   uncovertebral spurring. Multilevel facet arthrosis, most pronounced and   severe in degree at the right C2-C3, right C4-C5 and right C6-C7 levels.   Moderate left atlantoaxial arthrosis.    C2-C3: Mild right neural foramen stenosis secondary to uncovertebral   spurring and facet arthrosis.  C3-C4: Mild leftneural foramen stenosis secondary to uncovertebral   spurring.  C4-C5: Disc bulge, effacing the subarachnoid space, resulting in mild   central canal, severe left and moderate right neural foramen stenosis   with uncovertebral spurring and facet arthrosis.  C5-C6: Disc bulge, effacing the subarachnoid space, resulting in severe   bilateral neural foramen stenosis with uncovertebral spurring and facet   arthrosis.  C6-C7: Disc bulge, effacing the subarachnoid space, resulting in severe   bilateralneural foramen stenosis with uncovertebral spurring and facet   arthrosis.  C7-T1: No disc protrusion or stenosis.    No enhancing epidural or paraspinal mass. No evidence of leptomeningeal    IMPRESSION:  1. Straightening of lordosis may reflect muscle spasm.  2. Minimal grade 1 anterolisthesis C3 over C4, C6 over C7 and C7 over T1.  3. C4-C5 disc bulge, effacing the subarachnoid space, resulting in mild   central canal, severe left and moderate right neural foramen stenosis   with uncovertebralspurring and facet arthrosis.  4. C5-C6 disc bulge, effacing the subarachnoid space, resulting in severe   bilateral neural foramen stenosis with uncovertebral spurring and facet   arthrosis.  5. C6-C7 disc bulge, effacing the subarachnoid space, resulting in severe   bilateral neural foramen stenosis with uncovertebral spurring and facet   arthrosis.  6. No evidence of leptomeningeal carcinomatosis, epidural or paraspinal   mass. No evidence of osseous metastatic disease.  7. Additional findingsdescribed in detail above.    --- End of Report ---            AJITH ORTIZ M.D., ATTENDING RADIOLOGIST  This document has been electronically signed. Mar 15 2023 11:33AM    < end of copied text >           Orthopedics Spine Consult:  83 y/o Female with PMH of Afib on Eliquis, CAD (s/p stents x2), CHF, COPD/asthma, MARIE not on CPAP, skin cancer, RUL nodule s/p VATS procedure with Dr. Yoon (CT Surgeon) on 3/2/23 who presents with complaints of RUE pain x 1 month. Pt states she has now has been experiencing 3 days of paresthesias to right hand and clumsiness doing certain tasks with that hand. Per patient, her pain has been improving after receiving pain medications and feels like she can range her extremity more. Patient reports being frustrated with her current care as she has been not informed about her current course of care, refused to answer certain questions. Denies trauma, bowel/bladder incontinence, lower extremity paresthesias/weakness. Pt has been ambulatory since being admitted, states she is supposed to get home physical therapy.    PAST MEDICAL & SURGICAL HISTORY:  CAD (Coronary Artery Disease)  Stent to RCA, LAD 06 at [facility]  Afib x15yrs  GERD (Gastroesophageal Reflux Disease)  Hypercholesterolemia  Emphysema/COPD  CHF (congestive heart failure)  PUD (peptic ulcer disease)  Asthma  Smoker  Melanoma  Nodule of upper lobe of right lung  MARIE (obstructive sleep apnea)  H/O: CVA  History of Appendectomy  History of Hysterectomy  for fibroids  History of  , , ,    History of Cholecystectomy        MEDICATIONS  (STANDING):  aspirin  chewable 81 milliGRAM(s) Oral daily  azithromycin  IVPB      azithromycin  IVPB 500 milliGRAM(s) IV Intermittent every 24 hours  budesonide 160 MICROgram(s)/formoterol 4.5 MICROgram(s) Inhaler 2 Puff(s) Inhalation two times a day  cefTRIAXone   IVPB 1000 milliGRAM(s) IV Intermittent every 24 hours  colchicine 0.6 milliGRAM(s) Oral daily  heparin  Infusion.  Unit(s)/Hr IV Continuous <Continuous>  pantoprazole    Tablet 40 milliGRAM(s) Oral before breakfast  polyethylene glycol 3350 17 Gram(s) Oral daily  senna 2 Tablet(s) Oral at bedtime  verapamil  milliGRAM(s) Oral daily      Allergies  No Known Allergies  Intolerances    Vital Signs Last 24 Hrs  T(C): 36.3 (03-15-23 @ 13:39), Max: 36.5 (23 @ 22:00)  T(F): 97.3 (03-15-23 @ 13:39), Max: 97.7 (23 @ 22:00)  HR: 78 (03-15-23 @ 13:39) (78 - 100)  BP: 110/61 (03-15-23 @ 13:39) (107/72 - 145/61)  BP(mean): --  RR: 18 (03-15-23 @ 13:39) (18 - 18)  SpO2: 100% (03-15-23 @ 13:39) (97% - 100%)      Labs:                         8.8    6.07  )-----------( 343      ( 15 Mar 2023 04:15 )             27.9     15 Mar 2023 04:15    139    |  105    |  12     ----------------------------<  110    4.1     |  22     |  0.73     Ca    9.1        15 Mar 2023 04:15  Phos  4.6       15 Mar 2023 04:15  Mg     2.40      15 Mar 2023 04:15      PTT - ( 15 Mar 2023 10:49 )  PTT:30.3 sec        Physical Exam:   Gen: NAD  Spine PE:  Skin intact  No gross deformity/bony step offs  No midline/paraspinal TTP CTL spine  Negative bobo    Motor:               Deltoid       Bicep       Tricep     Wrist Ext      Wrist Flex    Finger Flex      Finger Abd  R             3+/5            3+/5          4/5            5/5                 5/5	           5/5                   5/5  L              5/5            5/5           5/5            5/5                 5/5               5/5                   5/5                Hip Flex        Quad      Ankle DF     Ankle PF     Toe Ext      Hamstring    R            5/5               5/5            5/5               5/5              5/5	        5/5  L             5/5               5/5            5/5               5/5              5/5               5/5    Sensory:   (0 = absent, 1 = impaired, 2 = normal, NT = not testable)              C5         C6     C7      C8       T1          R         2            2       2        2         2  L          2            2       2        2         2               L2          L3         L4      L5       S1     R         2            2           2         2        2  L          2            2           2        2         2      Imaging:   < from: MR Cervical Spine w/wo IV Cont (03.15.23 @ 10:35) >  ACC: [id number] EXAM:  MR SPINE CERVICAL WAW IC   ORDERED BY: LEEANNA ORDOÑEZ   PROCEDURE DATE:  03/15/2023      INTERPRETATION:  CLINICAL STATEMENT: Recent diagnosis of lung carcinoma.   Right upper extremity pain with finger numbness.    TECHNIQUE: Multiplanar multisequence MRI cervical spine, WITHOUT and WITH   intravenous contrast. No complications to the intravenous administration   of 7.5 cc of gadolinium-based contrast (Gadavist). 0 cc discarded.   Multiple images degraded by motion artifact.  COMPARISON: No similar prior studies for comparison.    FINDINGS:    No compression fracture or evidence of marrow replacement process. No   visualized intrathecal or paraspinal mass.    Partially imaged right pleural fluid collection. Partial opacification   right inferior mastoid air cells. No gross altered cord signal.    Straightening of lordosis. Minimal grade 1 anterolisthesis C3 over C4, C6   over C7 and C7 over T1. Multilevel disc space narrowing, most pronounced   and severe at C3-C4,with partial vertebral body ankylosis as well as   left C3-C4 posterior facet articulation ankylosis at this level. Varying   degrees of diffuse disc desiccation with multilevel endplate and   uncovertebral spurring. Multilevel facet arthrosis, most pronounced and   severe in degree at the right C2-C3, right C4-C5 and right C6-C7 levels.   Moderate left atlantoaxial arthrosis.    C2-C3: Mild right neural foramen stenosis secondary to uncovertebral   spurring and facet arthrosis.  C3-C4: Mild leftneural foramen stenosis secondary to uncovertebral   spurring.  C4-C5: Disc bulge, effacing the subarachnoid space, resulting in mild   central canal, severe left and moderate right neural foramen stenosis   with uncovertebral spurring and facet arthrosis.  C5-C6: Disc bulge, effacing the subarachnoid space, resulting in severe   bilateral neural foramen stenosis with uncovertebral spurring and facet   arthrosis.  C6-C7: Disc bulge, effacing the subarachnoid space, resulting in severe   bilateralneural foramen stenosis with uncovertebral spurring and facet   arthrosis.  C7-T1: No disc protrusion or stenosis.    No enhancing epidural or paraspinal mass. No evidence of leptomeningeal    IMPRESSION:  1. Straightening of lordosis may reflect muscle spasm.  2. Minimal grade 1 anterolisthesis C3 over C4, C6 over C7 and C7 over T1.  3. C4-C5 disc bulge, effacing the subarachnoid space, resulting in mild   central canal, severe left and moderate right neural foramen stenosis   with uncovertebralspurring and facet arthrosis.  4. C5-C6 disc bulge, effacing the subarachnoid space, resulting in severe   bilateral neural foramen stenosis with uncovertebral spurring and facet   arthrosis.  5. C6-C7 disc bulge, effacing the subarachnoid space, resulting in severe   bilateral neural foramen stenosis with uncovertebral spurring and facet   arthrosis.  6. No evidence of leptomeningeal carcinomatosis, epidural or paraspinal   mass. No evidence of osseous metastatic disease.  7. Additional findingsdescribed in detail above.    --- End of Report ---        AJITH ORTIZ M.D., ATTENDING RADIOLOGIST  This document has been electronically signed. Mar 15 2023 11:33AM  < end of copied text >

## 2023-03-15 NOTE — DISCHARGE NOTE PROVIDER - CARE PROVIDERS DIRECT ADDRESSES
,[email or web address]

## 2023-03-15 NOTE — CONSULT NOTE ADULT - ASSESSMENT
A/P: 82y Female with INCOMPLETE  - Pain control  - PT/OT/WBAT with assistive devices as needed  - Care per primary team  - Pt may follow up with  _____ in 1-2 weeks from discharge, call [phone] for appointment  - Plan to be discussed with  _____   A/P: 81 y/o Female with multilevel cervical stenosis  - Pain control  - PT/WBAT with assistive devices as needed  - Care per primary team  - Pt may follow up with Dr. Koch in 1-2 weeks from discharge, call [phone] for appointment  - Plan to be discussed with Dr. Koch A/P: 83 y/o Female with multilevel cervical stenosis  - Pain control  - PT/WBAT with assistive devices as needed  - Care per primary team  - Pt may follow up with Dr. Koch in 1-2 weeks from discharge, call [phone] for appointment  - Plan discussed with Dr. Koch, in agreement with above

## 2023-03-15 NOTE — DISCHARGE NOTE PROVIDER - NSDCMRMEDTOKEN_GEN_ALL_CORE_FT
albuterol 90 mcg/inh inhalation aerosol: 2 puff(s) inhaled every 6 hours, As Needed  aspirin 81 mg oral tablet: 1 tab(s) orally once a day  Eliquis 5 mg oral tablet: 1 tab(s) orally 2 times a day  last dose on 2/27/23  pantoprazole 40 mg oral delayed release tablet: 1 tab(s) orally once a day  Symbicort 160 mcg-4.5 mcg/inh inhalation aerosol: 2 puff(s) inhaled 2 times a day  verapamil 240 mg/24 hours oral capsule, extended release: 1 cap(s) orally once a day

## 2023-03-15 NOTE — DISCHARGE NOTE PROVIDER - NSDCCPCAREPLAN_GEN_ALL_CORE_FT
PRINCIPAL DISCHARGE DIAGNOSIS  Diagnosis: Arm pain  Assessment and Plan of Treatment: You came into the hospital for right arm weakness and pain, thoracic team following for recent VATs procedure at other hospital, stable and follow up with Dr. Yoon. Otherwise MRI c-spine was done showing narrowing of the cervical canal, orthopedics spine was consulted and they recommended outpatient follow up with Dr. Koch, please call to make an appointment. Follow up with your primary care provider for further care as well in 1 week.      SECONDARY DISCHARGE DIAGNOSES  Diagnosis: Chronic atrial fibrillation  Assessment and Plan of Treatment: Continue eliquis.    Diagnosis: Right upper lobe pulmonary nodule  Assessment and Plan of Treatment: Taken out past admission, cardiothoracic following. Follow up with Dr. Yoon's office for repeat CT chest in 3 months.

## 2023-03-15 NOTE — DISCHARGE NOTE PROVIDER - CARE PROVIDER_API CALL
Moreno Adame; PhD)  Cardiology; Internal Medicine; Vascular Medicine  [address] [place] [address], [address]  [place] [address]  Phone: [phone]  Fax: [phone]  Follow Up Time: 1 week    Jaswant Yoon)  Surgery; Thoracic Surgery  [address] [place] [address], Oncology [address] [place] [address]  Phone: [phone]  Fax: [phone]  Follow Up Time: 1 week    John Koch)  [facility]  [address], [address]  [place] [address]  Phone: [phone]  Fax: [phone]  Follow Up Time: 1 week

## 2023-03-15 NOTE — CHART NOTE - NSCHARTNOTEFT_GEN_A_CORE
Pt seen with DR Jesus  Pt's daughter called during visit  Pathology results discussed w pt and daughter  All questions answered.   Pt should f/u with DR Yoon in office in   3 months with repeat CT scan of chest  Arm pain slightly improved  VATS c/d/i.  Suture removed.   Care and d/c planning per primary medical team  Will follow as needed.

## 2023-03-15 NOTE — DISCHARGE NOTE PROVIDER - NSDCFUSCHEDAPPT_GEN_ALL_CORE_FT
[facility] [address]  Scheduled Appointment: 06/05/2023    Petty Steen  [facility] [address]   Scheduled Appointment: 06/05/2023

## 2023-03-15 NOTE — DISCHARGE NOTE PROVIDER - HOSPITAL COURSE
82 year old female with history of HLD, chronic AFib on Eliquis, CAD (s/p PCI x2) 10 yrs ago on ASA, CHF, COPD/asthma, MARIE not on CPAP skin cancer, COVID, RUL nodule s/p VATS ~1 wk ago with Dr. Yoon, path+ invasive squamous cell carcinoma,  presenting with RUE pain X1 day, CT c/f pneumonia, also endorses R finger numbness/RUE weakness, need to r/o cervical mets     Right arm pain ( and weakness) .   -pt with VATS with lung wedge resection of RUL nodule on 3/2, path came back invasive squamous cell carcinoma with negative margin  -CTS and vascular appreciated, Vascular c/s'd for axillary hematoma, since then continued on eliquis, hgb stable.    -pt with right arm/shoulder pain, s/p shoulder xray with no fracture or dislocation, and also MRI cervical spine ruled out cervical mets however showed multi-level cervical stenosis.  -pain and discomfort multifactorial : MSK pain from positioning during sx, small hematoma at site of sx, cervical disk/spine disease causing nerve compression as above    continue pain management as needed  ortho-spine Sx appreciated >> only recommending outpatient follow up: Dr. Koch in 1-2 weeks from discharge, call [phone] for appointment    Chronic atrial fibrillation.   ·  Plan: -stable, chronic Afib on EKG  -cardiology recs appreciated, recent TTE preserved lvef, mod-severe TR, mild RV dysfunction/RVE)  -on eliquis 5 mg bid at home and verapamil ER 240mg qd.  -cardiologist following     CAD (coronary artery disease).   ·  Plan: -h/o CAD, stents x2 10 years ago  -cont ASA    COPD (chronic obstructive pulmonary disease).   ·  Plan: c/w symbicort and albuterol inhaler prn.    Lung cancer.   ·  Plan: as above, CTS surgery to follow for new invasive squamous cell carcinoma of RUL nodule  follow up as outpatient    Anemia.   ·  Plan: macrocytic anemia, trend CBC, transfuse prn  - stable on d/c, 8.8, restarted eliquis.    On 3/16/23, discussed with Dr. Gutiérrez, patient is medically cleared and optimized for discharge today. All medications were reviewed with attending, and sent to mutually agreed upon pharmacy.  Reviewed discharge medications with patient; All new medications requiring new prescription sent to pharmacy of patients choice. Reviewed need for prescription for previous home medications and new prescriptions sent if [facility]. Patient in agreement and understands.   82 year old female with history of HLD, chronic AFib on Eliquis, CAD (s/p PCI x2) 10 yrs ago on ASA, CHF, COPD/asthma, MARIE not on CPAP skin cancer, COVID, RUL nodule s/p VATS ~1 wk ago with Dr. Yoon, path+ invasive squamous cell carcinoma,  presenting with RUE pain X1 day, CT c/f pneumonia, also endorses R finger numbness/RUE weakness, need to r/o cervical mets     Right arm pain ( and weakness) .   -pt with VATS with lung wedge resection of RUL nodule on 3/2, path came back invasive squamous cell carcinoma with negative margin  -CTS and vascular appreciated, Vascular c/s'd for axillary hematoma, since then continued on eliquis, hgb stable.    -pt with right arm/shoulder pain, s/p shoulder xray with no fracture or dislocation, and also MRI cervical spine ruled out cervical mets however showed multi-level cervical stenosis.  -pain and discomfort multifactorial : MSK pain from positioning during sx, small hematoma at site of sx, cervical disk/spine disease causing nerve compression as above    continue pain management as needed  ortho-spine Sx appreciated >> only recommending outpatient follow up: Dr. Koch in 1-2 weeks from discharge, call [phone] for appointment  - Pt also c/o RUQ pain however s/p christophe with BM, no improvement on simethicone and abd US unremarkable, follow up outpatient.    Chronic atrial fibrillation.   ·  Plan: -stable, chronic Afib on EKG  -cardiology recs appreciated, recent TTE preserved lvef, mod-severe TR, mild RV dysfunction/RVE)  -on eliquis 5 mg bid at home and verapamil ER 240mg qd.  -cardiologist following     CAD (coronary artery disease).   ·  Plan: -h/o CAD, stents x2 10 years ago  -cont ASA    COPD (chronic obstructive pulmonary disease).   ·  Plan: c/w symbicort and albuterol inhaler prn.    Lung cancer.   ·  Plan: as above, CTS surgery to follow for new invasive squamous cell carcinoma of RUL nodule  follow up as outpatient    Anemia.   ·  Plan: macrocytic anemia, trend CBC, transfuse prn  - stable on d/c, 8.8, restarted eliquis.    On 3/16/23, discussed with Dr. Gutiérrez, patient is medically cleared and optimized for discharge today. All medications were reviewed with attending, and sent to mutually agreed upon pharmacy.  Reviewed discharge medications with patient; All new medications requiring new prescription sent to pharmacy of patients choice. Reviewed need for prescription for previous home medications and new prescriptions sent if [facility]. Patient in agreement and understands.

## 2023-03-16 ENCOUNTER — TRANSCRIPTION ENCOUNTER (OUTPATIENT)
Age: 83
End: 2023-03-16

## 2023-03-16 VITALS
TEMPERATURE: 98 F | SYSTOLIC BLOOD PRESSURE: 100 MMHG | HEART RATE: 84 BPM | OXYGEN SATURATION: 98 % | DIASTOLIC BLOOD PRESSURE: 62 MMHG | RESPIRATION RATE: 17 BRPM

## 2023-03-16 LAB
ANION GAP SERPL CALC-SCNC: 12 MMOL/L — SIGNIFICANT CHANGE UP (ref 7–14)
APTT BLD: 105.9 SEC — HIGH (ref 27–36.3)
APTT BLD: 127.5 SEC — CRITICAL HIGH (ref 27–36.3)
BASOPHILS # BLD AUTO: 0.13 K/UL — SIGNIFICANT CHANGE UP (ref 0–0.2)
BASOPHILS NFR BLD AUTO: 1.7 % — SIGNIFICANT CHANGE UP (ref 0–2)
BUN SERPL-MCNC: 10 MG/DL — SIGNIFICANT CHANGE UP (ref 7–23)
CALCIUM SERPL-MCNC: 9.1 MG/DL — SIGNIFICANT CHANGE UP (ref 8.4–10.5)
CHLORIDE SERPL-SCNC: 106 MMOL/L — SIGNIFICANT CHANGE UP (ref 98–107)
CO2 SERPL-SCNC: 24 MMOL/L — SIGNIFICANT CHANGE UP (ref 22–31)
CREAT SERPL-MCNC: 0.74 MG/DL — SIGNIFICANT CHANGE UP (ref 0.5–1.3)
EGFR: 81 ML/MIN/1.73M2 — SIGNIFICANT CHANGE UP
EOSINOPHIL # BLD AUTO: 0.61 K/UL — HIGH (ref 0–0.5)
EOSINOPHIL NFR BLD AUTO: 7.9 % — HIGH (ref 0–6)
GLUCOSE SERPL-MCNC: 103 MG/DL — HIGH (ref 70–99)
HCT VFR BLD CALC: 27.8 % — LOW (ref 34.5–45)
HCT VFR BLD CALC: 29.5 % — LOW (ref 34.5–45)
HGB BLD-MCNC: 8.8 G/DL — LOW (ref 11.5–15.5)
HGB BLD-MCNC: 9.4 G/DL — LOW (ref 11.5–15.5)
IANC: 4.45 K/UL — SIGNIFICANT CHANGE UP (ref 1.8–7.4)
IMM GRANULOCYTES NFR BLD AUTO: 1.8 % — HIGH (ref 0–0.9)
LYMPHOCYTES # BLD AUTO: 1.49 K/UL — SIGNIFICANT CHANGE UP (ref 1–3.3)
LYMPHOCYTES # BLD AUTO: 19.2 % — SIGNIFICANT CHANGE UP (ref 13–44)
MAGNESIUM SERPL-MCNC: 2.3 MG/DL — SIGNIFICANT CHANGE UP (ref 1.6–2.6)
MCHC RBC-ENTMCNC: 31.7 GM/DL — LOW (ref 32–36)
MCHC RBC-ENTMCNC: 31.9 GM/DL — LOW (ref 32–36)
MCHC RBC-ENTMCNC: 32.7 PG — SIGNIFICANT CHANGE UP (ref 27–34)
MCHC RBC-ENTMCNC: 33.5 PG — SIGNIFICANT CHANGE UP (ref 27–34)
MCV RBC AUTO: 103.3 FL — HIGH (ref 80–100)
MCV RBC AUTO: 105 FL — HIGH (ref 80–100)
MONOCYTES # BLD AUTO: 0.94 K/UL — HIGH (ref 0–0.9)
MONOCYTES NFR BLD AUTO: 12.1 % — SIGNIFICANT CHANGE UP (ref 2–14)
NEUTROPHILS # BLD AUTO: 4.45 K/UL — SIGNIFICANT CHANGE UP (ref 1.8–7.4)
NEUTROPHILS NFR BLD AUTO: 57.3 % — SIGNIFICANT CHANGE UP (ref 43–77)
NRBC # BLD: 0 /100 WBCS — SIGNIFICANT CHANGE UP (ref 0–0)
NRBC # BLD: 0 /100 WBCS — SIGNIFICANT CHANGE UP (ref 0–0)
NRBC # FLD: 0 K/UL — SIGNIFICANT CHANGE UP (ref 0–0)
NRBC # FLD: 0 K/UL — SIGNIFICANT CHANGE UP (ref 0–0)
PHOSPHATE SERPL-MCNC: 3.8 MG/DL — SIGNIFICANT CHANGE UP (ref 2.5–4.5)
PLATELET # BLD AUTO: 283 K/UL — SIGNIFICANT CHANGE UP (ref 150–400)
PLATELET # BLD AUTO: 292 K/UL — SIGNIFICANT CHANGE UP (ref 150–400)
POTASSIUM SERPL-MCNC: 4.1 MMOL/L — SIGNIFICANT CHANGE UP (ref 3.5–5.3)
POTASSIUM SERPL-SCNC: 4.1 MMOL/L — SIGNIFICANT CHANGE UP (ref 3.5–5.3)
RBC # BLD: 2.69 M/UL — LOW (ref 3.8–5.2)
RBC # BLD: 2.81 M/UL — LOW (ref 3.8–5.2)
RBC # FLD: 14.1 % — SIGNIFICANT CHANGE UP (ref 10.3–14.5)
RBC # FLD: 14.2 % — SIGNIFICANT CHANGE UP (ref 10.3–14.5)
SODIUM SERPL-SCNC: 142 MMOL/L — SIGNIFICANT CHANGE UP (ref 135–145)
WBC # BLD: 7.76 K/UL — SIGNIFICANT CHANGE UP (ref 3.8–10.5)
WBC # BLD: 8.61 K/UL — SIGNIFICANT CHANGE UP (ref 3.8–10.5)
WBC # FLD AUTO: 7.76 K/UL — SIGNIFICANT CHANGE UP (ref 3.8–10.5)
WBC # FLD AUTO: 8.61 K/UL — SIGNIFICANT CHANGE UP (ref 3.8–10.5)

## 2023-03-16 PROCEDURE — 76700 US EXAM ABDOM COMPLETE: CPT | Mod: 26

## 2023-03-16 RX ORDER — OXYCODONE HYDROCHLORIDE 5 MG/1
5 TABLET ORAL EVERY 4 HOURS
Refills: 0 | Status: DISCONTINUED | OUTPATIENT
Start: 2023-03-16 | End: 2023-03-16

## 2023-03-16 RX ORDER — APIXABAN 2.5 MG/1
5 TABLET, FILM COATED ORAL
Refills: 0 | Status: DISCONTINUED | OUTPATIENT
Start: 2023-03-16 | End: 2023-03-16

## 2023-03-16 RX ADMIN — APIXABAN 5 MILLIGRAM(S): 2.5 TABLET, FILM COATED ORAL at 11:30

## 2023-03-16 RX ADMIN — BUDESONIDE AND FORMOTEROL FUMARATE DIHYDRATE 2 PUFF(S): 160; 4.5 AEROSOL RESPIRATORY (INHALATION) at 11:30

## 2023-03-16 RX ADMIN — HEPARIN SODIUM 1300 UNIT(S)/HR: 5000 INJECTION INTRAVENOUS; SUBCUTANEOUS at 00:59

## 2023-03-16 RX ADMIN — Medication 81 MILLIGRAM(S): at 11:30

## 2023-03-16 RX ADMIN — PANTOPRAZOLE SODIUM 40 MILLIGRAM(S): 20 TABLET, DELAYED RELEASE ORAL at 05:27

## 2023-03-16 RX ADMIN — Medication 240 MILLIGRAM(S): at 05:28

## 2023-03-16 NOTE — PROGRESS NOTE ADULT - REASON FOR ADMISSION
right arm pain

## 2023-03-16 NOTE — DISCHARGE NOTE NURSING/CASE MANAGEMENT/SOCIAL WORK - PATIENT PORTAL LINK FT
You can access the FollowMyHealth Patient Portal offered by [facility] by registering at the following website: [email or web address]. By joining [employer]’s FollowMyHealth portal, you will also be able to view your health information using other applications (apps) compatible with our system.

## 2023-03-16 NOTE — DISCHARGE NOTE NURSING/CASE MANAGEMENT/SOCIAL WORK - NSDCPEFALRISK_GEN_ALL_CORE
For information on Fall & Injury Prevention, visit: [email or web address] OR  [email or web address] OR  https://www.cdc.gov/steadi/patient.html

## 2023-03-16 NOTE — DISCHARGE NOTE NURSING/CASE MANAGEMENT/SOCIAL WORK - NSDCPEELIQUISCOMP_GEN_ALL_CORE
Patient Education        Urinary Tract Infection in Women: Care Instructions  Your Care Instructions    A urinary tract infection, or UTI, is a general term for an infection anywhere between the kidneys and the urethra (where urine comes out). Most UTIs are bladder infections. They often cause pain or burning when you urinate. UTIs are caused by bacteria and can be cured with antibiotics. Be sure to complete your treatment so that the infection goes away. Follow-up care is a key part of your treatment and safety. Be sure to make and go to all appointments, and call your doctor if you are having problems. It's also a good idea to know your test results and keep a list of the medicines you take. How can you care for yourself at home? · Take your antibiotics as directed. Do not stop taking them just because you feel better. You need to take the full course of antibiotics. · Drink extra water and other fluids for the next day or two. This may help wash out the bacteria that are causing the infection. (If you have kidney, heart, or liver disease and have to limit fluids, talk with your doctor before you increase your fluid intake.)  · Avoid drinks that are carbonated or have caffeine. They can irritate the bladder. · Urinate often. Try to empty your bladder each time. · To relieve pain, take a hot bath or lay a heating pad set on low over your lower belly or genital area. Never go to sleep with a heating pad in place. To prevent UTIs  · Drink plenty of water each day. This helps you urinate often, which clears bacteria from your system. (If you have kidney, heart, or liver disease and have to limit fluids, talk with your doctor before you increase your fluid intake.)  · Urinate when you need to. · Urinate right after you have sex. · Change sanitary pads often. · Avoid douches, bubble baths, feminine hygiene sprays, and other feminine hygiene products that have deodorants.   · After going to the bathroom, wipe from front to back. When should you call for help? Call your doctor now or seek immediate medical care if:    · Symptoms such as fever, chills, nausea, or vomiting get worse or appear for the first time.     · You have new pain in your back just below your rib cage. This is called flank pain.     · There is new blood or pus in your urine.     · You have any problems with your antibiotic medicine.    Watch closely for changes in your health, and be sure to contact your doctor if:    · You are not getting better after taking an antibiotic for 2 days.     · Your symptoms go away but then come back. Where can you learn more? Go to http://renata-antonieta.info/. Enter J568 in the search box to learn more about \"Urinary Tract Infection in Women: Care Instructions. \"  Current as of: March 20, 2018  Content Version: 11.9  © 8348-8320 [employer]. Care instructions adapted under license by [employer] (which disclaims liability or warranty for this information). If you have questions about a medical condition or this instruction, always ask your healthcare professional. [place] [address] any warranty or liability for your use of this information. Patient Education        Learning About Turning a Person in Bed  Why is it important to turn a person in bed? People sometimes have to stay in bed for long periods of time. They may be very sick, in pain, or very weak and not be able to move themselves into different positions. It's very important that your loved one changes positions. Lying in one position for a long time can cause pressure injuries (also called pressure sores). Pressure injuries are damage to the skin. They can range from red areas on the surface of the skin to severe tissue damage that goes deep into muscle and bone.  These problems are hard to treat and slow to heal. When pressure injuries don't heal well, they can cause problems such as bone, blood, and skin infections. Pressure injuries usually occur over bony areas, such as the hips, lower back, elbows, heels, and shoulders. They can also occur in places where the skin folds over on itself. You can help your loved one avoid pressure injuries by helping him or her turn and change position in bed. A drawsheet can help. What is a drawsheet? A drawsheet makes it easier to \"roll\" your loved one into another position. You can buy a drawsheet, or you can make one with a sheet. You then make the bed using the drawsheet. To make a drawsheet:  · Fold a sheet in half lengthwise. · Place the sheet on top of the fitted bottom sheet so that the top and bottom of the drawsheet go across the bed (perpendicular to the bed). Position the drawsheet so that it will be between your loved one's head and knees. · Tuck in the drawsheet tightly on both sides. Smooth out any wrinkles to reduce possible skin irritation. How do you turn a person in bed? Before getting started, tell your loved one that you want him or her to roll into another position. If your loved one has any drains, tubes, or other medical equipment, adjust them so they don't get in the way. If your loved one can help  · You may need to help your loved one scoot toward the opposite side of the bed so that he or she will have room to roll. · Go to the side of the bed you want your love one to roll toward. · Ask your loved one to lie on his or her back with the knees bent. Have your loved one place his or her arms across the body. · Ask your loved one to roll toward you while keeping the knees bent. If you have a rail on the bed, have your loved one reach toward the rail. · Help your loved one as needed. Gently place your hands on the shoulders and hips, and guide him or her toward you. If your loved one can't help  It is best to turn your loved one every 2 hours.  If your loved one cannot move or finds it very hard, you can use your drawsheet (see \"What is a drawsheet? \"). Have a family member or friend help you. It is easier for two people to turn someone, and it can be dangerous for one person to do it. Position your loved one  · One person stands on each side of the bed. If your loved one is in a hospital bed, lower the height of the bed. This will make it easier to turn the person. · Untuck the drawsheet on both sides of the bed. Each person gathers up one side so you both almost have a \"handle\" to grab. You may also need to make sure your loved one is high enough up in the bed. If not, lift him or her toward the head of the bed. · Agree on a count, and then lift and move your loved one to the side of the bed you're going to roll away from. · Tuck in the drawsheet on the side of the bed that your loved one will roll toward. Move your loved one  When you and your assistant are ready:  · Help your loved one lie on his or her back with the knees bent. If your loved one can't bend the knees, cross one ankle over the other in the direction of the turn. Position your loved one's arms across his or her body. · Stand on opposite sides of the bed. One person will pull and the other push. Be sure that you and your assistant have your feet shoulder-width apart. This will help you avoid straining your back. ? If you're pulling your loved one toward you, lean from your hips (don't bend your back), reach over your loved one, and grab the drawsheet at your loved one's hip and shoulder areas. Slowly pull the drawsheet toward you to roll your loved one over. ? If you're rolling your loved one away from you, slowly push at the hip and shoulder areas. Moving someone in bed is best as a two-person job. If your loved one can help, even a little, you may be able to do it yourself. But do all you can to find someone to help you. Positioning a drawsheet  Positioning a drawsheet    1.  When you make someone's bed with a drawsheet, position it so that it is between the person's head and knees. Turning or moving a person in bed    1. The drawsheet can then be used to turn or move the person you're caring for. What do you do after turning someone? You can use pillows to help your loved one get comfortable and avoid pressure injuries. If your loved one is on his or her side:  · Place pillows in front of your loved one, at chest level, with the top arm draped over a pillow. · If needed, tuck one edge of a pillow under the buttock, lengthwise. Then fold the pillow under and tuck the other edge under the first edge. That creates a \"roll\" that stays in place better and helps keep your loved one from rolling back. · Place a pillow between your loved one's knees, with the legs slightly bent. · Put the top leg a little in front of the bottom leg. This takes pressure off the bottom leg. · Put a pillow under the bottom leg so that the bottom ankle is off the bed. If your loved one is on his or her back:  · Put a pillow under your loved one's legs between the knees and ankles. · Do not put anything under the heels. · If you have a hospital bed, don't adjust the top end above 30 degrees. This helps prevent your loved one from sliding down. When you are finished, smooth out the drawsheet in its original position and tuck it in. Where can you learn more? Go to http://renata-antonieta.info/. Enter F047 in the search box to learn more about \"Learning About Turning a Person in Bed. \"  Current as of: April 18, 2018  Content Version: 11.9  © 2864-9352 [employer]. Care instructions adapted under license by [employer] (which disclaims liability or warranty for this information). If you have questions about a medical condition or this instruction, always ask your healthcare professional. [address] any warranty or liability for your use of this information. Apixaban/Eliquis is used to treat and prevent blood clots. If you are not able to swallow the tablets whole, they may be crushed and mixed in water, apple juice, or applesauce and promptly taken within four hours. Never skip a dose of Apixaban/Eliquis. If you forget to take your Apixaban/Eliquis, take a dose as soon as you remember. If it is almost time for your next Apixaban/Eliquis dose, wait until then and take a regular dose. DO NOT take an extra pill to ‘catch up’.  NEVER TAKE A DOUBLE DOSE. Notify your doctor that you missed a dose. Take Apixaban/Eliquis at the same time each morning and evening. Apixaban/Eliquis may be taken with other medication or food.

## 2023-03-16 NOTE — PROVIDER CONTACT NOTE (CRITICAL VALUE NOTIFICATION) - ACTION/TREATMENT ORDERED:
Critical result reported to MD. Verbal orders from MD to proceed and follow the nomogram Heparin protocol.

## 2023-03-16 NOTE — PROGRESS NOTE ADULT - PROVIDER SPECIALTY LIST ADULT
Cardiology
Cardiology
Internal Medicine
Cardiology
Cardiology
Internal Medicine
Internal Medicine
Thoracic Surgery
Thoracic Surgery
Vascular Surgery
Vascular Surgery

## 2023-03-16 NOTE — PROGRESS NOTE ADULT - ASSESSMENT
M E D I C A L   A T T E N D I N G    F O L L O W    U P   N O T E  (03-16-23 )                                     ------------------------------------------------------------------------------------------------    patient evaluated by me, case discussed with team, chart, medications, and physical exam reviewed, labs / tests  and vitals reviewed by me, as bellow.   Patient is stable for discharge today. patient overall feels better and wants to go home..   Patient to follow up with  PMD, Thoracic Sx, Ortho / spine Sx..   See discharge document for full note.  [Greater than 35 min spent for these services. ]                             ( note written / Date of service  03-16-23 )    ==================>> MEDICATIONS <<====================    apixaban 5 milliGRAM(s) Oral two times a day  aspirin  chewable 81 milliGRAM(s) Oral daily  budesonide 160 MICROgram(s)/formoterol 4.5 MICROgram(s) Inhaler 2 Puff(s) Inhalation two times a day  pantoprazole    Tablet 40 milliGRAM(s) Oral before breakfast  senna 2 Tablet(s) Oral at bedtime  verapamil  milliGRAM(s) Oral daily    MEDICATIONS  (PRN):  acetaminophen     Tablet .. 650 milliGRAM(s) Oral every 6 hours PRN Temp greater or equal to 38C (100.4F), Mild Pain (1 - 3)  albuterol    90 MICROgram(s) HFA Inhaler 2 Puff(s) Inhalation every 6 hours PRN Shortness of Breath and/or Wheezing  melatonin 3 milliGRAM(s) Oral at bedtime PRN Insomnia  ondansetron Injectable 4 milliGRAM(s) IV Push every 4 hours PRN Nausea and/or Vomiting  oxyCODONE    IR 5 milliGRAM(s) Oral every 4 hours PRN Severe Pain (7 - 10)  simethicone 80 milliGRAM(s) Chew every 6 hours PRN Gas    ___________  Active diet:  Diet, DASH/TLC:   Sodium & Cholesterol Restricted  ___________________    ==================>> VITAL SIGNS <<==================    T(C): 36.4 (03-16-23 @ 14:45), Max: 36.8 (03-16-23 @ 13:55)  HR: 84 (03-16-23 @ 14:45) (79 - 102)  BP: 100/62 (03-16-23 @ 14:45) (100/62 - 126/88)  BP(mean): --  RR: 17 (03-16-23 @ 14:45) (17 - 19)  SpO2: 98% (03-16-23 @ 14:45) (98% - 99%)       I&O's Summary    15 Mar 2023 07:01  -  16 Mar 2023 07:00  --------------------------------------------------------  IN: 240 mL / OUT: 0 mL / NET: 240 mL       ==================>> LAB AND IMAGING <<==================                        9.4    7.76  )-----------( 292      ( 16 Mar 2023 07:00 )             29.5        03-16    142  |  106  |  10  ----------------------------<  103<H>  4.1   |  24  |  0.74    Ca    9.1      16 Mar 2023 07:00  Phos  3.8     03-16  Mg     2.30     03-16      PTT - ( 16 Mar 2023 07:00 )  PTT:105.9 sec                 TSH:      5.98   (03-12-23)           Lipid profile:  (03-12-23)     Total: 129     LDL  : (p)     HDL  :46     TG   :104     WBC count:   7.76 <<== ,  8.61 <<== ,  6.07 <<== ,  8.15 <<== ,  7.52 <<== ,  8.45 <<==   Hemoglobin:   9.4 <<==,  8.8 <<==,  8.8 <<==,  9.7 <<==,  9.2 <<==,  9.6 <<==  platelets:  292 <==, 283 <==, 343 <==, 364 <==, 362 <==, 263 <==, 312 <==    Creatinine:  0.74  <<==, 0.73  <<==, 0.78  <<==, 0.69  <<==, 0.76  <<==, 0.88  <<==  Sodium:   142  <==, 139  <==, 142  <==, 139  <==, 137  <==, 137  <==

## 2023-03-16 NOTE — PROGRESS NOTE ADULT - SUBJECTIVE AND OBJECTIVE BOX
Cardiovascular Disease Progress Note    Overnight events: No acute events overnight.  no cp/sob/palps  Otherwise review of systems negative    Objective Findings:  T(C): 36 (23 @ 05:10), Max: 36.6 (03-15-23 @ 21:01)  HR: 102 (23 @ 05:10) (78 - 102)  BP: 120/67 (23 @ 05:10) (102/71 - 126/88)  RR: 19 (23 @ 05:10) (17 - 19)  SpO2: 99% (23 @ 05:10) (99% - 100%)  Wt(kg): --  Daily     Daily Weight in k (15 Mar 2023 14:58)      Physical Exam:  Gen: NAD  HEENT: EOMI  CV: RRR, normal S1 + S2, no m/r/g  Lungs: CTAB  Abd: soft, non-tender  Ext: No edema    Telemetry:    Laboratory Data:                        8.8    8.61  )-----------( 283      ( 16 Mar 2023 00:24 )             27.8     03-15    139  |  105  |  12  ----------------------------<  110<H>  4.1   |  22  |  0.73    Ca    9.1      15 Mar 2023 04:15  Phos  4.6     03-15  Mg     2.40     03-15      PTT - ( 16 Mar 2023 00:24 )  PTT:127.5 sec          Inpatient Medications:  MEDICATIONS  (STANDING):  apixaban 5 milliGRAM(s) Oral two times a day  aspirin  chewable 81 milliGRAM(s) Oral daily  budesonide 160 MICROgram(s)/formoterol 4.5 MICROgram(s) Inhaler 2 Puff(s) Inhalation two times a day  pantoprazole    Tablet 40 milliGRAM(s) Oral before breakfast  senna 2 Tablet(s) Oral at bedtime  verapamil  milliGRAM(s) Oral daily      Assessment:  82 year old female with history of HLD, AFib on Eliquis, CAD (s/p PCI x2) on ASA, CHF, COPD/asthma, MARIE, skin cancer, COVID, RUL nodule s/p VATS ~1 wk ago with Dr. Yoon, presenting with RUE pain    Recs:  cardiac stable  cp atypical, no e/o ACS  shoulder pain likely 2/2 orthopedic issue vs referred pain from recent surgery/right postop pleural effusion vs 2/2 hematoma. cts and vasc recs appreciated  s/p mri c spine, results noted. ortho consulted  pain control  recent tte preserved lvef, mod-severe TR, mild RV dysfunction/RVE  cw antiplatelet, statin and antianginal/rate control meds  AC resumed for persistent afib - vascular following  diuretics prn to maintain euvolemic  will follow            Over 25 minutes spent on total encounter; more than 50% of the visit was spent counseling and/or coordinating care by the attending physician.      Bill Cho MD   Cardiovascular Disease  [phone]
Surgery Progress Note    INTERVAl/SUBJECTIVE: No acute event overnight. Patient seen and examined in am rounds, found to be without acute distress.      Vital Signs Last 24 Hrs  T(C): 37 (14 Mar 2023 05:40), Max: 37 (14 Mar 2023 05:40)  T(F): 98.6 (14 Mar 2023 05:40), Max: 98.6 (14 Mar 2023 05:40)  HR: 96 (14 Mar 2023 05:40) (83 - 100)  BP: 111/62 (14 Mar 2023 05:40) (102/65 - 111/70)  BP(mean): --  RR: 17 (14 Mar 2023 05:40) (16 - 18)  SpO2: 99% (14 Mar 2023 05:40) (98% - 99%)    Parameters below as of 14 Mar 2023 05:40  Patient On (Oxygen Delivery Method): room air        Physical Exam:  General: Appears well, NAD  CHEST: breathing comfortably  CV: appears well perfused  Abdomen: soft, nontender, nondistended, no rebound or guarding  Extremities: Grossly symmetric. RUE mildly numbness, sensation and motion intact, palpable radial/ulnar.    LABS:                        9.2    7.52  )-----------( 362      ( 14 Mar 2023 09:08 )             29.2     03-14    142  |  106  |  13  ----------------------------<  154<H>  3.9   |  25  |  0.78    Ca    9.1      14 Mar 2023 09:08  Phos  3.5     03-13  Mg     2.20     03-13            INs and OUTs:  
 pt seen and examined at bedside  Subjective: pt admits persistent RUE pain and hand weakenss    Vital Signs:  Vital Signs Last 24 Hrs  T(C): 36.8 (03-13-23 @ 05:30), Max: 37.1 (03-12-23 @ 12:31)  T(F): 98.3 (03-13-23 @ 05:30), Max: 98.7 (03-12-23 @ 12:31)  HR: 95 (03-13-23 @ 05:30) (84 - 101)  BP: 138/74 (03-13-23 @ 05:30) (126/75 - 138/74)  RR: 16 (03-13-23 @ 05:30) (16 - 18)  SpO2: 100% (03-13-23 @ 05:30) (100% - 100%) on (O2)    Physical Exam:  Gen: pt frustrated to be in hospital  Neuro: AAOx4, Right hand  strength 5/5; RUE abduction, extension and flexion AROM limited secondary to significant pain; LUE with full ROM  Pulm:  Clear to anterior chest wall  Cardiac: S1S2, RRR  vasc:  2+ pulses bilat throughout; cap refill <3secs  GI: soft NT, ND  Extremities: 2+pulses bilat UE and LE; warm to touch; No edema appreciated      Relevant labs, radiology and Medications reviewed                        9.6    8.45  )-----------( 263      ( 13 Mar 2023 05:15 )             31.0     03-13    139  |  104  |  12  ----------------------------<  116<H>  4.4   |  19<L>  |  0.69    Ca    9.0      13 Mar 2023 05:15  Phos  3.5     03-13  Mg     2.20     03-13        MEDICATIONS  (STANDING):  aspirin  chewable 81 milliGRAM(s) Oral daily  azithromycin  IVPB      azithromycin  IVPB 500 milliGRAM(s) IV Intermittent every 24 hours  budesonide 160 MICROgram(s)/formoterol 4.5 MICROgram(s) Inhaler 2 Puff(s) Inhalation two times a day  cefTRIAXone   IVPB 1000 milliGRAM(s) IV Intermittent every 24 hours  pantoprazole    Tablet 40 milliGRAM(s) Oral before breakfast  polyethylene glycol 3350 17 Gram(s) Oral daily  senna 2 Tablet(s) Oral at bedtime  verapamil  milliGRAM(s) Oral daily    MEDICATIONS  (PRN):  acetaminophen     Tablet .. 650 milliGRAM(s) Oral every 6 hours PRN Temp greater or equal to 38C (100.4F), Mild Pain (1 - 3)  albuterol    90 MICROgram(s) HFA Inhaler 2 Puff(s) Inhalation every 6 hours PRN Shortness of Breath and/or Wheezing  melatonin 3 milliGRAM(s) Oral at bedtime PRN Insomnia  oxyCODONE    IR 5 milliGRAM(s) Oral every 4 hours PRN Severe Pain (7 - 10)    Pertinent Physical Exam  I&O's Summary        Imaging reviewed  appears to be hematoma along the subclavian vasculature on CT chest  MRI Cspine ordered        ASSESSMENT  This is an 82 year old female with history of HLD, chronic AFib on Eliquis, CAD (s/p PCI x2) 10 yrs ago on ASA, CHF, COPD/asthma, MARIE not on CPAP skin cancer, COVID, s/p RVATS RUL wedge resection 3/2/2023 w thoracic surgery, with pre-op IR localization. Pt readmitted w RUE pain and weakness found to have R axillary hematoma.    PLAN  - Vascular Surgery consult appreciated  - Cardiology following  - AC on hold, trending H/H  - MRI cspine ordered  - pain control, bowel regimen as needed  -No thoracic intervention warranted      [id number]  
Cardiovascular Disease Progress Note    Overnight events: No acute events overnight.  pending mri c spine, persistent right shoulder pain and hand numbness. no new cardiac sx  Otherwise review of systems negative    Objective Findings:  T(C): 37 (03-14-23 @ 05:40), Max: 37 (03-14-23 @ 05:40)  HR: 96 (03-14-23 @ 05:40) (83 - 100)  BP: 111/62 (03-14-23 @ 05:40) (102/65 - 111/70)  RR: 17 (03-14-23 @ 05:40) (16 - 18)  SpO2: 99% (03-14-23 @ 05:40) (98% - 99%)  Wt(kg): --  Daily     Daily       Physical Exam:  Gen: NAD  HEENT: EOMI  CV: RRR, normal S1 + S2, no m/r/g  Lungs: CTAB  Abd: soft, non-tender  Ext: No edema    Telemetry:    Laboratory Data:                        9.6    8.45  )-----------( 263      ( 13 Mar 2023 05:15 )             31.0     03-13    139  |  104  |  12  ----------------------------<  116<H>  4.4   |  19<L>  |  0.69    Ca    9.0      13 Mar 2023 05:15  Phos  3.5     03-13  Mg     2.20     03-13                Inpatient Medications:  MEDICATIONS  (STANDING):  aspirin  chewable 81 milliGRAM(s) Oral daily  azithromycin  IVPB      azithromycin  IVPB 500 milliGRAM(s) IV Intermittent every 24 hours  budesonide 160 MICROgram(s)/formoterol 4.5 MICROgram(s) Inhaler 2 Puff(s) Inhalation two times a day  cefTRIAXone   IVPB 1000 milliGRAM(s) IV Intermittent every 24 hours  pantoprazole    Tablet 40 milliGRAM(s) Oral before breakfast  polyethylene glycol 3350 17 Gram(s) Oral daily  senna 2 Tablet(s) Oral at bedtime  verapamil  milliGRAM(s) Oral daily      Assessment:  82 year old female with history of HLD, AFib on Eliquis, CAD (s/p PCI x2) on ASA, CHF, COPD/asthma, MARIE, skin cancer, COVID, RUL nodule s/p VATS ~1 wk ago with Dr. Yoon, presenting with RUE pain    Recs:  cardiac stable  cp atypical, no e/o ACS  shoulder pain likely 2/2 orthopedic issue vs referred pain from recent surgery/right postop pleural effusion vs 2/2 hematoma. cts and vasc recs appreciated  pending mri c spine  pain control  recent tte preserved lvef, mod-severe TR, mild RV dysfunction/RVE  cw antiplatelet, statin and antianginal/rate control meds  resume AC for persistent afib when able  diuretics prn to maintain euvolemic  will follow          Over 25 minutes spent on total encounter; more than 50% of the visit was spent counseling and/or coordinating care by the attending physician.      Bill Cho MD   Cardiovascular Disease  [phone]
Surgery Progress Note    INTERVAl/SUBJECTIVE: No acute event overnight. Patient seen and examined in am rounds, found to be without acute distress.      Vital Signs Last 24 Hrs  T(C): 36.8 (13 Mar 2023 05:30), Max: 37.1 (12 Mar 2023 12:31)  T(F): 98.3 (13 Mar 2023 05:30), Max: 98.7 (12 Mar 2023 12:31)  HR: 95 (13 Mar 2023 05:30) (84 - 101)  BP: 138/74 (13 Mar 2023 05:30) (126/75 - 138/74)  BP(mean): --  RR: 16 (13 Mar 2023 05:30) (16 - 18)  SpO2: 100% (13 Mar 2023 05:30) (100% - 100%)    Parameters below as of 13 Mar 2023 05:30  Patient On (Oxygen Delivery Method): room air        Physical Exam:  General: Appears well, NAD  CHEST: breathing comfortably  CV: appears well perfused  Abdomen: soft, nontender, nondistended, no rebound or guarding  Extremities: Grossly symmetric. RUE with palpable radial/ulnar, no focal deficit    LABS:                        9.6    8.45  )-----------( 263      ( 13 Mar 2023 05:15 )             31.0     03-13    139  |  104  |  12  ----------------------------<  116<H>  4.4   |  19<L>  |  0.69    Ca    9.0      13 Mar 2023 05:15  Phos  3.5     03-13  Mg     2.20     03-13            INs and OUTs:  
   Subjective/24hour Events: No acute events overnight last night; Pt with reported increased RUE pain and now with inabil    Vital Signs:  Vital Signs Last 24 Hrs  T(C): 36.9 (12 Mar 2023 06:00), Max: 36.9 (12 Mar 2023 06:00)  T(F): 98.5 (12 Mar 2023 06:00), Max: 98.5 (12 Mar 2023 06:00)  HR: 101 (12 Mar 2023 06:00) (78 - 103)  BP: 135/82 (12 Mar 2023 06:00) (101/71 - 135/82)  BP(mean): --  RR: 18 (12 Mar 2023 06:00) (18 - 20)  SpO2: 99% (12 Mar 2023 06:00) (98% - 100%)    Parameters below as of 12 Mar 2023 06:00  Patient On (Oxygen Delivery Method): room air        CAPILLARY BLOOD GLUCOSE            Daily Height in cm: 152.4 (11 Mar 2023 09:32)    Daily   I&O's Detail    MEDICATIONS  (STANDING):  apixaban 5 milliGRAM(s) Oral two times a day  aspirin  chewable 81 milliGRAM(s) Oral daily  azithromycin  IVPB      azithromycin  IVPB 500 milliGRAM(s) IV Intermittent every 24 hours  budesonide 160 MICROgram(s)/formoterol 4.5 MICROgram(s) Inhaler 2 Puff(s) Inhalation two times a day  cefTRIAXone   IVPB 1000 milliGRAM(s) IV Intermittent every 24 hours  pantoprazole    Tablet 40 milliGRAM(s) Oral before breakfast  verapamil  milliGRAM(s) Oral daily    MEDICATIONS  (PRN):  acetaminophen     Tablet .. 650 milliGRAM(s) Oral every 6 hours PRN Temp greater or equal to 38C (100.4F), Mild Pain (1 - 3)  albuterol    90 MICROgram(s) HFA Inhaler 2 Puff(s) Inhalation every 6 hours PRN Shortness of Breath and/or Wheezing  melatonin 3 milliGRAM(s) Oral at bedtime PRN Insomnia  oxyCODONE    IR 5 milliGRAM(s) Oral every 4 hours PRN Severe Pain (7 - 10)      Labs:                          10.9   6.57  )-----------( 312      ( 12 Mar 2023 04:43 )             34.3     03-12    137  |  102  |  14  ----------------------------<  93  4.2   |  21<L>  |  0.76    Ca    9.3      12 Mar 2023 04:43  Phos  3.2     03-12  Mg     2.20     03-12    TPro  6.8  /  Alb  3.8  /  TBili  0.9  /  DBili  x   /  AST  15  /  ALT  14  /  AlkPhos  69  03-11    LIVER FUNCTIONS - ( 11 Mar 2023 02:03 )  Alb: 3.8 g/dL / Pro: 6.8 g/dL / ALK PHOS: 69 U/L / ALT: 14 U/L / AST: 15 U/L / GGT: x           PT/INR - ( 11 Mar 2023 02:03 )   PT: 18.4 sec;   INR: 1.58 ratio         PTT - ( 11 Mar 2023 02:03 )  PTT:30.4 sec        Physical Exam:  Gen: Pleasant and conversant  Neuro: AAOx4, Right hand  strength 5/5; RUE abduction, extension and flexion AROM limited secondary to significant pain; LUE with full ROM  Pulm:  Clear to anterior chest wall  Cardiac: S1S2, r/r/r, (-)m/r/g; 2+ pulses bilat throughout; cap refill <3secs  GI: S/nt/nd, BSx4 quadrants, (-)tympany to percussion  Extremities: 2+pulses bilat UE and LE; warm to touch; No edema appreciated              
Cardiovascular Disease Progress Note    Overnight events: No acute events overnight.  pending mri. no new cardiac sx  Otherwise review of systems negative    Objective Findings:  T(C): 36.4 (03-15-23 @ 05:00), Max: 36.5 (03-14-23 @ 12:52)  HR: 94 (03-15-23 @ 05:00) (85 - 100)  BP: 107/72 (03-15-23 @ 05:00) (107/72 - 145/61)  RR: 18 (03-15-23 @ 05:00) (18 - 18)  SpO2: 99% (03-15-23 @ 05:00) (97% - 100%)  Wt(kg): --  Daily     Daily       Physical Exam:  Gen: NAD  HEENT: EOMI  CV: RRR, normal S1 + S2, no m/r/g  Lungs: CTAB  Abd: soft, non-tender  Ext: No edema    Telemetry:    Laboratory Data:                        8.8    6.07  )-----------( 343      ( 15 Mar 2023 04:15 )             27.9     03-15    139  |  105  |  12  ----------------------------<  110<H>  4.1   |  22  |  0.73    Ca    9.1      15 Mar 2023 04:15  Phos  4.6     03-15  Mg     2.40     03-15      PTT - ( 15 Mar 2023 04:15 )  PTT:62.0 sec          Inpatient Medications:  MEDICATIONS  (STANDING):  aspirin  chewable 81 milliGRAM(s) Oral daily  azithromycin  IVPB      azithromycin  IVPB 500 milliGRAM(s) IV Intermittent every 24 hours  budesonide 160 MICROgram(s)/formoterol 4.5 MICROgram(s) Inhaler 2 Puff(s) Inhalation two times a day  cefTRIAXone   IVPB 1000 milliGRAM(s) IV Intermittent every 24 hours  colchicine 0.6 milliGRAM(s) Oral daily  heparin  Infusion.  Unit(s)/Hr (12 mL/Hr) IV Continuous <Continuous>  pantoprazole    Tablet 40 milliGRAM(s) Oral before breakfast  polyethylene glycol 3350 17 Gram(s) Oral daily  senna 2 Tablet(s) Oral at bedtime  verapamil  milliGRAM(s) Oral daily      Assessment:  82 year old female with history of HLD, AFib on Eliquis, CAD (s/p PCI x2) on ASA, CHF, COPD/asthma, MARIE, skin cancer, COVID, RUL nodule s/p VATS ~1 wk ago with Dr. Yoon, presenting with RUE pain    Recs:  cardiac stable  cp atypical, no e/o ACS  shoulder pain likely 2/2 orthopedic issue vs referred pain from recent surgery/right postop pleural effusion vs 2/2 hematoma. cts and vasc recs appreciated  pending mri c spine  pain control  recent tte preserved lvef, mod-severe TR, mild RV dysfunction/RVE  cw antiplatelet, statin and antianginal/rate control meds  AC resumed for persistent afib - vascular following  diuretics prn to maintain euvolemic  will follow        Over 25 minutes spent on total encounter; more than 50% of the visit was spent counseling and/or coordinating care by the attending physician.      Bill Cho MD   Cardiovascular Disease  [phone]
Cardiovascular Disease Progress Note    Overnight events: No acute events overnight.  persistent shoulder pain. no new cardiac sx  Otherwise review of systems negative    Objective Findings:  T(C): 36.8 (03-13-23 @ 05:30), Max: 37.1 (03-12-23 @ 12:31)  HR: 95 (03-13-23 @ 05:30) (84 - 101)  BP: 138/74 (03-13-23 @ 05:30) (126/75 - 138/74)  RR: 16 (03-13-23 @ 05:30) (16 - 18)  SpO2: 100% (03-13-23 @ 05:30) (100% - 100%)  Wt(kg): --  Daily     Daily       Physical Exam:  Gen: NAD  HEENT: EOMI  CV: RRR, normal S1 + S2, no m/r/g  Lungs: CTAB  Abd: soft, non-tender  Ext: No edema    Telemetry:    Laboratory Data:                        9.6    8.45  )-----------( 263      ( 13 Mar 2023 05:15 )             31.0     03-13    139  |  104  |  12  ----------------------------<  116<H>  4.4   |  19<L>  |  0.69    Ca    9.0      13 Mar 2023 05:15  Phos  3.5     03-13  Mg     2.20     03-13                Inpatient Medications:  MEDICATIONS  (STANDING):  aspirin  chewable 81 milliGRAM(s) Oral daily  azithromycin  IVPB      azithromycin  IVPB 500 milliGRAM(s) IV Intermittent every 24 hours  budesonide 160 MICROgram(s)/formoterol 4.5 MICROgram(s) Inhaler 2 Puff(s) Inhalation two times a day  cefTRIAXone   IVPB 1000 milliGRAM(s) IV Intermittent every 24 hours  pantoprazole    Tablet 40 milliGRAM(s) Oral before breakfast  polyethylene glycol 3350 17 Gram(s) Oral daily  senna 2 Tablet(s) Oral at bedtime  verapamil  milliGRAM(s) Oral daily      Assessment:  82 year old female with history of HLD, AFib on Eliquis, CAD (s/p PCI x2) on ASA, CHF, COPD/asthma, MARIE, skin cancer, COVID, RUL nodule s/p VATS ~1 wk ago with Dr. Yoon, presenting with RUE pain    Recs:  cardiac stable  cp atypical, no e/o ACS  shoulder pain likely 2/2 orthopedic issue vs referred pain from recent surgery/right postop pleural effusion vs 2/2 hematoma. cts and vasc recs appreciated  suggest orthopedic evaluation and pain control  recent tte preserved lvef, mod-severe TR, mild RV dysfunction/RVE  cw antiplatelet, statin and antianginal/rate control meds  resume AC for persistent afib when able  diuretics prn to maintain euvolemic  will follow        Over 25 minutes spent on total encounter; more than 50% of the visit was spent counseling and/or coordinating care by the attending physician.      Bill Cho MD   Cardiovascular Disease  [phone]

## 2023-04-02 DIAGNOSIS — M54.2 CERVICALGIA: ICD-10-CM

## 2023-04-03 ENCOUNTER — APPOINTMENT (OUTPATIENT)
Dept: ORTHOPEDIC SURGERY | Facility: CLINIC | Age: 83
End: 2023-04-03

## 2023-04-04 LAB
CULTURE RESULTS: SIGNIFICANT CHANGE UP
ORGANISM # SPEC MICROSCOPIC CNT: SIGNIFICANT CHANGE UP
ORGANISM # SPEC MICROSCOPIC CNT: SIGNIFICANT CHANGE UP

## 2023-04-17 ENCOUNTER — APPOINTMENT (OUTPATIENT)
Dept: PULMONOLOGY | Facility: CLINIC | Age: 83
End: 2023-04-17
Payer: MEDICARE

## 2023-04-17 VITALS
HEART RATE: 76 BPM | SYSTOLIC BLOOD PRESSURE: 123 MMHG | DIASTOLIC BLOOD PRESSURE: 82 MMHG | TEMPERATURE: 97.1 F | HEIGHT: 60 IN | BODY MASS INDEX: 30.43 KG/M2 | WEIGHT: 155 LBS | OXYGEN SATURATION: 100 %

## 2023-04-17 PROCEDURE — 99214 OFFICE O/P EST MOD 30 MIN: CPT

## 2023-04-17 NOTE — PROCEDURE
[FreeTextEntry1] : \par  Pathology             Final\par \par No Documents Attached\par \par \par \par \par   [facility] Accession Number : [id number]\par Patient:   JANA OLIVARES\par \par \par Accession:                             [id number]\par \par Collected Date/Time:                   3/2/2023 13:47 EST\par Received Date/Time:                    3/2/2023 23:25 EST\par \par Surgical Pathology Report - Auth (Verified)\par \par Specimen(s) Submitted\par 1- Right wedge resection right upper lobe\par \par Final Diagnosis\par 1. Lung, right upper lobe, wedge resection\par -  Invasive squamous cell carcinoma, 1.1 cm.\par -  Margins negative for carcinoma (0.9 cm to margin).\par -   Emphysema.\par \par -   One negative intraparenchymal lymph node.\par -   Fibroelastosis (apical cap).\par Verified by: Kenney Munoz MD\par (Electronic Signature)\par Reported on: 03/07/23 17:44 EST, [facility], [address] [address]\par Phone: [phone]   Fax: [phone]\par _________________________________________________________________\par \par \par Synoptic Summary\par 1: Lung - Resection\par Specimen\par Procedure:  Wedge resection\par Specimen Laterality:   Right\par Tumor\par Tumor Focality:   Single focus\par Tumor Site:  Upper lobe of lung\par Tumor Size\par Total Tumor Size:   1.1 Centimeters (cm)\par Size of Invasive Component:   1.1 Centimeters (cm)\par Histologic Type:   Invasive squamous cell carcinoma, keratinizing\par Visceral Pleura Invasion:   Not identified\par Direct Invasion of Adjacent Structures:    Not applicable (no\par adjacent structures present)\par Treatment Effect:   No known presurgical therapy\par Lymphovascular Invasion:   Not identified\par Margins\par Margin Status for Invasive Carcinoma:    All margins negative for\par invasive carcinoma\par Closest Margin(s) to Invasive Carcinoma:    Parenchymal - 0.9\par Distance from Invasive Carcinoma to Closest Margin:    0.9 cm\par Margin Status for Non-Invasive Tumor:    All margins negative for\par non-invasive tumor\par Regional Lymph Nodes\par Lymph Node(s) from Prior Procedures:    No known prior lymph node\par sampling performed\par Regional Lymph Node Status:   All regional lymph nodes negative for\par tumor\par Number of Lymph Nodes Examined:   1\par Grupo Site(s) Examined:   14R: Subsegmental\par Pathologic Stage Classification (pTNM, AJCC 8th Edition)\par pT Category:   pT1b\par pN Category:   pN0\par \par CAP eCC 2022 Q3 Release\par \par Intraoperative Consultation\par 1. Wedge resection right upper lobe  , frozen section diagnosis:\par - Non-small carcinoma. Margin is negative\par \par By Dr. Mendoza\par \par Frozen section performed at [facility], Department\par of Pathology, [address] [place] [address]. The frozen section\par and frozen section control have been reviewed by the final pathologist\par who verified this report.\par \par Clinical Information\par Clinical data not provided\par \par Gross Description\par Received:  Fresh for intraoperative consultation labeled   "wedge resection\par right upper lobe"  and consists of a lung wedge\par Integrity:  Previously incised; stapled line is previously partially\par removed at the site of the incision\par Size:  10.5 x 3.5 x 2.2 cm\par Resection Margin:  11.5 cm long , stapled\par Inking\par The stapled margin overlying the lesion is inked black and the staples\par are removed. The remaining stapled line is removed with the underlying\par pseudo-parenchymal margin inked purple.\par Area of Interest - Mass:   1.1 x 1.0 x 0.9 cm (measured at the time of\par grossing), tan-white, ill-defined, firm lesion with visible injected blue\par dye and embedded coiled metallic wire\par Location\par 0.9 cm to resection margin\par 0.5 cm to pleura\par \par Remaining Pleura:  The pleura is tan-pink to brown with moderate\par anthracotic pigment. The pleura is focally thickened and slightly firm\par measuring up to 0.2 cm in thickness.\par Remaining Parenchyma:   Tan-brown, spongy and focally congested.\par Submitted:  Representative sections in 7 cassettes:\par 1FSA: Frozen section remnant (cassette is checked to confirm tissue\par presence)\par 1A: Mass with the nearest inked resection margin\par 1B: Full face mass with pleura\par 1C: Additional section of mass\par 1D: Sections of pleura thickening\par 1E: Uninvolved pulmonary parenchyma near mass\par 1F: Uninvolved pulmonary parenchyma away from mass\par \par Katerina Mckenna 03/03/2023 03:14 PM\par \par Disclaimer\par In addition to other data that may appear on the specimen containers, all\par labels have been inspected to confirm the presence of the patient's name\par and date of birth.\par \par  \par \par  Ordered by: BONNIE DICKINSON       Collected/Examined: 02Mar2023 01:47PM       \par Verification Required       Stage: Final       \par  Performed at: [facility]       Resulted: 07Mar2023 05:44PM       Last Updated: 07Mar2023 05:45PM       Accession: [id number]

## 2023-04-17 NOTE — ASSESSMENT
[FreeTextEntry1] : Continue Symbicort HFA for history of COPD\par Continue Spiriva Turbuhaler for COPD\par Albuterol via nebulizer 4 times daily\par Continue Lasix daily. \par Continue Proair Respiclick as needed.\par Cardiology follow-up.\par Thoracic surgery follow-up with Dr. Yoon .\par Repeat chest CT scan for follow-up in 6 months.

## 2023-04-17 NOTE — REASON FOR VISIT
[Follow-Up - From Hospitalization] : a follow-up visit after a recent hospitalization [Abnormal CXR/ Chest CT] : an abnormal CXR/ chest CT [Lung Cancer] : lung cancer [COPD] : COPD

## 2023-04-17 NOTE — HISTORY OF PRESENT ILLNESS
[TextBox_4] : s/p right VATS with right upper lobe wedge resection recently.  Has right shoulder pain/weakness postoperatively, getting physical therapy at this point, also seeing neurology for it.  Mild exertional dyspnea.

## 2023-06-05 ENCOUNTER — APPOINTMENT (OUTPATIENT)
Dept: PULMONOLOGY | Facility: CLINIC | Age: 83
End: 2023-06-05
Payer: MEDICARE

## 2023-06-05 VITALS
BODY MASS INDEX: 27.6 KG/M2 | OXYGEN SATURATION: 98 % | SYSTOLIC BLOOD PRESSURE: 101 MMHG | TEMPERATURE: 97.5 F | HEART RATE: 99 BPM | WEIGHT: 150 LBS | RESPIRATION RATE: 15 BRPM | DIASTOLIC BLOOD PRESSURE: 70 MMHG | HEIGHT: 62 IN

## 2023-06-05 PROCEDURE — 94060 EVALUATION OF WHEEZING: CPT

## 2023-06-05 PROCEDURE — 94726 PLETHYSMOGRAPHY LUNG VOLUMES: CPT

## 2023-06-05 PROCEDURE — 94729 DIFFUSING CAPACITY: CPT

## 2023-06-05 PROCEDURE — 99214 OFFICE O/P EST MOD 30 MIN: CPT | Mod: 25

## 2023-06-05 PROCEDURE — 95012 NITRIC OXIDE EXP GAS DETER: CPT

## 2023-06-05 PROCEDURE — ZZZZZ: CPT

## 2023-06-05 NOTE — ASSESSMENT
[FreeTextEntry1] : Continue Symbicort HFA for history of COPD\par Continue Spiriva Turbuhaler for COPD\par Albuterol via nebulizer 4 times daily\par Continue Lasix daily. \par Continue Proair Respiclick as needed.\par Cardiology follow-up.\par Thoracic surgery follow-up with Dr. Yoon .\par Repeat chest CT scan for follow-up now.\par Return for pulmonary follow-up after chest CT scan has been performed.

## 2023-06-05 NOTE — DISCUSSION/SUMMARY
[FreeTextEntry1] : Marilyn is a patient with an enlarged right upper lobe lung nodule and FDG uptake on PET scan, secondary to just diagnosed squamous cell lung cancer, status post right VATS secondly, she is a patient with improved shortness of breath and cough secondary to COPD.

## 2023-06-05 NOTE — REASON FOR VISIT
[Follow-Up - From Hospitalization] : a follow-up visit after a recent hospitalization [Abnormal CXR/ Chest CT] : an abnormal CXR/ chest CT [Lung Cancer] : lung cancer [COPD] : COPD [Cough] : cough

## 2023-06-05 NOTE — PROCEDURE
FYI for PCP    
From: Edyta Yee  To: Lizandro Ann  Sent: 5/21/2021 7:45 AM CDT  Subject: Other    Dr. Ann, As [facility], following up on blood pressure and heart rate. The Lopressor...or nature...seems to be doing the trick. Ectopics gradually diminished over 2 days after starting the Lopressor, became rare (under 1-2 per hour) on the 17th, and have disappeared completely starting 5/19 mid day. I cannot feel them, hear them, or see any ectopics when I take a rhythm strip periodically on my watch. I do not have any symptoms whatsoever related to cardiac issues, nor do I seem to have any side effects from the Lopressor. I have returned to regular exercise in the past couple of days. BP chart is attached. According to my watch, my average HR is 67, & Resting HR 57. As a bonus, my essential tremor is almost non-existant. In short, I feel normal. Again, I very much appreciate your kindness, patience, and care. Edyta Yee  
[FreeTextEntry1] : Pulmonary Function Test performed in my office today: Spirometry: Mild obstructive airway disease with some improvement postbronchodilator; Lung Volume: Within normal limits; resistance: Increased; diffusion: Moderate impairment.\par __________\par \par  Exhaled Nitric Oxide             Final\par \par No Documents Attached\par \par \par   Test   Result   Flag Reference Goal Last Verified \par   Exhaled Nitric Oxide 7      REQUIRED \par \par  Ordered by: SANDY ARIAS       Collected/Examined: 05Jun2023 08:59AM       \par Verification Required       Stage: Final       \par  Performed at: Other       Performed by: SANDY ARIAS       Resulted: 05Jun2023 08:59AM       Last Updated: 05Jun2023 09:01AM       \par ___________\par  Pathology             Final\par \par No Documents Attached\par \par \par \par \par   [facility] Accession Number : [id number]\par Patient:   JANA OLIVARES\par \par \par Accession:                             [id number]\par \par Collected Date/Time:                   3/2/2023 13:47 EST\par Received Date/Time:                    3/2/2023 23:25 EST\par \par Surgical Pathology Report - Auth (Verified)\par \par Specimen(s) Submitted\par 1- Right wedge resection right upper lobe\par \par Final Diagnosis\par 1. Lung, right upper lobe, wedge resection\par -  Invasive squamous cell carcinoma, 1.1 cm.\par -  Margins negative for carcinoma (0.9 cm to margin).\par -   Emphysema.\par \par -   One negative intraparenchymal lymph node.\par -   Fibroelastosis (apical cap).\par Verified by: Kenney Munoz MD\par (Electronic Signature)\par Reported on: 03/07/23 17:44 EST, [facility] [place], [address]\par [address] [address]\par Phone: [phone]   Fax: [phone]\par _________________________________________________________________\par \par \par Synoptic Summary\par 1: Lung - Resection\par Specimen\par Procedure:  Wedge resection\par Specimen Laterality:   Right\par Tumor\par Tumor Focality:   Single focus\par Tumor Site:  Upper lobe of lung\par Tumor Size\par Total Tumor Size:   1.1 Centimeters (cm)\par Size of Invasive Component:   1.1 Centimeters (cm)\par Histologic Type:   Invasive squamous cell carcinoma, keratinizing\par Visceral Pleura Invasion:   Not identified\par Direct Invasion of Adjacent Structures:    Not applicable (no\par adjacent structures present)\par Treatment Effect:   No known presurgical therapy\par Lymphovascular Invasion:   Not identified\par Margins\par Margin Status for Invasive Carcinoma:    All margins negative for\par invasive carcinoma\par Closest Margin(s) to Invasive Carcinoma:    Parenchymal - 0.9\par Distance from Invasive Carcinoma to Closest Margin:    0.9 cm\par Margin Status for Non-Invasive Tumor:    All margins negative for\par non-invasive tumor\par Regional Lymph Nodes\par Lymph Node(s) from Prior Procedures:    No known prior lymph node\par sampling performed\par Regional Lymph Node Status:   All regional lymph nodes negative for\par tumor\par Number of Lymph Nodes Examined:   1\par Grupo Site(s) Examined:   14R: Subsegmental\par Pathologic Stage Classification (pTNM, AJCC 8th Edition)\par pT Category:   pT1b\par pN Category:   pN0\par \par CAP eCC 2022 Q3 Release\par \par Intraoperative Consultation\par 1. Wedge resection right upper lobe  , frozen section diagnosis:\par - Non-small carcinoma. Margin is negative\par \par By Dr. Mendoza\par \par Frozen section performed at [facility], Department\par of Pathology, [address] [place] [address]. The frozen section\par and frozen section control have been reviewed by the final pathologist\par who verified this report.\par \par Clinical Information\par Clinical data not provided\par \par Gross Description\par Received:  Fresh for intraoperative consultation labeled   "wedge resection\par right upper lobe"  and consists of a lung wedge\par Integrity:  Previously incised; stapled line is previously partially\par removed at the site of the incision\par Size:  10.5 x 3.5 x 2.2 cm\par Resection Margin:  11.5 cm long , stapled\par Inking\par The stapled margin overlying the lesion is inked black and the staples\par are removed. The remaining stapled line is removed with the underlying\par pseudo-parenchymal margin inked purple.\par Area of Interest - Mass:   1.1 x 1.0 x 0.9 cm (measured at the time of\par grossing), tan-white, ill-defined, firm lesion with visible injected blue\par dye and embedded coiled metallic wire\par Location\par 0.9 cm to resection margin\par 0.5 cm to pleura\par \par Remaining Pleura:  The pleura is tan-pink to brown with moderate\par anthracotic pigment. The pleura is focally thickened and slightly firm\par measuring up to 0.2 cm in thickness.\par Remaining Parenchyma:   Tan-brown, spongy and focally congested.\par Submitted:  Representative sections in 7 cassettes:\par 1FSA: Frozen section remnant (cassette is checked to confirm tissue\par presence)\par 1A: Mass with the nearest inked resection margin\par 1B: Full face mass with pleura\par 1C: Additional section of mass\par 1D: Sections of pleura thickening\par 1E: Uninvolved pulmonary parenchyma near mass\par 1F: Uninvolved pulmonary parenchyma away from mass\par \par Katerina Mckenna 03/03/2023 03:14 PM\par \par Disclaimer\par In addition to other data that may appear on the specimen containers, all\par labels have been inspected to confirm the presence of the patient's name\par and date of birth.\par \par  \par \par  Ordered by: BONNIE DICKINSON       Collected/Examined: 02Mar2023 01:47PM       \par Verification Required       Stage: Final       \par  Performed at: [facility]       Resulted: 07Mar2023 05:44PM       Last Updated: 07Mar2023 05:45PM       Accession: [id number]

## 2023-06-05 NOTE — HISTORY OF PRESENT ILLNESS
[TextBox_4] : s/p right VATS with right upper lobe wedge resection recently.  Has right shoulder pain/weakness postoperatively, getting physical therapy at this point, also seeing neurology for it.  Mild exertional dyspnea.\par Quit smoking.\par Has mild cough, no chest pain or shortness of breath.

## 2023-06-20 ENCOUNTER — APPOINTMENT (OUTPATIENT)
Dept: THORACIC SURGERY | Facility: CLINIC | Age: 83
End: 2023-06-20
Payer: MEDICARE

## 2023-06-27 ENCOUNTER — APPOINTMENT (OUTPATIENT)
Dept: THORACIC SURGERY | Facility: CLINIC | Age: 83
End: 2023-06-27
Payer: MEDICARE

## 2023-06-27 VITALS
HEIGHT: 60 IN | SYSTOLIC BLOOD PRESSURE: 116 MMHG | DIASTOLIC BLOOD PRESSURE: 80 MMHG | RESPIRATION RATE: 18 BRPM | OXYGEN SATURATION: 98 % | HEART RATE: 94 BPM | BODY MASS INDEX: 29.25 KG/M2 | WEIGHT: 149 LBS

## 2023-06-27 PROCEDURE — 99214 OFFICE O/P EST MOD 30 MIN: CPT

## 2023-06-27 NOTE — ASSESSMENT
[FreeTextEntry1] : Ms. JANA OLIVARES, 82 year old female, former smoker (quit 6 months ago, 40PY), w/ hx of HLD, A-Fib on Eliquis, CAD (stents x 2 in 2006) on ASA, CHF, COPD/Asthma, MARIE, skin cancer, COVID Sept 2022 s/p MAB infusion, who initially presented with new lung nodule. \par \par ECHO on 6/29/22 at [facility]: EF 55-60%. Moderate Rt atrial enlargement; mild diastolic dysfunction.\par \par PFTs on 8/8/22: %, FEV1 88%, DLCO 60%.\par \par Now, s/p Flex bronch, uniportal Right VATS, wedge resection of RUL x1 on 3/2/23. Path of RUL: Invasive squamous cell carcinoma, keratinizing; 1.1 cm; All margins and LN (0/1) Negative for tumor; pT1b pN0 (Stage IA2)\par \par Hospitalized 3/11-3/16 for right arm pain. pain and discomfort multifactorial : MSK pain from positioning during sx, \par small hematoma at site of sx, cervical disk/spine disease causing nerve  compression as above \par continue pain management as needed  ortho-spine Sx appreciated >> only recommending outpatient follow up: Dr. Koch \par \par Here today with follow up imagining. \par \par I have independently reviewed the medical records and imaging at the time of this office consultation, and discussed the following interpretations with the patient:\par - CT Chest with stable findings. Discussed returning to clinic in 3 months with repeat CT Chest, no contrast, to re-evaluate stability. \par - Patient w/ persistent Right upper extremity pain as well as numbness. Continue with Physical Therapy. Discussed neuro workup. Will give contact info for Dr. Harish Ware if she is agreeable. \par \par Recommendations reviewed with patient during this office visit, and all questions answered; Patient instructed on the importance of follow up and verbalizes understanding.\par \par I, BONNIE Reed, personally performed the evaluation and management (E/M) services for this established patient. That E/M includes conducting the examination, assessing all new/exacerbated conditions, and establishing a new plan of care. Today, My ACP, Peggy Teresa, was here to observe my evaluation and management services for this patient to be followed going forward.\par \par

## 2023-06-27 NOTE — PHYSICAL EXAM
[] : no respiratory distress [Respiration, Rhythm And Depth] : normal respiratory rhythm and effort [Exaggerated Use Of Accessory Muscles For Inspiration] : no accessory muscle use [Auscultation Breath Sounds / Voice Sounds] : lungs were clear to auscultation bilaterally [Heart Rate And Rhythm] : heart rate was normal and rhythm regular [Examination Of The Chest] : the chest was normal in appearance [Chest Visual Inspection Thoracic Asymmetry] : no chest asymmetry [Diminished Respiratory Excursion] : normal chest expansion [2+] : left 2+ [Involuntary Movements] : no involuntary movements were seen [Oriented To Time, Place, And Person] : oriented to person, place, and time

## 2023-06-27 NOTE — HISTORY OF PRESENT ILLNESS
[FreeTextEntry1] : Ms. JANA OLIVARES, 82 year old female, former smoker (quit 6 months ago, 40PY), w/ hx of HLD, A-Fib on Eliquis, CAD (stents x 2 in 2006) on ASA, CHF, COPD/Asthma, MARIE, skin cancer, COVID Sept 2022 s/p MAB infusion, who initially presented with new lung nodule. \par \par ECHO on 6/29/22 at [facility]: EF 55-60%. Moderate Rt atrial enlargement; mild diastolic dysfunction.\par \par PFTs on 8/8/22: %, FEV1 88%, DLCO 60%.\par \par CT Chest on 9/30/22 at [facility] (compared to CT on 4/20/22):\par - bilateral apical pleural thickening; centrilobular emphysema\par - new 6 x 6mm slightly irregular nodule in the Rt apex (3:28), suspicious, previously noted as tiny Rt apical nodules\par - stable 6 x 3mm subpleural nodule in the RLL (3:82)\par - no mediastinal adenopathy\par \par Nodify blood work on 10/21/22: No significant level of autoantibodies detected post Nodify CDT risk of malignancy\par \par CT Chest on 1/5/23:\par - Continued interval increase in size of lobulated minimally spiculated RUL nodule which remains highly suspicious for malignancy, now measuring 8 x 10 mm PET suggested\par - Unchanged RUL 6 x 3 mm subpleural nodule (image 87)\par - Small pericardial effusion\par \par PFTs on 2/13/23: FVC: 2.4 (100%); FEV1: 1.53 (88%); DLCO: 11.73 (64%)\par \par Now, s/p Flex bronch, uniportal Right VATS, wedge resection of RUL x1 on 3/2/23. Path of RUL: Invasive squamous cell carcinoma, keratinizing; 1.1 cm; All margins and LN (0/1) Negative for tumor; pT1b pN0 (Stage IA2)\par \par CT Angio CAP on 3/11/23: \par - s/p RUL wedge resection with dystrophic calcifications noted in the surgical bed.\par - Groundglass airspace opacities are noted in the RUL.\par - Small right pleural effusion. Fluid tracks along the right major fissure.\par - Heart size is stable, enlarged. \par - Bilateral renal cysts with a 1.4 cm cyst noted in the upper pole of the right kidney and a 2.6 cm cyst in the right inferior pole. A partially rim calcified, exophytic 2.5 cm cyst is noted in the upper pole of the left kidney.\par - No aortic dissection.\par \par Hospitalized 3/11-3/16 for right arm pain. pain and discomfort multifactorial : MSK pain from positioning during sx, \par small hematoma at site of sx, cervical disk/spine disease causing nerve  compression as above \par continue pain management as needed  ortho-spine Sx appreciated >> only recommending outpatient follow up: Dr. Koch \par \par CT Chest on 6/6/23: \par - Post op changes\par - Linear consolidation in the right [place] (4:25)\par - Stable 6 x 3 mm subpleural RLL nodule (4:74)\par - Subpleural fibrotic changes are again noted in the periphery of both lungs most prominent in the bilateral lower lobes\par - Stable, bilateral fluid density renal lesions one the left with peripheral calcification\par - 4.2 cm fat-containing ventral abdominal wall hernia with 1.1 cm abdominal wall defect\par - Mild degenerative changes of the spine. \par \par Patient presents to office for follow up. Continues to experience right upper extremity pain and numbness. Undergoing Physical therapy, which has been helping, but endorses that extremity has not yet returned to her baseline functional status prior to procedure. Following with Ortho. Endorses she is currently undergoing workup for bilateral hip issues. Today, patient denies  chest pain, cough. + SOB with exertion. Follows with PULM (Petty Ritchie)\par \par

## 2023-07-03 ENCOUNTER — APPOINTMENT (OUTPATIENT)
Dept: PULMONOLOGY | Facility: CLINIC | Age: 83
End: 2023-07-03

## 2023-07-12 NOTE — BRIEF OPERATIVE NOTE - ASSISTANT(S)
JEFF Herring, PGY-6

## 2023-07-24 ENCOUNTER — APPOINTMENT (OUTPATIENT)
Dept: PULMONOLOGY | Facility: CLINIC | Age: 83
End: 2023-07-24
Payer: MEDICARE

## 2023-07-24 VITALS
BODY MASS INDEX: 29.25 KG/M2 | TEMPERATURE: 97.4 F | WEIGHT: 149 LBS | OXYGEN SATURATION: 99 % | SYSTOLIC BLOOD PRESSURE: 114 MMHG | DIASTOLIC BLOOD PRESSURE: 80 MMHG | HEART RATE: 101 BPM | HEIGHT: 60 IN

## 2023-07-24 PROCEDURE — 99214 OFFICE O/P EST MOD 30 MIN: CPT

## 2023-07-24 NOTE — ASSESSMENT
[FreeTextEntry1] : Continue Symbicort HFA for history of COPD\par Continue Spiriva Turbuhaler for COPD\par Albuterol via nebulizer 4 times daily\par Continue Lasix daily. \par Continue Proair Respiclick as needed.\par Cardiology follow-up.\par Thoracic surgery follow-up with Dr. Yoon .\par Repeat chest CT scan for follow-up 2 months.\par Return for pulmonary follow-up after chest CT scan has been performed.

## 2023-07-24 NOTE — DISCUSSION/SUMMARY
[FreeTextEntry1] : Marilyn is a patient with an enlarged right upper lobe lung nodule and FDG uptake on PET scan, secondary to just diagnosed squamous cell lung cancer, status post right VATS; Secondly, she is a patient with improved shortness of breath and cough secondary to COPD.

## 2023-07-24 NOTE — REASON FOR VISIT
[Follow-Up - From Hospitalization] : a follow-up visit after a recent hospitalization [Abnormal CXR/ Chest CT] : an abnormal CXR/ chest CT [Lung Cancer] : lung cancer [Cough] : cough [COPD] : COPD

## 2023-08-02 NOTE — ASSESSMENT
[FreeTextEntry1] : 82 year old female with a past medical history of former smoker (quit 6 months ago, 40PY), w/ hx of HLD, A-Fib on Coumadin, CAD (stents x 2 in 2006) on ASA, CHF, COPD/Asthma, MARIE, skin cancer, COVID Sept 2022 s/p MAB infusion and lung nodule. Patient has been referred to IR by Dr. Yoon for consultation regarding lung marking procedure. Patient is scheduled with Dr. Yoon for a Flex bronch, right VATS, lung resection with IR lung marking before on 3/2/23. \par \par The full procedure of CT-guided localization of right lung nodule was discussed with the patient.  This included a discussion of the risks, benefits, and alternatives.  Risks of bleeding, adjacent organ injury including vascular injury, pneumothorax, coil migration and fracture were discussed.  Ample time was provided to answer her questions.  Consent was obtained at the time of consultation.\par \par Plan:\par CT-guided localization of right upper lobe lung nodule.  \par PET/CT 1/31/2023 image 2–64\par Plan for anteromedial approach.  Patient in supine position. No difficulties

## 2023-08-08 NOTE — CONSULT NOTE ADULT - CONSULT REQUESTED BY NAME
Diabetic patient presents for foot exam.  Lab Results   Component Value Date    HGBA1C 6.2 (H) 02/08/2023         Denies latex allergies.  Medications reviewed.  Tobacco history reviewed.    Past Medical History:   Diagnosis Date   • CAD (coronary artery disease)     NO STENTS/SEES CARDIOLOGY AT [place]/MEDICAL TREATMENT/ALL STABLE   • Callous ulcer (CMD)    • Chronic kidney disease (CKD)    • Eczema 9/1991   • Gout    • Mitral valve disease    • Other and unspecified hyperlipidemia    • Proteinuria     Elevated protein/cr ratio   • Pulmonary hypertension (CMD)    • PVD (peripheral vascular disease) (CMD)    • Type II or unspecified type diabetes mellitus without mention of complication, not stated as uncontrolled 5/23/2003    Microalbuminuria   • Unspecified essential hypertension    • Valvular heart disease    • Vertigo         abrudescu

## 2023-09-11 ENCOUNTER — APPOINTMENT (OUTPATIENT)
Dept: PULMONOLOGY | Facility: CLINIC | Age: 83
End: 2023-09-11
Payer: MEDICARE

## 2023-09-11 VITALS
OXYGEN SATURATION: 94 % | SYSTOLIC BLOOD PRESSURE: 118 MMHG | BODY MASS INDEX: 29.64 KG/M2 | DIASTOLIC BLOOD PRESSURE: 75 MMHG | HEART RATE: 88 BPM | WEIGHT: 151 LBS | HEIGHT: 60 IN | TEMPERATURE: 97.8 F

## 2023-09-11 PROCEDURE — ZZZZZ: CPT

## 2023-09-11 PROCEDURE — 85018 HEMOGLOBIN: CPT | Mod: QW

## 2023-09-11 PROCEDURE — 94060 EVALUATION OF WHEEZING: CPT

## 2023-09-11 PROCEDURE — 95012 NITRIC OXIDE EXP GAS DETER: CPT

## 2023-09-11 PROCEDURE — 94726 PLETHYSMOGRAPHY LUNG VOLUMES: CPT

## 2023-09-11 PROCEDURE — 94729 DIFFUSING CAPACITY: CPT

## 2023-09-11 PROCEDURE — 99214 OFFICE O/P EST MOD 30 MIN: CPT | Mod: 25

## 2023-09-12 LAB — HEMOGLOBIN: 11

## 2023-09-22 ENCOUNTER — APPOINTMENT (OUTPATIENT)
Age: 83
End: 2023-09-22
Payer: MEDICARE

## 2023-09-22 VITALS
SYSTOLIC BLOOD PRESSURE: 120 MMHG | BODY MASS INDEX: 28.89 KG/M2 | HEIGHT: 61 IN | DIASTOLIC BLOOD PRESSURE: 81 MMHG | HEART RATE: 85 BPM | OXYGEN SATURATION: 98 % | WEIGHT: 153 LBS

## 2023-09-22 PROCEDURE — 93880 EXTRACRANIAL BILAT STUDY: CPT

## 2023-09-22 PROCEDURE — 93923 UPR/LXTR ART STDY 3+ LVLS: CPT

## 2023-09-22 PROCEDURE — 99214 OFFICE O/P EST MOD 30 MIN: CPT

## 2023-09-23 LAB
APTT BLD: 33.9 SEC
HCT VFR BLD CALC: 34.2 %
HGB BLD-MCNC: 11.1 G/DL
INR PPP: 1.81 RATIO
MCHC RBC-ENTMCNC: 32.5 GM/DL
MCHC RBC-ENTMCNC: 34.8 PG
MCV RBC AUTO: 107.2 FL
PLATELET # BLD AUTO: 270 K/UL
PT BLD: 20.3 SEC
RBC # BLD: 3.19 M/UL
RBC # FLD: 15.1 %
WBC # FLD AUTO: 6.46 K/UL

## 2023-10-02 ENCOUNTER — APPOINTMENT (OUTPATIENT)
Dept: PULMONOLOGY | Facility: CLINIC | Age: 83
End: 2023-10-02
Payer: MEDICARE

## 2023-10-02 VITALS
WEIGHT: 143 LBS | HEIGHT: 61 IN | HEART RATE: 84 BPM | BODY MASS INDEX: 27 KG/M2 | TEMPERATURE: 97.8 F | OXYGEN SATURATION: 99 % | SYSTOLIC BLOOD PRESSURE: 113 MMHG | DIASTOLIC BLOOD PRESSURE: 62 MMHG

## 2023-10-02 DIAGNOSIS — J44.1 CHRONIC OBSTRUCTIVE PULMONARY DISEASE WITH (ACUTE) EXACERBATION: ICD-10-CM

## 2023-10-02 DIAGNOSIS — J45.909 UNSPECIFIED ASTHMA, UNCOMPLICATED: ICD-10-CM

## 2023-10-02 PROCEDURE — 71046 X-RAY EXAM CHEST 2 VIEWS: CPT

## 2023-10-02 PROCEDURE — 99214 OFFICE O/P EST MOD 30 MIN: CPT | Mod: 25

## 2023-10-10 ENCOUNTER — APPOINTMENT (OUTPATIENT)
Dept: UROLOGY | Facility: CLINIC | Age: 83
End: 2023-10-10
Payer: MEDICARE

## 2023-10-10 ENCOUNTER — APPOINTMENT (OUTPATIENT)
Dept: THORACIC SURGERY | Facility: CLINIC | Age: 83
End: 2023-10-10

## 2023-10-10 VITALS
SYSTOLIC BLOOD PRESSURE: 100 MMHG | WEIGHT: 143 LBS | HEART RATE: 77 BPM | OXYGEN SATURATION: 99 % | BODY MASS INDEX: 27 KG/M2 | TEMPERATURE: 97.5 F | HEIGHT: 61 IN | DIASTOLIC BLOOD PRESSURE: 67 MMHG

## 2023-10-10 DIAGNOSIS — Z78.9 OTHER SPECIFIED HEALTH STATUS: ICD-10-CM

## 2023-10-10 DIAGNOSIS — Z63.4 DISAPPEARANCE AND DEATH OF FAMILY MEMBER: ICD-10-CM

## 2023-10-10 DIAGNOSIS — N15.9 RENAL TUBULO-INTERSTITIAL DISEASE, UNSPECIFIED: ICD-10-CM

## 2023-10-10 DIAGNOSIS — Z87.891 PERSONAL HISTORY OF NICOTINE DEPENDENCE: ICD-10-CM

## 2023-10-10 PROCEDURE — 99204 OFFICE O/P NEW MOD 45 MIN: CPT

## 2023-10-10 RX ORDER — PENICILLIN V POTASSIUM 500 MG/1
500 TABLET, FILM COATED ORAL
Refills: 0 | Status: ACTIVE | COMMUNITY

## 2023-10-10 RX ORDER — ALBUTEROL SULFATE 2.5 MG/3ML
(2.5 MG/3ML) SOLUTION RESPIRATORY (INHALATION)
Refills: 0 | Status: ACTIVE | COMMUNITY

## 2023-10-10 SDOH — SOCIAL STABILITY - SOCIAL INSECURITY: DISSAPEARANCE AND DEATH OF FAMILY MEMBER: Z63.4

## 2023-10-12 LAB
APPEARANCE: ABNORMAL
BACTERIA UR CULT: NORMAL
BACTERIA: NEGATIVE /HPF
BILIRUBIN URINE: NEGATIVE
BLOOD URINE: NEGATIVE
CALCIUM OXALATE CRYSTALS: PRESENT
CAST: 0 /LPF
COLOR: NORMAL
EPITHELIAL CELLS: 4 /HPF
GLUCOSE QUALITATIVE U: NEGATIVE MG/DL
KETONES URINE: NEGATIVE MG/DL
LEUKOCYTE ESTERASE URINE: ABNORMAL
MICROSCOPIC-UA: NORMAL
NITRITE URINE: NEGATIVE
PH URINE: 5.5
PROTEIN URINE: NEGATIVE MG/DL
RED BLOOD CELLS URINE: 6 /HPF
REVIEW: NORMAL
SPECIFIC GRAVITY URINE: 1.02
UROBILINOGEN URINE: 0.2 MG/DL
WHITE BLOOD CELLS URINE: 11 /HPF

## 2023-10-13 LAB — URINE CYTOLOGY: NORMAL

## 2023-10-16 ENCOUNTER — APPOINTMENT (OUTPATIENT)
Dept: PULMONOLOGY | Facility: CLINIC | Age: 83
End: 2023-10-16
Payer: MEDICARE

## 2023-10-16 VITALS
DIASTOLIC BLOOD PRESSURE: 81 MMHG | HEIGHT: 61 IN | HEART RATE: 94 BPM | TEMPERATURE: 97.7 F | SYSTOLIC BLOOD PRESSURE: 130 MMHG | WEIGHT: 153 LBS | OXYGEN SATURATION: 99 % | BODY MASS INDEX: 28.89 KG/M2

## 2023-10-16 DIAGNOSIS — Z23 ENCOUNTER FOR IMMUNIZATION: ICD-10-CM

## 2023-10-16 DIAGNOSIS — Z01.811 ENCOUNTER FOR PREPROCEDURAL RESPIRATORY EXAMINATION: ICD-10-CM

## 2023-10-16 PROCEDURE — 90662 IIV NO PRSV INCREASED AG IM: CPT

## 2023-10-16 PROCEDURE — 99214 OFFICE O/P EST MOD 30 MIN: CPT | Mod: 25

## 2023-10-16 PROCEDURE — G0008: CPT

## 2023-10-17 LAB
MYCOPLASMA HOMINIS CULTURE: NEGATIVE
UREAPLASMA CULTURE: NEGATIVE

## 2023-10-18 ENCOUNTER — RESULT REVIEW (OUTPATIENT)
Age: 83
End: 2023-10-18

## 2023-10-18 ENCOUNTER — APPOINTMENT (OUTPATIENT)
Dept: ENDOVASCULAR SURGERY | Facility: CLINIC | Age: 83
End: 2023-10-18
Payer: MEDICARE

## 2023-10-18 VITALS
HEART RATE: 112 BPM | TEMPERATURE: 96.8 F | OXYGEN SATURATION: 99 % | RESPIRATION RATE: 18 BRPM | HEIGHT: 61 IN | WEIGHT: 153 LBS | BODY MASS INDEX: 28.89 KG/M2 | DIASTOLIC BLOOD PRESSURE: 81 MMHG | SYSTOLIC BLOOD PRESSURE: 112 MMHG

## 2023-10-18 PROCEDURE — 37225Z: CUSTOM | Mod: LT

## 2023-10-18 PROCEDURE — 93926 LOWER EXTREMITY STUDY: CPT

## 2023-10-18 PROCEDURE — 37229Z: CUSTOM | Mod: 59,LT

## 2023-10-18 PROCEDURE — 75625 CONTRAST EXAM ABDOMINL AORTA: CPT

## 2023-10-18 PROCEDURE — 76937 US GUIDE VASCULAR ACCESS: CPT

## 2023-10-20 ENCOUNTER — APPOINTMENT (OUTPATIENT)
Age: 83
End: 2023-10-20
Payer: MEDICARE

## 2023-10-20 PROCEDURE — 99214 OFFICE O/P EST MOD 30 MIN: CPT

## 2023-10-20 PROCEDURE — 93926 LOWER EXTREMITY STUDY: CPT

## 2023-10-25 LAB
ALBUMIN SERPL ELPH-MCNC: 4.5 G/DL
ALP BLD-CCNC: 69 U/L
ALT SERPL-CCNC: 11 U/L
ANION GAP SERPL CALC-SCNC: 15 MMOL/L
APTT BLD: 32.1 SEC
AST SERPL-CCNC: 17 U/L
BILIRUB SERPL-MCNC: 0.6 MG/DL
BUN SERPL-MCNC: 14 MG/DL
CALCIUM SERPL-MCNC: 9.5 MG/DL
CHLORIDE SERPL-SCNC: 102 MMOL/L
CO2 SERPL-SCNC: 22 MMOL/L
CREAT SERPL-MCNC: 0.91 MG/DL
EGFR: 63 ML/MIN/1.73M2
GLUCOSE SERPL-MCNC: 96 MG/DL
HCT VFR BLD CALC: 32.5 %
HGB BLD-MCNC: 10.4 G/DL
INR PPP: 1.33 RATIO
MCHC RBC-ENTMCNC: 32 GM/DL
MCHC RBC-ENTMCNC: 34.4 PG
MCV RBC AUTO: 107.6 FL
PLATELET # BLD AUTO: 288 K/UL
POTASSIUM SERPL-SCNC: 4.3 MMOL/L
PROT SERPL-MCNC: 6.5 G/DL
PT BLD: 15 SEC
RBC # BLD: 3.02 M/UL
RBC # FLD: 14.6 %
SODIUM SERPL-SCNC: 139 MMOL/L
WBC # FLD AUTO: 6.87 K/UL

## 2023-11-01 ENCOUNTER — APPOINTMENT (OUTPATIENT)
Dept: ENDOVASCULAR SURGERY | Facility: CLINIC | Age: 83
End: 2023-11-01
Payer: MEDICARE

## 2023-11-01 ENCOUNTER — RESULT REVIEW (OUTPATIENT)
Age: 83
End: 2023-11-01

## 2023-11-01 VITALS
BODY MASS INDEX: 28.89 KG/M2 | TEMPERATURE: 97.5 F | WEIGHT: 153 LBS | DIASTOLIC BLOOD PRESSURE: 79 MMHG | HEART RATE: 84 BPM | OXYGEN SATURATION: 99 % | HEIGHT: 61 IN | RESPIRATION RATE: 18 BRPM | SYSTOLIC BLOOD PRESSURE: 115 MMHG

## 2023-11-01 PROCEDURE — 76937 US GUIDE VASCULAR ACCESS: CPT

## 2023-11-01 PROCEDURE — 37227Z: CUSTOM | Mod: RT

## 2023-11-01 PROCEDURE — 75625 CONTRAST EXAM ABDOMINL AORTA: CPT

## 2023-11-01 PROCEDURE — 93926 LOWER EXTREMITY STUDY: CPT

## 2023-11-01 PROCEDURE — 37229Z: CUSTOM | Mod: 59,RT

## 2023-11-07 ENCOUNTER — APPOINTMENT (OUTPATIENT)
Dept: UROLOGY | Facility: CLINIC | Age: 83
End: 2023-11-07
Payer: MEDICARE

## 2023-11-07 VITALS
HEART RATE: 76 BPM | OXYGEN SATURATION: 98 % | HEIGHT: 61 IN | SYSTOLIC BLOOD PRESSURE: 106 MMHG | DIASTOLIC BLOOD PRESSURE: 70 MMHG | WEIGHT: 153 LBS | TEMPERATURE: 98.8 F | BODY MASS INDEX: 28.89 KG/M2

## 2023-11-07 DIAGNOSIS — N39.0 URINARY TRACT INFECTION, SITE NOT SPECIFIED: ICD-10-CM

## 2023-11-07 PROCEDURE — 52000 CYSTOURETHROSCOPY: CPT

## 2023-11-07 PROCEDURE — 99213 OFFICE O/P EST LOW 20 MIN: CPT | Mod: 25

## 2023-11-17 ENCOUNTER — APPOINTMENT (OUTPATIENT)
Dept: VASCULAR SURGERY | Facility: CLINIC | Age: 83
End: 2023-11-17
Payer: MEDICARE

## 2023-11-17 PROCEDURE — 99214 OFFICE O/P EST MOD 30 MIN: CPT

## 2023-11-17 PROCEDURE — 93925 LOWER EXTREMITY STUDY: CPT

## 2023-12-04 ENCOUNTER — APPOINTMENT (OUTPATIENT)
Dept: PULMONOLOGY | Facility: CLINIC | Age: 83
End: 2023-12-04

## 2023-12-15 ENCOUNTER — NON-APPOINTMENT (OUTPATIENT)
Age: 83
End: 2023-12-15

## 2023-12-16 ENCOUNTER — EMERGENCY (EMERGENCY)
Facility: HOSPITAL | Age: 83
LOS: 1 days | Discharge: HOME | End: 2023-12-16
Attending: EMERGENCY MEDICINE
Payer: MEDICARE

## 2023-12-16 VITALS
RESPIRATION RATE: 20 BRPM | TEMPERATURE: 98 F | WEIGHT: 153 LBS | HEIGHT: 61 IN | HEART RATE: 103 BPM | OXYGEN SATURATION: 98 % | DIASTOLIC BLOOD PRESSURE: 86 MMHG | SYSTOLIC BLOOD PRESSURE: 123 MMHG

## 2023-12-16 VITALS
HEART RATE: 102 BPM | RESPIRATION RATE: 18 BRPM | TEMPERATURE: 98 F | DIASTOLIC BLOOD PRESSURE: 76 MMHG | OXYGEN SATURATION: 97 % | SYSTOLIC BLOOD PRESSURE: 113 MMHG

## 2023-12-16 DIAGNOSIS — Z96.642 PRESENCE OF LEFT ARTIFICIAL HIP JOINT: Chronic | ICD-10-CM

## 2023-12-16 DIAGNOSIS — Z96.641 PRESENCE OF RIGHT ARTIFICIAL HIP JOINT: Chronic | ICD-10-CM

## 2023-12-16 DIAGNOSIS — Z98.890 OTHER SPECIFIED POSTPROCEDURAL STATES: Chronic | ICD-10-CM

## 2023-12-16 DIAGNOSIS — Z98.49 CATARACT EXTRACTION STATUS, UNSPECIFIED EYE: Chronic | ICD-10-CM

## 2023-12-16 LAB
ALBUMIN SERPL ELPH-MCNC: 3.9 G/DL — SIGNIFICANT CHANGE UP (ref 3.3–5)
ALBUMIN SERPL ELPH-MCNC: 3.9 G/DL — SIGNIFICANT CHANGE UP (ref 3.3–5)
ALP SERPL-CCNC: 58 U/L — SIGNIFICANT CHANGE UP (ref 40–120)
ALP SERPL-CCNC: 58 U/L — SIGNIFICANT CHANGE UP (ref 40–120)
ALT FLD-CCNC: 9 U/L — LOW (ref 10–45)
ALT FLD-CCNC: 9 U/L — LOW (ref 10–45)
ANION GAP SERPL CALC-SCNC: 11 MMOL/L — SIGNIFICANT CHANGE UP (ref 5–17)
ANION GAP SERPL CALC-SCNC: 11 MMOL/L — SIGNIFICANT CHANGE UP (ref 5–17)
APTT BLD: 29.5 SEC — SIGNIFICANT CHANGE UP (ref 24.5–35.6)
APTT BLD: 29.5 SEC — SIGNIFICANT CHANGE UP (ref 24.5–35.6)
AST SERPL-CCNC: 17 U/L — SIGNIFICANT CHANGE UP (ref 10–40)
AST SERPL-CCNC: 17 U/L — SIGNIFICANT CHANGE UP (ref 10–40)
BASOPHILS # BLD AUTO: 0.12 K/UL — SIGNIFICANT CHANGE UP (ref 0–0.2)
BASOPHILS # BLD AUTO: 0.12 K/UL — SIGNIFICANT CHANGE UP (ref 0–0.2)
BASOPHILS NFR BLD AUTO: 1.6 % — SIGNIFICANT CHANGE UP (ref 0–2)
BASOPHILS NFR BLD AUTO: 1.6 % — SIGNIFICANT CHANGE UP (ref 0–2)
BILIRUB SERPL-MCNC: 0.7 MG/DL — SIGNIFICANT CHANGE UP (ref 0.2–1.2)
BILIRUB SERPL-MCNC: 0.7 MG/DL — SIGNIFICANT CHANGE UP (ref 0.2–1.2)
BLD GP AB SCN SERPL QL: NEGATIVE — SIGNIFICANT CHANGE UP
BLD GP AB SCN SERPL QL: NEGATIVE — SIGNIFICANT CHANGE UP
BUN SERPL-MCNC: 14 MG/DL — SIGNIFICANT CHANGE UP (ref 7–23)
BUN SERPL-MCNC: 14 MG/DL — SIGNIFICANT CHANGE UP (ref 7–23)
CALCIUM SERPL-MCNC: 8.9 MG/DL — SIGNIFICANT CHANGE UP (ref 8.4–10.5)
CALCIUM SERPL-MCNC: 8.9 MG/DL — SIGNIFICANT CHANGE UP (ref 8.4–10.5)
CHLORIDE SERPL-SCNC: 105 MMOL/L — SIGNIFICANT CHANGE UP (ref 96–108)
CHLORIDE SERPL-SCNC: 105 MMOL/L — SIGNIFICANT CHANGE UP (ref 96–108)
CO2 SERPL-SCNC: 23 MMOL/L — SIGNIFICANT CHANGE UP (ref 22–31)
CO2 SERPL-SCNC: 23 MMOL/L — SIGNIFICANT CHANGE UP (ref 22–31)
CREAT SERPL-MCNC: 0.8 MG/DL — SIGNIFICANT CHANGE UP (ref 0.5–1.3)
CREAT SERPL-MCNC: 0.8 MG/DL — SIGNIFICANT CHANGE UP (ref 0.5–1.3)
EGFR: 74 ML/MIN/1.73M2 — SIGNIFICANT CHANGE UP
EGFR: 74 ML/MIN/1.73M2 — SIGNIFICANT CHANGE UP
EOSINOPHIL # BLD AUTO: 0.11 K/UL — SIGNIFICANT CHANGE UP (ref 0–0.5)
EOSINOPHIL # BLD AUTO: 0.11 K/UL — SIGNIFICANT CHANGE UP (ref 0–0.5)
EOSINOPHIL NFR BLD AUTO: 1.5 % — SIGNIFICANT CHANGE UP (ref 0–6)
EOSINOPHIL NFR BLD AUTO: 1.5 % — SIGNIFICANT CHANGE UP (ref 0–6)
GLUCOSE SERPL-MCNC: 115 MG/DL — HIGH (ref 70–99)
GLUCOSE SERPL-MCNC: 115 MG/DL — HIGH (ref 70–99)
HCT VFR BLD CALC: 29.2 % — LOW (ref 34.5–45)
HCT VFR BLD CALC: 29.2 % — LOW (ref 34.5–45)
HGB BLD-MCNC: 9.5 G/DL — LOW (ref 11.5–15.5)
HGB BLD-MCNC: 9.5 G/DL — LOW (ref 11.5–15.5)
IMM GRANULOCYTES NFR BLD AUTO: 0.8 % — SIGNIFICANT CHANGE UP (ref 0–0.9)
IMM GRANULOCYTES NFR BLD AUTO: 0.8 % — SIGNIFICANT CHANGE UP (ref 0–0.9)
INR BLD: 1.55 RATIO — HIGH (ref 0.85–1.18)
INR BLD: 1.55 RATIO — HIGH (ref 0.85–1.18)
LYMPHOCYTES # BLD AUTO: 1.67 K/UL — SIGNIFICANT CHANGE UP (ref 1–3.3)
LYMPHOCYTES # BLD AUTO: 1.67 K/UL — SIGNIFICANT CHANGE UP (ref 1–3.3)
LYMPHOCYTES # BLD AUTO: 22.2 % — SIGNIFICANT CHANGE UP (ref 13–44)
LYMPHOCYTES # BLD AUTO: 22.2 % — SIGNIFICANT CHANGE UP (ref 13–44)
MCHC RBC-ENTMCNC: 32.5 GM/DL — SIGNIFICANT CHANGE UP (ref 32–36)
MCHC RBC-ENTMCNC: 32.5 GM/DL — SIGNIFICANT CHANGE UP (ref 32–36)
MCHC RBC-ENTMCNC: 35.3 PG — HIGH (ref 27–34)
MCHC RBC-ENTMCNC: 35.3 PG — HIGH (ref 27–34)
MCV RBC AUTO: 108.6 FL — HIGH (ref 80–100)
MCV RBC AUTO: 108.6 FL — HIGH (ref 80–100)
MONOCYTES # BLD AUTO: 1.25 K/UL — HIGH (ref 0–0.9)
MONOCYTES # BLD AUTO: 1.25 K/UL — HIGH (ref 0–0.9)
MONOCYTES NFR BLD AUTO: 16.6 % — HIGH (ref 2–14)
MONOCYTES NFR BLD AUTO: 16.6 % — HIGH (ref 2–14)
NEUTROPHILS # BLD AUTO: 4.31 K/UL — SIGNIFICANT CHANGE UP (ref 1.8–7.4)
NEUTROPHILS # BLD AUTO: 4.31 K/UL — SIGNIFICANT CHANGE UP (ref 1.8–7.4)
NEUTROPHILS NFR BLD AUTO: 57.3 % — SIGNIFICANT CHANGE UP (ref 43–77)
NEUTROPHILS NFR BLD AUTO: 57.3 % — SIGNIFICANT CHANGE UP (ref 43–77)
NRBC # BLD: 0 /100 WBCS — SIGNIFICANT CHANGE UP (ref 0–0)
NRBC # BLD: 0 /100 WBCS — SIGNIFICANT CHANGE UP (ref 0–0)
PLATELET # BLD AUTO: 252 K/UL — SIGNIFICANT CHANGE UP (ref 150–400)
PLATELET # BLD AUTO: 252 K/UL — SIGNIFICANT CHANGE UP (ref 150–400)
POTASSIUM SERPL-MCNC: 3.8 MMOL/L — SIGNIFICANT CHANGE UP (ref 3.5–5.3)
POTASSIUM SERPL-MCNC: 3.8 MMOL/L — SIGNIFICANT CHANGE UP (ref 3.5–5.3)
POTASSIUM SERPL-SCNC: 3.8 MMOL/L — SIGNIFICANT CHANGE UP (ref 3.5–5.3)
POTASSIUM SERPL-SCNC: 3.8 MMOL/L — SIGNIFICANT CHANGE UP (ref 3.5–5.3)
PROT SERPL-MCNC: 6.2 G/DL — SIGNIFICANT CHANGE UP (ref 6–8.3)
PROT SERPL-MCNC: 6.2 G/DL — SIGNIFICANT CHANGE UP (ref 6–8.3)
PROTHROM AB SERPL-ACNC: 16.1 SEC — HIGH (ref 9.5–13)
PROTHROM AB SERPL-ACNC: 16.1 SEC — HIGH (ref 9.5–13)
RBC # BLD: 2.69 M/UL — LOW (ref 3.8–5.2)
RBC # BLD: 2.69 M/UL — LOW (ref 3.8–5.2)
RBC # FLD: 15.5 % — HIGH (ref 10.3–14.5)
RBC # FLD: 15.5 % — HIGH (ref 10.3–14.5)
RH IG SCN BLD-IMP: POSITIVE — SIGNIFICANT CHANGE UP
RH IG SCN BLD-IMP: POSITIVE — SIGNIFICANT CHANGE UP
SODIUM SERPL-SCNC: 139 MMOL/L — SIGNIFICANT CHANGE UP (ref 135–145)
SODIUM SERPL-SCNC: 139 MMOL/L — SIGNIFICANT CHANGE UP (ref 135–145)
WBC # BLD: 7.52 K/UL — SIGNIFICANT CHANGE UP (ref 3.8–10.5)
WBC # BLD: 7.52 K/UL — SIGNIFICANT CHANGE UP (ref 3.8–10.5)
WBC # FLD AUTO: 7.52 K/UL — SIGNIFICANT CHANGE UP (ref 3.8–10.5)
WBC # FLD AUTO: 7.52 K/UL — SIGNIFICANT CHANGE UP (ref 3.8–10.5)

## 2023-12-16 PROCEDURE — 99284 EMERGENCY DEPT VISIT MOD MDM: CPT | Mod: 25

## 2023-12-16 PROCEDURE — 86901 BLOOD TYPING SEROLOGIC RH(D): CPT

## 2023-12-16 PROCEDURE — 73070 X-RAY EXAM OF ELBOW: CPT

## 2023-12-16 PROCEDURE — 71045 X-RAY EXAM CHEST 1 VIEW: CPT | Mod: 26

## 2023-12-16 PROCEDURE — 80053 COMPREHEN METABOLIC PANEL: CPT

## 2023-12-16 PROCEDURE — 85025 COMPLETE CBC W/AUTO DIFF WBC: CPT

## 2023-12-16 PROCEDURE — 96374 THER/PROPH/DIAG INJ IV PUSH: CPT

## 2023-12-16 PROCEDURE — 73030 X-RAY EXAM OF SHOULDER: CPT

## 2023-12-16 PROCEDURE — 73110 X-RAY EXAM OF WRIST: CPT

## 2023-12-16 PROCEDURE — 99285 EMERGENCY DEPT VISIT HI MDM: CPT

## 2023-12-16 PROCEDURE — 73030 X-RAY EXAM OF SHOULDER: CPT | Mod: 26,LT

## 2023-12-16 PROCEDURE — 86900 BLOOD TYPING SEROLOGIC ABO: CPT

## 2023-12-16 PROCEDURE — 73060 X-RAY EXAM OF HUMERUS: CPT

## 2023-12-16 PROCEDURE — 73090 X-RAY EXAM OF FOREARM: CPT | Mod: 26,LT

## 2023-12-16 PROCEDURE — 71045 X-RAY EXAM CHEST 1 VIEW: CPT

## 2023-12-16 PROCEDURE — 73110 X-RAY EXAM OF WRIST: CPT | Mod: 26,LT

## 2023-12-16 PROCEDURE — 85610 PROTHROMBIN TIME: CPT

## 2023-12-16 PROCEDURE — 73070 X-RAY EXAM OF ELBOW: CPT | Mod: 26,LT

## 2023-12-16 PROCEDURE — 73020 X-RAY EXAM OF SHOULDER: CPT

## 2023-12-16 PROCEDURE — 85730 THROMBOPLASTIN TIME PARTIAL: CPT

## 2023-12-16 PROCEDURE — 73090 X-RAY EXAM OF FOREARM: CPT

## 2023-12-16 PROCEDURE — 73020 X-RAY EXAM OF SHOULDER: CPT | Mod: 26,XE,LT

## 2023-12-16 PROCEDURE — 73060 X-RAY EXAM OF HUMERUS: CPT | Mod: 26,LT

## 2023-12-16 PROCEDURE — 86850 RBC ANTIBODY SCREEN: CPT

## 2023-12-16 RX ORDER — ACETAMINOPHEN 500 MG
975 TABLET ORAL ONCE
Refills: 0 | Status: COMPLETED | OUTPATIENT
Start: 2023-12-16 | End: 2023-12-16

## 2023-12-16 RX ORDER — OXYCODONE HYDROCHLORIDE 5 MG/1
1 TABLET ORAL
Qty: 5 | Refills: 0
Start: 2023-12-16 | End: 2023-12-20

## 2023-12-16 RX ORDER — IBUPROFEN 200 MG
600 TABLET ORAL ONCE
Refills: 0 | Status: COMPLETED | OUTPATIENT
Start: 2023-12-16 | End: 2023-12-16

## 2023-12-16 RX ORDER — FENTANYL CITRATE 50 UG/ML
50 INJECTION INTRAVENOUS ONCE
Refills: 0 | Status: DISCONTINUED | OUTPATIENT
Start: 2023-12-16 | End: 2023-12-16

## 2023-12-16 RX ADMIN — Medication 600 MILLIGRAM(S): at 18:57

## 2023-12-16 RX ADMIN — Medication 975 MILLIGRAM(S): at 18:58

## 2023-12-16 RX ADMIN — FENTANYL CITRATE 50 MICROGRAM(S): 50 INJECTION INTRAVENOUS at 19:43

## 2023-12-16 NOTE — ED PROVIDER NOTE - CLINICAL SUMMARY MEDICAL DECISION MAKING FREE TEXT BOX
82 yo F w PMHx of afib on eliquis, CAD sp stents x2, lung cancer sp vats, COPD, presents sp mechanical fall onto L shoulder from standing height yesterday at 5pm. When pt was helped up by friend pulling from L shoulder, noticed severe L shoulder pain, with no L shoulder range of motion, and moderate humerus pain. Admits to numbness in forearms     VSS. CLinically stable. Physical exam showing well appearing adult but in mild distress. LCTAB, no mrg, no focal neuroological deficits, L shoulder limp, significantly ttp, zero range of motion w shoulder or elbow flexion/exntetion/abduction or adduction 2/2 severe pain. R shoulder full ROM. Large area of echymosis and bruyising from L axilla to L elbow postereriomedially.     Suspicion for L shoulder disclocation vs fracture vs dislocation + fracture. Assess w screening labs, coags, type, pain meds, xrays from shoulder to wrist 82 yo F w PMHx of afib on eliquis, CAD sp stents x2, lung cancer sp vats, COPD, presents sp mechanical fall onto L shoulder from standing height yesterday at 5pm. When pt was helped up by friend pulling from L shoulder, noticed severe L shoulder pain, with no L shoulder range of motion, and moderate humerus pain. Admits to numbness in forearms     VSS. CLinically stable. Physical exam showing well appearing adult but in mild distress. LCTAB, no mrg, no focal neuroological deficits, L shoulder limp, significantly ttp, zero range of motion w shoulder or elbow flexion/exntetion/abduction or adduction 2/2 severe pain. R shoulder full ROM. Large area of echymosis and bruyising from L axilla to L elbow postereriomedially.     Suspicion for L shoulder disclocation vs fracture vs dislocation + fracture. Assess w screening labs, coags, type, pain meds, xrays from shoulder to wrist. Dispo pending imaging 84 yo F w PMHx of afib on eliquis, CAD sp stents x2, lung cancer sp vats, COPD, presents sp mechanical fall onto L shoulder from standing height yesterday at 5pm. When pt was helped up by friend pulling from L shoulder, noticed severe L shoulder pain, with no L shoulder range of motion, and moderate humerus pain. Admits to numbness in forearms     VSS. CLinically stable. Physical exam showing well appearing adult but in mild distress. LCTAB, no mrg, no focal neuroological deficits, L shoulder limp, significantly ttp, zero range of motion w shoulder or elbow flexion/exntetion/abduction or adduction 2/2 severe pain. R shoulder full ROM. Large area of echymosis and bruyising from L axilla to L elbow postereriomedially.     Suspicion for L shoulder disclocation vs fracture vs dislocation + fracture. Assess w screening labs, coags, type, pain meds, xrays from shoulder to wrist. Dispo pending imaging

## 2023-12-16 NOTE — ED PROVIDER NOTE - ATTENDING CONTRIBUTION TO CARE
Attending Statement (RADHA Christian MD):    HPI: 83-year-old female with history of A-fib (on Eliquis), CAD (PCI x 2), CHF, COPD/asthma, HLD, lung cancer s/p RUL wedge resection 3/2023, presenting for left shoulder pain after fall yesterday.  Patient reports she was walking while holding onto a metal railing that had decorations on it and slipped landing onto the left shoulder.  Patient states her friend was there and assisted her to ground cradling head and denies having any head strike or head injury.  No LOC.  States did not have any pain into the friend attempted to lift her by the left arm and immediately felt left shoulder pain.  Attempted treatment with Motrin last night with little relief.  Went to an urgent care today who sent her to the ED for further evaluation.  Reports some tingling in the left fingers but no numbness.  States unable to move left shoulder and has pain from left shoulder to left elbow.  No bleeding or open wounds.    Review of Systems:  -General: no fever   -ENT: no congestion  -Pulmonary: no cough, no shortness of breath  -Cardiac: no chest pain  -Gastrointestinal: no abdominal pain, no nausea, no vomiting, and no diarrhea.  -Genitourinary: no blood or pain with urination  -Musculoskeletal: See HPI  -Skin: no rashes  -Endocrine: No h/o diabetes  -Neurologic: No new weakness or numbness in extremities    All else negative unless otherwise specified elsewhere in this note.    On Physical Exam:  General: Elderly but awake/alert speaking clearly in full sentences.  In no acute distress but has significant discomfort with any movement of the left upper extremity  HEENT: PERRL, anicteric sclera, airway patent  Neck: No C-spine tenderness  Cardiac: Irregular   lungs: CTABL  Abdomen: soft nontender/nondistended  Skin: Ecchymoses throughout upper half of left upper extremity  Extremities: Left shoulder deformity, diffuse tenderness from left shoulder/proximal humerus through elbow.  No gross elbow deformity.  Radial pulse palpable sensation intact in all fingers cap refill in fingers less than 2 seconds.  Soft compartments throughout left upper extremity. no other gross deformities appreciated in other extremities.      MDM: 83-year-old female with history of A-fib (on Eliquis), CAD (PCI x 2), CHF, COPD/asthma, HLD, lung cancer s/p RUL wedge resection 3/2023, presenting for left shoulder pain after fall yesterday.  Patient has neurovascular intact left upper extremity but strong concern for possible shoulder/humerus fracture or dislocation less concern elbow fracture or dislocation.  Wrist is nontender and fingers are nontender.  Is neurovascularly intact with soft compartments throughout left upper extremity.  No concern for clinically significant intracranial injury based on history and duration of time since injury.  Will obtain x-rays of the left shoulder humerus elbow forearm and wrist for evaluation of fractures dislocations.  Consultation with orthopedics if warranted based on imaging.  See progress notes above for updates ED clinical course and medical decision making.

## 2023-12-16 NOTE — ED ADULT NURSE NOTE - NSFALLHARMRISKINTERV_ED_ALL_ED
Assistance OOB with selected safe patient handling equipment if applicable/Assistance with ambulation/Communicate risk of Fall with Harm to all staff, patient, and family/Monitor gait and stability/Provide visual cue: red socks, yellow wristband, yellow gown, etc/Reinforce activity limits and safety measures with patient and family/Bed in lowest position, wheels locked, appropriate side rails in place/Call bell, personal items and telephone in reach/Instruct patient to call for assistance before getting out of bed/chair/stretcher/Non-slip footwear applied when patient is off stretcher/[place] to call system/Physically safe environment - no spills, clutter or unnecessary equipment/Purposeful Proactive Rounding/Room/bathroom lighting operational, light cord in reach Assistance OOB with selected safe patient handling equipment if applicable/Assistance with ambulation/Communicate risk of Fall with Harm to all staff, patient, and family/Monitor gait and stability/Provide visual cue: red socks, yellow wristband, yellow gown, etc/Reinforce activity limits and safety measures with patient and family/Bed in lowest position, wheels locked, appropriate side rails in place/Call bell, personal items and telephone in reach/Instruct patient to call for assistance before getting out of bed/chair/stretcher/Non-slip footwear applied when patient is off stretcher/[place] to call system/Physically safe environment - no spills, clutter or unnecessary equipment/Purposeful Proactive Rounding/Room/bathroom lighting operational, light cord in reach

## 2023-12-16 NOTE — ED ADULT TRIAGE NOTE - NS ED NURSE AMBULANCES
[facility]

## 2023-12-16 NOTE — ED PROVIDER NOTE - PATIENT PORTAL LINK FT
You can access the FollowMyHealth Patient Portal offered by  by registering at the following website: [email or web address]. By joining [facility]’s FollowMyHealth portal, you will also be able to view your health information using other applications (apps) compatible with our system. You can access the FollowMyHealth Patient Portal offered by [facility] by registering at the following website: [email or web address]. By joining [employer]’s FollowMyHealth portal, you will also be able to view your health information using other applications (apps) compatible with our system.

## 2023-12-16 NOTE — ED PROVIDER NOTE - CHILD ABUSE FACILITY
[facility] Pregnancy And Lactation Text: This medication is Pregnancy Category X and is absolutely contraindicated during pregnancy. It is unknown if it is excreted in breast milk.

## 2023-12-16 NOTE — ED ADULT NURSE NOTE - NSICDXPASTMEDICALHX_GEN_ALL_CORE_FT
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      PAST MEDICAL HISTORY:  Afib x 15 yrs --on Coumadin  attempted cardioversion 13 yrs ago--failed    Asthma     CAD (coronary artery disease)     CHF (congestive heart failure)     Coronary Stent to RCA, LAD, and DIAG 9/25/06 at [facility]    Emphysema/COPD     GERD (Gastroesophageal Reflux Disease)     H/O: CVA pt unaware of CVA, yet 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

## 2023-12-16 NOTE — ED PROVIDER NOTE - NSFOLLOWUPINSTRUCTIONS_ED_ALL_ED_FT
In the ED for left arm pain.  Your evaluated with an x-ray which showed presence of a proximal humerus fracture.  We reduced the fracture in the ED, however you should follow-up with an orthopedist for further management.  We spoke to your daughter who suggested that you guys have an orthopedist and can make an appointment on Monday or Tuesday. Prescription for pain medications was sent to your pharmacy, please take as prescribed. If your symptoms worsen please return to the ED.  This includes worsening swelling, redness, bruising, inability to move the arm.    Humerus Fracture Treated With Immobilization  Right arm and hand showing skeleton with a humerus fracture.  A humerus fracture is a break in the large bone in the upper arm (humerus). If the joint is stable and the bones are still in their normal position (nondisplaced), the injury may be treated with immobilization. This involves the use of a cast, splint, or sling to hold your arm in place. Immobilization ensures that your bones continue to stay in the correct position while your arm is healing.    What are the causes?  This condition may be caused by:  A fall.  A hard, direct hit to the arm.  A motor vehicle accident.  What increases the risk?  The following factors may make you more likely to develop this condition:  Having a disease that makes the bones thin and weak.  Being elderly.  What are the signs or symptoms?  Symptoms of this condition include:  Pain.  Swelling.  Bruising.  Not being able to move your arm normally.  How is this diagnosed?  This condition may be diagnosed based on:  A physical exam.  X-rays of your upper arm, elbow, and shoulder.  CT scan.  How is this treated?  Treatment for this condition involves wearing a cast, splint, or sling until the injured area is stable enough for you to begin range-of-motion exercises. You may also be prescribed pain medicine.    Follow these instructions at home:  If you have a nonremovable cast or splint:    Do not put pressure on any part of the cast or splint until it is fully hardened. This may take several hours.  Do not stick anything inside the cast or splint to scratch your skin. Doing that increases your risk of infection.  Check the skin around the cast or splint every day. Tell your health care provider about any concerns.  You may put lotion on dry skin around the edges of the cast or splint. Do not put lotion on the skin underneath the cast or splint.  Keep the cast or splint clean and dry.  If you have a removable splint or sling:    Wear the splint or sling as told by your health care provider. Remove it only as told by your health care provider.  Check the skin around the splint or sling every day. Tell your health care provider about any concerns.  Loosen the splint or sling if your fingers tingle, become numb, or turn cold and blue.  Keep the splint or sling clean and dry.  Bathing    Do not take baths, swim, or use a hot tub until your health care provider approves. Ask your health care provider if you may take showers. You may only be allowed to take sponge baths.  If the cast, splint, or sling is not waterproof:  Do not let it get wet.  Cover it with a watertight covering when you take a bath or shower.  Managing pain, stiffness, and swelling    Bag of ice on a towel on the skin.   If directed, put ice on the injured area. To do this:  If you have a removable splint or sling, remove it as told by your health care provider.  Put ice in a plastic bag.  Place a towel between your skin and the bag or between your nonremovable cast or sling and the bag.  Leave the ice on for 20 minutes, 2–3 times a day.  Remove the ice if your skin turns bright red. This is very important. If you cannot feel pain, heat, or cold, you have a greater risk of damage to the area.  Move your fingers often to reduce stiffness and swelling.  Raise the injured area above the level of your heart while you are sitting or lying down.  Driving    Do not drive or operate machinery while taking prescription pain medicine.  Ask your health care provider when it is safe to drive if you have a cast, splint, or sling on your arm.  Activity    Do not lift anything until your health care provider says that it is safe.  Return to your normal activities as told by your health care provider. Ask your health care provider what activities are safe for you.  Do range-of-motion exercises only as told by your health care provider or physical therapist.  General instructions    Do not use any products that contain nicotine or tobacco. These products include cigarettes, chewing tobacco, and vaping devices, such as e-cigarettes. These can delay bone healing. If you need help quitting, ask your health care provider.  Take over-the-counter and prescription medicines only as told by your health care provider.  Ask your health care provider if the medicine prescribed to you:  Can cause constipation. You may need to take these actions to prevent or treat constipation:  Drink enough fluid to keep your urine pale yellow.  Take over-the-counter or prescription medicines.  Eat foods that are high in fiber, such as beans, whole grains, and fresh fruits and vegetables.  Limit foods that are high in fat and processed sugars, such as fried or sweet foods.  Keep all follow-up visits. This is important.  Contact a health care provider if:  You have any new pain, swelling, or bruising.  Your pain, swelling, and bruising do not improve.  Your cast, splint, or sling becomes loose or damaged.  Get help right away if:  Your skin or fingers on your injured arm turn blue or gray.  Your arm feels cold or numb.  You have severe pain in your injured arm.  Summary  A humerus fracture is a break in the large bone in the upper arm.  Immobilization involves the use of a cast, splint, or sling to hold your arm in place while the injury heals.  Wear a splint or sling as told by your health care provider. Remove it only as told by your health care provider.  Move your fingers often to reduce stiffness and swelling.  This information is not intended to replace advice given to you by your health care provider. Make sure you discuss any questions you have with your health care provider.

## 2023-12-16 NOTE — ED PROVIDER NOTE - NSFOLLOWUPCLINICS_GEN_ALL_ED_FT
[facility] Orthopedic Surgery  Orthopedic Surgery  [address], 3rd & 4th [place] [address]  Phone: [phone]  Fax:      [facility] Orthopedic Surgery  Orthopedic Surgery  [address], 3rd & 4th [place] [address]  Phone: [phone]  Fax:

## 2023-12-16 NOTE — ED PROVIDER NOTE - PROGRESS NOTE DETAILS
Darian Camp, PGY1    Reduced in ED with Dr. Christian. Post reduction films improved. Spoke to Pt daughter Giuliana, okay with plan to DC, stated they know orthopedist and will make an apt for Monday or Tuesday

## 2023-12-16 NOTE — ED PROVIDER NOTE - OBJECTIVE STATEMENT
83-year-old female past medical history of A-fib on Eliquis, CAD status post 2 stents on aspirin, CHF, COPD, HLD, lung cancer status post VATS presents to ED after witnessed mechanical fall yesterday, now complaining of left shoulder pain.  Patient states that yesterday while walking at home was holding onto a railing that was decorated for the holidays, slipped on [place] decorations on metal railing fell sideways onto left shoulder.  Friend was there the patient's head, head did not impact ground, no LOS, no head strike.  1 friend attempted to lift patient on the floor by left shoulder patient noticed severe left shoulder pa 83-year-old female past medical history of A-fib on Eliquis, CAD status post 2 stents on aspirin, CHF, COPD, HLD, lung cancer status post VATS presents to ED after witnessed mechanical fall yesterday at 5pm, now complaining of left shoulder pain.  Patient states that yesterday while walking at home was holding onto a railing that was decorated for the holidays, slipped on holiday decorations on metal railing fell sideways onto left shoulder.  Friend was there the patient's head, head did not impact ground, no LOS, no head strike. Friend attempted to lift patient on the floor by left shoulder patient noticed severe left shoulder pain. Tried to treat pain with Motrin without relief.  Went to urgent care today was referred over for x-rays.  Patient has been unable to move left shoulder or left elbow.  Denies presyncopal symptoms. Admits to bruising and ecchymosis on anterior arm, denies fevers, chills, nausea, vomiting, dizziness, chest pain, SOB, abdominal pain, dysuria, hematuria.

## 2023-12-16 NOTE — ED ADULT NURSE NOTE - OBJECTIVE STATEMENT
Pt bib EMS from an [facility] center for eval of left shoulder injury which occurred yesterday as she was walking down her stoop which hod some holiday decorations on it, lost her balance and fell.  Her friend pulled on her left arm, trying to help her up, but caused more pain to the site.  Sling in place.  Anterior aspect of shoulder has purple bruising present with swelling of surrounding tissue.  She denies head trauma. Pt bib EMS from an [facility] center for eval of left shoulder injury which occurred yesterday as she was walking down her stoop which hod some holiday decorations on it, lost her balance and fell.  Her friend pulled on her left arm, trying to help her up, but caused more pain to the site.  Sling in place.  Anterior aspect of shoulder has purple bruising present with swelling of surrounding tissue.  She denies head trauma.

## 2023-12-16 NOTE — ED PROVIDER NOTE - PHYSICAL EXAMINATION
Gen: AAOx3, non-toxic  Head: NCAT  HEENT: EOMI, oral mucosa moist, normal conjunctiva  Lung: CTAB, no respiratory distress, no wheezes/rhonchi/rales B/L,   CV: RRR, no murmurs, rubs or gallops  Abd: soft, NTND, no guarding, no CVA tenderness  MSK:  L shoulder limp, significantly ttp, zero range of motion w shoulder or elbow flexion/exntetion/abduction or adduction 2/2 severe pain. R shoulder full ROM. Large area of echymosis and bruyising from L axilla to L elbow postereriomedially.   Neuro: No focal sensory or motor deficits  Skin: Warm, well perfused, no rash  Psych: normal affect. Gen: AAOx3, non-toxic  Head: NCAT  HEENT: EOMI, oral mucosa moist, normal conjunctiva  Lung: CTAB, no respiratory distress, no wheezes/rhonchi/rales B/L,   CV: RRR, no murmurs, rubs or gallops  Abd: soft, NTND, no guarding, no CVA tenderness  MSK:  L shoulder limp, significantly ttp, zero range of motion w shoulder or elbow flexion/exntetion/abduction or adduction 2/2 severe pain. R shoulder full ROM. Large area of echymosis and bruyising from L axilla to L elbow postereriomedially.   Neuro: No focal sensory or motor deficits  Skin: Warm, Large area of echymosis and bruyising from L axilla to L elbow postereriomedially.   Psych: normal affect. Gen: AAOx3, non-toxic  Head: NCAT  HEENT: EOMI, oral mucosa moist, normal conjunctiva  Lung: CTAB, no respiratory distress, no wheezes/rhonchi/rales B/L,   CV: RRR, no murmurs, rubs or gallops  Abd: soft, NTND, no guarding, no CVA tenderness  MSK:  L shoulder limp, significantly ttp, zero range of motion w shoulder or elbow flexion/exntetion/abduction or adduction 2/2 severe pain. R shoulder full ROM. Large area of echymosis and bruyising from L axilla to L elbow postereriomedially. neurovascularly intact. Radial pulse 2+, cap refill <2s  Neuro: No focal sensory or motor deficits  Skin: Warm, Large area of echymosis and bruyising from L axilla to L elbow postereriomedially.   Psych: normal affect.

## 2023-12-29 ENCOUNTER — APPOINTMENT (OUTPATIENT)
Dept: VASCULAR SURGERY | Facility: CLINIC | Age: 83
End: 2023-12-29

## 2024-02-05 ENCOUNTER — APPOINTMENT (OUTPATIENT)
Dept: PULMONOLOGY | Facility: CLINIC | Age: 84
End: 2024-02-05
Payer: MEDICARE

## 2024-02-05 VITALS
OXYGEN SATURATION: 95 % | BODY MASS INDEX: 27.38 KG/M2 | HEART RATE: 74 BPM | TEMPERATURE: 97.6 F | DIASTOLIC BLOOD PRESSURE: 76 MMHG | HEIGHT: 61 IN | SYSTOLIC BLOOD PRESSURE: 125 MMHG | WEIGHT: 145 LBS

## 2024-02-05 PROCEDURE — 99214 OFFICE O/P EST MOD 30 MIN: CPT

## 2024-02-05 RX ORDER — BUDESONIDE AND FORMOTEROL FUMARATE DIHYDRATE 160; 4.5 UG/1; UG/1
160-4.5 AEROSOL RESPIRATORY (INHALATION) TWICE DAILY
Qty: 3 | Refills: 3 | Status: ACTIVE | COMMUNITY
Start: 2021-10-04 | End: 1900-01-01

## 2024-02-05 RX ORDER — AZITHROMYCIN 250 MG/1
250 TABLET, FILM COATED ORAL
Qty: 1 | Refills: 0 | Status: COMPLETED | COMMUNITY
Start: 2023-10-02 | End: 2024-02-05

## 2024-02-05 RX ORDER — PREDNISONE 10 MG/1
10 TABLET ORAL
Qty: 18 | Refills: 0 | Status: COMPLETED | COMMUNITY
Start: 2023-10-02 | End: 2024-02-05

## 2024-02-05 NOTE — HISTORY OF PRESENT ILLNESS
[TextBox_4] : Had left elbow fracture from fall 6 weeks ago, still recovering from it.   Overall feeling well; has productive cough that "comes and goes"; occasional shortness of breath with exertion

## 2024-02-05 NOTE — ASSESSMENT
[FreeTextEntry1] : Continue Symbicort HFA for history of COPD Continue Spiriva Turbuhaler for COPD Albuterol via nebulizer 4 times daily Continue Lasix daily. Continue Proair Respiclick as needed. Cardiology follow-up. Thoracic surgery follow-up with Dr. Yoon. Repeat chest CT scan on July 24, 2023 reviewed with patient in the office today. Return for pulmonary follow-up In 3 months. No acute or active pulmonary contraindications to the proposed left leg angiogram on October 18, 2023.  Repeat chest CT scan for follow-up in 5 months.

## 2024-02-22 NOTE — CONSULT LETTER
[Dear  ___] : Dear  [unfilled], [Courtesy Letter:] : I had the pleasure of seeing your patient, [unfilled], in my office today.       Objective   Physical Exam  Constitutional:       General: He is not in acute distress.     Appearance: He is well-developed. He is not diaphoretic.   HENT:      Head: Normocephalic and atraumatic.      Mouth/Throat:      Pharynx: No oropharyngeal exudate.   Eyes:      General: No scleral icterus.     Pupils: Pupils are equal, round, and reactive to light.   Neck:      Trachea: No tracheal deviation.   Cardiovascular:      Rate and Rhythm: Normal rate and regular rhythm.      Heart sounds: Normal heart sounds. No murmur heard.  Pulmonary:      Effort: Pulmonary effort is normal. No respiratory distress.      Breath sounds: No wheezing.   Abdominal:      General: Bowel sounds are normal. There is no distension.      Palpations: Abdomen is soft. There is no mass.      Tenderness: There is no abdominal tenderness. There is no guarding or rebound.      Hernia: No hernia is present.   Musculoskeletal:         General: No tenderness. Normal range of motion.      Cervical back: Normal range of motion and neck supple.   Lymphadenopathy:      Cervical: No cervical adenopathy.   Skin:     General: Skin is warm.      Findings: No erythema or rash.   Neurological:      Mental Status: He is alert and oriented to person, place, and time.   Psychiatric:         Behavior: Behavior normal.                  I had an extensive and thorough discussion with Dexter Flaherty regarding current diagnosis and treatment recommendations. Total time spend with him was 65 minutes.  This included the following:  preparing to see the patient (reviewing prior records and tests),  performing a medically appropriate examination and/or evaluation,  counseling and educating the patient/family/caregiver,  documenting clinical information in the electronic or other health record,  independently interpreting results and communicating results to the patient/family/caregiver,  ordering medications, tests, or procedures,  referring and communicating  [Please see my note below.] : Please see my note below. [Referral Closing:] : Thank you very much for seeing this patient.  If you have any questions, please do not hesitate to contact me. [Sincerely,] : Sincerely, [FreeTextEntry3] : Dr. Petty Steen

## 2024-03-11 NOTE — ED ADULT TRIAGE NOTE - SOURCE OF INFORMATION
Called pt to schedule appt with GI provider Ethan Acuña at [facility]. No answer/left message to call [phone] for scheduling.    Patient

## 2024-03-15 ENCOUNTER — APPOINTMENT (OUTPATIENT)
Dept: VASCULAR SURGERY | Facility: CLINIC | Age: 84
End: 2024-03-15
Payer: MEDICARE

## 2024-03-15 VITALS
HEIGHT: 61 IN | SYSTOLIC BLOOD PRESSURE: 133 MMHG | BODY MASS INDEX: 28.13 KG/M2 | WEIGHT: 149 LBS | HEART RATE: 86 BPM | DIASTOLIC BLOOD PRESSURE: 72 MMHG

## 2024-03-15 PROCEDURE — 99215 OFFICE O/P EST HI 40 MIN: CPT

## 2024-03-15 NOTE — PHYSICAL EXAM
[JVD] : no jugular venous distention  [Normal Breath Sounds] : Normal breath sounds [Normal Heart Sounds] : normal heart sounds [2+] : left 2+ [0] : left 0 [Ankle Swelling (On Exam)] : present [Ankle Swelling On The Left] : moderate [Abdomen Masses] : No abdominal masses

## 2024-03-15 NOTE — HISTORY OF PRESENT ILLNESS
[FreeTextEntry1] : Patient with severe peripheral vascular disease.  Status post right lower extremity intervention with excellent results.  Status post left lower extremity intervention with inability to revascularize the distal tibial vessels.  Patient continues to have significant rest pain in the left lower leg associated with swelling.  No open wounds.  Right leg feels a lot better than prior to the procedure.

## 2024-03-15 NOTE — ASSESSMENT
[FreeTextEntry1] : Patient is status post bilateral lower extremity intervention.  Patient complaining of worsening symptoms of both feet with significant nightly cramps and discomfort. Left lower extremity has persistent distal tibial vessel disease.   Patient also complaining of intermittent abdominal pain and vomiting.  Patient undergoing GI workup.  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  Will schedule the patient for mesenteric artery duplex to further evaluate.  Patient also will be scheduled for duplex of right lower extremity stents and PVRs and toe pressures to further evaluate lower extremity circulation.  Patient may require lower extremity angiogram for revascularization.  Meanwhile continue with Eliquis and aspirin.  Follow-up in a month.

## 2024-03-20 ENCOUNTER — APPOINTMENT (OUTPATIENT)
Dept: VASCULAR SURGERY | Facility: CLINIC | Age: 84
End: 2024-03-20
Payer: MEDICARE

## 2024-03-20 DIAGNOSIS — I65.29 OCCLUSION AND STENOSIS OF UNSPECIFIED CAROTID ARTERY: ICD-10-CM

## 2024-03-20 PROCEDURE — 93976 VASCULAR STUDY: CPT

## 2024-03-20 PROCEDURE — 93925 LOWER EXTREMITY STUDY: CPT

## 2024-03-20 PROCEDURE — 93880 EXTRACRANIAL BILAT STUDY: CPT

## 2024-03-20 PROCEDURE — 93923 UPR/LXTR ART STDY 3+ LVLS: CPT

## 2024-03-20 PROCEDURE — 93978 VASCULAR STUDY: CPT | Mod: 59

## 2024-03-20 RX ORDER — ATORVASTATIN CALCIUM 20 MG/1
20 TABLET, FILM COATED ORAL DAILY
Qty: 1 | Refills: 3 | Status: ACTIVE | COMMUNITY
Start: 2023-02-08 | End: 1900-01-01

## 2024-03-22 ENCOUNTER — APPOINTMENT (OUTPATIENT)
Dept: VASCULAR SURGERY | Facility: CLINIC | Age: 84
End: 2024-03-22

## 2024-03-27 LAB
ANION GAP SERPL CALC-SCNC: 11 MMOL/L
APTT BLD: 32.7 SEC
BUN SERPL-MCNC: 11 MG/DL
CALCIUM SERPL-MCNC: 10 MG/DL
CHLORIDE SERPL-SCNC: 107 MMOL/L
CO2 SERPL-SCNC: 25 MMOL/L
CREAT SERPL-MCNC: 0.9 MG/DL
EGFR: 64 ML/MIN/1.73M2
GLUCOSE SERPL-MCNC: 117 MG/DL
HCT VFR BLD CALC: 33.1 %
HGB BLD-MCNC: 10.7 G/DL
INR PPP: 1.45 RATIO
MCHC RBC-ENTMCNC: 32.3 GM/DL
MCHC RBC-ENTMCNC: 34.9 PG
MCV RBC AUTO: 107.8 FL
PLATELET # BLD AUTO: 289 K/UL
POTASSIUM SERPL-SCNC: 4.9 MMOL/L
PT BLD: 16.2 SEC
RBC # BLD: 3.07 M/UL
RBC # FLD: 15.5 %
SODIUM SERPL-SCNC: 142 MMOL/L
WBC # FLD AUTO: 5.53 K/UL

## 2024-03-29 ENCOUNTER — APPOINTMENT (OUTPATIENT)
Dept: CT IMAGING | Facility: CLINIC | Age: 84
End: 2024-03-29

## 2024-04-01 ENCOUNTER — APPOINTMENT (OUTPATIENT)
Dept: CT IMAGING | Facility: HOSPITAL | Age: 84
End: 2024-04-01

## 2024-04-16 ENCOUNTER — APPOINTMENT (OUTPATIENT)
Dept: ENDOVASCULAR SURGERY | Facility: CLINIC | Age: 84
End: 2024-04-16
Payer: MEDICARE

## 2024-04-16 ENCOUNTER — RESULT REVIEW (OUTPATIENT)
Age: 84
End: 2024-04-16

## 2024-04-16 ENCOUNTER — RX RENEWAL (OUTPATIENT)
Age: 84
End: 2024-04-16

## 2024-04-16 VITALS
OXYGEN SATURATION: 100 % | HEART RATE: 99 BPM | RESPIRATION RATE: 18 BRPM | DIASTOLIC BLOOD PRESSURE: 74 MMHG | TEMPERATURE: 97.4 F | WEIGHT: 149 LBS | HEIGHT: 61 IN | BODY MASS INDEX: 28.13 KG/M2 | SYSTOLIC BLOOD PRESSURE: 134 MMHG

## 2024-04-16 PROCEDURE — 76937 US GUIDE VASCULAR ACCESS: CPT

## 2024-04-16 PROCEDURE — 37225Z: CUSTOM | Mod: 82,59,RT

## 2024-04-16 PROCEDURE — 75625 CONTRAST EXAM ABDOMINL AORTA: CPT

## 2024-04-16 PROCEDURE — 37231Z: CUSTOM | Mod: 82,RT

## 2024-04-16 PROCEDURE — 37231Z: CUSTOM | Mod: RT

## 2024-04-16 PROCEDURE — 37225Z: CUSTOM | Mod: 59,RT

## 2024-04-16 RX ORDER — FUROSEMIDE 20 MG/1
20 TABLET ORAL DAILY
Refills: 0 | Status: DISCONTINUED | COMMUNITY
End: 2024-04-16

## 2024-04-16 RX ORDER — TIOTROPIUM BROMIDE 18 UG/1
18 CAPSULE ORAL; RESPIRATORY (INHALATION) DAILY
Qty: 90 | Refills: 3 | Status: ACTIVE | COMMUNITY
Start: 2023-04-17 | End: 1900-01-01

## 2024-04-17 NOTE — HISTORY OF PRESENT ILLNESS
[FreeTextEntry1] : accompanied by daughter Charissa Hunt [phone] feels ok Cr 0.99 3/20/2024 last Eliquis 4/14/2024 pm took aspirin this morning breathing ok  [FreeTextEntry5] : yesterday at 9pm [FreeTextEntry6] : Dr. Cortés

## 2024-04-17 NOTE — PAST MEDICAL HISTORY
[FreeTextEntry1] : Malignant Hyperthermia Screening Tool and Risk of Bleeding Assessment  Ms. BATES RECORD denies family history of unexpected death following Anesthesia or Exercise. Denies Family history of Malignant Hyperthermia, Muscle or Neuromuscular disorder and High Temperature following exercise.  Ms. BATES RECORD denies history of Muscle Spasm, Dark or Chocolate - Colored urine and Unanticipated fever immediately following anesthesia or serious exercise.  MsMirza OLIVARES also denies bleeding tendencies/ Risks of Bleeding.

## 2024-04-17 NOTE — ASSESSMENT
[FreeTextEntry1] :  Patient complaining of worsening symptoms of both feet with significant nightly cramps and discomfort particularly in the right lower extremity. Arterial duplex demonstrates a patent right SFA stent.  Left common femoral to peroneal artery is patent with no evidence of stenosis. PVRs demonstrate persistent popliteal and tibial artery disease in the right lower extremity. plan for right leg angiogram.

## 2024-04-17 NOTE — DISCUSSION/SUMMARY
[FreeTextEntry1] : 83-year-old female with peripheral arterial disease status post bilateral lower extremity intervention.  Patient complaining of worsening symptoms of both feet with significant nightly cramps and discomfort particularly in the right lower extremity. Arterial duplex demonstrates a patent right SFA stent.  Left common femoral to peroneal artery is patent with no evidence of stenosis. PVRs demonstrate persistent popliteal and tibial artery disease in the right lower extremity. plan for right leg angiogram.  In the interim, patient to continue with atorvastatin, Eliquis and aspirin.

## 2024-04-29 ENCOUNTER — APPOINTMENT (OUTPATIENT)
Dept: VASCULAR SURGERY | Facility: CLINIC | Age: 84
End: 2024-04-29
Payer: MEDICARE

## 2024-04-29 DIAGNOSIS — I70.229 ATHEROSCLEROSIS OF NATIVE ARTERIES OF EXTREMITIES WITH REST PAIN, UNSPECIFIED EXTREMITY: ICD-10-CM

## 2024-04-29 DIAGNOSIS — I73.9 PERIPHERAL VASCULAR DISEASE, UNSPECIFIED: ICD-10-CM

## 2024-04-29 PROCEDURE — 99213 OFFICE O/P EST LOW 20 MIN: CPT

## 2024-04-29 PROCEDURE — 93926 LOWER EXTREMITY STUDY: CPT

## 2024-04-29 PROCEDURE — G0506: CPT

## 2024-04-29 NOTE — ASSESSMENT
[FreeTextEntry1] : Patient is status post bilateral lower extremity intervention.  Left lower extremity has persistent distal tibial vessel disease.    Duplex demonstrates widely patent right SFA and LUTHER stent with no evidence of stenosis.  Right lower extremity vessels patent from popliteal to dorsalis pedis arteries  Patient to continue aspirin, Eliquis, and atorvastatin.  Follow-up 3 months with arterial duplex and PVR. [Arterial/Venous Disease] : arterial/venous disease [Medication Management] : medication management

## 2024-04-29 NOTE — PHYSICAL EXAM
[JVD] : no jugular venous distention  [Normal Breath Sounds] : Normal breath sounds [Normal Rate and Rhythm] : normal rate and rhythm [2+] : left 2+ [0] : left 0 [Ankle Swelling (On Exam)] : present [Ankle Swelling On The Left] : moderate [Abdomen Masses] : No abdominal masses [No Rash or Lesion] : No rash or lesion [Alert] : alert [Calm] : calm [de-identified] : Appears well, no acute distress noted [de-identified] : Intact

## 2024-04-29 NOTE — HISTORY OF PRESENT ILLNESS
[FreeTextEntry1] : Patient with severe peripheral vascular disease s/p right lower extremity intervention with excellent results, s/p left lower extremity intervention with inability to revascularize the distal tibial vessels.  Denies rest pain.  Denies tissue loss.

## 2024-05-06 ENCOUNTER — APPOINTMENT (OUTPATIENT)
Dept: PULMONOLOGY | Facility: CLINIC | Age: 84
End: 2024-05-06
Payer: MEDICARE

## 2024-05-06 VITALS
OXYGEN SATURATION: 99 % | HEIGHT: 61 IN | HEART RATE: 93 BPM | SYSTOLIC BLOOD PRESSURE: 117 MMHG | DIASTOLIC BLOOD PRESSURE: 75 MMHG | WEIGHT: 150 LBS | BODY MASS INDEX: 28.32 KG/M2 | TEMPERATURE: 97.3 F | RESPIRATION RATE: 15 BRPM

## 2024-05-06 DIAGNOSIS — J44.9 CHRONIC OBSTRUCTIVE PULMONARY DISEASE, UNSPECIFIED: ICD-10-CM

## 2024-05-06 DIAGNOSIS — R91.8 OTHER NONSPECIFIC ABNORMAL FINDING OF LUNG FIELD: ICD-10-CM

## 2024-05-06 DIAGNOSIS — R06.00 DYSPNEA, UNSPECIFIED: ICD-10-CM

## 2024-05-06 DIAGNOSIS — R05.9 COUGH, UNSPECIFIED: ICD-10-CM

## 2024-05-06 PROCEDURE — 95012 NITRIC OXIDE EXP GAS DETER: CPT

## 2024-05-06 PROCEDURE — 94060 EVALUATION OF WHEEZING: CPT

## 2024-05-06 PROCEDURE — 94729 DIFFUSING CAPACITY: CPT

## 2024-05-06 PROCEDURE — 99214 OFFICE O/P EST MOD 30 MIN: CPT | Mod: 25

## 2024-05-06 PROCEDURE — 94726 PLETHYSMOGRAPHY LUNG VOLUMES: CPT

## 2024-05-06 PROCEDURE — ZZZZZ: CPT

## 2024-05-06 NOTE — ASSESSMENT
[FreeTextEntry1] : Continue Symbicort HFA for history of COPD Continue Spiriva Turbuhaler for COPD Albuterol via nebulizer 4 times daily Continue Lasix daily. Continue Proair Respiclick as needed. Cardiology follow-up. Thoracic surgery follow-up with Dr. Yoon. Return for pulmonary follow-up In 3 months.  Repeat chest CT scan for follow-up in 2 months.

## 2024-05-06 NOTE — REASON FOR VISIT
[COPD] : COPD [Follow-Up] : a follow-up visit [Abnormal CXR/ Chest CT] : an abnormal CXR/ chest CT [Pulmonary Nodules] : pulmonary nodules

## 2024-05-06 NOTE — PROCEDURE
[FreeTextEntry1] : Pulmonary function test performed in my office today: Spirometry shows mild obstructive airway disease with some improvement postbronchodilator; lung volume is within normal limits; resistance is increased; diffusion shows moderate impairment.

## 2024-05-09 ENCOUNTER — NON-APPOINTMENT (OUTPATIENT)
Age: 84
End: 2024-05-09

## 2024-05-10 ENCOUNTER — APPOINTMENT (OUTPATIENT)
Dept: UROLOGY | Facility: CLINIC | Age: 84
End: 2024-05-10
Payer: MEDICARE

## 2024-05-10 VITALS
WEIGHT: 150 LBS | OXYGEN SATURATION: 99 % | DIASTOLIC BLOOD PRESSURE: 84 MMHG | BODY MASS INDEX: 28.32 KG/M2 | SYSTOLIC BLOOD PRESSURE: 135 MMHG | HEIGHT: 61 IN | HEART RATE: 90 BPM

## 2024-05-10 DIAGNOSIS — R39.15 URGENCY OF URINATION: ICD-10-CM

## 2024-05-10 DIAGNOSIS — R31.29 OTHER MICROSCOPIC HEMATURIA: ICD-10-CM

## 2024-05-10 PROCEDURE — G2211 COMPLEX E/M VISIT ADD ON: CPT

## 2024-05-10 PROCEDURE — 99213 OFFICE O/P EST LOW 20 MIN: CPT

## 2024-05-10 NOTE — ASSESSMENT
[FreeTextEntry1] : Ms. Peña is a very pleasant 83 year old woman here today for history of microscopic hematuria. She reports feeling okay since she was here last. She reports that over the last month she has had some new onset urinary urgency. She'll feel suprapubic pressure and then a strong urge to urinate. Denies any hematuria or dysuria. CT scan and cystoscopy negative. UC. UA. Would like to defer pharmacological intervention for urgency at this time RTO in 1 year or sooner if needed.  Patient is being seen today for evaluation and management of a chronic and longitudinal ongoing condition and I am of the primary treating physician

## 2024-05-10 NOTE — HISTORY OF PRESENT ILLNESS
[Currently Experiencing ___] :  [unfilled] [Urinary Urgency] : urinary urgency [None] : None [FreeTextEntry1] : Ms. Peña is a very pleasant 83 year old woman here today for history of microscopic hematuria. She reports feeling okay since she was here last. She reports that over the last month she has had some new onset urinary urgency. She'll feel suprapubic pressure and then a strong urge to urinate. Denies any hematuria or dysuria.

## 2024-05-13 LAB
APPEARANCE: CLEAR
BACTERIA: NEGATIVE /HPF
BILIRUBIN URINE: NEGATIVE
BLOOD URINE: NEGATIVE
CAST: 0 /LPF
COLOR: YELLOW
EPITHELIAL CELLS: 1 /HPF
GLUCOSE QUALITATIVE U: NEGATIVE MG/DL
KETONES URINE: NEGATIVE MG/DL
LEUKOCYTE ESTERASE URINE: NEGATIVE
MICROSCOPIC-UA: NORMAL
NITRITE URINE: NEGATIVE
PH URINE: 6
PROTEIN URINE: NEGATIVE MG/DL
RED BLOOD CELLS URINE: 0 /HPF
SPECIFIC GRAVITY URINE: 1.02
UROBILINOGEN URINE: 0.2 MG/DL
WHITE BLOOD CELLS URINE: 1 /HPF

## 2024-05-30 PROBLEM — C34.91 SQUAMOUS CELL CARCINOMA OF RIGHT LUNG: Status: ACTIVE | Noted: 2023-03-09

## 2024-06-04 ENCOUNTER — APPOINTMENT (OUTPATIENT)
Dept: THORACIC SURGERY | Facility: CLINIC | Age: 84
End: 2024-06-04
Payer: MEDICARE

## 2024-06-04 VITALS
WEIGHT: 150 LBS | SYSTOLIC BLOOD PRESSURE: 109 MMHG | DIASTOLIC BLOOD PRESSURE: 65 MMHG | HEIGHT: 61 IN | HEART RATE: 86 BPM | BODY MASS INDEX: 28.32 KG/M2 | OXYGEN SATURATION: 97 % | RESPIRATION RATE: 16 BRPM

## 2024-06-04 DIAGNOSIS — R91.8 OTHER NONSPECIFIC ABNORMAL FINDING OF LUNG FIELD: ICD-10-CM

## 2024-06-04 DIAGNOSIS — C34.91 MALIGNANT NEOPLASM OF UNSPECIFIED PART OF RIGHT BRONCHUS OR LUNG: ICD-10-CM

## 2024-06-04 PROCEDURE — 99213 OFFICE O/P EST LOW 20 MIN: CPT

## 2024-06-04 NOTE — ASSESSMENT
[FreeTextEntry1] : Ms. JANA OLIVARES, 83 year old female, former smoker (quit 6 months ago, 40PY), w/ hx of HLD, A-Fib on Eliquis, CAD (stents x 2 in 2006) on ASA, CHF, COPD/Asthma, MARIE, skin cancer, COVID Sept 2022 s/p MAB infusion, who initially presented with new lung nodule.   ECHO on 6/29/22 at [facility]: EF 55-60%. Moderate Rt atrial enlargement; mild diastolic dysfunction.  PFTs on 8/8/22: %, FEV1 88%, DLCO 60%.  Now, s/p Flex bronch, uniportal Right VATS, wedge resection of RUL x1 on 3/2/23. Path of RUL: Invasive squamous cell carcinoma, keratinizing; 1.1 cm; All margins and LN (0/1) Negative for tumor; pT1b pN0 (Stage IA2)  Hospitalized 3/11-3/16 for right arm pain. pain and discomfort multifactorial : MSK pain from positioning during sx,  small hematoma at site of sx, cervical disk/spine disease causing nerve  compression as above  continue pain management as needed  ortho-spine Sx appreciated >> only recommending outpatient follow up: Dr. Koch   Here today with follow up imaging.   I have independently reviewed the medical records and imaging at the time of this office consultation, and discussed the following interpretations with the patient: - CT Chest reviewed with patient, stable scan. I discussed she returns to clinic in 3 months with CT Chest without contrast for re-evaluation. She is agreeable. - Continue follow up with pulm.   Recommendations reviewed with patient during this office visit, and all questions answered; Patient instructed on the importance of follow up and verbalizes understanding.   I, BONNIE Reed, personally performed the evaluation and management (E/M) services for this established patient. That E/M includes conducting the examination, assessing all new/exacerbated conditions, and establishing a new plan of care. Today, my ACP, Acosta Mallory NP, was here to observe my evaluation and management services for this new problem/exacerbated condition to be followed going forward.

## 2024-06-04 NOTE — HISTORY OF PRESENT ILLNESS
[FreeTextEntry1] : Ms. JANA OLIVARES, 83 year old female, former smoker (quit 6 months ago, 40PY), w/ hx of HLD, A-Fib on Eliquis, CAD (stents x 2 in 2006) on ASA, CHF, COPD/Asthma, MARIE, skin cancer, COVID Sept 2022 s/p MAB infusion, who initially presented with new lung nodule.   ECHO on 6/29/22 at [facility]: EF 55-60%. Moderate Rt atrial enlargement; mild diastolic dysfunction.  PFTs on 8/8/22: %, FEV1 88%, DLCO 60%.  CT Chest on 9/30/22 at [facility] (compared to CT on 4/20/22): - bilateral apical pleural thickening; centrilobular emphysema - new 6 x 6mm slightly irregular nodule in the Rt apex (3:28), suspicious, previously noted as tiny Rt apical nodules - stable 6 x 3mm subpleural nodule in the RLL (3:82) - no mediastinal adenopathy  Nodify blood work on 10/21/22: No significant level of autoantibodies detected post Nodify CDT risk of malignancy  CT Chest on 1/5/23: - Continued interval increase in size of lobulated minimally spiculated RUL nodule which remains highly suspicious for malignancy, now measuring 8 x 10 mm PET suggested - Unchanged RUL 6 x 3 mm subpleural nodule (image 87) - Small pericardial effusion  PFTs on 2/13/23: FVC: 2.4 (100%); FEV1: 1.53 (88%); DLCO: 11.73 (64%)  Now, s/p Flex bronch, uniportal Right VATS, wedge resection of RUL x1 on 3/2/23. Path of RUL: Invasive squamous cell carcinoma, keratinizing; 1.1 cm; All margins and LN (0/1) Negative for tumor; pT1b pN0 (Stage IA2)  CT Angio CAP on 3/11/23:  - s/p RUL wedge resection with dystrophic calcifications noted in the surgical bed. - Groundglass airspace opacities are noted in the RUL. - Small right pleural effusion. Fluid tracks along the right major fissure. - Heart size is stable, enlarged.  - Bilateral renal cysts with a 1.4 cm cyst noted in the upper pole of the right kidney and a 2.6 cm cyst in the right inferior pole. A partially rim calcified, exophytic 2.5 cm cyst is noted in the upper pole of the left kidney. - No aortic dissection.  Hospitalized 3/11-3/16 for right arm pain. pain and discomfort multifactorial : MSK pain from positioning during sx,  small hematoma at site of sx, cervical disk/spine disease causing nerve  compression as above  continue pain management as needed  ortho-spine Sx appreciated >> only recommending outpatient follow up: Dr. Koch   CT Chest on 6/6/23:  - Post op changes - Linear consolidation in the right [place] (4:25) - Stable 6 x 3 mm subpleural RLL nodule (4:74) - Subpleural fibrotic changes are again noted in the periphery of both lungs most prominent in the bilateral lower lobes - Stable, bilateral fluid density renal lesions one the left with peripheral calcification - 4.2 cm fat-containing ventral abdominal wall hernia with 1.1 cm abdominal wall defect - Mild degenerative changes of the spine.   Seen on 06/27/2023: RTC in 3 months with CT Chest without contrast. Patient w/ persistent Right upper extremity pain as well as numbness. Continue with Physical Therapy. Discussed neuro workup. Will give contact info for Dr. Harish Ware if she is agreeable.   CT Chest on 07/24/2023 (): - Status post right upper lobe wedge resection. Stable nodular/linear density in the left apex 4:25.  - Stability favors postsurgical change - Stable 6 x 3 mm subpleural lower lobe nodule 4:79 - Stable bibasilar subpleural fibrotic changes - Mild to moderate emphysematous changes - Mild bilateral bronchial wall thickening with scattered foci of mucous plugging for example left lower lobe 4:100 - Fluid density left renal lesion with peripheral calcification is again noted.  - 4.6 cm fat containing ventral abdominal wall hernia.  PFTs on 09/11/2023: FVC 2.26, 95%; FEV1 1.34, 77%; DLCO 13.37, 72%  CT Chest on 5/9/24: ([facility]) - Centrilobular emphysema most prominent within the upper lung fields - Small amount of nodular soft tissue density adjacent to the surgical sutures at the medial left lung apex which is stable.  - 6 mm subpleural nodular density again seen along the posterior pleural surface of the RLL (Series 5, Image 82) - Atherosclerotic calcification within the thoracic aorta and coronary arteries - 3 cm peripherally calcified exophytic cystic mass of the lateral upper pole of the left kidney, stable.   PFTs on 5/6/2024: FVC: 2.58 (109%); FEV1: 1.6 (93%); DLCO: 10.45 (57%)   Patient presents to office for follow up. Reports shortness of breath with activity and dry cough which are both chronic. Denies any fever, chills, or hemoptysis.

## 2024-06-26 ENCOUNTER — OUTPATIENT (OUTPATIENT)
Dept: OUTPATIENT SERVICES | Facility: HOSPITAL | Age: 84
LOS: 1 days | End: 2024-06-26
Payer: MEDICARE

## 2024-06-26 ENCOUNTER — APPOINTMENT (OUTPATIENT)
Dept: CT IMAGING | Facility: HOSPITAL | Age: 84
End: 2024-06-26

## 2024-06-26 DIAGNOSIS — Z98.890 OTHER SPECIFIED POSTPROCEDURAL STATES: Chronic | ICD-10-CM

## 2024-06-26 DIAGNOSIS — C34.91 MALIGNANT NEOPLASM OF UNSPECIFIED PART OF RIGHT BRONCHUS OR LUNG: ICD-10-CM

## 2024-06-26 DIAGNOSIS — Z96.641 PRESENCE OF RIGHT ARTIFICIAL HIP JOINT: Chronic | ICD-10-CM

## 2024-06-26 DIAGNOSIS — Z98.49 CATARACT EXTRACTION STATUS, UNSPECIFIED EYE: Chronic | ICD-10-CM

## 2024-06-26 DIAGNOSIS — Z96.642 PRESENCE OF LEFT ARTIFICIAL HIP JOINT: Chronic | ICD-10-CM

## 2024-06-26 PROCEDURE — 71250 CT THORAX DX C-: CPT

## 2024-06-26 PROCEDURE — 71250 CT THORAX DX C-: CPT | Mod: 26,MH

## 2024-07-12 ENCOUNTER — APPOINTMENT (OUTPATIENT)
Dept: VASCULAR SURGERY | Facility: CLINIC | Age: 84
End: 2024-07-12
Payer: MEDICARE

## 2024-07-12 ENCOUNTER — APPOINTMENT (OUTPATIENT)
Dept: VASCULAR SURGERY | Facility: CLINIC | Age: 84
End: 2024-07-12

## 2024-07-12 VITALS
WEIGHT: 150 LBS | HEART RATE: 77 BPM | SYSTOLIC BLOOD PRESSURE: 139 MMHG | BODY MASS INDEX: 28.32 KG/M2 | DIASTOLIC BLOOD PRESSURE: 69 MMHG | HEIGHT: 61 IN

## 2024-07-12 PROCEDURE — 99214 OFFICE O/P EST MOD 30 MIN: CPT

## 2024-07-12 PROCEDURE — 93923 UPR/LXTR ART STDY 3+ LVLS: CPT

## 2024-07-12 PROCEDURE — 93926 LOWER EXTREMITY STUDY: CPT | Mod: RT

## 2024-07-12 PROCEDURE — G2211 COMPLEX E/M VISIT ADD ON: CPT

## 2024-07-24 RX ORDER — OXYCODONE AND ACETAMINOPHEN 5; 325 MG/1; MG/1
5-325 TABLET ORAL EVERY 4 HOURS
Qty: 10 | Refills: 0 | Status: ACTIVE | COMMUNITY
Start: 2024-07-24 | End: 1900-01-01

## 2024-07-31 ENCOUNTER — NON-APPOINTMENT (OUTPATIENT)
Age: 84
End: 2024-07-31

## 2024-08-07 ENCOUNTER — NON-APPOINTMENT (OUTPATIENT)
Age: 84
End: 2024-08-07

## 2024-08-09 ENCOUNTER — APPOINTMENT (OUTPATIENT)
Dept: VASCULAR SURGERY | Facility: CLINIC | Age: 84
End: 2024-08-09

## 2024-08-09 PROCEDURE — 93926 LOWER EXTREMITY STUDY: CPT | Mod: RT

## 2024-08-09 PROCEDURE — G2211 COMPLEX E/M VISIT ADD ON: CPT

## 2024-08-09 PROCEDURE — 99214 OFFICE O/P EST MOD 30 MIN: CPT

## 2024-08-09 NOTE — ASSESSMENT
[FreeTextEntry1] : Patient with severe peripheral vascular disease and severe rest pain.  Right SFA and anterior tibial stents are patent but distal anterior tibial artery is occluded.  Will schedule the patient for right lower extremity angiogram and reintervention.  This was all discussed with the patient detail.

## 2024-08-09 NOTE — HISTORY OF PRESENT ILLNESS
[FreeTextEntry1] : Patient with history of severe peripheral vascular disease status post right lower extremity intervention.  Patient complaining of severe pain in both lower extremities specially in the feet.  No tissue loss.

## 2024-08-13 ENCOUNTER — APPOINTMENT (OUTPATIENT)
Dept: ENDOVASCULAR SURGERY | Facility: CLINIC | Age: 84
End: 2024-08-13
Payer: MEDICARE

## 2024-08-13 ENCOUNTER — NON-APPOINTMENT (OUTPATIENT)
Age: 84
End: 2024-08-13

## 2024-08-13 ENCOUNTER — RESULT REVIEW (OUTPATIENT)
Age: 84
End: 2024-08-13

## 2024-08-13 VITALS
OXYGEN SATURATION: 99 % | HEART RATE: 90 BPM | RESPIRATION RATE: 16 BRPM | TEMPERATURE: 97.2 F | SYSTOLIC BLOOD PRESSURE: 128 MMHG | WEIGHT: 150 LBS | DIASTOLIC BLOOD PRESSURE: 62 MMHG | BODY MASS INDEX: 28.32 KG/M2 | HEIGHT: 61 IN

## 2024-08-13 DIAGNOSIS — I73.9 PERIPHERAL VASCULAR DISEASE, UNSPECIFIED: ICD-10-CM

## 2024-08-13 PROCEDURE — 37231Z: CUSTOM | Mod: 82,59,RT

## 2024-08-13 PROCEDURE — 37225Z: CUSTOM | Mod: 59,RT

## 2024-08-13 PROCEDURE — 37225Z: CUSTOM | Mod: 82,RT

## 2024-08-13 PROCEDURE — 37231Z: CUSTOM | Mod: RT

## 2024-08-13 PROCEDURE — 75625 CONTRAST EXAM ABDOMINL AORTA: CPT

## 2024-08-13 NOTE — ASSESSMENT
[FreeTextEntry1] : Patient with severe peripheral vascular disease and severe rest pain. Right SFA and anterior tibial stents are patent but distal anterior tibial artery is occluded.  Plan for right lower extremity angiogram and intervention

## 2024-08-13 NOTE — HISTORY OF PRESENT ILLNESS
[FreeTextEntry1] : accompanied by daughter Charissa [phone] alert and oriented feels ok Cr: 0.82 7/9/24 last Eliquis sunday night  no medications taken this morning gave aspirin at 7am   [FreeTextEntry5] : yesterday at 8pm [FreeTextEntry6] : Dr. Phillip

## 2024-08-13 NOTE — HISTORY OF PRESENT ILLNESS
[FreeTextEntry1] : accompanied by daughter Charissa [phone] alert and oriented feels ok Cr: 0.82 7/9/24 last Eliquis sunday night  no medications taken this morning gave aspirin at 7am   [FreeTextEntry5] : yesterday at 8pm [FreeTextEntry6] : Dr. Phillip

## 2024-08-13 NOTE — HISTORY OF PRESENT ILLNESS
[FreeTextEntry1] : accompanied by daughter Charissa [phone] alert and oriented feels ok Cr: 0.82 7/9/24 last Eliquis sunday night  no medications taken this morning gave aspirin at 7am   [FreeTextEntry5] : yesterday at 8pm [FreeTextEntry6] : Dr. Phillip

## 2024-08-15 ENCOUNTER — NON-APPOINTMENT (OUTPATIENT)
Age: 84
End: 2024-08-15

## 2024-08-15 ENCOUNTER — LABORATORY RESULT (OUTPATIENT)
Age: 84
End: 2024-08-15

## 2024-08-15 ENCOUNTER — APPOINTMENT (OUTPATIENT)
Dept: PULMONOLOGY | Facility: CLINIC | Age: 84
End: 2024-08-15
Payer: MEDICARE

## 2024-08-15 VITALS
OXYGEN SATURATION: 96 % | DIASTOLIC BLOOD PRESSURE: 77 MMHG | SYSTOLIC BLOOD PRESSURE: 109 MMHG | HEART RATE: 86 BPM | TEMPERATURE: 97.4 F

## 2024-08-15 DIAGNOSIS — R05.9 COUGH, UNSPECIFIED: ICD-10-CM

## 2024-08-15 PROCEDURE — G2211 COMPLEX E/M VISIT ADD ON: CPT

## 2024-08-15 PROCEDURE — 71046 X-RAY EXAM CHEST 2 VIEWS: CPT

## 2024-08-15 PROCEDURE — 36415 COLL VENOUS BLD VENIPUNCTURE: CPT

## 2024-08-15 PROCEDURE — 99214 OFFICE O/P EST MOD 30 MIN: CPT

## 2024-08-15 RX ORDER — PREDNISONE 10 MG/1
10 TABLET ORAL
Qty: 30 | Refills: 0 | Status: ACTIVE | COMMUNITY
Start: 2024-08-15 | End: 1900-01-01

## 2024-08-16 ENCOUNTER — NON-APPOINTMENT (OUTPATIENT)
Age: 84
End: 2024-08-16

## 2024-08-16 LAB
ANION GAP SERPL CALC-SCNC: 15 MMOL/L
BASOPHILS # BLD AUTO: 0.15 K/UL
BASOPHILS NFR BLD AUTO: 1.5 %
BUN SERPL-MCNC: 10 MG/DL
CALCIUM SERPL-MCNC: 9.9 MG/DL
CHLORIDE SERPL-SCNC: 104 MMOL/L
CO2 SERPL-SCNC: 22 MMOL/L
CREAT SERPL-MCNC: 1.04 MG/DL
EGFR: 54 ML/MIN/1.73M2
EOSINOPHIL # BLD AUTO: 0.26 K/UL
EOSINOPHIL NFR BLD AUTO: 2.6 %
GLUCOSE SERPL-MCNC: 116 MG/DL
HCT VFR BLD CALC: 34.5 %
HGB BLD-MCNC: 10.9 G/DL
IMM GRANULOCYTES NFR BLD AUTO: 0.7 %
INFLUENZA A RESULT: NOT DETECTED
INFLUENZA B RESULT: NOT DETECTED
LYMPHOCYTES # BLD AUTO: 0.86 K/UL
LYMPHOCYTES NFR BLD AUTO: 8.6 %
MAN DIFF?: NORMAL
MCHC RBC-ENTMCNC: 31.6 GM/DL
MCHC RBC-ENTMCNC: 36.7 PG
MCV RBC AUTO: 116.2 FL
MONOCYTES # BLD AUTO: 1 K/UL
MONOCYTES NFR BLD AUTO: 10 %
NEUTROPHILS # BLD AUTO: 7.63 K/UL
NEUTROPHILS NFR BLD AUTO: 76.6 %
PLATELET # BLD AUTO: 280 K/UL
POTASSIUM SERPL-SCNC: 4.6 MMOL/L
PROCALCITONIN SERPL-MCNC: 0.04 NG/ML
RBC # BLD: 2.97 M/UL
RBC # FLD: 15.2 %
RESP SYN VIRUS RESULT: NOT DETECTED
SARS-COV-2 RESULT: DETECTED
SODIUM SERPL-SCNC: 142 MMOL/L
WBC # FLD AUTO: 9.97 K/UL

## 2024-08-16 NOTE — PHYSICAL EXAM
[No Acute Distress] : no acute distress [No Resp Distress] : no resp distress [No Focal Deficits] : no focal deficits [Oriented x3] : oriented x3 [TextBox_68] : scattered rhonchi

## 2024-08-16 NOTE — HISTORY OF PRESENT ILLNESS
[TextBox_4] : JANA OLIVARES is a 83 year old female who presents with cough  a few weeks of cough chest congestoin thorat clearing  coivd negative at home went to  s/p zpak  on prednisone 40mg - day 2 today  on her inhalers no fever

## 2024-08-16 NOTE — PROCEDURE
[FreeTextEntry1] :  the labs were drawn in the office today	 Chest x-ray PA and lateral views performed in my office today showed cardiomegaly, clear lungs, no evidence of infiltrates or pleural effusions.  swab today

## 2024-08-18 ENCOUNTER — NON-APPOINTMENT (OUTPATIENT)
Age: 84
End: 2024-08-18

## 2024-08-24 NOTE — ED ADULT TRIAGE NOTE - BRAND OF FIRST COVID-19 BOOSTER
[FreeTextEntry1] : 78yo M with h/o DM and  shunt presents for follow up after admission on 8/2 for pancreatitis. Work up at that time revealed cholelithiasis, however he was also taking ozempic. Pancreatitis could be either biliary or medication-induced. Because pt is somewhat higher risk for surgery (frail, nonambulatory, history of  shunt with likely intraabdominal adhesions), and there is a reasonable alternative etiology of the pancreatitis, I would recommend expectant observation at this time, rather than cholecystectomy to prevent future episodes. If he develops recurrent pancreatitis while off ozempic, would recommend cholecystectomy. Discussed extensively with patient and his wife, who agree with the plan.  [employer]

## 2024-08-26 ENCOUNTER — APPOINTMENT (OUTPATIENT)
Dept: VASCULAR SURGERY | Facility: CLINIC | Age: 84
End: 2024-08-26
Payer: MEDICARE

## 2024-08-26 PROCEDURE — G2211 COMPLEX E/M VISIT ADD ON: CPT

## 2024-08-26 PROCEDURE — 93926 LOWER EXTREMITY STUDY: CPT | Mod: RT

## 2024-08-26 PROCEDURE — 99214 OFFICE O/P EST MOD 30 MIN: CPT

## 2024-08-26 NOTE — ASSESSMENT
[FreeTextEntry1] : Patient with severe peripheral vascular disease and severe rest pain.  Right SFA and anterior tibial stents are patent but distal anterior tibial artery is occluded.  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  Will hold off further intervention of the anterior tibial artery.  Patient recently had worsening of COPD and currently recovering.  Follow-up in 4 weeks with PVRs.  Continue atorvastatin for treatment of hyperlipidemia.  Continue aspirin and Eliquis for treatment of peripheral vascular disease.

## 2024-09-09 ENCOUNTER — APPOINTMENT (OUTPATIENT)
Dept: PULMONOLOGY | Facility: CLINIC | Age: 84
End: 2024-09-09
Payer: MEDICARE

## 2024-09-09 VITALS
HEIGHT: 61 IN | BODY MASS INDEX: 28.32 KG/M2 | OXYGEN SATURATION: 97 % | DIASTOLIC BLOOD PRESSURE: 76 MMHG | WEIGHT: 150 LBS | TEMPERATURE: 97.2 F | SYSTOLIC BLOOD PRESSURE: 115 MMHG | HEART RATE: 55 BPM

## 2024-09-09 DIAGNOSIS — J44.1 CHRONIC OBSTRUCTIVE PULMONARY DISEASE WITH (ACUTE) EXACERBATION: ICD-10-CM

## 2024-09-09 DIAGNOSIS — R91.8 OTHER NONSPECIFIC ABNORMAL FINDING OF LUNG FIELD: ICD-10-CM

## 2024-09-09 DIAGNOSIS — J44.9 CHRONIC OBSTRUCTIVE PULMONARY DISEASE, UNSPECIFIED: ICD-10-CM

## 2024-09-09 DIAGNOSIS — R06.00 DYSPNEA, UNSPECIFIED: ICD-10-CM

## 2024-09-09 PROCEDURE — 99214 OFFICE O/P EST MOD 30 MIN: CPT | Mod: 25

## 2024-09-09 PROCEDURE — 94010 BREATHING CAPACITY TEST: CPT

## 2024-09-09 RX ORDER — ROFLUMILAST 250 UG/1
250 TABLET ORAL
Qty: 30 | Refills: 1 | Status: ACTIVE | COMMUNITY
Start: 2024-09-09 | End: 1900-01-01

## 2024-09-09 NOTE — DISCUSSION/SUMMARY
[FreeTextEntry1] : Shortness of breath secondary to COPD exacerbation from history of cigarette smoking.  A 1 x 1 cm right upper lobe nodule shown on the current chest CT scan on September 5, 2024, rule out postinflammatory changes, rule out recurrent malignancy. History of lung nodules/lung cancer.

## 2024-09-09 NOTE — HISTORY OF PRESENT ILLNESS
[TextBox_4] : JANA OLIVARES is a 83 year old female, with history of COPD and asthma, who presents to the office for follow up evaluation. Patient notes that she has been felling worse since her recent second bought with COVID Patient notes she is taking sympicort, spiriva and cefdinir  Patient notes she has SOB Patient notes that she has stomach pain but that she has not seen a GI.

## 2024-09-09 NOTE — ASSESSMENT
[FreeTextEntry1] : Continue Symbicort HFA for history of COPD Continue Spiriva Turbuhaler for COPD Albuterol via nebulizer 4 times daily  Patient should begin taking Roflumilast at 250mcg Daily to manage COPD.  Provider recommended lowest dose to start out. Patient will take for rest of her life CT scan On September 5, 2024 showed a "new 10x10 mm nodule in the right upper lobe", which the radiologist was concerned could be indicative of a returning cancer. Provider recommend PET scan to be done before patient follows up with present provider in 3 weeks (Sept 30 2024). PET scan should be done at a [facility] location.   Patient should also begin prednisone taper (3 tablets once a day for 3 days, 2 tab once a day for 3 days, 1 tab once a day for 3 days) as COVID likely exacerbated COPD

## 2024-09-09 NOTE — ADDENDUM
[FreeTextEntry1] :  I, Tien Hector, acted solely as a scribe for Dr. Petty Steen D.O. on this date 09/09/2024.   All medical record entries made by the Scribe were at my, Dr. Petty Steen D.O., direction and personally dictated by me on 09/09/2024. I have reviewed the chart and agree that the record accurately reflects my personal performance of the history, physical exam, assessment and plan. I have also personally directed, reviewed, and agreed with the chart.

## 2024-09-16 ENCOUNTER — APPOINTMENT (OUTPATIENT)
Dept: PULMONOLOGY | Facility: CLINIC | Age: 84
End: 2024-09-16
Payer: MEDICARE

## 2024-09-16 VITALS
RESPIRATION RATE: 15 BRPM | DIASTOLIC BLOOD PRESSURE: 71 MMHG | HEIGHT: 61 IN | BODY MASS INDEX: 28.32 KG/M2 | TEMPERATURE: 97.2 F | SYSTOLIC BLOOD PRESSURE: 109 MMHG | HEART RATE: 97 BPM | OXYGEN SATURATION: 97 % | WEIGHT: 150 LBS

## 2024-09-16 DIAGNOSIS — J45.909 UNSPECIFIED ASTHMA, UNCOMPLICATED: ICD-10-CM

## 2024-09-16 PROCEDURE — 94727 GAS DIL/WSHOT DETER LNG VOL: CPT

## 2024-09-16 PROCEDURE — 94729 DIFFUSING CAPACITY: CPT

## 2024-09-16 PROCEDURE — 94060 EVALUATION OF WHEEZING: CPT

## 2024-09-16 PROCEDURE — 95012 NITRIC OXIDE EXP GAS DETER: CPT

## 2024-09-18 ENCOUNTER — APPOINTMENT (OUTPATIENT)
Dept: NUCLEAR MEDICINE | Facility: IMAGING CENTER | Age: 84
End: 2024-09-18

## 2024-09-18 ENCOUNTER — OUTPATIENT (OUTPATIENT)
Dept: OUTPATIENT SERVICES | Facility: HOSPITAL | Age: 84
LOS: 1 days | End: 2024-09-18
Payer: MEDICARE

## 2024-09-18 DIAGNOSIS — Z96.641 PRESENCE OF RIGHT ARTIFICIAL HIP JOINT: Chronic | ICD-10-CM

## 2024-09-18 DIAGNOSIS — R91.8 OTHER NONSPECIFIC ABNORMAL FINDING OF LUNG FIELD: ICD-10-CM

## 2024-09-18 DIAGNOSIS — Z96.642 PRESENCE OF LEFT ARTIFICIAL HIP JOINT: Chronic | ICD-10-CM

## 2024-09-18 PROCEDURE — 78815 PET IMAGE W/CT SKULL-THIGH: CPT | Mod: 26,PI,MH

## 2024-09-18 PROCEDURE — 78815 PET IMAGE W/CT SKULL-THIGH: CPT

## 2024-09-18 PROCEDURE — A9552: CPT

## 2024-09-24 ENCOUNTER — APPOINTMENT (OUTPATIENT)
Dept: THORACIC SURGERY | Facility: CLINIC | Age: 84
End: 2024-09-24
Payer: MEDICARE

## 2024-09-24 VITALS
HEART RATE: 81 BPM | HEIGHT: 61 IN | DIASTOLIC BLOOD PRESSURE: 89 MMHG | WEIGHT: 150 LBS | RESPIRATION RATE: 18 BRPM | BODY MASS INDEX: 28.32 KG/M2 | OXYGEN SATURATION: 97 % | SYSTOLIC BLOOD PRESSURE: 119 MMHG

## 2024-09-24 DIAGNOSIS — R91.8 OTHER NONSPECIFIC ABNORMAL FINDING OF LUNG FIELD: ICD-10-CM

## 2024-09-24 DIAGNOSIS — C34.91 MALIGNANT NEOPLASM OF UNSPECIFIED PART OF RIGHT BRONCHUS OR LUNG: ICD-10-CM

## 2024-09-24 PROCEDURE — 99214 OFFICE O/P EST MOD 30 MIN: CPT

## 2024-09-24 NOTE — PHYSICAL EXAM
[] : no respiratory distress [Respiration, Rhythm And Depth] : normal respiratory rhythm and effort [Exaggerated Use Of Accessory Muscles For Inspiration] : no accessory muscle use [Auscultation Breath Sounds / Voice Sounds] : lungs were clear to auscultation bilaterally [Heart Rate And Rhythm] : heart rate was normal and rhythm regular [Examination Of The Chest] : the chest was normal in appearance [Chest Visual Inspection Thoracic Asymmetry] : no chest asymmetry [Diminished Respiratory Excursion] : normal chest expansion [2+] : left 2+ [Cervical Lymph Nodes Enlarged Posterior Bilaterally] : posterior cervical [Cervical Lymph Nodes Enlarged Anterior Bilaterally] : anterior cervical [Supraclavicular Lymph Nodes Enlarged Bilaterally] : supraclavicular [No CVA Tenderness] : no ~M costovertebral angle tenderness [No Spinal Tenderness] : no spinal tenderness [Involuntary Movements] : no involuntary movements were seen [Skin Color & Pigmentation] : normal skin color and pigmentation [No Focal Deficits] : no focal deficits [Oriented To Time, Place, And Person] : oriented to person, place, and time

## 2024-09-27 ENCOUNTER — APPOINTMENT (OUTPATIENT)
Dept: VASCULAR SURGERY | Facility: CLINIC | Age: 84
End: 2024-09-27
Payer: MEDICARE

## 2024-09-27 PROCEDURE — G2211 COMPLEX E/M VISIT ADD ON: CPT

## 2024-09-27 PROCEDURE — 99214 OFFICE O/P EST MOD 30 MIN: CPT

## 2024-09-27 PROCEDURE — 93923 UPR/LXTR ART STDY 3+ LVLS: CPT

## 2024-09-27 NOTE — DATA REVIEWED
[FreeTextEntry1] : Independently reviewed the following: - CT Chest on 9/5/24 - PFTs on 9/16/24 - PET/CT on 9/18/24

## 2024-09-27 NOTE — HISTORY OF PRESENT ILLNESS
[FreeTextEntry1] : Ms. JANA OLIVARES, 83 year old female, former smoker (40PY), w/ hx of HLD, A-Fib on Eliquis, CAD (stents x 2 in 2006) on ASA, CHF, COPD/Asthma, MARIE, skin cancer, COVID Sept 2022 s/p MAB infusion, who initially presented with new lung nodule.   ECHO on 6/29/22 at [facility]: EF 55-60%. Moderate Rt atrial enlargement; mild diastolic dysfunction.  PFTs on 8/8/22: %, FEV1 88%, DLCO 60%.  CT Chest on 9/30/22 at [facility] (compared to CT on 4/20/22): - bilateral apical pleural thickening; centrilobular emphysema - new 6 x 6mm slightly irregular nodule in the Rt apex (3:28), suspicious, previously noted as tiny Rt apical nodules - stable 6 x 3mm subpleural nodule in the RLL (3:82) - no mediastinal adenopathy  Nodify blood work on 10/21/22: No significant level of autoantibodies detected post Nodify CDT risk of malignancy  CT Chest on 1/5/23: - Continued interval increase in size of lobulated minimally spiculated RUL nodule which remains highly suspicious for malignancy, now measuring 8 x 10 mm PET suggested - Unchanged RUL 6 x 3 mm subpleural nodule (image 87) - Small pericardial effusion  PFTs on 2/13/23: FVC: 2.4 (100%); FEV1: 1.53 (88%); DLCO: 11.73 (64%)  Now, s/p Flex bronch, uniportal Right VATS, wedge resection of RUL x1 on 3/2/23. Path of RUL: Invasive squamous cell carcinoma, keratinizing; 1.1 cm; All margins and LN (0/1) Negative for tumor; pT1b pN0 (Stage IA2)  CT Angio CAP on 3/11/23:  - s/p RUL wedge resection with dystrophic calcifications noted in the surgical bed. - Groundglass airspace opacities are noted in the RUL. - Small right pleural effusion. Fluid tracks along the right major fissure. - Heart size is stable, enlarged.  - Bilateral renal cysts with a 1.4 cm cyst noted in the upper pole of the right kidney and a 2.6 cm cyst in the right inferior pole. A partially rim calcified, exophytic 2.5 cm cyst is noted in the upper pole of the left kidney. - No aortic dissection.  Hospitalized 3/11-3/16 for right arm pain. pain and discomfort multifactorial : MSK pain from positioning during sx,  small hematoma at site of sx, cervical disk/spine disease causing nerve  compression as above  continue pain management as needed  ortho-spine Sx appreciated >> only recommending outpatient follow up: Dr. Koch   CT Chest on 6/6/23:  - Post op changes - Linear consolidation in the right [place] (4:25) - Stable 6 x 3 mm subpleural RLL nodule (4:74) - Subpleural fibrotic changes are again noted in the periphery of both lungs most prominent in the bilateral lower lobes - Stable, bilateral fluid density renal lesions one the left with peripheral calcification - 4.2 cm fat-containing ventral abdominal wall hernia with 1.1 cm abdominal wall defect - Mild degenerative changes of the spine.   Seen on 06/27/2023: RTC in 3 months with CT Chest without contrast. Patient w/ persistent Right upper extremity pain as well as numbness. Continue with Physical Therapy. Discussed neuro workup. Will give contact info for Dr. Harish Ware if she is agreeable.   CT Chest on 07/24/2023 (): - Status post right upper lobe wedge resection. Stable nodular/linear density in the left apex 4:25.  - Stability favors postsurgical change - Stable 6 x 3 mm subpleural lower lobe nodule 4:79 - Stable bibasilar subpleural fibrotic changes - Mild to moderate emphysematous changes - Mild bilateral bronchial wall thickening with scattered foci of mucous plugging for example left lower lobe 4:100 - Fluid density left renal lesion with peripheral calcification is again noted.  - 4.6 cm fat containing ventral abdominal wall hernia.  PFTs on 09/11/2023: FVC 2.26, 95%; FEV1 1.34, 77%; DLCO 13.37, 72%  CT Chest on 5/9/24: ([facility]) - Centrilobular emphysema most prominent within the upper lung fields - Small amount of nodular soft tissue density adjacent to the surgical sutures at the medial left lung apex which is stable.  - 6 mm subpleural nodular density again seen along the posterior pleural surface of the RLL (Series 5, Image 82) - Atherosclerotic calcification within the thoracic aorta and coronary arteries - 3 cm peripherally calcified exophytic cystic mass of the lateral upper pole of the left kidney, stable.   PFTs on 5/6/2024: FVC: 2.58 (109%); FEV1: 1.6 (93%); DLCO: 10.45 (57%)  CT Chest on 9/5/24: [facility]? - New 10 x 10 mm nodule in the RUL at the inferior aspect of the post surgical changes (4: 35) - Stable 6 mm subpeural nodule posteriorly in the RLL (4:73) - Bilateral renal cysts - Colonic Diverticulosis   PFTs on 9/16/24: (Post) FVC: 2.35 (99%); FEV1: 1.58 (92%); DLCO: 13.35 (73%)  PET/CT on 9/18/24:  - FDG avid nodule in the RUL adjacent to the suture line from prior wedge resection measures 1.2 x 1.1 cm, SUV 8.6 (image 3-88). This lesion is new compared to CT chest 6/26/2024.  - Additionally, the more superior RUL nonavid 1.2 x 0.8 cm opacity adjacent to the suture line is unchanged since 5/9/2024 and decreased compared to 3/11/2023 (image 3-78).  - Unchanged nonavid 0.3 cm RLL nodule (image [id number]).  - Emphysema. - Unchanged nonavid 2.6 x 2.3 cm exophytic rim calcified left upper pole cyst. -  Additional nonavid right renal cysts. - Increased FDG activity in distal esophagus, increased as compared to prior study, and not well evaluated on CT (SUV 21.9; image 135; previous SUV 7.9).  - Colonic diverticulosis. - Bilateral total hip replacements. Chronic fracture deformity of the left proximal humerus, new since prior PET/CT.   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 Follows with Dr. Upton. Will hold off further intervention of the anterior tibial artery.  PULM: Dr. Petty Steen   Patient presents to office for follow up. Today, patient denies worsening SOB, chest pain, cough, hemoptysis, fever, chills, night sweats, lightheadedness or dizziness.

## 2024-09-27 NOTE — ASSESSMENT
[FreeTextEntry1] : Ms. JANA OLIVARES, 83 year old female, former smoker (40PY), w/ hx of HLD, A-Fib on Eliquis, CAD (stents x 2 in 2006) on ASA, CHF, COPD/Asthma, MARIE, skin cancer, COVID Sept 2022 s/p MAB infusion, who initially presented with new lung nodule.   ECHO on 6/29/22 at [facility]: EF 55-60%. Moderate Rt atrial enlargement; mild diastolic dysfunction.  PFTs on 8/8/22: %, FEV1 88%, DLCO 60%.  Now, s/p Flex bronch, uniportal Right VATS, wedge resection of RUL x1 on 3/2/23. Path of RUL: Invasive squamous cell carcinoma, keratinizing; 1.1 cm; All margins and LN (0/1) Negative for tumor; pT1b pN0 (Stage IA2)  Here today with follow up imaging.   I have independently reviewed the medical records and imaging at the time of this office consultation, and discussed the following interpretations with the patient: - New FDG avid nodule in the right upper lobe. Discussed CT guided needle biopsy of Right upper lobe nodule for tissue diagnosis, which will be needed to dictate further treatment. Patient is agreeable. Office will help coordinate. Instructed to return to clinic 1-2 weeks following procedure to discuss results and further plan of care.   Recommendations reviewed with patient during this office visit, and all questions answered; Patient instructed on the importance of follow up and verbalizes understanding.  I, OZIEL ReedIM, personally performed the evaluation and management (E/M) services for this established patient. That E/M includes conducting the examination, assessing all new/exacerbated conditions, and establishing a new plan of care. Today, My ACP, Peggy Teresa, was here to observe my evaluation and management services for this patient to be followed going forward.

## 2024-09-27 NOTE — HISTORY OF PRESENT ILLNESS
[FreeTextEntry1] : Ms. JANA OLIVARES, 83 year old female, former smoker (40PY), w/ hx of HLD, A-Fib on Eliquis, CAD (stents x 2 in 2006) on ASA, CHF, COPD/Asthma, MARIE, skin cancer, COVID Sept 2022 s/p MAB infusion, who initially presented with new lung nodule.   ECHO on 6/29/22 at [facility]: EF 55-60%. Moderate Rt atrial enlargement; mild diastolic dysfunction.  PFTs on 8/8/22: %, FEV1 88%, DLCO 60%.  CT Chest on 9/30/22 at [facility] (compared to CT on 4/20/22): - bilateral apical pleural thickening; centrilobular emphysema - new 6 x 6mm slightly irregular nodule in the Rt apex (3:28), suspicious, previously noted as tiny Rt apical nodules - stable 6 x 3mm subpleural nodule in the RLL (3:82) - no mediastinal adenopathy  Nodify blood work on 10/21/22: No significant level of autoantibodies detected post Nodify CDT risk of malignancy  CT Chest on 1/5/23: - Continued interval increase in size of lobulated minimally spiculated RUL nodule which remains highly suspicious for malignancy, now measuring 8 x 10 mm PET suggested - Unchanged RUL 6 x 3 mm subpleural nodule (image 87) - Small pericardial effusion  PFTs on 2/13/23: FVC: 2.4 (100%); FEV1: 1.53 (88%); DLCO: 11.73 (64%)  Now, s/p Flex bronch, uniportal Right VATS, wedge resection of RUL x1 on 3/2/23. Path of RUL: Invasive squamous cell carcinoma, keratinizing; 1.1 cm; All margins and LN (0/1) Negative for tumor; pT1b pN0 (Stage IA2)  CT Angio CAP on 3/11/23:  - s/p RUL wedge resection with dystrophic calcifications noted in the surgical bed. - Groundglass airspace opacities are noted in the RUL. - Small right pleural effusion. Fluid tracks along the right major fissure. - Heart size is stable, enlarged.  - Bilateral renal cysts with a 1.4 cm cyst noted in the upper pole of the right kidney and a 2.6 cm cyst in the right inferior pole. A partially rim calcified, exophytic 2.5 cm cyst is noted in the upper pole of the left kidney. - No aortic dissection.  Hospitalized 3/11-3/16 for right arm pain. pain and discomfort multifactorial : MSK pain from positioning during sx,  small hematoma at site of sx, cervical disk/spine disease causing nerve  compression as above  continue pain management as needed  ortho-spine Sx appreciated >> only recommending outpatient follow up: Dr. Koch   CT Chest on 6/6/23:  - Post op changes - Linear consolidation in the right [place] (4:25) - Stable 6 x 3 mm subpleural RLL nodule (4:74) - Subpleural fibrotic changes are again noted in the periphery of both lungs most prominent in the bilateral lower lobes - Stable, bilateral fluid density renal lesions one the left with peripheral calcification - 4.2 cm fat-containing ventral abdominal wall hernia with 1.1 cm abdominal wall defect - Mild degenerative changes of the spine.   Seen on 06/27/2023: RTC in 3 months with CT Chest without contrast. Patient w/ persistent Right upper extremity pain as well as numbness. Continue with Physical Therapy. Discussed neuro workup. Will give contact info for Dr. Harish Ware if she is agreeable.   CT Chest on 07/24/2023 (): - Status post right upper lobe wedge resection. Stable nodular/linear density in the left apex 4:25.  - Stability favors postsurgical change - Stable 6 x 3 mm subpleural lower lobe nodule 4:79 - Stable bibasilar subpleural fibrotic changes - Mild to moderate emphysematous changes - Mild bilateral bronchial wall thickening with scattered foci of mucous plugging for example left lower lobe 4:100 - Fluid density left renal lesion with peripheral calcification is again noted.  - 4.6 cm fat containing ventral abdominal wall hernia.  PFTs on 09/11/2023: FVC 2.26, 95%; FEV1 1.34, 77%; DLCO 13.37, 72%  CT Chest on 5/9/24: ([facility]) - Centrilobular emphysema most prominent within the upper lung fields - Small amount of nodular soft tissue density adjacent to the surgical sutures at the medial left lung apex which is stable.  - 6 mm subpleural nodular density again seen along the posterior pleural surface of the RLL (Series 5, Image 82) - Atherosclerotic calcification within the thoracic aorta and coronary arteries - 3 cm peripherally calcified exophytic cystic mass of the lateral upper pole of the left kidney, stable.   PFTs on 5/6/2024: FVC: 2.58 (109%); FEV1: 1.6 (93%); DLCO: 10.45 (57%)  CT Chest on 9/5/24: [facility]? - New 10 x 10 mm nodule in the RUL at the inferior aspect of the post surgical changes (4: 35) - Stable 6 mm subpeural nodule posteriorly in the RLL (4:73) - Bilateral renal cysts - Colonic Diverticulosis   PFTs on 9/16/24: (Post) FVC: 2.35 (99%); FEV1: 1.58 (92%); DLCO: 13.35 (73%)  PET/CT on 9/18/24:  - FDG avid nodule in the RUL adjacent to the suture line from prior wedge resection measures 1.2 x 1.1 cm, SUV 8.6 (image 3-88). This lesion is new compared to CT chest 6/26/2024.  - Additionally, the more superior RUL nonavid 1.2 x 0.8 cm opacity adjacent to the suture line is unchanged since 5/9/2024 and decreased compared to 3/11/2023 (image 3-78).  - Unchanged nonavid 0.3 cm RLL nodule (image 3-124).  - Emphysema. - Unchanged nonavid 2.6 x 2.3 cm exophytic rim calcified left upper pole cyst. -  Additional nonavid right renal cysts. - Increased FDG activity in distal esophagus, increased as compared to prior study, and not well evaluated on CT (SUV 21.9; image 135; previous SUV 7.9).  - Colonic diverticulosis. - Bilateral total hip replacements. Chronic fracture deformity of the left proximal humerus, new since prior PET/CT.   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 Follows with Dr. Upton. Will hold off further intervention of the anterior tibial artery.  PULM: Dr. Petty Steen   Patient presents to office for follow up. Today, patient denies worsening SOB, chest pain, cough, hemoptysis, fever, chills, night sweats, lightheadedness or dizziness.

## 2024-09-27 NOTE — ASSESSMENT
[FreeTextEntry1] : Ms. JANA OLIVARES, 83 year old female, former smoker (40PY), w/ hx of HLD, A-Fib on Eliquis, CAD (stents x 2 in 2006) on ASA, CHF, COPD/Asthma, MARIE, skin cancer, COVID Sept 2022 s/p MAB infusion, who initially presented with new lung nodule.   ECHO on 6/29/22 at [facility]: EF 55-60%. Moderate Rt atrial enlargement; mild diastolic dysfunction.  PFTs on 8/8/22: %, FEV1 88%, DLCO 60%.  Now, s/p Flex bronch, uniportal Right VATS, wedge resection of RUL x1 on 3/2/23. Path of RUL: Invasive squamous cell carcinoma, keratinizing; 1.1 cm; All margins and LN (0/1) Negative for tumor; pT1b pN0 (Stage IA2)  Here today with follow up imaging.   I have independently reviewed the medical records and imaging at the time of this office consultation, and discussed the following interpretations with the patient: - New FDG avid nodule in the right upper lobe. Discussed CT guided needle biopsy of Right upper lobe nodule for tissue diagnosis, which will be needed to dictate further treatment. Patient is agreeable. Office will help coordinate. Instructed to return to clinic 1-2 weeks following procedure to discuss results and further plan of care.   Recommendations reviewed with patient during this office visit, and all questions answered; Patient instructed on the importance of follow up and verbalizes understanding.  I, OZIEL ReedIM, personally performed the evaluation and management (E/M) services for this established patient. That E/M includes conducting the examination, assessing all new/exacerbated conditions, and establishing a new plan of care. Today, My ACP, Peggy Teresa, was here to observe my evaluation and management services for this patient to be followed going forward.

## 2024-09-27 NOTE — HISTORY OF PRESENT ILLNESS
[FreeTextEntry1] : Ms. JANA OLIVARES, 83 year old female, former smoker (40PY), w/ hx of HLD, A-Fib on Eliquis, CAD (stents x 2 in 2006) on ASA, CHF, COPD/Asthma, MARIE, skin cancer, COVID Sept 2022 s/p MAB infusion, who initially presented with new lung nodule.   ECHO on 6/29/22 at [facility]: EF 55-60%. Moderate Rt atrial enlargement; mild diastolic dysfunction.  PFTs on 8/8/22: %, FEV1 88%, DLCO 60%.  CT Chest on 9/30/22 at \[facility]\"" (compared to CT on 4/20/22): - bilateral apical pleural thickening; centrilobular emphysema - new 6 x 6mm slightly irregular nodule in the Rt apex (3:28), suspicious, previously noted as tiny Rt apical nodules - stable 6 x 3mm subpleural nodule in the RLL (3:82) - no mediastinal adenopathy  Nodify blood work on 10/21/22: No significant level of autoantibodies detected post Nodify CDT risk of malignancy  CT Chest on 1/5/23: - Continued interval increase in size of lobulated minimally spiculated RUL nodule which remains highly suspicious for malignancy, now measuring 8 x 10 mm PET suggested - Unchanged RUL 6 x 3 mm subpleural nodule (image 87) - Small pericardial effusion  PFTs on 2/13/23: FVC: 2.4 (100%); FEV1: 1.53 (88%); DLCO: 11.73 (64%)  Now, s/p Flex bronch, uniportal Right VATS, wedge resection of RUL x1 on 3/2/23. Path of RUL: Invasive squamous cell carcinoma, keratinizing; 1.1 cm; All margins and LN (0/1) Negative for tumor; pT1b pN0 (Stage IA2)  CT Angio CAP on 3/11/23:  - s/p RUL wedge resection with dystrophic calcifications noted in the surgical bed. - Groundglass airspace opacities are noted in the RUL. - Small right pleural effusion. Fluid tracks along the right major fissure. - Heart size is stable, enlarged.  - Bilateral renal cysts with a 1.4 cm cyst noted in the upper pole of the right kidney and a 2.6 cm cyst in the right inferior pole. A partially rim calcified, exophytic 2.5 cm cyst is noted in the upper pole of the left kidney. - No aortic dissection.  Hospitalized 3/11-3/16 for right arm pain. pain and discomfort multifactorial : MSK pain from positioning during sx,  small hematoma at site of sx, cervical disk/spine disease causing nerve  compression as above  continue pain management as needed  ortho-spine Sx appreciated >> only recommending outpatient follow up: Dr. Koch   CT Chest on 6/6/23:  - Post op changes - Linear consolidation in the right [place] (4:25) - Stable 6 x 3 mm subpleural RLL nodule (4:74) - Subpleural fibrotic changes are again noted in the periphery of both lungs most prominent in the bilateral lower lobes - Stable, bilateral fluid density renal lesions one the left with peripheral calcification - 4.2 cm fat-containing ventral abdominal wall hernia with 1.1 cm abdominal wall defect - Mild degenerative changes of the spine.   Seen on 06/27/2023: RTC in 3 months with CT Chest without contrast. Patient w/ persistent Right upper extremity pain as well as numbness. Continue with Physical Therapy. Discussed neuro workup. Will give contact info for Dr. Harish Ware if she is agreeable.   CT Chest on 07/24/2023 (): - Status post right upper lobe wedge resection. Stable nodular/linear density in the left apex 4:25.  - Stability favors postsurgical change - Stable 6 x 3 mm subpleural lower lobe nodule 4:79 - Stable bibasilar subpleural fibrotic changes - Mild to moderate emphysematous changes - Mild bilateral bronchial wall thickening with scattered foci of mucous plugging for example left lower lobe 4:100 - Fluid density left renal lesion with peripheral calcification is again noted.  - 4.6 cm fat containing ventral abdominal wall hernia.  PFTs on 09/11/2023: FVC 2.26, 95%; FEV1 1.34, 77%; DLCO 13.37, 72%  CT Chest on 5/9/24: ([facility]) - Centrilobular emphysema most prominent within the upper lung fields - Small amount of nodular soft tissue density adjacent to the surgical sutures at the medial left lung apex which is stable.  - 6 mm subpleural nodular density again seen along the posterior pleural surface of the RLL (Series 5, Image 82) - Atherosclerotic calcification within the thoracic aorta and coronary arteries - 3 cm peripherally calcified exophytic cystic mass of the lateral upper pole of the left kidney, stable.   PFTs on 5/6/2024: FVC: 2.58 (109%); FEV1: 1.6 (93%); DLCO: 10.45 (57%)  CT Chest on 9/5/24: [facility]? - New 10 x 10 mm nodule in the RUL at the inferior aspect of the post surgical changes (4: 35) - Stable 6 mm subpeural nodule posteriorly in the RLL (4:73) - Bilateral renal cysts - Colonic Diverticulosis   PFTs on 9/16/24: (Post) FVC: 2.35 (99%); FEV1: 1.58 (92%); DLCO: 13.35 (73%)  PET/CT on 9/18/24:  - FDG avid nodule in the RUL adjacent to the suture line from prior wedge resection measures 1.2 x 1.1 cm, SUV 8.6 (image 3-88). This lesion is new compared to CT chest 6/26/2024.  - Additionally, the more superior RUL nonavid 1.2 x 0.8 cm opacity adjacent to the suture line is unchanged since 5/9/2024 and decreased compared to 3/11/2023 (image 3-78).  - Unchanged nonavid 0.3 cm RLL nodule (image 3-124).  - Emphysema. - Unchanged nonavid 2.6 x 2.3 cm exophytic rim calcified left upper pole cyst. -  Additional nonavid right renal cysts. - Increased FDG activity in distal esophagus, increased as compared to prior study, and not well evaluated on CT (SUV 21.9; image 135; previous SUV 7.9).  - Colonic diverticulosis. - Bilateral total hip replacements. Chronic fracture deformity of the left proximal humerus, new since prior PET/CT.   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 Follows with Dr. Upton. Will hold off further intervention of the anterior tibial artery.  PULM: Dr. Petty Steen   Patient presents to office for follow up. Today, patient denies worsening SOB, chest pain, cough, hemoptysis, fever, chills, night sweats, lightheadedness or dizziness.

## 2024-09-27 NOTE — ASSESSMENT
[FreeTextEntry1] : Ms. JANA OLIVARES, 83 year old female, former smoker (40PY), w/ hx of HLD, A-Fib on Eliquis, CAD (stents x 2 in 2006) on ASA, CHF, COPD/Asthma, MARIE, skin cancer, COVID Sept 2022 s/p MAB infusion, who initially presented with new lung nodule.   ECHO on 6/29/22 at [facility]: EF 55-60%. Moderate Rt atrial enlargement; mild diastolic dysfunction.  PFTs on 8/8/22: %, FEV1 88%, DLCO 60%.  Now, s/p Flex bronch, uniportal Right VATS, wedge resection of RUL x1 on 3/2/23. Path of RUL: Invasive squamous cell carcinoma, keratinizing; 1.1 cm; All margins and LN (0/1) Negative for tumor; pT1b pN0 (Stage IA2)  Here today with follow up imaging.   I have independently reviewed the medical records and imaging at the time of this office consultation, and discussed the following interpretations with the patient: - New FDG avid nodule in the right upper lobe. Discussed CT guided needle biopsy of Right upper lobe nodule for tissue diagnosis, which will be needed to dictate further treatment. Patient is agreeable. Office will help coordinate. Instructed to return to clinic 1-2 weeks following procedure to discuss results and further plan of care.   Recommendations reviewed with patient during this office visit, and all questions answered; Patient instructed on the importance of follow up and verbalizes understanding.  I, OZIEL ReedIM, personally performed the evaluation and management (E/M) services for this established patient. That E/M includes conducting the examination, assessing all new/exacerbated conditions, and establishing a new plan of care. Today, My ACP, Peggy Teresa, was here to observe my evaluation and management services for this patient to be followed going forward.

## 2024-09-27 NOTE — ASSESSMENT
[FreeTextEntry1] : Patient with severe peripheral vascular disease and severe rest pain.   Status post right lower extremity intervention.  Patient reports significant improvement of the rest pain of the right foot.  Patient is newly diagnosed with lung nodules and is undergoing workup including a biopsy with highly suspicious lesions for malignancy based on the history and prior history of lung carcinoma.  Continue the workup for lung carcinoma and will continue with conservative management of the lower extremities.  Continue atorvastatin for treatment of hyperlipidemia.  Continue aspirin and Eliquis for treatment of peripheral vascular disease.

## 2024-09-30 ENCOUNTER — APPOINTMENT (OUTPATIENT)
Dept: PULMONOLOGY | Facility: CLINIC | Age: 84
End: 2024-09-30

## 2024-10-01 ENCOUNTER — OUTPATIENT (OUTPATIENT)
Dept: OUTPATIENT SERVICES | Facility: HOSPITAL | Age: 84
LOS: 1 days | End: 2024-10-01

## 2024-10-01 ENCOUNTER — RESULT REVIEW (OUTPATIENT)
Age: 84
End: 2024-10-01

## 2024-10-01 ENCOUNTER — APPOINTMENT (OUTPATIENT)
Dept: NUCLEAR MEDICINE | Facility: HOSPITAL | Age: 84
End: 2024-10-01
Payer: MEDICARE

## 2024-10-01 DIAGNOSIS — Z01.818 ENCOUNTER FOR OTHER PREPROCEDURAL EXAMINATION: ICD-10-CM

## 2024-10-01 DIAGNOSIS — Z96.642 PRESENCE OF LEFT ARTIFICIAL HIP JOINT: Chronic | ICD-10-CM

## 2024-10-01 DIAGNOSIS — Z98.890 OTHER SPECIFIED POSTPROCEDURAL STATES: Chronic | ICD-10-CM

## 2024-10-01 DIAGNOSIS — Z98.49 CATARACT EXTRACTION STATUS, UNSPECIFIED EYE: Chronic | ICD-10-CM

## 2024-10-01 DIAGNOSIS — Z96.641 PRESENCE OF RIGHT ARTIFICIAL HIP JOINT: Chronic | ICD-10-CM

## 2024-10-01 PROCEDURE — 78597 LUNG PERFUSION DIFFERENTIAL: CPT | Mod: 26,MH

## 2024-10-03 ENCOUNTER — OUTPATIENT (OUTPATIENT)
Dept: OUTPATIENT SERVICES | Facility: HOSPITAL | Age: 84
LOS: 1 days | End: 2024-10-03

## 2024-10-03 VITALS
DIASTOLIC BLOOD PRESSURE: 67 MMHG | TEMPERATURE: 98 F | HEIGHT: 58 IN | SYSTOLIC BLOOD PRESSURE: 126 MMHG | OXYGEN SATURATION: 100 % | HEART RATE: 90 BPM | RESPIRATION RATE: 16 BRPM | WEIGHT: 149.91 LBS

## 2024-10-03 DIAGNOSIS — I25.10 ATHEROSCLEROTIC HEART DISEASE OF NATIVE CORONARY ARTERY WITHOUT ANGINA PECTORIS: ICD-10-CM

## 2024-10-03 DIAGNOSIS — R91.8 OTHER NONSPECIFIC ABNORMAL FINDING OF LUNG FIELD: ICD-10-CM

## 2024-10-03 DIAGNOSIS — Z98.49 CATARACT EXTRACTION STATUS, UNSPECIFIED EYE: Chronic | ICD-10-CM

## 2024-10-03 DIAGNOSIS — Z96.642 PRESENCE OF LEFT ARTIFICIAL HIP JOINT: Chronic | ICD-10-CM

## 2024-10-03 DIAGNOSIS — I48.91 UNSPECIFIED ATRIAL FIBRILLATION: ICD-10-CM

## 2024-10-03 DIAGNOSIS — Z98.890 OTHER SPECIFIED POSTPROCEDURAL STATES: Chronic | ICD-10-CM

## 2024-10-03 DIAGNOSIS — Z96.641 PRESENCE OF RIGHT ARTIFICIAL HIP JOINT: Chronic | ICD-10-CM

## 2024-10-03 DIAGNOSIS — R91.1 SOLITARY PULMONARY NODULE: ICD-10-CM

## 2024-10-03 LAB
ALBUMIN SERPL ELPH-MCNC: 4.4 G/DL — SIGNIFICANT CHANGE UP (ref 3.3–5)
ALP SERPL-CCNC: 66 U/L — SIGNIFICANT CHANGE UP (ref 40–120)
ALT FLD-CCNC: 11 U/L — SIGNIFICANT CHANGE UP (ref 4–33)
ANION GAP SERPL CALC-SCNC: 14 MMOL/L — SIGNIFICANT CHANGE UP (ref 7–14)
AST SERPL-CCNC: 16 U/L — SIGNIFICANT CHANGE UP (ref 4–32)
BILIRUB SERPL-MCNC: 0.4 MG/DL — SIGNIFICANT CHANGE UP (ref 0.2–1.2)
BUN SERPL-MCNC: 13 MG/DL — SIGNIFICANT CHANGE UP (ref 7–23)
CALCIUM SERPL-MCNC: 9.5 MG/DL — SIGNIFICANT CHANGE UP (ref 8.4–10.5)
CHLORIDE SERPL-SCNC: 105 MMOL/L — SIGNIFICANT CHANGE UP (ref 98–107)
CO2 SERPL-SCNC: 22 MMOL/L — SIGNIFICANT CHANGE UP (ref 22–31)
CREAT SERPL-MCNC: 1.01 MG/DL — SIGNIFICANT CHANGE UP (ref 0.5–1.3)
EGFR: 56 ML/MIN/1.73M2 — LOW
GLUCOSE SERPL-MCNC: 103 MG/DL — HIGH (ref 70–99)
HCT VFR BLD CALC: 30.5 % — LOW (ref 34.5–45)
HGB BLD-MCNC: 10.2 G/DL — LOW (ref 11.5–15.5)
MCHC RBC-ENTMCNC: 33.4 GM/DL — SIGNIFICANT CHANGE UP (ref 32–36)
MCHC RBC-ENTMCNC: 36.7 PG — HIGH (ref 27–34)
MCV RBC AUTO: 109.7 FL — HIGH (ref 80–100)
NRBC # BLD: 0 /100 WBCS — SIGNIFICANT CHANGE UP (ref 0–0)
NRBC # FLD: 0 K/UL — SIGNIFICANT CHANGE UP (ref 0–0)
PLATELET # BLD AUTO: 299 K/UL — SIGNIFICANT CHANGE UP (ref 150–400)
POTASSIUM SERPL-MCNC: 4.1 MMOL/L — SIGNIFICANT CHANGE UP (ref 3.5–5.3)
POTASSIUM SERPL-SCNC: 4.1 MMOL/L — SIGNIFICANT CHANGE UP (ref 3.5–5.3)
PROT SERPL-MCNC: 6.9 G/DL — SIGNIFICANT CHANGE UP (ref 6–8.3)
RBC # BLD: 2.78 M/UL — LOW (ref 3.8–5.2)
RBC # FLD: 15 % — HIGH (ref 10.3–14.5)
SODIUM SERPL-SCNC: 141 MMOL/L — SIGNIFICANT CHANGE UP (ref 135–145)
WBC # BLD: 5.41 K/UL — SIGNIFICANT CHANGE UP (ref 3.8–10.5)
WBC # FLD AUTO: 5.41 K/UL — SIGNIFICANT CHANGE UP (ref 3.8–10.5)

## 2024-10-03 RX ORDER — ATORVASTATIN CALCIUM 10 MG/1
1 TABLET, FILM COATED ORAL
Refills: 0 | DISCHARGE

## 2024-10-03 NOTE — H&P PST ADULT - PROBLEM SELECTOR PLAN 1
Pt scheduled for surgery and preop instructions including instructions for taking own Pantoprazole  on the day of surgery, given verbally and with use of  written materials, and patient confirming understanding of such instructions using  teach back method.  Daughter to confirm with Cardio stop dates for Eliquis and ASA - TEAMs message with Sherry Romano NP - she states pt is having high risk procedure and to stop 3 days preop for Eliquis ( daughter had been told 2 by same office) and to stop ASA 5 days preop -   Request ECHO and EKG from Cardio

## 2024-10-03 NOTE — H&P PST ADULT - NSICDXPASTMEDICALHX_GEN_ALL_CORE_FT
PAST MEDICAL HISTORY:  Afib x 15 yrs --on Coumadin  attempted cardioversion 13 yrs ago--failed    Asthma     CAD (coronary artery disease)     CHF (congestive heart failure)     Coronary Stent to RCA, LAD, and DIAG 9/25/06 at [facility]    Emphysema/COPD     GERD (Gastroesophageal Reflux Disease)     H/O: CVA pt unaware of CVA, yet 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      PAST MEDICAL HISTORY:  Afib x 15 yrs --on Coumadin  attempted cardioversion 13 yrs ago--failed    Asthma     CAD (coronary artery disease)     CHF (congestive heart failure)     Coronary Stent to RCA, LAD, and DIAG 9/25/06 at [facility]    Emphysema/COPD     GERD (Gastroesophageal Reflux Disease)     H/O: CVA pt unaware of CVA, yet showed up on CT of head as old CVA    Hypercholesterolemia     Lip Cancer resected 6 yrs ago (no chemo/rad)    Lung nodule     Melanoma     Nodule of upper lobe of right lung     MARIE (obstructive sleep apnea)     PUD (peptic ulcer disease)     PVD (peripheral vascular disease)     Skin Cancer of Face removed 1 yr ago    Skin Cancer of Nose 1 yr ago- removed    Smoker

## 2024-10-03 NOTE — H&P PST ADULT - CARDIOVASCULAR COMMENTS
Hx A-fib / Eliquis and CAD stents x2 / ASA 81 mg po - pt also states hx of stents in legs - PVD with chronic pain -

## 2024-10-03 NOTE — H&P PST ADULT - RESPIRATORY AND THORAX COMMENTS
Hx Lung VATs surgery 2023 and now new RUL nodule noted on CT scan - Recent treatment for exacerbation of COPD - completed prednisone and abx but is confused as to other meds to be taking - will have daughter call and confirm - pt now states feeling better  with less SOB

## 2024-10-03 NOTE — H&P PST ADULT - HISTORY OF PRESENT ILLNESS
81 y/o female PMH HTN HLD CAD (2 cardiac stents 2006) AFib (failed ablation) on eliquis CHF (EF 55-60% on 6/2022 echo) COPD/Asthma and MARIE former smoker presents to presurgical testing with diagnosis of other nonspecified abnormal finding of lung field. Pt had COVID-19 infection in 9/2022, treated with MAB infusion. CT scan showing an increase in right upper lung nodule. Pt is scheduled for flexible bronchoscopy with right video assisted thoracoscopy lung resection with interventional marking at 8:30AM.  Pt is a 83 yr old female scheduled for IR CT Guided Bx of FDG [place] Nodule with Dr Gamez 10/15/24 - pt with new right upper lobe nodule and hx of past surgery right VATs 3/23 (invasive SCC) - Hx A-fib (on Eliquis ) CAD (stents placed x2 2006 - on ASA) CHF ( no recent hospitalizations) COPD/Asthma ( recent exacerbation 9/24 pt given abx, prednisone and meds - pt confused as to meds to consistently take - daughter to call and confirm) sleep apnea , PVD with past femoral stents (as per patient ) Pt is poor historian

## 2024-10-03 NOTE — H&P PST ADULT - PROBLEM SELECTOR PLAN 4
POST OP DELIRIUM SCREENING   1. Patient eligible for chilo risk screen age>75? Yes  2. Health care proxy paperwork given to patient? Yes - Paperwork given and explained   3. Impaired mobility (ie: uses cane, walker, wheelchair, or assist device)? No   4. Known dementia diagnosis? No   5. Impaired functional status (METS<4)? NO   6. Malnutrition BMI<20?No   Email sent to surgeon if risk factor identified No

## 2024-10-08 ENCOUNTER — APPOINTMENT (OUTPATIENT)
Dept: INTERVENTIONAL RADIOLOGY/VASCULAR | Facility: CLINIC | Age: 84
End: 2024-10-08

## 2024-10-08 VITALS — WEIGHT: 150 LBS | BODY MASS INDEX: 29.45 KG/M2 | HEIGHT: 60 IN

## 2024-10-08 DIAGNOSIS — R91.8 OTHER NONSPECIFIC ABNORMAL FINDING OF LUNG FIELD: ICD-10-CM

## 2024-10-08 PROCEDURE — 99213 OFFICE O/P EST LOW 20 MIN: CPT

## 2024-10-10 ENCOUNTER — NON-APPOINTMENT (OUTPATIENT)
Age: 84
End: 2024-10-10

## 2024-10-17 ENCOUNTER — RX RENEWAL (OUTPATIENT)
Age: 84
End: 2024-10-17

## 2024-10-30 PROBLEM — R91.1 SOLITARY PULMONARY NODULE: Chronic | Status: ACTIVE | Noted: 2024-10-03

## 2024-10-30 PROBLEM — I73.9 PERIPHERAL VASCULAR DISEASE, UNSPECIFIED: Chronic | Status: ACTIVE | Noted: 2024-10-03

## 2024-11-08 ENCOUNTER — APPOINTMENT (OUTPATIENT)
Dept: VASCULAR SURGERY | Facility: CLINIC | Age: 84
End: 2024-11-08
Payer: MEDICARE

## 2024-11-08 PROCEDURE — 93926 LOWER EXTREMITY STUDY: CPT | Mod: RT

## 2024-11-08 PROCEDURE — 99214 OFFICE O/P EST MOD 30 MIN: CPT

## 2024-11-08 PROCEDURE — G2211 COMPLEX E/M VISIT ADD ON: CPT

## 2024-11-11 ENCOUNTER — OUTPATIENT (OUTPATIENT)
Dept: OUTPATIENT SERVICES | Facility: HOSPITAL | Age: 84
LOS: 1 days | End: 2024-11-11

## 2024-11-11 VITALS
TEMPERATURE: 98 F | SYSTOLIC BLOOD PRESSURE: 114 MMHG | HEIGHT: 60.5 IN | HEART RATE: 86 BPM | OXYGEN SATURATION: 99 % | WEIGHT: 149.03 LBS | DIASTOLIC BLOOD PRESSURE: 70 MMHG | RESPIRATION RATE: 16 BRPM

## 2024-11-11 DIAGNOSIS — I48.91 UNSPECIFIED ATRIAL FIBRILLATION: ICD-10-CM

## 2024-11-11 DIAGNOSIS — Z98.49 CATARACT EXTRACTION STATUS, UNSPECIFIED EYE: Chronic | ICD-10-CM

## 2024-11-11 DIAGNOSIS — Z95.5 PRESENCE OF CORONARY ANGIOPLASTY IMPLANT AND GRAFT: Chronic | ICD-10-CM

## 2024-11-11 DIAGNOSIS — Z96.642 PRESENCE OF LEFT ARTIFICIAL HIP JOINT: Chronic | ICD-10-CM

## 2024-11-11 DIAGNOSIS — I25.10 ATHEROSCLEROTIC HEART DISEASE OF NATIVE CORONARY ARTERY WITHOUT ANGINA PECTORIS: ICD-10-CM

## 2024-11-11 DIAGNOSIS — J44.89 OTHER SPECIFIED CHRONIC OBSTRUCTIVE PULMONARY DISEASE: ICD-10-CM

## 2024-11-11 DIAGNOSIS — R91.8 OTHER NONSPECIFIC ABNORMAL FINDING OF LUNG FIELD: ICD-10-CM

## 2024-11-11 DIAGNOSIS — G47.33 OBSTRUCTIVE SLEEP APNEA (ADULT) (PEDIATRIC): ICD-10-CM

## 2024-11-11 DIAGNOSIS — Z98.890 OTHER SPECIFIED POSTPROCEDURAL STATES: Chronic | ICD-10-CM

## 2024-11-11 LAB
ALBUMIN SERPL ELPH-MCNC: 4.1 G/DL — SIGNIFICANT CHANGE UP (ref 3.3–5)
ALP SERPL-CCNC: 72 U/L — SIGNIFICANT CHANGE UP (ref 40–120)
ALT FLD-CCNC: 11 U/L — SIGNIFICANT CHANGE UP (ref 4–33)
ANION GAP SERPL CALC-SCNC: 14 MMOL/L — SIGNIFICANT CHANGE UP (ref 7–14)
AST SERPL-CCNC: 14 U/L — SIGNIFICANT CHANGE UP (ref 4–32)
BILIRUB SERPL-MCNC: 0.4 MG/DL — SIGNIFICANT CHANGE UP (ref 0.2–1.2)
BUN SERPL-MCNC: 14 MG/DL — SIGNIFICANT CHANGE UP (ref 7–23)
CALCIUM SERPL-MCNC: 9.1 MG/DL — SIGNIFICANT CHANGE UP (ref 8.4–10.5)
CHLORIDE SERPL-SCNC: 105 MMOL/L — SIGNIFICANT CHANGE UP (ref 98–107)
CO2 SERPL-SCNC: 23 MMOL/L — SIGNIFICANT CHANGE UP (ref 22–31)
CREAT SERPL-MCNC: 0.92 MG/DL — SIGNIFICANT CHANGE UP (ref 0.5–1.3)
EGFR: 62 ML/MIN/1.73M2 — SIGNIFICANT CHANGE UP
GLUCOSE SERPL-MCNC: 102 MG/DL — HIGH (ref 70–99)
HCT VFR BLD CALC: 29.2 % — LOW (ref 34.5–45)
HGB BLD-MCNC: 9.3 G/DL — LOW (ref 11.5–15.5)
MCHC RBC-ENTMCNC: 31.8 G/DL — LOW (ref 32–36)
MCHC RBC-ENTMCNC: 36.3 PG — HIGH (ref 27–34)
MCV RBC AUTO: 114.1 FL — HIGH (ref 80–100)
NRBC # BLD: 0 /100 WBCS — SIGNIFICANT CHANGE UP (ref 0–0)
NRBC # FLD: 0 K/UL — SIGNIFICANT CHANGE UP (ref 0–0)
PLATELET # BLD AUTO: 335 K/UL — SIGNIFICANT CHANGE UP (ref 150–400)
POTASSIUM SERPL-MCNC: 4.2 MMOL/L — SIGNIFICANT CHANGE UP (ref 3.5–5.3)
POTASSIUM SERPL-SCNC: 4.2 MMOL/L — SIGNIFICANT CHANGE UP (ref 3.5–5.3)
PROT SERPL-MCNC: 6.5 G/DL — SIGNIFICANT CHANGE UP (ref 6–8.3)
RBC # BLD: 2.56 M/UL — LOW (ref 3.8–5.2)
RBC # FLD: 15 % — HIGH (ref 10.3–14.5)
SODIUM SERPL-SCNC: 142 MMOL/L — SIGNIFICANT CHANGE UP (ref 135–145)
WBC # BLD: 5.89 K/UL — SIGNIFICANT CHANGE UP (ref 3.8–10.5)
WBC # FLD AUTO: 5.89 K/UL — SIGNIFICANT CHANGE UP (ref 3.8–10.5)

## 2024-11-11 NOTE — H&P PST ADULT - PROBLEM SELECTOR PLAN 2
Patient with h/o CAD s/p Stents on aspirin  Ok to stop Aspirin 5 days prior to procedure as per cardiologist.  Last day of Aspirin 11/12/24. Patient verbalized understanding.

## 2024-11-11 NOTE — H&P PST ADULT - NSICDXPASTMEDICALHX_GEN_ALL_CORE_FT
PAST MEDICAL HISTORY:  Afib x 15 yrs --on Coumadin  attempted cardioversion 13 yrs ago--failed    Asthma     CAD (coronary artery disease)     CHF (congestive heart failure)     Coronary Stent to RCA, LAD, and DIAG 9/25/06 at [facility]    Emphysema/COPD     GERD (Gastroesophageal Reflux Disease)     H/O: CVA pt unaware of CVA, yet showed up on CT of head as old CVA    Hypercholesterolemia     Lip Cancer resected 6 yrs ago (no chemo/rad)    Lung nodule     Melanoma     Nodule of upper lobe of right lung     MARIE (obstructive sleep apnea)     PUD (peptic ulcer disease)     PVD (peripheral vascular disease)     Skin Cancer of Face removed 1 yr ago    Skin Cancer of Nose 1 yr ago- removed    Smoker

## 2024-11-11 NOTE — H&P PST ADULT - PROBLEM SELECTOR PLAN 6
Pre-op Delirium Screening Questionnaire:    Patient eligible for chilo risk screen age>75?  Yes  Health care proxy paperwork given to patient? Yes (all patients should be given the packet to fill out at home and return on day of surgery to pre-op RN)  Impaired mobility (ie: uses cane, walker, wheelchair, or assist device)? NO  Known dementia diagnosis? No  Impaired functional status (METS<4)? NO  Malnutrition BMI<20? NO

## 2024-11-11 NOTE — H&P PST ADULT - NSICDXPASTSURGICALHX_GEN_ALL_CORE_FT
PAST SURGICAL HISTORY:  H/O cardiac radiofrequency ablation     History of Appendectomy childhood      History of appendectomy     History of  , , ,     History of cataract surgery     History of Cholecystectomy     History of cholecystectomy     History of Hysterectomy  for fibroids      History of total left hip replacement     History of total right hip replacement     Melanoma     S/P primary angioplasty with coronary stent     S/P T&A childhood      Status post angiography of extremity

## 2024-11-11 NOTE — H&P PST ADULT - RS GEN HX ROS MEA POS PC
h/o asthma/COPD- controlled , No recent exacerbation, no h/o intubation or hospitalization in the past

## 2024-11-11 NOTE — H&P PST ADULT - HISTORY OF PRESENT ILLNESS
81 year old female who is former smoker (40PY), w/ hx of HLD, A-Fib on Eliquis, CAD (stents x 2 in 2006) on ASA, PVD (s/p Right SFA and anterior tibial stents) COPD/Asthma, MARIE- denies use of CPAP, CHF (as per documents- patient denies) , skin cancer, COVID Sept 2022 s/p MAB infusion, lung nodules in 2022.s/p Flex bronch, uniportal Right VATS, wedge resection of RUL x1 on 3/2/23. CT Chest on 9/5/24: shows new lung nodules Now scheduled for CT guided biopsy of FDG Avid node RUL.  81 year old female who is former smoker (40PY), w/ hx of HLD, A-Fib on Eliquis, CAD (stents x 3 in 2006) on ASA, PVD (s/p Right SFA and anterior tibial stents) COPD/Asthma, MARIE- denies use of CPAP, CHF (as per documents- patient denies) , skin cancer, COVID Sept 2022 s/p MAB infusion, lung nodules in 2022.s/p Flex bronch, uniportal Right VATS, wedge resection of RUL x1 on 3/2/23. Surveillance CT Chest on 9/5/24: shows new lung nodules Now scheduled for CT guided biopsy of FDG Avid node RUL.

## 2024-11-11 NOTE — H&P PST ADULT - LAST CARDIAC ANGIOGRAM/IMAGING
2006 - Proximal LAD, Proximal RCA and 1st Diagonal 2006 - Proximal LAD, Proximal RCA and 1st Diagonal, Multiple Cath done - 2010, 2013, 2014, 2022- No interventions

## 2024-11-11 NOTE — H&P PST ADULT - OTHER CARE PROVIDERS
Dr Enio Dove (pulmonary ) [phone]                                               Dr Mcrae (vascular) [phone]

## 2024-11-11 NOTE — H&P PST ADULT - PROBLEM SELECTOR PLAN 1
Patient tentatively scheduled for CT guided biopsy of FDG Avid node RUL on 11/18/24.  Pre-op instructions provided. Pt given verbal and written instructions with teach back on chlorhexidine wash and take his own pantoprazole. Pt verbalized understanding with return demonstration.     CBC, CMP - pending

## 2024-11-11 NOTE — H&P PST ADULT - PROBLEM SELECTOR PLAN 3
Patient on Eliquis. Ok to hold 6 doses of  Eliquis prior to procedure as per cardiologist.  Last day of Eliquis on 11/14/24. Patient verbalized understanding.   Patient instructed to take Verapamil with a sip of water on the morning of procedure. Patient verbalized understanding.

## 2024-11-11 NOTE — H&P PST ADULT - PROBLEM SELECTOR PROBLEM 1
[TextEntry] : Post Operative Care:  Pt advised of importance of daily weights. Pt advised to call FY NP with weight gain of 3 lbs or more. Pt instructed on how to use incentive spirometer every hour, demonstrated proper use.  Pt encouraged to ambulate as much as tolerated, avoiding extreme temperatures outdoors.  Also advised to cleanse incisions daily with mild soap and water and to avoid lotions, powders, ointments or creams near or on the incision.  Low salt, low fat diet encouraged and discussed.  Pt advised to avoid heavy lifting or straining.     [facility] team will continue to follow up with pt status.  NP/CCC roles explained with pt understanding, contact information provided. Pt agrees to call with any questions, issues or concerns.  Worsening symptoms reviewed with patient understanding.      FOLLOW UP APPOINTMENTS:  CTS: Dr. Grijalva appointment 10/9/24  CARDIOLOGIST: Dr. Bishop appointment 10/23/24  PCP: Pt encouraged to follow up within one month of discharge
Lung nodules

## 2024-11-11 NOTE — H&P PST ADULT - NS MD HP INPLANTS MED DEV
b/l hip, 3 cardiac stents , one stent to RLE/Artificial joint b/l hip, 3 cardiac stents , two stent to RLE/Artificial joint

## 2024-11-18 ENCOUNTER — RESULT REVIEW (OUTPATIENT)
Age: 84
End: 2024-11-18

## 2024-11-18 ENCOUNTER — OUTPATIENT (OUTPATIENT)
Dept: OUTPATIENT SERVICES | Facility: HOSPITAL | Age: 84
LOS: 1 days | End: 2024-11-18
Payer: MEDICARE

## 2024-11-18 VITALS
OXYGEN SATURATION: 99 % | HEART RATE: 74 BPM | SYSTOLIC BLOOD PRESSURE: 127 MMHG | DIASTOLIC BLOOD PRESSURE: 81 MMHG | RESPIRATION RATE: 14 BRPM

## 2024-11-18 VITALS
RESPIRATION RATE: 18 BRPM | DIASTOLIC BLOOD PRESSURE: 57 MMHG | OXYGEN SATURATION: 97 % | SYSTOLIC BLOOD PRESSURE: 95 MMHG | HEART RATE: 85 BPM | TEMPERATURE: 98 F

## 2024-11-18 DIAGNOSIS — R91.1 SOLITARY PULMONARY NODULE: ICD-10-CM

## 2024-11-18 DIAGNOSIS — Z96.642 PRESENCE OF LEFT ARTIFICIAL HIP JOINT: Chronic | ICD-10-CM

## 2024-11-18 DIAGNOSIS — Z98.890 OTHER SPECIFIED POSTPROCEDURAL STATES: Chronic | ICD-10-CM

## 2024-11-18 DIAGNOSIS — Z95.5 PRESENCE OF CORONARY ANGIOPLASTY IMPLANT AND GRAFT: Chronic | ICD-10-CM

## 2024-11-18 DIAGNOSIS — Z96.641 PRESENCE OF RIGHT ARTIFICIAL HIP JOINT: Chronic | ICD-10-CM

## 2024-11-18 DIAGNOSIS — Z98.49 CATARACT EXTRACTION STATUS, UNSPECIFIED EYE: Chronic | ICD-10-CM

## 2024-11-18 PROCEDURE — 88342 IMHCHEM/IMCYTCHM 1ST ANTB: CPT | Mod: 26

## 2024-11-18 PROCEDURE — 88305 TISSUE EXAM BY PATHOLOGIST: CPT | Mod: 26

## 2024-11-18 PROCEDURE — 88341 IMHCHEM/IMCYTCHM EA ADD ANTB: CPT | Mod: 26

## 2024-11-18 PROCEDURE — 88333 PATH CONSLTJ SURG CYTO XM 1: CPT | Mod: 26

## 2024-11-18 PROCEDURE — 71045 X-RAY EXAM CHEST 1 VIEW: CPT | Mod: 26

## 2024-11-18 PROCEDURE — 32408 CORE NDL BX LNG/MED PERQ: CPT

## 2024-11-18 RX ORDER — BUDESONIDE AND FORMOTEROL FUMARATE DIHYDRATE 80; 4.5 UG/1; UG/1
2 AEROSOL RESPIRATORY (INHALATION)
Refills: 0 | DISCHARGE

## 2024-11-18 RX ORDER — VERAPAMIL HCL 240 MG
1 TABLET, EXTENDED RELEASE ORAL
Refills: 0 | DISCHARGE

## 2024-11-18 RX ORDER — TIOTROPIUM BROMIDE INHALATION SPRAY 1.56 UG/1
1 SPRAY, METERED RESPIRATORY (INHALATION)
Refills: 0 | DISCHARGE

## 2024-11-18 RX ORDER — PLANT STANOL ESTER 450 MG
90 TABLET ORAL
Refills: 0 | DISCHARGE

## 2024-11-18 RX ORDER — ASPIRIN/MAG CARB/ALUMINUM AMIN 325 MG
1 TABLET ORAL
Refills: 0 | DISCHARGE

## 2024-11-18 RX ORDER — PANTOPRAZOLE SODIUM 40 MG/1
1 TABLET, DELAYED RELEASE ORAL
Refills: 0 | DISCHARGE

## 2024-11-18 RX ORDER — APIXABAN 5 MG/1
1 TABLET, FILM COATED ORAL
Refills: 0 | DISCHARGE

## 2024-11-18 RX ORDER — CHOLECALCIFEROL (VITAMIN D3) 10MCG/0.25
0 DROPS ORAL
Refills: 0 | DISCHARGE

## 2024-11-19 ENCOUNTER — APPOINTMENT (OUTPATIENT)
Dept: THORACIC SURGERY | Facility: CLINIC | Age: 84
End: 2024-11-19
Payer: MEDICARE

## 2024-11-25 LAB — NON-GYNECOLOGICAL CYTOLOGY STUDY: SIGNIFICANT CHANGE UP

## 2024-11-26 ENCOUNTER — APPOINTMENT (OUTPATIENT)
Dept: THORACIC SURGERY | Facility: CLINIC | Age: 84
End: 2024-11-26

## 2024-11-26 ENCOUNTER — APPOINTMENT (OUTPATIENT)
Dept: THORACIC SURGERY | Facility: CLINIC | Age: 84
End: 2024-11-26
Payer: MEDICARE

## 2024-11-26 VITALS
DIASTOLIC BLOOD PRESSURE: 83 MMHG | RESPIRATION RATE: 17 BRPM | HEART RATE: 83 BPM | OXYGEN SATURATION: 97 % | SYSTOLIC BLOOD PRESSURE: 111 MMHG | WEIGHT: 150 LBS | BODY MASS INDEX: 29.3 KG/M2

## 2024-11-26 DIAGNOSIS — C34.91 MALIGNANT NEOPLASM OF UNSPECIFIED PART OF RIGHT BRONCHUS OR LUNG: ICD-10-CM

## 2024-11-26 DIAGNOSIS — R91.8 OTHER NONSPECIFIC ABNORMAL FINDING OF LUNG FIELD: ICD-10-CM

## 2024-11-26 PROCEDURE — 99214 OFFICE O/P EST MOD 30 MIN: CPT

## 2024-12-10 ENCOUNTER — APPOINTMENT (OUTPATIENT)
Dept: RADIATION ONCOLOGY | Facility: CLINIC | Age: 84
End: 2024-12-10
Payer: MEDICARE

## 2024-12-10 VITALS
DIASTOLIC BLOOD PRESSURE: 72 MMHG | HEART RATE: 87 BPM | WEIGHT: 151.63 LBS | RESPIRATION RATE: 17 BRPM | BODY MASS INDEX: 29.77 KG/M2 | SYSTOLIC BLOOD PRESSURE: 102 MMHG | OXYGEN SATURATION: 99 % | HEIGHT: 60 IN

## 2024-12-10 PROCEDURE — 99205 OFFICE O/P NEW HI 60 MIN: CPT

## 2024-12-12 ENCOUNTER — OUTPATIENT (OUTPATIENT)
Dept: OUTPATIENT SERVICES | Facility: HOSPITAL | Age: 84
LOS: 1 days | Discharge: HOME | End: 2024-12-12
Payer: MEDICARE

## 2024-12-12 DIAGNOSIS — Z98.890 OTHER SPECIFIED POSTPROCEDURAL STATES: Chronic | ICD-10-CM

## 2024-12-12 DIAGNOSIS — Z96.642 PRESENCE OF LEFT ARTIFICIAL HIP JOINT: Chronic | ICD-10-CM

## 2024-12-12 DIAGNOSIS — Z98.49 CATARACT EXTRACTION STATUS, UNSPECIFIED EYE: Chronic | ICD-10-CM

## 2024-12-12 DIAGNOSIS — Z95.5 PRESENCE OF CORONARY ANGIOPLASTY IMPLANT AND GRAFT: Chronic | ICD-10-CM

## 2024-12-12 DIAGNOSIS — Z96.641 PRESENCE OF RIGHT ARTIFICIAL HIP JOINT: Chronic | ICD-10-CM

## 2024-12-16 ENCOUNTER — NON-APPOINTMENT (OUTPATIENT)
Age: 84
End: 2024-12-16

## 2024-12-16 PROCEDURE — 77334 RADIATION TREATMENT AID(S): CPT | Mod: 26

## 2024-12-16 PROCEDURE — 77263 THER RADIOLOGY TX PLNG CPLX: CPT

## 2025-01-13 PROCEDURE — 77300 RADIATION THERAPY DOSE PLAN: CPT | Mod: 26

## 2025-01-13 PROCEDURE — 77301 RADIOTHERAPY DOSE PLAN IMRT: CPT | Mod: 26

## 2025-01-13 PROCEDURE — 77293 RESPIRATOR MOTION MGMT SIMUL: CPT | Mod: 26

## 2025-01-13 PROCEDURE — 77338 DESIGN MLC DEVICE FOR IMRT: CPT | Mod: 26

## 2025-01-28 NOTE — PROVIDER CONTACT NOTE (OTHER) - NAME OF MD/NP/PA/DO NOTIFIED:
LAYLA TADEO
LAYLA TADEO
Reason?: Additional Information
Reason?: non-covered service
Cost Of Treatment Patient Responsible For Paying?: $147
Detail Level: Detailed
Procedure (Limit To 20 Characters): liquid nitrogen
Payment Option: Option 2: Don't Bill Medicare, patient responsible for payment. Patient cannot appeal Medicare if billed.

## 2025-01-29 ENCOUNTER — NON-APPOINTMENT (OUTPATIENT)
Age: 85
End: 2025-01-29

## 2025-01-31 PROCEDURE — 77435 SBRT MANAGEMENT: CPT

## 2025-02-17 NOTE — ED ADULT NURSE NOTE - CAS EDN DISCHARGE INTERVENTIONS
Medication: levothyroxine 100 MCG tablet, simvastatin (ZOCOR) 40 MG tablet passed protocol.   Last office visit date: 10/9/24  Next appointment scheduled?: Yes   Number of refills given: 2  
IV intact

## 2025-02-27 ENCOUNTER — NON-APPOINTMENT (OUTPATIENT)
Age: 85
End: 2025-02-27

## 2025-03-10 NOTE — DISCHARGE NOTE PROVIDER - NSDCQMSTROKE_NEU_ALL_CORE
3/10/2025      Delia Dailey  [facility]  [address] [place] [address]        No notes on file      Sincerely,        NATE Mcghee CNP    Electronically signed  
No

## 2025-03-12 ENCOUNTER — APPOINTMENT (OUTPATIENT)
Dept: RADIATION ONCOLOGY | Facility: CLINIC | Age: 85
End: 2025-03-12

## 2025-03-12 VITALS
HEIGHT: 60 IN | HEART RATE: 100 BPM | RESPIRATION RATE: 16 BRPM | TEMPERATURE: 96.98 F | OXYGEN SATURATION: 95 % | SYSTOLIC BLOOD PRESSURE: 138 MMHG | DIASTOLIC BLOOD PRESSURE: 84 MMHG

## 2025-03-12 PROCEDURE — 99215 OFFICE O/P EST HI 40 MIN: CPT

## 2025-03-14 ENCOUNTER — APPOINTMENT (OUTPATIENT)
Dept: VASCULAR SURGERY | Facility: CLINIC | Age: 85
End: 2025-03-14
Payer: MEDICARE

## 2025-03-14 PROCEDURE — 93926 LOWER EXTREMITY STUDY: CPT | Mod: RT

## 2025-03-14 PROCEDURE — 99214 OFFICE O/P EST MOD 30 MIN: CPT

## 2025-03-26 ENCOUNTER — NON-APPOINTMENT (OUTPATIENT)
Age: 85
End: 2025-03-26

## 2025-04-01 ENCOUNTER — APPOINTMENT (OUTPATIENT)
Dept: NUCLEAR MEDICINE | Facility: IMAGING CENTER | Age: 85
End: 2025-04-01
Payer: MEDICARE

## 2025-04-01 ENCOUNTER — OUTPATIENT (OUTPATIENT)
Dept: OUTPATIENT SERVICES | Facility: HOSPITAL | Age: 85
LOS: 1 days | End: 2025-04-01
Payer: MEDICARE

## 2025-04-01 DIAGNOSIS — Z98.890 OTHER SPECIFIED POSTPROCEDURAL STATES: Chronic | ICD-10-CM

## 2025-04-01 DIAGNOSIS — Z98.49 CATARACT EXTRACTION STATUS, UNSPECIFIED EYE: Chronic | ICD-10-CM

## 2025-04-01 DIAGNOSIS — Z96.642 PRESENCE OF LEFT ARTIFICIAL HIP JOINT: Chronic | ICD-10-CM

## 2025-04-01 DIAGNOSIS — Z96.641 PRESENCE OF RIGHT ARTIFICIAL HIP JOINT: Chronic | ICD-10-CM

## 2025-04-01 DIAGNOSIS — C34.91 MALIGNANT NEOPLASM OF UNSPECIFIED PART OF RIGHT BRONCHUS OR LUNG: ICD-10-CM

## 2025-04-01 DIAGNOSIS — Z95.5 PRESENCE OF CORONARY ANGIOPLASTY IMPLANT AND GRAFT: Chronic | ICD-10-CM

## 2025-04-01 PROCEDURE — A9552: CPT

## 2025-04-01 PROCEDURE — 78815 PET IMAGE W/CT SKULL-THIGH: CPT

## 2025-04-01 PROCEDURE — 78815 PET IMAGE W/CT SKULL-THIGH: CPT | Mod: 26,PS

## 2025-04-03 ENCOUNTER — EMERGENCY (EMERGENCY)
Facility: HOSPITAL | Age: 85
LOS: 1 days | Discharge: HOME | End: 2025-04-03
Attending: EMERGENCY MEDICINE | Admitting: EMERGENCY MEDICINE
Payer: MEDICARE

## 2025-04-03 VITALS
RESPIRATION RATE: 17 BRPM | DIASTOLIC BLOOD PRESSURE: 99 MMHG | SYSTOLIC BLOOD PRESSURE: 111 MMHG | HEART RATE: 64 BPM | OXYGEN SATURATION: 100 % | TEMPERATURE: 98 F

## 2025-04-03 VITALS
HEIGHT: 62 IN | WEIGHT: 141.98 LBS | HEART RATE: 95 BPM | DIASTOLIC BLOOD PRESSURE: 59 MMHG | OXYGEN SATURATION: 98 % | RESPIRATION RATE: 16 BRPM | TEMPERATURE: 98 F | SYSTOLIC BLOOD PRESSURE: 111 MMHG

## 2025-04-03 DIAGNOSIS — Z96.642 PRESENCE OF LEFT ARTIFICIAL HIP JOINT: Chronic | ICD-10-CM

## 2025-04-03 DIAGNOSIS — Z98.49 CATARACT EXTRACTION STATUS, UNSPECIFIED EYE: Chronic | ICD-10-CM

## 2025-04-03 DIAGNOSIS — Z95.5 PRESENCE OF CORONARY ANGIOPLASTY IMPLANT AND GRAFT: Chronic | ICD-10-CM

## 2025-04-03 DIAGNOSIS — Z98.890 OTHER SPECIFIED POSTPROCEDURAL STATES: Chronic | ICD-10-CM

## 2025-04-03 DIAGNOSIS — Z96.641 PRESENCE OF RIGHT ARTIFICIAL HIP JOINT: Chronic | ICD-10-CM

## 2025-04-03 LAB
ALBUMIN SERPL ELPH-MCNC: 3.8 G/DL — SIGNIFICANT CHANGE UP (ref 3.3–5)
ALP SERPL-CCNC: 67 U/L — SIGNIFICANT CHANGE UP (ref 40–120)
ALT FLD-CCNC: 9 U/L — SIGNIFICANT CHANGE UP (ref 4–33)
ANION GAP SERPL CALC-SCNC: 16 MMOL/L — HIGH (ref 7–14)
ANISOCYTOSIS BLD QL: SLIGHT — SIGNIFICANT CHANGE UP
AST SERPL-CCNC: 17 U/L — SIGNIFICANT CHANGE UP (ref 4–32)
BASOPHILS # BLD AUTO: 0.17 K/UL — SIGNIFICANT CHANGE UP (ref 0–0.2)
BASOPHILS NFR BLD AUTO: 2.6 % — HIGH (ref 0–2)
BILIRUB SERPL-MCNC: 0.5 MG/DL — SIGNIFICANT CHANGE UP (ref 0.2–1.2)
BUN SERPL-MCNC: 18 MG/DL — SIGNIFICANT CHANGE UP (ref 7–23)
CALCIUM SERPL-MCNC: 9.1 MG/DL — SIGNIFICANT CHANGE UP (ref 8.4–10.5)
CHLORIDE SERPL-SCNC: 103 MMOL/L — SIGNIFICANT CHANGE UP (ref 98–107)
CO2 SERPL-SCNC: 21 MMOL/L — LOW (ref 22–31)
CREAT SERPL-MCNC: 0.85 MG/DL — SIGNIFICANT CHANGE UP (ref 0.5–1.3)
EGFR: 68 ML/MIN/1.73M2 — SIGNIFICANT CHANGE UP
EGFR: 68 ML/MIN/1.73M2 — SIGNIFICANT CHANGE UP
EOSINOPHIL # BLD AUTO: 0.35 K/UL — SIGNIFICANT CHANGE UP (ref 0–0.5)
EOSINOPHIL NFR BLD AUTO: 5.3 % — SIGNIFICANT CHANGE UP (ref 0–6)
GAS PNL BLDV: SIGNIFICANT CHANGE UP
GIANT PLATELETS BLD QL SMEAR: PRESENT — SIGNIFICANT CHANGE UP
GLUCOSE SERPL-MCNC: 105 MG/DL — HIGH (ref 70–99)
HCT VFR BLD CALC: 29.7 % — LOW (ref 34.5–45)
HGB BLD-MCNC: 9.7 G/DL — LOW (ref 11.5–15.5)
IANC: 4.3 K/UL — SIGNIFICANT CHANGE UP (ref 1.8–7.4)
LYMPHOCYTES # BLD AUTO: 0.69 K/UL — LOW (ref 1–3.3)
LYMPHOCYTES # BLD AUTO: 10.5 % — LOW (ref 13–44)
MACROCYTES BLD QL: SIGNIFICANT CHANGE UP
MANUAL SMEAR VERIFICATION: SIGNIFICANT CHANGE UP
MCHC RBC-ENTMCNC: 32.7 G/DL — SIGNIFICANT CHANGE UP (ref 32–36)
MCHC RBC-ENTMCNC: 37.3 PG — HIGH (ref 27–34)
MCV RBC AUTO: 114.2 FL — HIGH (ref 80–100)
MONOCYTES # BLD AUTO: 0.69 K/UL — SIGNIFICANT CHANGE UP (ref 0–0.9)
MONOCYTES NFR BLD AUTO: 10.5 % — SIGNIFICANT CHANGE UP (ref 2–14)
NEUTROPHILS # BLD AUTO: 4.48 K/UL — SIGNIFICANT CHANGE UP (ref 1.8–7.4)
NEUTROPHILS NFR BLD AUTO: 67.6 % — SIGNIFICANT CHANGE UP (ref 43–77)
NEUTS BAND # BLD: 0.9 % — SIGNIFICANT CHANGE UP (ref 0–6)
NEUTS BAND NFR BLD: 0.9 % — SIGNIFICANT CHANGE UP (ref 0–6)
OVALOCYTES BLD QL SMEAR: SLIGHT — SIGNIFICANT CHANGE UP
PLAT MORPH BLD: ABNORMAL
PLATELET # BLD AUTO: 329 K/UL — SIGNIFICANT CHANGE UP (ref 150–400)
PLATELET COUNT - ESTIMATE: NORMAL — SIGNIFICANT CHANGE UP
POIKILOCYTOSIS BLD QL AUTO: SLIGHT — SIGNIFICANT CHANGE UP
POTASSIUM SERPL-MCNC: 4.3 MMOL/L — SIGNIFICANT CHANGE UP (ref 3.5–5.3)
POTASSIUM SERPL-SCNC: 4.3 MMOL/L — SIGNIFICANT CHANGE UP (ref 3.5–5.3)
PROT SERPL-MCNC: 6.3 G/DL — SIGNIFICANT CHANGE UP (ref 6–8.3)
RBC # BLD: 2.6 M/UL — LOW (ref 3.8–5.2)
RBC # FLD: 16.4 % — HIGH (ref 10.3–14.5)
RBC BLD AUTO: ABNORMAL
SODIUM SERPL-SCNC: 140 MMOL/L — SIGNIFICANT CHANGE UP (ref 135–145)
VARIANT LYMPHS # BLD: 2.6 % — SIGNIFICANT CHANGE UP (ref 0–6)
VARIANT LYMPHS NFR BLD MANUAL: 2.6 % — SIGNIFICANT CHANGE UP (ref 0–6)
WBC # BLD: 6.54 K/UL — SIGNIFICANT CHANGE UP (ref 3.8–10.5)
WBC # FLD AUTO: 6.54 K/UL — SIGNIFICANT CHANGE UP (ref 3.8–10.5)

## 2025-04-03 PROCEDURE — 71046 X-RAY EXAM CHEST 2 VIEWS: CPT | Mod: 26

## 2025-04-03 PROCEDURE — 74177 CT ABD & PELVIS W/CONTRAST: CPT | Mod: 26

## 2025-04-03 PROCEDURE — 99285 EMERGENCY DEPT VISIT HI MDM: CPT

## 2025-04-03 PROCEDURE — 71260 CT THORAX DX C+: CPT | Mod: 26

## 2025-04-03 RX ORDER — ACETAMINOPHEN 500 MG/5ML
2 LIQUID (ML) ORAL
Qty: 40 | Refills: 0
Start: 2025-04-03 | End: 2025-04-07

## 2025-04-03 RX ORDER — KETOROLAC TROMETHAMINE 30 MG/ML
1 INJECTION, SOLUTION INTRAMUSCULAR; INTRAVENOUS
Qty: 20 | Refills: 0
Start: 2025-04-03 | End: 2025-04-07

## 2025-04-03 RX ORDER — ACETAMINOPHEN 500 MG/5ML
1000 LIQUID (ML) ORAL ONCE
Refills: 0 | Status: COMPLETED | OUTPATIENT
Start: 2025-04-03 | End: 2025-04-03

## 2025-04-03 RX ORDER — KETOROLAC TROMETHAMINE 30 MG/ML
15 INJECTION, SOLUTION INTRAMUSCULAR; INTRAVENOUS ONCE
Refills: 0 | Status: DISCONTINUED | OUTPATIENT
Start: 2025-04-03 | End: 2025-04-03

## 2025-04-03 RX ORDER — LIDOCAINE HYDROCHLORIDE 20 MG/ML
1 JELLY TOPICAL
Qty: 1 | Refills: 0
Start: 2025-04-03 | End: 2025-04-07

## 2025-04-03 RX ADMIN — Medication 400 MILLIGRAM(S): at 11:57

## 2025-04-03 RX ADMIN — KETOROLAC TROMETHAMINE 15 MILLIGRAM(S): 30 INJECTION, SOLUTION INTRAMUSCULAR; INTRAVENOUS at 15:42

## 2025-04-03 RX ADMIN — Medication 4 MILLIGRAM(S): at 15:42

## 2025-04-03 RX ADMIN — Medication 2 MILLIGRAM(S): at 13:47

## 2025-04-03 RX ADMIN — Medication 4 MILLIGRAM(S): at 11:56

## 2025-04-07 ENCOUNTER — APPOINTMENT (OUTPATIENT)
Dept: RADIATION ONCOLOGY | Facility: CLINIC | Age: 85
End: 2025-04-07

## 2025-04-07 VITALS
HEIGHT: 60 IN | OXYGEN SATURATION: 99 % | DIASTOLIC BLOOD PRESSURE: 83 MMHG | HEART RATE: 87 BPM | BODY MASS INDEX: 28.24 KG/M2 | TEMPERATURE: 96.98 F | RESPIRATION RATE: 16 BRPM | SYSTOLIC BLOOD PRESSURE: 119 MMHG | WEIGHT: 143.85 LBS

## 2025-04-07 DIAGNOSIS — C34.91 MALIGNANT NEOPLASM OF UNSPECIFIED PART OF RIGHT BRONCHUS OR LUNG: ICD-10-CM

## 2025-04-07 PROCEDURE — 99215 OFFICE O/P EST HI 40 MIN: CPT

## 2025-04-09 ENCOUNTER — OUTPATIENT (OUTPATIENT)
Dept: OUTPATIENT SERVICES | Facility: HOSPITAL | Age: 85
LOS: 1 days | End: 2025-04-09
Payer: MEDICARE

## 2025-04-09 ENCOUNTER — APPOINTMENT (OUTPATIENT)
Dept: MRI IMAGING | Facility: IMAGING CENTER | Age: 85
End: 2025-04-09
Payer: MEDICARE

## 2025-04-09 DIAGNOSIS — Z95.5 PRESENCE OF CORONARY ANGIOPLASTY IMPLANT AND GRAFT: Chronic | ICD-10-CM

## 2025-04-09 DIAGNOSIS — Z96.641 PRESENCE OF RIGHT ARTIFICIAL HIP JOINT: Chronic | ICD-10-CM

## 2025-04-09 DIAGNOSIS — Z98.890 OTHER SPECIFIED POSTPROCEDURAL STATES: Chronic | ICD-10-CM

## 2025-04-09 DIAGNOSIS — Z98.49 CATARACT EXTRACTION STATUS, UNSPECIFIED EYE: Chronic | ICD-10-CM

## 2025-04-09 DIAGNOSIS — Z96.642 PRESENCE OF LEFT ARTIFICIAL HIP JOINT: Chronic | ICD-10-CM

## 2025-04-09 DIAGNOSIS — C34.91 MALIGNANT NEOPLASM OF UNSPECIFIED PART OF RIGHT BRONCHUS OR LUNG: ICD-10-CM

## 2025-04-09 PROCEDURE — 72142 MRI NECK SPINE W/DYE: CPT

## 2025-04-09 PROCEDURE — 72142 MRI NECK SPINE W/DYE: CPT | Mod: 26

## 2025-04-23 ENCOUNTER — APPOINTMENT (OUTPATIENT)
Dept: RADIATION ONCOLOGY | Facility: CLINIC | Age: 85
End: 2025-04-23

## 2025-04-23 ENCOUNTER — NON-APPOINTMENT (OUTPATIENT)
Age: 85
End: 2025-04-23

## 2025-04-23 VITALS
OXYGEN SATURATION: 95 % | WEIGHT: 140.32 LBS | HEIGHT: 60 IN | RESPIRATION RATE: 16 BRPM | DIASTOLIC BLOOD PRESSURE: 72 MMHG | HEART RATE: 95 BPM | SYSTOLIC BLOOD PRESSURE: 109 MMHG | BODY MASS INDEX: 27.55 KG/M2

## 2025-04-23 PROCEDURE — ZZZZZ: CPT

## 2025-04-26 ENCOUNTER — OUTPATIENT (OUTPATIENT)
Dept: OUTPATIENT SERVICES | Facility: HOSPITAL | Age: 85
LOS: 1 days | Discharge: HOME | End: 2025-04-26

## 2025-04-26 DIAGNOSIS — Z95.5 PRESENCE OF CORONARY ANGIOPLASTY IMPLANT AND GRAFT: Chronic | ICD-10-CM

## 2025-04-26 DIAGNOSIS — Z98.49 CATARACT EXTRACTION STATUS, UNSPECIFIED EYE: Chronic | ICD-10-CM

## 2025-04-26 DIAGNOSIS — Z96.641 PRESENCE OF RIGHT ARTIFICIAL HIP JOINT: Chronic | ICD-10-CM

## 2025-04-26 DIAGNOSIS — C34.00 MALIGNANT NEOPLASM OF UNSPECIFIED MAIN BRONCHUS: ICD-10-CM

## 2025-04-26 DIAGNOSIS — Z98.890 OTHER SPECIFIED POSTPROCEDURAL STATES: Chronic | ICD-10-CM

## 2025-04-26 DIAGNOSIS — Z96.642 PRESENCE OF LEFT ARTIFICIAL HIP JOINT: Chronic | ICD-10-CM

## 2025-04-29 ENCOUNTER — NON-APPOINTMENT (OUTPATIENT)
Age: 85
End: 2025-04-29

## 2025-04-29 ENCOUNTER — RESULT REVIEW (OUTPATIENT)
Age: 85
End: 2025-04-29

## 2025-04-29 ENCOUNTER — APPOINTMENT (OUTPATIENT)
Dept: HEMATOLOGY ONCOLOGY | Facility: CLINIC | Age: 85
End: 2025-04-29
Payer: MEDICARE

## 2025-04-29 ENCOUNTER — LABORATORY RESULT (OUTPATIENT)
Age: 85
End: 2025-04-29

## 2025-04-29 ENCOUNTER — APPOINTMENT (OUTPATIENT)
Dept: PAIN MANAGEMENT | Facility: CLINIC | Age: 85
End: 2025-04-29
Payer: MEDICARE

## 2025-04-29 VITALS
SYSTOLIC BLOOD PRESSURE: 105 MMHG | TEMPERATURE: 97 F | BODY MASS INDEX: 26.7 KG/M2 | DIASTOLIC BLOOD PRESSURE: 72 MMHG | OXYGEN SATURATION: 99 % | HEIGHT: 60 IN | WEIGHT: 136 LBS | RESPIRATION RATE: 16 BRPM | HEART RATE: 64 BPM

## 2025-04-29 VITALS
RESPIRATION RATE: 16 BRPM | HEIGHT: 60 IN | TEMPERATURE: 97.5 F | WEIGHT: 136 LBS | SYSTOLIC BLOOD PRESSURE: 117 MMHG | DIASTOLIC BLOOD PRESSURE: 77 MMHG | OXYGEN SATURATION: 98 % | HEART RATE: 74 BPM | BODY MASS INDEX: 26.7 KG/M2

## 2025-04-29 DIAGNOSIS — Z01.812 ENCOUNTER FOR PREPROCEDURAL LABORATORY EXAMINATION: ICD-10-CM

## 2025-04-29 DIAGNOSIS — D64.9 ANEMIA, UNSPECIFIED: ICD-10-CM

## 2025-04-29 DIAGNOSIS — G89.3 NEOPLASM RELATED PAIN (ACUTE) (CHRONIC): ICD-10-CM

## 2025-04-29 DIAGNOSIS — C77.1 SECONDARY AND UNSPECIFIED MALIGNANT NEOPLASM OF INTRATHORACIC LYMPH NODES: ICD-10-CM

## 2025-04-29 DIAGNOSIS — C78.7 SECONDARY MALIGNANT NEOPLASM OF LIVER AND INTRAHEPATIC BILE DUCT: ICD-10-CM

## 2025-04-29 DIAGNOSIS — Z78.9 OTHER SPECIFIED HEALTH STATUS: ICD-10-CM

## 2025-04-29 DIAGNOSIS — C34.91 MALIGNANT NEOPLASM OF UNSPECIFIED PART OF RIGHT BRONCHUS OR LUNG: ICD-10-CM

## 2025-04-29 LAB
BASOPHILS # BLD AUTO: 0.08 K/UL — SIGNIFICANT CHANGE UP (ref 0–0.2)
BASOPHILS NFR BLD AUTO: 1.4 % — SIGNIFICANT CHANGE UP (ref 0–2)
EOSINOPHIL # BLD AUTO: 0.18 K/UL — SIGNIFICANT CHANGE UP (ref 0–0.5)
EOSINOPHIL NFR BLD AUTO: 3.3 % — SIGNIFICANT CHANGE UP (ref 0–6)
HCT VFR BLD CALC: 26.7 % — LOW (ref 34.5–45)
HGB BLD-MCNC: 8.8 G/DL — LOW (ref 11.5–15.5)
IMM GRANULOCYTES NFR BLD AUTO: 0.9 % — SIGNIFICANT CHANGE UP (ref 0–0.9)
LYMPHOCYTES # BLD AUTO: 0.57 K/UL — LOW (ref 1–3.3)
LYMPHOCYTES # BLD AUTO: 10.3 % — LOW (ref 13–44)
MCHC RBC-ENTMCNC: 33 G/DL — SIGNIFICANT CHANGE UP (ref 32–36)
MCHC RBC-ENTMCNC: 37.6 PG — HIGH (ref 27–34)
MCV RBC AUTO: 114.1 FL — HIGH (ref 80–100)
MONOCYTES # BLD AUTO: 0.66 K/UL — SIGNIFICANT CHANGE UP (ref 0–0.9)
MONOCYTES NFR BLD AUTO: 12 % — SIGNIFICANT CHANGE UP (ref 2–14)
NEUTROPHILS # BLD AUTO: 3.98 K/UL — SIGNIFICANT CHANGE UP (ref 1.8–7.4)
NEUTROPHILS NFR BLD AUTO: 72.1 % — SIGNIFICANT CHANGE UP (ref 43–77)
NRBC BLD AUTO-RTO: 0 /100 WBCS — SIGNIFICANT CHANGE UP (ref 0–0)
PLATELET # BLD AUTO: 285 K/UL — SIGNIFICANT CHANGE UP (ref 150–400)
RBC # BLD: 2.34 M/UL — LOW (ref 3.8–5.2)
RBC # FLD: 15.4 % — HIGH (ref 10.3–14.5)
RETICS #: 40.7 K/UL — SIGNIFICANT CHANGE UP (ref 25–125)
RETICS/RBC NFR: 1.7 % — SIGNIFICANT CHANGE UP (ref 0.5–2.5)
WBC # BLD: 5.52 K/UL — SIGNIFICANT CHANGE UP (ref 3.8–10.5)
WBC # FLD AUTO: 5.52 K/UL — SIGNIFICANT CHANGE UP (ref 3.8–10.5)

## 2025-04-29 PROCEDURE — G2211 COMPLEX E/M VISIT ADD ON: CPT

## 2025-04-29 PROCEDURE — G2212 PROLONG OUTPT/OFFICE VIS: CPT

## 2025-04-29 PROCEDURE — 99205 OFFICE O/P NEW HI 60 MIN: CPT

## 2025-04-29 PROCEDURE — 99204 OFFICE O/P NEW MOD 45 MIN: CPT

## 2025-04-29 RX ORDER — OXYCODONE 5 MG/1
5 TABLET ORAL
Qty: 60 | Refills: 0 | Status: ACTIVE | COMMUNITY
Start: 2025-04-29 | End: 1900-01-01

## 2025-04-30 ENCOUNTER — NON-APPOINTMENT (OUTPATIENT)
Age: 85
End: 2025-04-30

## 2025-04-30 VITALS
TEMPERATURE: 96.98 F | RESPIRATION RATE: 16 BRPM | WEIGHT: 137.68 LBS | SYSTOLIC BLOOD PRESSURE: 106 MMHG | BODY MASS INDEX: 27.03 KG/M2 | HEIGHT: 60 IN | DIASTOLIC BLOOD PRESSURE: 71 MMHG | HEART RATE: 96 BPM | OXYGEN SATURATION: 98 %

## 2025-05-01 LAB
ALBUMIN SERPL ELPH-MCNC: 4.4 G/DL
ALP BLD-CCNC: 90 U/L
ALT SERPL-CCNC: 14 U/L
ANION GAP SERPL CALC-SCNC: 16 MMOL/L
APTT BLD: 33.3 SEC
AST SERPL-CCNC: 24 U/L
BILIRUB SERPL-MCNC: 1.1 MG/DL
BUN SERPL-MCNC: 18 MG/DL
CALCIUM SERPL-MCNC: 9.8 MG/DL
CHLORIDE SERPL-SCNC: 101 MMOL/L
CO2 SERPL-SCNC: 22 MMOL/L
CREAT SERPL-MCNC: 0.76 MG/DL
EGFRCR SERPLBLD CKD-EPI 2021: 78 ML/MIN/1.73M2
FERRITIN SERPL-MCNC: 333 NG/ML
FOLATE SERPL-MCNC: 18.3 NG/ML
GLUCOSE SERPL-MCNC: 111 MG/DL
HBV CORE IGG+IGM SER QL: NONREACTIVE
HBV SURFACE AB SER QL: NONREACTIVE
HBV SURFACE AG SER QL: NONREACTIVE
HCV AB SER QL: NONREACTIVE
HCV S/CO RATIO: 0.09 S/CO
INR PPP: 2.02 RATIO
IRON SATN MFR SERPL: 34 %
IRON SERPL-MCNC: 86 UG/DL
LDH SERPL-CCNC: 355 U/L
PHOSPHATE SERPL-MCNC: 3.6 MG/DL
POTASSIUM SERPL-SCNC: 4.2 MMOL/L
PROT SERPL-MCNC: 6.7 G/DL
PT BLD: 23.9 SEC
SODIUM SERPL-SCNC: 140 MMOL/L
TIBC SERPL-MCNC: 255 UG/DL
TSH SERPL-ACNC: 5.65 UIU/ML
UIBC SERPL-MCNC: 169 UG/DL
URATE SERPL-MCNC: 6.7 MG/DL
VIT B12 SERPL-MCNC: 366 PG/ML

## 2025-05-02 ENCOUNTER — APPOINTMENT (OUTPATIENT)
Dept: INTERVENTIONAL RADIOLOGY/VASCULAR | Facility: CLINIC | Age: 85
End: 2025-05-02

## 2025-05-02 DIAGNOSIS — C79.51 SECONDARY MALIGNANT NEOPLASM OF BONE: ICD-10-CM

## 2025-05-02 DIAGNOSIS — I48.91 UNSPECIFIED ATRIAL FIBRILLATION: ICD-10-CM

## 2025-05-02 DIAGNOSIS — I50.9 HEART FAILURE, UNSPECIFIED: ICD-10-CM

## 2025-05-02 DIAGNOSIS — C34.91 MALIGNANT NEOPLASM OF UNSPECIFIED PART OF RIGHT BRONCHUS OR LUNG: ICD-10-CM

## 2025-05-02 DIAGNOSIS — I25.10 ATHEROSCLEROTIC HEART DISEASE OF NATIVE CORONARY ARTERY W/OUT ANGINA PECTORIS: ICD-10-CM

## 2025-05-02 DIAGNOSIS — J44.9 CHRONIC OBSTRUCTIVE PULMONARY DISEASE, UNSPECIFIED: ICD-10-CM

## 2025-05-02 PROCEDURE — 99203 OFFICE O/P NEW LOW 30 MIN: CPT | Mod: 2W

## 2025-05-02 RX ORDER — ACETAMINOPHEN 500 MG/1
TABLET ORAL
Refills: 0 | Status: DISCONTINUED | COMMUNITY
End: 2025-05-02

## 2025-05-02 RX ORDER — MORPHINE SULFATE 15 MG/1
15 TABLET ORAL
Refills: 0 | Status: DISCONTINUED | COMMUNITY
End: 2025-05-02

## 2025-05-06 ENCOUNTER — OUTPATIENT (OUTPATIENT)
Dept: OUTPATIENT SERVICES | Facility: HOSPITAL | Age: 85
LOS: 1 days | End: 2025-05-06
Payer: MEDICARE

## 2025-05-06 ENCOUNTER — APPOINTMENT (OUTPATIENT)
Dept: MRI IMAGING | Facility: CLINIC | Age: 85
End: 2025-05-06
Payer: MEDICARE

## 2025-05-06 ENCOUNTER — APPOINTMENT (OUTPATIENT)
Dept: MRI IMAGING | Facility: CLINIC | Age: 85
End: 2025-05-06

## 2025-05-06 DIAGNOSIS — Z98.890 OTHER SPECIFIED POSTPROCEDURAL STATES: Chronic | ICD-10-CM

## 2025-05-06 DIAGNOSIS — C34.91 MALIGNANT NEOPLASM OF UNSPECIFIED PART OF RIGHT BRONCHUS OR LUNG: ICD-10-CM

## 2025-05-06 PROCEDURE — 72196 MRI PELVIS W/DYE: CPT | Mod: 26

## 2025-05-06 PROCEDURE — 72149 MRI LUMBAR SPINE W/DYE: CPT

## 2025-05-06 PROCEDURE — 70553 MRI BRAIN STEM W/O & W/DYE: CPT | Mod: 26

## 2025-05-06 PROCEDURE — A9585: CPT

## 2025-05-06 PROCEDURE — 72149 MRI LUMBAR SPINE W/DYE: CPT | Mod: 26

## 2025-05-06 PROCEDURE — 72196 MRI PELVIS W/DYE: CPT

## 2025-05-06 PROCEDURE — 70553 MRI BRAIN STEM W/O & W/DYE: CPT

## 2025-05-06 RX ORDER — DEXAMETHASONE 4 MG/1
4 TABLET ORAL
Qty: 30 | Refills: 0 | Status: ACTIVE | COMMUNITY
Start: 2025-05-06 | End: 1900-01-01

## 2025-05-07 RX ORDER — METOCLOPRAMIDE 10 MG/1
10 TABLET ORAL EVERY 6 HOURS
Qty: 30 | Refills: 2 | Status: ACTIVE | COMMUNITY
Start: 2025-05-07 | End: 1900-01-01

## 2025-05-08 ENCOUNTER — APPOINTMENT (OUTPATIENT)
Dept: RADIATION ONCOLOGY | Facility: CLINIC | Age: 85
End: 2025-05-08

## 2025-05-08 VITALS
SYSTOLIC BLOOD PRESSURE: 118 MMHG | OXYGEN SATURATION: 99 % | HEIGHT: 60 IN | HEART RATE: 80 BPM | RESPIRATION RATE: 16 BRPM | DIASTOLIC BLOOD PRESSURE: 77 MMHG | TEMPERATURE: 96.98 F

## 2025-05-08 DIAGNOSIS — C79.31 MALIGNANT NEOPLASM OF UNSPECIFIED PART OF UNSPECIFIED BRONCHUS OR LUNG: ICD-10-CM

## 2025-05-08 DIAGNOSIS — C34.91 MALIGNANT NEOPLASM OF UNSPECIFIED PART OF RIGHT BRONCHUS OR LUNG: ICD-10-CM

## 2025-05-08 DIAGNOSIS — C34.90 MALIGNANT NEOPLASM OF UNSPECIFIED PART OF UNSPECIFIED BRONCHUS OR LUNG: ICD-10-CM

## 2025-05-08 PROCEDURE — G2212 PROLONG OUTPT/OFFICE VIS: CPT

## 2025-05-08 PROCEDURE — 99215 OFFICE O/P EST HI 40 MIN: CPT | Mod: GC,25

## 2025-05-09 ENCOUNTER — APPOINTMENT (OUTPATIENT)
Dept: UROLOGY | Facility: CLINIC | Age: 85
End: 2025-05-09

## 2025-05-13 ENCOUNTER — OUTPATIENT (OUTPATIENT)
Dept: OUTPATIENT SERVICES | Facility: HOSPITAL | Age: 85
LOS: 1 days | End: 2025-05-13
Payer: MEDICARE

## 2025-05-13 ENCOUNTER — TRANSCRIPTION ENCOUNTER (OUTPATIENT)
Age: 85
End: 2025-05-13

## 2025-05-13 ENCOUNTER — RESULT REVIEW (OUTPATIENT)
Age: 85
End: 2025-05-13

## 2025-05-13 VITALS
TEMPERATURE: 97 F | OXYGEN SATURATION: 99 % | HEIGHT: 60 IN | SYSTOLIC BLOOD PRESSURE: 137 MMHG | HEART RATE: 98 BPM | WEIGHT: 134.04 LBS | DIASTOLIC BLOOD PRESSURE: 62 MMHG | RESPIRATION RATE: 12 BRPM

## 2025-05-13 VITALS
RESPIRATION RATE: 12 BRPM | HEART RATE: 95 BPM | SYSTOLIC BLOOD PRESSURE: 103 MMHG | DIASTOLIC BLOOD PRESSURE: 58 MMHG | OXYGEN SATURATION: 97 %

## 2025-05-13 DIAGNOSIS — Z96.642 PRESENCE OF LEFT ARTIFICIAL HIP JOINT: Chronic | ICD-10-CM

## 2025-05-13 DIAGNOSIS — C34.91 MALIGNANT NEOPLASM OF UNSPECIFIED PART OF RIGHT BRONCHUS OR LUNG: ICD-10-CM

## 2025-05-13 DIAGNOSIS — C79.51 SECONDARY MALIGNANT NEOPLASM OF BONE: ICD-10-CM

## 2025-05-13 DIAGNOSIS — Z98.890 OTHER SPECIFIED POSTPROCEDURAL STATES: Chronic | ICD-10-CM

## 2025-05-13 PROBLEM — C34.90 NSCLC METASTATIC TO BRAIN: Status: ACTIVE | Noted: 2025-05-13

## 2025-05-13 LAB
APTT BLD: 23.1 SEC — LOW (ref 26.1–36.8)
INR BLD: 1.09 RATIO — SIGNIFICANT CHANGE UP (ref 0.85–1.16)
PROTHROM AB SERPL-ACNC: 12.5 SEC — SIGNIFICANT CHANGE UP (ref 9.9–13.4)

## 2025-05-13 PROCEDURE — 88305 TISSUE EXAM BY PATHOLOGIST: CPT

## 2025-05-13 PROCEDURE — 88173 CYTOPATH EVAL FNA REPORT: CPT | Mod: 26

## 2025-05-13 PROCEDURE — 88341 IMHCHEM/IMCYTCHM EA ADD ANTB: CPT | Mod: 26

## 2025-05-13 PROCEDURE — C1830: CPT

## 2025-05-13 PROCEDURE — 20220 BONE BIOPSY TROCAR/NDL SUPFC: CPT

## 2025-05-13 PROCEDURE — 88341 IMHCHEM/IMCYTCHM EA ADD ANTB: CPT

## 2025-05-13 PROCEDURE — 85610 PROTHROMBIN TIME: CPT

## 2025-05-13 PROCEDURE — 88307 TISSUE EXAM BY PATHOLOGIST: CPT

## 2025-05-13 PROCEDURE — 88333 PATH CONSLTJ SURG CYTO XM 1: CPT | Mod: 26,59

## 2025-05-13 PROCEDURE — 88307 TISSUE EXAM BY PATHOLOGIST: CPT | Mod: 26

## 2025-05-13 PROCEDURE — 85730 THROMBOPLASTIN TIME PARTIAL: CPT

## 2025-05-13 PROCEDURE — 88333 PATH CONSLTJ SURG CYTO XM 1: CPT

## 2025-05-13 PROCEDURE — 77012 CT SCAN FOR NEEDLE BIOPSY: CPT

## 2025-05-13 PROCEDURE — 88342 IMHCHEM/IMCYTCHM 1ST ANTB: CPT | Mod: 26

## 2025-05-13 PROCEDURE — 88342 IMHCHEM/IMCYTCHM 1ST ANTB: CPT

## 2025-05-13 PROCEDURE — 88305 TISSUE EXAM BY PATHOLOGIST: CPT | Mod: 26

## 2025-05-13 PROCEDURE — 88173 CYTOPATH EVAL FNA REPORT: CPT

## 2025-05-13 PROCEDURE — 77012 CT SCAN FOR NEEDLE BIOPSY: CPT | Mod: 26

## 2025-05-13 RX ORDER — ONDANSETRON HCL/PF 4 MG/2 ML
4 VIAL (ML) INJECTION ONCE
Refills: 0 | Status: DISCONTINUED | OUTPATIENT
Start: 2025-05-13 | End: 2025-05-28

## 2025-05-13 RX ORDER — ACETAMINOPHEN 500 MG/5ML
1000 LIQUID (ML) ORAL ONCE
Refills: 0 | Status: DISCONTINUED | OUTPATIENT
Start: 2025-05-13 | End: 2025-05-28

## 2025-05-13 RX ORDER — HYDROMORPHONE/SOD CHLOR,ISO/PF 2 MG/10 ML
0.25 SYRINGE (ML) INJECTION
Refills: 0 | Status: DISCONTINUED | OUTPATIENT
Start: 2025-05-13 | End: 2025-05-13

## 2025-05-13 RX ORDER — HYDROMORPHONE/SOD CHLOR,ISO/PF 2 MG/10 ML
0.5 SYRINGE (ML) INJECTION
Refills: 0 | Status: DISCONTINUED | OUTPATIENT
Start: 2025-05-13 | End: 2025-05-13

## 2025-05-13 NOTE — ASU PREOP CHECKLIST - ALLERGIES REVIEWED
Nursing notes reviewed and accepted.    Delma Melo is a 30 month old female who presents for 30 mo well child exam.  Patient presents with Mother.    Concerns raised today include: appetite, fingernails, gu symptoms  Mom is concerned Delma urine has a bad odor to it.  She is potty trained does have issues with constipation in the past but since seeing the chiropractor has improved. She is not taking medications for constipation. She has had a history of labial adhesion. Is potty trained. No night time wetting.   Mom is worried that Delma has a poor appetite.  She does in a variety of foods but will at times hold food in her mouth especially chicken.  Mom states this can happen with any food. . No choking. Dad felt her tonsils may be enlarged which could be contributing to her poor eating habits.  Mom is worried because her nails are all which and brittle.  She is concerned her poor nutrition may be contributing to this. Delma does eat fruits and vegetables mom will use single frozen vegetables as a back up when she is not eating the family meal.     Diet/eating: has a poor appetite. Mom says in the past 2-3 months, patient now holds food in her mouth and want to spit it out. Likes fruits and veggies, some meats like salmon, shrimp.  says she can get patient to eat well. Whole milk-2 cups daily. Likes juice, some water.  Elimination:  Bowel Movements once a day. Does get constipated. Sees chiropractor about once a month which helps.   Toilet training: yes- day and night  Sleep: sleeping well through the night. Sleeps about 11 hours per night. Naps once a day for 2-3 hours. In a crib in her own room.   Behavior: none  Screen time:  Less than an hour daily.     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    PHYSICAL EXAM:  Pulse 112, temperature 98.1 °F (36.7 °C), temperature source Tympanic, resp. rate 32, height 2' 11.25\" (0.895 m), weight 12.6 kg (27 lb 12.8 oz), head circumference 49.5 cm (19.49\").  GENERAL:  Well appearing  female, nontoxic, no acute distress.  Alert and interactive. Speech tangible.   SKIN: Warm, normal turgor.  No cyanosis.  No bruises or lesions. No dry skin. Tips of nails are peeling on thumbs.   HEAD:  Normocephalic, atraumatic.    EYES:  Conjunctivae without injection or icterus.  PERRL (pupils equal, round, reactive to light), EOMI (extraocular movements intact).  NOSE: No flaring.  EARS:  TMs (tympanic membranes) transparent with good landmarks.  THROAT:  Oropharynx with moist mucous membranes and no lesions.  There are 19 teeth, adequate spacing and ailment. No chipping discoloration or cavities. Upper right molar cutting in.  NECK:  Supple, no lymphadenopathy or masses.  HEART:  Regular rate and rhythm.  Quiet precordium.  Normal S1, S2.  No murmurs, rubs, gallops.   LUNGS:  Clear to auscultation bilaterally.  No wheezes, rales, rhonchi.  Normal work of breathing.  ABDOMEN:  Soft, nontender.  No organomegaly or masses.  GENITOURINARY:  Saad 1 female and No labial adhesions or lesions.  EXTREMITIES:  Warm, dry, without abnormalities.  NEUROLOGIC:  Normal tone, bulk, strength.    ASSESSMENT:  30 month old female well child.  ED Diagnosis   1. Encounter for routine child health examination with abnormal findings  THYROID STIMULATING HORMONE REFLEX   2. Screening for condition  COMPREHENSIVE METABOLIC PANEL   3. Screening for iron deficiency anemia  CBC WITH  DIFFERENTIAL   4. Screening for lead exposure  LEAD BLOOD/VENOUS   5. Foul smelling urine  CANCELED: POCT URINE DIP NON-AUTO       PLAN:    All parental concerns and questions discussed.  Addressed tablets half a tab form.  Appropriate affect and good nutrition reviewed.  Nothing  also advised.  Modeling good eating behaviors addressed.  Reviewed growth chart with mom and reassurance provided.  It did advise mom the brittle nails are likely related to dryness rather than nutrition.  Moisturizing hands was advised especially with use of a hand .  Benefits of checking labs for thyroid, kidney and liver functioning, glucose, anemia and lead poisoning advised.  Labs were ordered and are pending.  Mom will call to schedule appointment for upcoming labs.  Child was unable to void in clinic.  Materials provided to collect urine at home and technique of collecting clean-catch urine was advised.  When urine results available will contact family.  Reassured mom I do believe the odor is likely related to concentrated urine which can fluctuate with her issues with constipation.  Also advised on the shoulder could be from hygiene measures.  Since she is independent in toileting not always properly wiping or leaving tissue behind this can contribute to shoulders as well.  Encouraged to increase hydration through water.  Anticipatory guidance provided, handout given.              Safety/car/bicycle/fire/sharp objects/falls/water              Development/meeting all age appropriate developmental milestones    Toilet training              Discipline/structure, routines and consistent limit setting              Diet/appetite slumps and healthy eating habits              Television/limiting screen time              Analgesics/antipyretics              Sun exposure / insect repellent              Tobacco-free home              Dental care              Lead exposure risk: none  Health Maintenance Due   Topic  Date Due   • Influenza Vaccine (1 of 2) 09/01/2021                Immunizations recommend flu vaccine.  Risks, benefits, and side effects discussed. deferring with informed consent.  Return to clinic in 6 mo. for well child exam or sooner prn illness/concerns.   done

## 2025-05-13 NOTE — H&P ADULT - HISTORY OF PRESENT ILLNESS
Pre-Interventional Radiology Procedure Note    JANA RECORD | [id number]    25 @ 14:54    Interventional Radiology Attending Physician:     Ordering Attending Physician: Mayank Blancas    Diagnosis/Indication: Patient is a 84y old  Female who presents with a chief complaint of lung CA and  indeterminate bone lesions    Procedure: CT Guided Left Iliac Bone Lesion Biopsy    84y    Female    PAST MEDICAL & SURGICAL HISTORY:  Coronary Stent  to RCA, LAD, and DIAG 06 at [facility]      Afib  x 15 yrs --on Coumadin  attempted cardioversion 13 yrs ago--failed      GERD (Gastroesophageal Reflux Disease)      Hypercholesterolemia      Emphysema/COPD      Lip Cancer  resected 6 yrs ago (no chemo/rad)      Skin Cancer of Face  removed 1 yr ago      Skin Cancer of Nose  1 yr ago- removed      H/O: CVA  pt unaware of CVA, yet showed up on CT of head as old CVA      CHF (congestive heart failure)      PUD (peptic ulcer disease)      Asthma      Smoker      CAD (coronary artery disease)      Melanoma      Nodule of upper lobe of right lung      MARIE (obstructive sleep apnea)      PVD (peripheral vascular disease)      Lung nodule      History of Hysterectomy   for fibroids        History of   , , 1970,       History of Cholecystectomy        History of Appendectomy  childhood        S/P T&A  childhood        History of cholecystectomy      History of appendectomy      Melanoma      History of total right hip replacement      History of total left hip replacement      History of cataract surgery      H/O cardiac radiofrequency ablation      Status post angiography of extremity      S/P primary angioplasty with coronary stent                   PT/INR - ( 13 May 2025 14:04 )   PT: 12.5 sec;   INR: 1.09 ratio         PTT - ( 13 May 2025 14:04 )  PTT:23.1 sec

## 2025-05-13 NOTE — ASU DISCHARGE PLAN (ADULT/PEDIATRIC) - NS MD DC FALL RISK RISK
For information on Fall & Injury Prevention, visit: [email or web address] OR  [email or web address] OR  https://www.cdc.gov/steadi/patient.html

## 2025-05-13 NOTE — PRE-ANESTHESIA EVALUATION ADULT - NSANTHPMHFT_GEN_ALL_CORE
Moderate to severe TR  Decreased RV function Moderate to severe TR  Decreased RV function  Asthma: no intubations or ER visits  COPD: not on home O2  Denies CVA  Carotid stenosis  Atrial fibrillation    Coronary stents placed many years ago Moderate to severe TR  Decreased RV function  Asthma: no intubations or ER visits  COPD: not on home O2  Denies CVA  Carotid stenosis  Atrial fibrillation  MARIE in patient record but patient denies  ASA last taken on 5/7/25  Eliquis last taken on: 5/10/25    Coronary stents placed many years ago

## 2025-05-13 NOTE — PROCEDURE NOTE - PROCEDURE FINDINGS AND DETAILS
Left Iliac Bone Lesion Biopsy (Interval MRI demonstrates dominant left iliac bone lesion that was PET avid; decision was made to biopsy this lesion instead of the L3 vertebral lesion due to safer approach and larger size of lesion).

## 2025-05-13 NOTE — PRE-ANESTHESIA EVALUATION ADULT - WEIGHT IN KG
RX PROGRESS NOTE: Vancomycin Therapeutic Drug Monitoring      Indication for therapy: Bacteremia    ALLERGIES:  No Known Allergies    Most recent height and weight information:  Weight: (!) 148.5 kg (10/16/20 0400)  Height: 6' (182.9 cm) (10/16/20 0400)    The Following are the calculated  Current Weights for Niall Faustin            Adjusted Ideal    106 kg 77.6 kg             Labs:  Serum Creatinine and Creatinine Clearance:  Serum creatinine: 0.68 mg/dL 10/16/20 0525  Estimated creatinine clearance: 120 mL/min    Maximum Temperature (last 24 hours)     Value Max    Temp  98.6 °F (37 °C)        WBC (K/mcL)   Date/Time Value   10/16/2020 0525 5.5   10/15/2020 2306 6.8     Microbiology Results  (Last 10 results in the past 7 days)    Specimen   Gram Smear   Culture Result   Status      10/15/20  2330   10/15/20  2330  10/15/20  2330     BLOOD, ANTECUBITAL ANTECUBITAL,LEFT   NO GROWTH <24 HRS. PENDING    10/15/20  2306   10/15/20  2306  10/15/20  2306     BLOOD, PERIPHERAL ARM, LEFT   NO GROWTH <24 HRS. PENDING            Assessment/Plan:  Briefly, this is a 66 year old male started on vancomycin for Bacteremia, with a target serum trough concentration of 10-15 mcg/mL. PTA on Vancomycin 1500mg IV q12h at NH.  Patient received a dose of vancomycin 1000 mg at 00:44.  Will continue Vanomycin 1500 mg every 12 hours (maintenance dose).    Pharmacy will monitor levels as appropriate.    Pharmacy will continue to monitor patient (renal function, microbiology data, risk factors for adverse events, appropriate duration of therapy), will order/monitor serum levels as appropriate, and will adjust dose if/when necessary.      Thank you,    Makayla Jerez RPH  10/16/2020 1:51 PM     60.8

## 2025-05-13 NOTE — ASU DISCHARGE PLAN (ADULT/PEDIATRIC) - NURSING INSTRUCTIONS
Please feel free to contact us at [phone] if any problems arise. After 6PM, Monday through Friday, on weekends and on holidays, please call [phone] and ask for the radiology resident on call to be paged.

## 2025-05-13 NOTE — ASU PATIENT PROFILE, ADULT - FALL HARM RISK - RISK INTERVENTIONS

## 2025-05-13 NOTE — PRE-ANESTHESIA EVALUATION ADULT - NSANTHAIRWAYFT_ENT_ALL_CORE
Decreased ROM  Full dentures removed  Neck circumference >16cm  TM distance <6cm  Interincisor distance <2 finger breadths

## 2025-05-13 NOTE — PRE-ANESTHESIA EVALUATION ADULT - NSRADCARDRESULTSFT_GEN_ALL_CORE
TTE 6/29/22:    EF (Visual Estimate): 55-60 %  Doppler Peak Velocity (m/sec): MV=1.0 AoV=1.2  ------------------------------------------------------------------------  Observations:  Mitral Valve: Mitral annular calcification, otherwise  normal mitral valve. Peak mitral valve gradient equals 4 mm  Hg, mean transmitral valve gradient equals 1 mm Hg.  Aortic Valve/Aorta: Normal trileaflet aortic valve. Peak  transaortic valve gradient equals 6 mm Hg. Minimal aortic  regurgitation.  Peak left ventricular outflow tract  gradient equals 1 mm Hg.  Aortic Root: 4 cm.  Ascending Aorta: 3.5 cm.  LVOT diameter: 2 cm.  Left Atrium: Severely dilated left atrium.  LA volume index  = 62 cc/m2.  Left Ventricle: Overall preserved left ventricular systolic  function. The basal inferoseptum is hypokinetic. The basal  inferior wall is akinetic.  Mild concentric left  ventricular hypertrophy. Mild diastolic dysfunction (Stage  I).  Right Heart: Moderate right atrial enlargement. Right  ventricular enlargement with decreased right ventricular  systolic function. Tethered tricuspid valve.  Moderate-severe tricuspid regurgitation. Normal pulmonic  valve. Mild pulmonic regurgitation.  Pericardium/Pleura: Normal pericardium with no pericardial  effusion.  Hemodynamic: Estimated right atrial pressure is 8 mm Hg.  Estimated right ventricular systolic pressure equals 27 mm  Hg, assuming right atrial pressure equals 8 mm Hg,  consistent with normal pulmonary pressures.  ------------------------------------------------------------------------  Conclusions:  1. Mitral annular calcification, otherwise normal mitral  valve.  2. Normal trileaflet aortic valve. Minimal aortic  regurgitation.  3. Mild concentric left ventricular hypertrophy.  4. Overall preserved left ventricular systolic function.  The basal inferoseptum is hypokinetic. The basal inferior  wall is akinetic.  5. Mild diastolic dysfunction (Stage I).  6. Moderate right atrial enlargement.  7. Right ventricular enlargement with decreased right  ventricular systolic function.  *** Compared with echocardiogram of 4/25/2022, no  significant changes noted.    Cardiac Cath 6/28/22:  Diagnostic Conclusions:     Mild nonobstructive disease.  All prior stents are widely patent.  Medical therapy    Procedure Narrative:   The risks and alternatives of the procedures and conscious sedation  were explained to the patient and informed consent was  obtained. The patient was brought to the cath lab and placed on the  exam table.  Access   Right femoral artery:   The puncture site was infiltrated with 1% Lidocaine. Vascular access  was obtained using modified seldinger technique.    Diagnostic Findings:     Coronary Angiography   The coronary circulation is right dominant.      LM   Left main artery: Angiography shows no disease.      LAD   Left anterior descending artery: Angiography shows no disease.      CX   Circumflex: Angiography shows mild atherosclerosis. First obtuse  marginal: There is a 40 % stenosis.    RCA   Right coronary artery: Angiography shows mild atherosclerosis.

## 2025-05-13 NOTE — ASU DISCHARGE PLAN (ADULT/PEDIATRIC) - ASU DC SPECIAL INSTRUCTIONSFT
Biopsy Discharge    Discharge Instructions  - You have had a biopsy of left iliac bone lesion.   - You may shower in 24 hours. No soaking or swimming until the site is completely healed.  - Keep the area covered and dry for the next 24 hours.  - Do not perform any heavy lifting for the next few days or until the site is healed.  - You may resume your normal diet.  - You may resume your normal medications however you should wait 48 hours before restarting aspirin, plavix, or blood thinners.  - It is normal to experience some pain over the site for the next few days. You may take apply ice to the area (20 minutes on, 20 minutes off) and take Tylenol for that pain. Do not take more frequently than every 6 hours and do not exceed more than 3000mg of Tylenol in a 24 hour period.    - You were given conscious sedation which may make you drowsy, therefore you need someone to stay with you until the morning following the procedure.  - Do not drive, engage in heavy lifting or strenuous activity, or drink any alcoholic beverages for the next 24 hours.   - You may resume normal activity in 24 hours.    Notify your primary physician and/or Interventional Radiology IMMEDIATELY if you experience any of the following       - Fever of 100.4F or 38C       - Chills or Rigors/ Shakes       - Swelling and/or Redness in the area around the biopsy site       - Worsening Pain       - Blood soaked bandages or worsening bleeding       - Lightheadedness and/or dizziness upon standing       - Chest Pain/ Tightness       - Shortness of Breath       - Difficulty walking    If you have a problem that you believe requires IMMEDIATE attention, please go to your NEAREST Emergency Room. If you believe your problem can safely wait until you speak to a physician, please call Interventional Radiology for any concerns.    Please feel free to contact us at [phone] if any problems arise. After 6PM, Monday through Friday, on weekends and on holidays, please call [phone] and ask for the radiology resident on call to be paged.

## 2025-05-13 NOTE — H&P ADULT - ASSESSMENT
85 yo female hx lung CA and multiple bone lesions concerning for metastatic disease presents for left iliac bone lesion biopsy. Originally scheduled for L3 vertebral lesion, however given interval MRI demonstrating that the dominant left iliac bone lesion is largest and safest target. This was explained to the patient who agreed to proceed with left iliac bone lesion biopsy.     - Plan for left iliac bone lesion biopsy today.

## 2025-05-13 NOTE — ASU DISCHARGE PLAN (ADULT/PEDIATRIC) - FINANCIAL ASSISTANCE
[facility] provides services at a reduced cost to those who are determined to be eligible through [facility]’s financial assistance program. Information regarding [facility]’s financial assistance program can be found by going to [email or web address] or by calling [phone].

## 2025-05-13 NOTE — PRE-ANESTHESIA EVALUATION ADULT - NSANTHOSAYNRD_GEN_A_CORE
Yes No. MARIE screening performed.  STOP BANG Legend: 0-2 = LOW Risk; 3-4 = INTERMEDIATE Risk; 5-8 = HIGH Risk

## 2025-05-14 ENCOUNTER — APPOINTMENT (OUTPATIENT)
Dept: HEMATOLOGY ONCOLOGY | Facility: CLINIC | Age: 85
End: 2025-05-14

## 2025-05-14 ENCOUNTER — NON-APPOINTMENT (OUTPATIENT)
Age: 85
End: 2025-05-14

## 2025-05-14 ENCOUNTER — RESULT REVIEW (OUTPATIENT)
Age: 85
End: 2025-05-14

## 2025-05-14 ENCOUNTER — APPOINTMENT (OUTPATIENT)
Dept: INFUSION THERAPY | Facility: HOSPITAL | Age: 85
End: 2025-05-14

## 2025-05-14 DIAGNOSIS — R11.2 NAUSEA WITH VOMITING, UNSPECIFIED: ICD-10-CM

## 2025-05-14 DIAGNOSIS — I87.8 OTHER SPECIFIED DISORDERS OF VEINS: ICD-10-CM

## 2025-05-14 DIAGNOSIS — Z51.11 ENCOUNTER FOR ANTINEOPLASTIC CHEMOTHERAPY: ICD-10-CM

## 2025-05-14 LAB
BASOPHILS # BLD AUTO: 0.03 K/UL — SIGNIFICANT CHANGE UP (ref 0–0.2)
BASOPHILS NFR BLD AUTO: 0.3 % — SIGNIFICANT CHANGE UP (ref 0–2)
EOSINOPHIL # BLD AUTO: 0.01 K/UL — SIGNIFICANT CHANGE UP (ref 0–0.5)
EOSINOPHIL NFR BLD AUTO: 0.1 % — SIGNIFICANT CHANGE UP (ref 0–6)
HCT VFR BLD CALC: 28.3 % — LOW (ref 34.5–45)
HGB BLD-MCNC: 9.5 G/DL — LOW (ref 11.5–15.5)
IMM GRANULOCYTES NFR BLD AUTO: 4.2 % — HIGH (ref 0–0.9)
LYMPHOCYTES # BLD AUTO: 0.24 K/UL — LOW (ref 1–3.3)
LYMPHOCYTES # BLD AUTO: 2.7 % — LOW (ref 13–44)
MCHC RBC-ENTMCNC: 33.6 G/DL — SIGNIFICANT CHANGE UP (ref 32–36)
MCHC RBC-ENTMCNC: 37.3 PG — HIGH (ref 27–34)
MCV RBC AUTO: 111 FL — HIGH (ref 80–100)
MONOCYTES # BLD AUTO: 0.59 K/UL — SIGNIFICANT CHANGE UP (ref 0–0.9)
MONOCYTES NFR BLD AUTO: 6.7 % — SIGNIFICANT CHANGE UP (ref 2–14)
NEUTROPHILS # BLD AUTO: 7.54 K/UL — HIGH (ref 1.8–7.4)
NEUTROPHILS NFR BLD AUTO: 86 % — HIGH (ref 43–77)
NRBC BLD AUTO-RTO: 0 /100 WBCS — SIGNIFICANT CHANGE UP (ref 0–0)
PLATELET # BLD AUTO: 200 K/UL — SIGNIFICANT CHANGE UP (ref 150–400)
RBC # BLD: 2.55 M/UL — LOW (ref 3.8–5.2)
RBC # FLD: 15.2 % — HIGH (ref 10.3–14.5)
WBC # BLD: 8.65 K/UL — SIGNIFICANT CHANGE UP (ref 3.8–10.5)
WBC # FLD AUTO: 8.65 K/UL — SIGNIFICANT CHANGE UP (ref 3.8–10.5)

## 2025-05-14 RX ORDER — DEXAMETHASONE 0.5 MG/1
4 TABLET ORAL
Refills: 0 | DISCHARGE

## 2025-05-15 ENCOUNTER — APPOINTMENT (OUTPATIENT)
Dept: INFUSION THERAPY | Facility: HOSPITAL | Age: 85
End: 2025-05-15

## 2025-05-15 ENCOUNTER — RESULT REVIEW (OUTPATIENT)
Age: 85
End: 2025-05-15

## 2025-05-15 LAB
ANION GAP SERPL CALC-SCNC: 14 MMOL/L — SIGNIFICANT CHANGE UP (ref 5–17)
BUN SERPL-MCNC: 28 MG/DL — HIGH (ref 7–23)
CALCIUM SERPL-MCNC: 9.1 MG/DL — SIGNIFICANT CHANGE UP (ref 8.4–10.5)
CHLORIDE SERPL-SCNC: 104 MMOL/L — SIGNIFICANT CHANGE UP (ref 96–108)
CO2 SERPL-SCNC: 23 MMOL/L — SIGNIFICANT CHANGE UP (ref 22–31)
CREAT SERPL-MCNC: 0.7 MG/DL — SIGNIFICANT CHANGE UP (ref 0.5–1.3)
EGFR: 86 ML/MIN/1.73M2 — SIGNIFICANT CHANGE UP
EGFR: 86 ML/MIN/1.73M2 — SIGNIFICANT CHANGE UP
GLUCOSE SERPL-MCNC: 160 MG/DL — HIGH (ref 70–99)
INR BLD: 1.48 RATIO — HIGH (ref 0.85–1.16)
LDH SERPL L TO P-CCNC: 749 U/L — HIGH (ref 50–242)
PHOSPHATE SERPL-MCNC: 3.2 MG/DL — SIGNIFICANT CHANGE UP (ref 2.5–4.5)
POTASSIUM SERPL-MCNC: 4.6 MMOL/L — SIGNIFICANT CHANGE UP (ref 3.5–5.3)
POTASSIUM SERPL-SCNC: 4.6 MMOL/L — SIGNIFICANT CHANGE UP (ref 3.5–5.3)
PROTHROM AB SERPL-ACNC: 17.4 SEC — HIGH (ref 9.9–13.4)
SODIUM SERPL-SCNC: 140 MMOL/L — SIGNIFICANT CHANGE UP (ref 135–145)
URATE SERPL-MCNC: 4.5 MG/DL — SIGNIFICANT CHANGE UP (ref 2.5–7)

## 2025-05-16 ENCOUNTER — APPOINTMENT (OUTPATIENT)
Dept: INFUSION THERAPY | Facility: HOSPITAL | Age: 85
End: 2025-05-16

## 2025-05-16 ENCOUNTER — RESULT REVIEW (OUTPATIENT)
Age: 85
End: 2025-05-16

## 2025-05-16 ENCOUNTER — NON-APPOINTMENT (OUTPATIENT)
Age: 85
End: 2025-05-16

## 2025-05-16 LAB — NON-GYNECOLOGICAL CYTOLOGY STUDY: SIGNIFICANT CHANGE UP

## 2025-05-17 LAB
ANION GAP SERPL CALC-SCNC: 18 MMOL/L — HIGH (ref 5–17)
BUN SERPL-MCNC: 27 MG/DL — HIGH (ref 7–23)
CALCIUM SERPL-MCNC: 9.4 MG/DL — SIGNIFICANT CHANGE UP (ref 8.4–10.5)
CHLORIDE SERPL-SCNC: 101 MMOL/L — SIGNIFICANT CHANGE UP (ref 96–108)
CO2 SERPL-SCNC: 19 MMOL/L — LOW (ref 22–31)
CREAT SERPL-MCNC: 0.71 MG/DL — SIGNIFICANT CHANGE UP (ref 0.5–1.3)
EGFR: 84 ML/MIN/1.73M2 — SIGNIFICANT CHANGE UP
EGFR: 84 ML/MIN/1.73M2 — SIGNIFICANT CHANGE UP
GLUCOSE SERPL-MCNC: 198 MG/DL — HIGH (ref 70–99)
INR BLD: 1.39 RATIO — HIGH (ref 0.85–1.16)
LDH SERPL L TO P-CCNC: 837 U/L — HIGH (ref 50–242)
PHOSPHATE SERPL-MCNC: 3.2 MG/DL — SIGNIFICANT CHANGE UP (ref 2.5–4.5)
POTASSIUM SERPL-MCNC: 4.8 MMOL/L — SIGNIFICANT CHANGE UP (ref 3.5–5.3)
POTASSIUM SERPL-SCNC: 4.8 MMOL/L — SIGNIFICANT CHANGE UP (ref 3.5–5.3)
PROTHROM AB SERPL-ACNC: 16.3 SEC — HIGH (ref 9.9–13.4)
SODIUM SERPL-SCNC: 139 MMOL/L — SIGNIFICANT CHANGE UP (ref 135–145)
URATE SERPL-MCNC: 5.3 MG/DL — SIGNIFICANT CHANGE UP (ref 2.5–7)

## 2025-05-20 ENCOUNTER — NON-APPOINTMENT (OUTPATIENT)
Age: 85
End: 2025-05-20

## 2025-05-21 ENCOUNTER — APPOINTMENT (OUTPATIENT)
Dept: PAIN MANAGEMENT | Facility: CLINIC | Age: 85
End: 2025-05-21
Payer: MEDICARE

## 2025-05-21 PROCEDURE — 99212 OFFICE O/P EST SF 10 MIN: CPT | Mod: 2W

## 2025-05-22 ENCOUNTER — APPOINTMENT (OUTPATIENT)
Dept: GERIATRICS | Facility: CLINIC | Age: 85
End: 2025-05-22
Payer: MEDICARE

## 2025-05-22 VITALS
TEMPERATURE: 97 F | SYSTOLIC BLOOD PRESSURE: 112 MMHG | DIASTOLIC BLOOD PRESSURE: 69 MMHG | RESPIRATION RATE: 16 BRPM | HEART RATE: 111 BPM

## 2025-05-22 DIAGNOSIS — R11.2 NAUSEA WITH VOMITING, UNSPECIFIED: ICD-10-CM

## 2025-05-22 DIAGNOSIS — Z51.5 ENCOUNTER FOR PALLIATIVE CARE: ICD-10-CM

## 2025-05-22 DIAGNOSIS — T45.1X5A NAUSEA WITH VOMITING, UNSPECIFIED: ICD-10-CM

## 2025-05-22 DIAGNOSIS — R63.0 ANOREXIA: ICD-10-CM

## 2025-05-22 DIAGNOSIS — G89.3 NEOPLASM RELATED PAIN (ACUTE) (CHRONIC): ICD-10-CM

## 2025-05-22 PROCEDURE — 99497 ADVNCD CARE PLAN 30 MIN: CPT

## 2025-05-22 PROCEDURE — 99205 OFFICE O/P NEW HI 60 MIN: CPT

## 2025-05-23 PROBLEM — Z51.5 ENCOUNTER FOR PALLIATIVE CARE: Status: ACTIVE | Noted: 2025-05-23

## 2025-05-23 PROBLEM — R11.2 CHEMOTHERAPY INDUCED NAUSEA AND VOMITING: Status: ACTIVE | Noted: 2025-05-23

## 2025-05-23 PROBLEM — R63.0 APPETITE LOSS: Status: ACTIVE | Noted: 2025-05-23

## 2025-05-23 PROBLEM — K59.00 CONSTIPATION: Status: ACTIVE | Noted: 2025-05-23

## 2025-05-27 ENCOUNTER — RESULT REVIEW (OUTPATIENT)
Age: 85
End: 2025-05-27

## 2025-05-27 ENCOUNTER — NON-APPOINTMENT (OUTPATIENT)
Age: 85
End: 2025-05-27

## 2025-05-27 ENCOUNTER — APPOINTMENT (OUTPATIENT)
Dept: HEMATOLOGY ONCOLOGY | Facility: CLINIC | Age: 85
End: 2025-05-27
Payer: MEDICARE

## 2025-05-27 VITALS
BODY MASS INDEX: 25.4 KG/M2 | OXYGEN SATURATION: 99 % | HEART RATE: 98 BPM | SYSTOLIC BLOOD PRESSURE: 101 MMHG | DIASTOLIC BLOOD PRESSURE: 65 MMHG | RESPIRATION RATE: 16 BRPM | WEIGHT: 130.07 LBS | TEMPERATURE: 97.8 F

## 2025-05-27 DIAGNOSIS — C77.1 SECONDARY AND UNSPECIFIED MALIGNANT NEOPLASM OF INTRATHORACIC LYMPH NODES: ICD-10-CM

## 2025-05-27 DIAGNOSIS — H54.7 UNSPECIFIED VISUAL LOSS: ICD-10-CM

## 2025-05-27 DIAGNOSIS — C78.7 SECONDARY MALIGNANT NEOPLASM OF LIVER AND INTRAHEPATIC BILE DUCT: ICD-10-CM

## 2025-05-27 DIAGNOSIS — D64.9 ANEMIA, UNSPECIFIED: ICD-10-CM

## 2025-05-27 DIAGNOSIS — C79.51 SECONDARY MALIGNANT NEOPLASM OF BONE: ICD-10-CM

## 2025-05-27 DIAGNOSIS — K59.00 CONSTIPATION, UNSPECIFIED: ICD-10-CM

## 2025-05-27 DIAGNOSIS — I87.8 OTHER SPECIFIED DISORDERS OF VEINS: ICD-10-CM

## 2025-05-27 LAB
BASOPHILS # BLD AUTO: 0.02 K/UL — SIGNIFICANT CHANGE UP (ref 0–0.2)
BASOPHILS NFR BLD AUTO: 0.5 % — SIGNIFICANT CHANGE UP (ref 0–2)
EOSINOPHIL # BLD AUTO: 0.09 K/UL — SIGNIFICANT CHANGE UP (ref 0–0.5)
EOSINOPHIL NFR BLD AUTO: 2.5 % — SIGNIFICANT CHANGE UP (ref 0–6)
HCT VFR BLD CALC: 22.1 % — LOW (ref 34.5–45)
HGB BLD-MCNC: 7.6 G/DL — LOW (ref 11.5–15.5)
IMM GRANULOCYTES NFR BLD AUTO: 5.2 % — HIGH (ref 0–0.9)
LYMPHOCYTES # BLD AUTO: 0.44 K/UL — LOW (ref 1–3.3)
LYMPHOCYTES # BLD AUTO: 12 % — LOW (ref 13–44)
MCHC RBC-ENTMCNC: 34.4 G/DL — SIGNIFICANT CHANGE UP (ref 32–36)
MCHC RBC-ENTMCNC: 36.7 PG — HIGH (ref 27–34)
MCV RBC AUTO: 106.8 FL — HIGH (ref 80–100)
MONOCYTES # BLD AUTO: 0.43 K/UL — SIGNIFICANT CHANGE UP (ref 0–0.9)
MONOCYTES NFR BLD AUTO: 11.7 % — SIGNIFICANT CHANGE UP (ref 2–14)
NEUTROPHILS # BLD AUTO: 2.49 K/UL — SIGNIFICANT CHANGE UP (ref 1.8–7.4)
NEUTROPHILS NFR BLD AUTO: 68.1 % — SIGNIFICANT CHANGE UP (ref 43–77)
NRBC BLD AUTO-RTO: 0 /100 WBCS — SIGNIFICANT CHANGE UP (ref 0–0)
PLATELET # BLD AUTO: 58 K/UL — LOW (ref 150–400)
RBC # BLD: 2.07 M/UL — LOW (ref 3.8–5.2)
RBC # FLD: 14.5 % — SIGNIFICANT CHANGE UP (ref 10.3–14.5)
WBC # BLD: 3.66 K/UL — LOW (ref 3.8–10.5)
WBC # FLD AUTO: 3.66 K/UL — LOW (ref 3.8–10.5)

## 2025-05-27 PROCEDURE — 99214 OFFICE O/P EST MOD 30 MIN: CPT

## 2025-05-28 LAB
ALBUMIN SERPL ELPH-MCNC: 3.7 G/DL
ALP BLD-CCNC: 111 U/L
ALT SERPL-CCNC: 30 U/L
ANION GAP SERPL CALC-SCNC: 14 MMOL/L
AST SERPL-CCNC: 18 U/L
BILIRUB SERPL-MCNC: 0.6 MG/DL
BUN SERPL-MCNC: 19 MG/DL
CALCIUM SERPL-MCNC: 9.3 MG/DL
CHLORIDE SERPL-SCNC: 99 MMOL/L
CO2 SERPL-SCNC: 23 MMOL/L
CREAT SERPL-MCNC: 0.69 MG/DL
EGFRCR SERPLBLD CKD-EPI 2021: 86 ML/MIN/1.73M2
GLUCOSE SERPL-MCNC: 152 MG/DL
POTASSIUM SERPL-SCNC: 3.8 MMOL/L
PROT SERPL-MCNC: 5.9 G/DL
SODIUM SERPL-SCNC: 136 MMOL/L

## 2025-05-29 ENCOUNTER — NON-APPOINTMENT (OUTPATIENT)
Age: 85
End: 2025-05-29

## 2025-05-29 ENCOUNTER — RESULT REVIEW (OUTPATIENT)
Age: 85
End: 2025-05-29

## 2025-05-29 ENCOUNTER — APPOINTMENT (OUTPATIENT)
Dept: HEMATOLOGY ONCOLOGY | Facility: CLINIC | Age: 85
End: 2025-05-29

## 2025-05-29 DIAGNOSIS — E53.8 DEFICIENCY OF OTHER SPECIFIED B GROUP VITAMINS: ICD-10-CM

## 2025-05-29 LAB
BASOPHILS # BLD AUTO: 0.02 K/UL — SIGNIFICANT CHANGE UP (ref 0–0.2)
BASOPHILS NFR BLD AUTO: 1.1 % — SIGNIFICANT CHANGE UP (ref 0–2)
EOSINOPHIL # BLD AUTO: 0.05 K/UL — SIGNIFICANT CHANGE UP (ref 0–0.5)
EOSINOPHIL NFR BLD AUTO: 2.8 % — SIGNIFICANT CHANGE UP (ref 0–6)
HCT VFR BLD CALC: 23.5 % — LOW (ref 34.5–45)
HGB BLD-MCNC: 7.8 G/DL — LOW (ref 11.5–15.5)
IMM GRANULOCYTES NFR BLD AUTO: 2.2 % — HIGH (ref 0–0.9)
LYMPHOCYTES # BLD AUTO: 0.38 K/UL — LOW (ref 1–3.3)
LYMPHOCYTES # BLD AUTO: 21.2 % — SIGNIFICANT CHANGE UP (ref 13–44)
MCHC RBC-ENTMCNC: 33.2 G/DL — SIGNIFICANT CHANGE UP (ref 32–36)
MCHC RBC-ENTMCNC: 37.5 PG — HIGH (ref 27–34)
MCV RBC AUTO: 113 FL — HIGH (ref 80–100)
MONOCYTES # BLD AUTO: 0.29 K/UL — SIGNIFICANT CHANGE UP (ref 0–0.9)
MONOCYTES NFR BLD AUTO: 16.2 % — HIGH (ref 2–14)
NEUTROPHILS # BLD AUTO: 1.01 K/UL — LOW (ref 1.8–7.4)
NEUTROPHILS NFR BLD AUTO: 56.5 % — SIGNIFICANT CHANGE UP (ref 43–77)
NRBC BLD AUTO-RTO: 0 /100 WBCS — SIGNIFICANT CHANGE UP (ref 0–0)
PLATELET # BLD AUTO: 54 K/UL — LOW (ref 150–400)
RBC # BLD: 2.08 M/UL — LOW (ref 3.8–5.2)
RBC # FLD: 15 % — HIGH (ref 10.3–14.5)
WBC # BLD: 1.79 K/UL — LOW (ref 3.8–10.5)
WBC # FLD AUTO: 1.79 K/UL — LOW (ref 3.8–10.5)

## 2025-05-30 ENCOUNTER — APPOINTMENT (OUTPATIENT)
Dept: INFUSION THERAPY | Facility: HOSPITAL | Age: 85
End: 2025-05-30

## 2025-05-30 ENCOUNTER — RESULT REVIEW (OUTPATIENT)
Age: 85
End: 2025-05-30

## 2025-05-31 LAB
BASOPHILS # BLD AUTO: 0.02 K/UL — SIGNIFICANT CHANGE UP (ref 0–0.2)
BASOPHILS NFR BLD AUTO: 1.1 % — SIGNIFICANT CHANGE UP (ref 0–2)
EOSINOPHIL # BLD AUTO: 0.08 K/UL — SIGNIFICANT CHANGE UP (ref 0–0.5)
EOSINOPHIL NFR BLD AUTO: 4.3 % — SIGNIFICANT CHANGE UP (ref 0–6)
HCT VFR BLD CALC: 23.7 % — LOW (ref 34.5–45)
HGB BLD-MCNC: 7.7 G/DL — LOW (ref 11.5–15.5)
IMM GRANULOCYTES NFR BLD AUTO: 1.1 % — HIGH (ref 0–0.9)
LYMPHOCYTES # BLD AUTO: 0.59 K/UL — LOW (ref 1–3.3)
LYMPHOCYTES # BLD AUTO: 32.1 % — SIGNIFICANT CHANGE UP (ref 13–44)
MCHC RBC-ENTMCNC: 32.5 G/DL — SIGNIFICANT CHANGE UP (ref 32–36)
MCHC RBC-ENTMCNC: 34.1 PG — HIGH (ref 27–34)
MCV RBC AUTO: 104.9 FL — HIGH (ref 80–100)
MONOCYTES # BLD AUTO: 0.37 K/UL — SIGNIFICANT CHANGE UP (ref 0–0.9)
MONOCYTES NFR BLD AUTO: 20.1 % — HIGH (ref 2–14)
NEUTROPHILS # BLD AUTO: 0.76 K/UL — LOW (ref 1.8–7.4)
NEUTROPHILS NFR BLD AUTO: 41.3 % — LOW (ref 43–77)
PLATELET # BLD AUTO: 53 K/UL — LOW (ref 150–400)
RBC # BLD: 2.26 M/UL — LOW (ref 3.8–5.2)
RBC # FLD: 19.5 % — HIGH (ref 10.3–14.5)
WBC # BLD: 1.84 K/UL — LOW (ref 3.8–10.5)
WBC # FLD AUTO: 1.84 K/UL — LOW (ref 3.8–10.5)

## 2025-06-02 ENCOUNTER — APPOINTMENT (OUTPATIENT)
Dept: ENDOVASCULAR SURGERY | Facility: CLINIC | Age: 85
End: 2025-06-02
Payer: MEDICARE

## 2025-06-02 ENCOUNTER — RESULT REVIEW (OUTPATIENT)
Age: 85
End: 2025-06-02

## 2025-06-02 VITALS
RESPIRATION RATE: 16 BRPM | TEMPERATURE: 97.5 F | WEIGHT: 130 LBS | HEIGHT: 64 IN | OXYGEN SATURATION: 99 % | SYSTOLIC BLOOD PRESSURE: 96 MMHG | BODY MASS INDEX: 22.2 KG/M2 | DIASTOLIC BLOOD PRESSURE: 51 MMHG | HEART RATE: 88 BPM

## 2025-06-02 DIAGNOSIS — C34.91 MALIGNANT NEOPLASM OF UNSPECIFIED PART OF RIGHT BRONCHUS OR LUNG: ICD-10-CM

## 2025-06-02 PROCEDURE — 36561 INSERT TUNNELED CV CATH: CPT | Mod: RT

## 2025-06-02 PROCEDURE — 77001 FLUOROGUIDE FOR VEIN DEVICE: CPT

## 2025-06-02 PROCEDURE — 76937 US GUIDE VASCULAR ACCESS: CPT

## 2025-06-04 ENCOUNTER — APPOINTMENT (OUTPATIENT)
Dept: HEMATOLOGY ONCOLOGY | Facility: CLINIC | Age: 85
End: 2025-06-04

## 2025-06-04 ENCOUNTER — NON-APPOINTMENT (OUTPATIENT)
Age: 85
End: 2025-06-04

## 2025-06-04 ENCOUNTER — RESULT REVIEW (OUTPATIENT)
Age: 85
End: 2025-06-04

## 2025-06-04 ENCOUNTER — APPOINTMENT (OUTPATIENT)
Dept: INFUSION THERAPY | Facility: HOSPITAL | Age: 85
End: 2025-06-04

## 2025-06-04 LAB
BASOPHILS # BLD AUTO: 0.04 K/UL — SIGNIFICANT CHANGE UP (ref 0–0.2)
BASOPHILS NFR BLD AUTO: 2.1 % — HIGH (ref 0–2)
EOSINOPHIL # BLD AUTO: 0.08 K/UL — SIGNIFICANT CHANGE UP (ref 0–0.5)
EOSINOPHIL NFR BLD AUTO: 4.2 % — SIGNIFICANT CHANGE UP (ref 0–6)
HCT VFR BLD CALC: 25.5 % — LOW (ref 34.5–45)
HGB BLD-MCNC: 8.6 G/DL — LOW (ref 11.5–15.5)
HOMOCYSTEINE LEVEL: 15.7 UMOL/L
IMM GRANULOCYTES NFR BLD AUTO: 1 % — HIGH (ref 0–0.9)
LYMPHOCYTES # BLD AUTO: 0.35 K/UL — LOW (ref 1–3.3)
LYMPHOCYTES # BLD AUTO: 18.2 % — SIGNIFICANT CHANGE UP (ref 13–44)
MCHC RBC-ENTMCNC: 33.7 G/DL — SIGNIFICANT CHANGE UP (ref 32–36)
MCHC RBC-ENTMCNC: 35 PG — HIGH (ref 27–34)
MCV RBC AUTO: 103.7 FL — HIGH (ref 80–100)
METHYLMALONATE SERPL-SCNC: 250 NMOL/L
MONOCYTES # BLD AUTO: 0.43 K/UL — SIGNIFICANT CHANGE UP (ref 0–0.9)
MONOCYTES NFR BLD AUTO: 22.4 % — HIGH (ref 2–14)
NEUTROPHILS # BLD AUTO: 1 K/UL — LOW (ref 1.8–7.4)
NEUTROPHILS NFR BLD AUTO: 52.1 % — SIGNIFICANT CHANGE UP (ref 43–77)
NRBC BLD AUTO-RTO: 0 /100 WBCS — SIGNIFICANT CHANGE UP (ref 0–0)
PLATELET # BLD AUTO: 105 K/UL — LOW (ref 150–400)
RBC # BLD: 2.46 M/UL — LOW (ref 3.8–5.2)
RBC # FLD: 17.6 % — HIGH (ref 10.3–14.5)
T4 FREE SERPL-MCNC: 1.4 NG/DL — SIGNIFICANT CHANGE UP (ref 0.9–1.8)
TSH SERPL-MCNC: 3.16 UIU/ML — SIGNIFICANT CHANGE UP (ref 0.27–4.2)
WBC # BLD: 1.92 K/UL — LOW (ref 3.8–10.5)
WBC # FLD AUTO: 1.92 K/UL — LOW (ref 3.8–10.5)

## 2025-06-05 ENCOUNTER — APPOINTMENT (OUTPATIENT)
Dept: INFUSION THERAPY | Facility: HOSPITAL | Age: 85
End: 2025-06-05

## 2025-06-05 ENCOUNTER — APPOINTMENT (OUTPATIENT)
Dept: MRI IMAGING | Facility: IMAGING CENTER | Age: 85
End: 2025-06-05

## 2025-06-06 ENCOUNTER — APPOINTMENT (OUTPATIENT)
Dept: INFUSION THERAPY | Facility: HOSPITAL | Age: 85
End: 2025-06-06

## 2025-06-09 ENCOUNTER — OUTPATIENT (OUTPATIENT)
Dept: OUTPATIENT SERVICES | Facility: HOSPITAL | Age: 85
LOS: 1 days | End: 2025-06-09
Payer: MEDICARE

## 2025-06-09 ENCOUNTER — APPOINTMENT (OUTPATIENT)
Dept: MRI IMAGING | Facility: IMAGING CENTER | Age: 85
End: 2025-06-09
Payer: MEDICARE

## 2025-06-09 DIAGNOSIS — Z95.5 PRESENCE OF CORONARY ANGIOPLASTY IMPLANT AND GRAFT: Chronic | ICD-10-CM

## 2025-06-09 DIAGNOSIS — Z98.49 CATARACT EXTRACTION STATUS, UNSPECIFIED EYE: Chronic | ICD-10-CM

## 2025-06-09 DIAGNOSIS — Z96.641 PRESENCE OF RIGHT ARTIFICIAL HIP JOINT: Chronic | ICD-10-CM

## 2025-06-09 DIAGNOSIS — H54.7 UNSPECIFIED VISUAL LOSS: ICD-10-CM

## 2025-06-09 DIAGNOSIS — C34.90 MALIGNANT NEOPLASM OF UNSPECIFIED PART OF UNSPECIFIED BRONCHUS OR LUNG: ICD-10-CM

## 2025-06-09 DIAGNOSIS — Z98.890 OTHER SPECIFIED POSTPROCEDURAL STATES: Chronic | ICD-10-CM

## 2025-06-09 DIAGNOSIS — Z96.642 PRESENCE OF LEFT ARTIFICIAL HIP JOINT: Chronic | ICD-10-CM

## 2025-06-09 PROCEDURE — 70553 MRI BRAIN STEM W/O & W/DYE: CPT | Mod: 26

## 2025-06-09 PROCEDURE — 70553 MRI BRAIN STEM W/O & W/DYE: CPT

## 2025-06-09 PROCEDURE — A9585: CPT

## 2025-06-18 ENCOUNTER — RESULT REVIEW (OUTPATIENT)
Age: 85
End: 2025-06-18

## 2025-06-18 ENCOUNTER — APPOINTMENT (OUTPATIENT)
Dept: HEMATOLOGY ONCOLOGY | Facility: CLINIC | Age: 85
End: 2025-06-18
Payer: MEDICARE

## 2025-06-18 ENCOUNTER — APPOINTMENT (OUTPATIENT)
Dept: INFUSION THERAPY | Facility: HOSPITAL | Age: 85
End: 2025-06-18

## 2025-06-18 ENCOUNTER — APPOINTMENT (OUTPATIENT)
Dept: RADIATION ONCOLOGY | Facility: CLINIC | Age: 85
End: 2025-06-18

## 2025-06-18 VITALS
RESPIRATION RATE: 16 BRPM | OXYGEN SATURATION: 95 % | WEIGHT: 127.87 LBS | HEART RATE: 105 BPM | TEMPERATURE: 97.4 F | SYSTOLIC BLOOD PRESSURE: 92 MMHG | BODY MASS INDEX: 21.95 KG/M2 | DIASTOLIC BLOOD PRESSURE: 62 MMHG

## 2025-06-18 PROBLEM — R11.0 NAUSEA: Status: ACTIVE | Noted: 2025-06-18

## 2025-06-18 LAB
ACANTHOCYTES BLD QL SMEAR: SLIGHT — SIGNIFICANT CHANGE UP
ALBUMIN SERPL ELPH-MCNC: 3.7 G/DL — SIGNIFICANT CHANGE UP (ref 3.3–5)
ALP SERPL-CCNC: 227 U/L — HIGH (ref 40–120)
ALT FLD-CCNC: 12 U/L — SIGNIFICANT CHANGE UP (ref 10–45)
ANION GAP SERPL CALC-SCNC: 16 MMOL/L — SIGNIFICANT CHANGE UP (ref 5–17)
ANISOCYTOSIS BLD QL: SLIGHT — SIGNIFICANT CHANGE UP
AST SERPL-CCNC: 17 U/L — SIGNIFICANT CHANGE UP (ref 10–40)
BASOPHILS # BLD AUTO: 0 K/UL — SIGNIFICANT CHANGE UP (ref 0–0.2)
BASOPHILS NFR BLD AUTO: 0 % — SIGNIFICANT CHANGE UP (ref 0–2)
BILIRUB SERPL-MCNC: 0.5 MG/DL — SIGNIFICANT CHANGE UP (ref 0.2–1.2)
BUN SERPL-MCNC: 11 MG/DL — SIGNIFICANT CHANGE UP (ref 7–23)
CALCIUM SERPL-MCNC: 8.2 MG/DL — LOW (ref 8.4–10.5)
CHLORIDE SERPL-SCNC: 103 MMOL/L — SIGNIFICANT CHANGE UP (ref 96–108)
CO2 SERPL-SCNC: 23 MMOL/L — SIGNIFICANT CHANGE UP (ref 22–31)
CREAT SERPL-MCNC: 0.64 MG/DL — SIGNIFICANT CHANGE UP (ref 0.5–1.3)
EGFR: 88 ML/MIN/1.73M2 — SIGNIFICANT CHANGE UP
EGFR: 88 ML/MIN/1.73M2 — SIGNIFICANT CHANGE UP
ELLIPTOCYTES BLD QL SMEAR: SIGNIFICANT CHANGE UP
EOSINOPHIL # BLD AUTO: 0 K/UL — SIGNIFICANT CHANGE UP (ref 0–0.5)
EOSINOPHIL NFR BLD AUTO: 0 % — SIGNIFICANT CHANGE UP (ref 0–6)
GLUCOSE SERPL-MCNC: 96 MG/DL — SIGNIFICANT CHANGE UP (ref 70–99)
HCT VFR BLD CALC: 21.9 % — LOW (ref 34.5–45)
HGB BLD-MCNC: 7.3 G/DL — LOW (ref 11.5–15.5)
LDH SERPL L TO P-CCNC: 290 U/L — HIGH (ref 50–242)
LYMPHOCYTES # BLD AUTO: 0.53 K/UL — LOW (ref 1–3.3)
LYMPHOCYTES # BLD AUTO: 4 % — LOW (ref 13–44)
MCHC RBC-ENTMCNC: 33.3 G/DL — SIGNIFICANT CHANGE UP (ref 32–36)
MCHC RBC-ENTMCNC: 34.8 PG — HIGH (ref 27–34)
MCV RBC AUTO: 104.3 FL — HIGH (ref 80–100)
METAMYELOCYTES # FLD: 2 % — HIGH (ref 0–0)
METAMYELOCYTES NFR BLD: 2 % — HIGH (ref 0–0)
MONOCYTES # BLD AUTO: 1.98 K/UL — HIGH (ref 0–0.9)
MONOCYTES NFR BLD AUTO: 15 % — HIGH (ref 2–14)
NEUTROPHILS # BLD AUTO: 10.4 K/UL — HIGH (ref 1.8–7.4)
NEUTROPHILS NFR BLD AUTO: 79 % — HIGH (ref 43–77)
NRBC # BLD: 0 /100 WBCS — SIGNIFICANT CHANGE UP (ref 0–0)
NRBC BLD AUTO-RTO: SIGNIFICANT CHANGE UP /100 WBCS (ref 0–0)
NRBC BLD-RTO: 0 /100 WBCS — SIGNIFICANT CHANGE UP (ref 0–0)
PLAT MORPH BLD: NORMAL — SIGNIFICANT CHANGE UP
PLATELET # BLD AUTO: 56 K/UL — LOW (ref 150–400)
POIKILOCYTOSIS BLD QL AUTO: SLIGHT — SIGNIFICANT CHANGE UP
POTASSIUM SERPL-MCNC: 3.2 MMOL/L — LOW (ref 3.5–5.3)
POTASSIUM SERPL-SCNC: 3.2 MMOL/L — LOW (ref 3.5–5.3)
PROT SERPL-MCNC: 6.1 G/DL — SIGNIFICANT CHANGE UP (ref 6–8.3)
RBC # BLD: 2.1 M/UL — LOW (ref 3.8–5.2)
RBC # FLD: 17.9 % — HIGH (ref 10.3–14.5)
RBC BLD AUTO: ABNORMAL
SODIUM SERPL-SCNC: 143 MMOL/L — SIGNIFICANT CHANGE UP (ref 135–145)
WBC # BLD: 13.17 K/UL — HIGH (ref 3.8–10.5)
WBC # FLD AUTO: 13.17 K/UL — HIGH (ref 3.8–10.5)

## 2025-06-18 PROCEDURE — 99214 OFFICE O/P EST MOD 30 MIN: CPT

## 2025-06-18 PROCEDURE — 99215 OFFICE O/P EST HI 40 MIN: CPT | Mod: GC

## 2025-06-18 RX ORDER — ONDANSETRON 8 MG/1
8 TABLET, ORALLY DISINTEGRATING ORAL
Qty: 30 | Refills: 2 | Status: ACTIVE | COMMUNITY
Start: 2025-06-18 | End: 1900-01-01

## 2025-06-19 ENCOUNTER — NON-APPOINTMENT (OUTPATIENT)
Age: 85
End: 2025-06-19

## 2025-06-19 RX ORDER — POTASSIUM CHLORIDE 1500 MG/1
20 TABLET, EXTENDED RELEASE ORAL
Qty: 60 | Refills: 5 | Status: ACTIVE | COMMUNITY
Start: 2025-06-19 | End: 1900-01-01

## 2025-06-19 NOTE — H&P ADULT - NSHPPHYSICALEXAM_GEN_ALL_CORE
Vital Signs Last 24 Hrs  T(C): 36.7 (11 Mar 2023 05:07), Max: 36.7 (11 Mar 2023 04:18)  T(F): 98 (11 Mar 2023 05:07), Max: 98 (11 Mar 2023 04:18)  HR: 67 (11 Mar 2023 05:07) (67 - 120)  BP: 111/71 (11 Mar 2023 05:07) (111/71 - 149/98)  BP(mean): --  RR: 18 (11 Mar 2023 05:07) (16 - 18)  SpO2: 100% (11 Mar 2023 05:07) (99% - 100%)    Parameters below as of 11 Mar 2023 05:07  Patient On (Oxygen Delivery Method): room air      CAPILLARY BLOOD GLUCOSE        I&O's Summary      PHYSICAL EXAM:  GENERAL: NAD, well-developed  HEAD:  Atraumatic, Normocephalic  EYES: EOMI, PERRLA, conjunctiva and sclera clear  NECK: Supple, No JVD  CHEST/LUNG: mainly clear, decreased air entry  HEART: Regular rate and rhythm; No murmurs, rubs, or gallops  ABDOMEN: Soft, Nontender, Nondistended; Bowel sounds present  EXTREMITIES:  2+ Peripheral Pulses, No clubbing, cyanosis, or edema, unable to abduct right arm above head d/t pain  PSYCH: AAOx3  NEUROLOGY: numbness R fingers especially first to third digits  SKIN: No rashes or lesions Alone

## 2025-06-20 ENCOUNTER — APPOINTMENT (OUTPATIENT)
Dept: VASCULAR SURGERY | Facility: CLINIC | Age: 85
End: 2025-06-20

## 2025-06-25 ENCOUNTER — NON-APPOINTMENT (OUTPATIENT)
Age: 85
End: 2025-06-25

## 2025-06-25 ENCOUNTER — RESULT REVIEW (OUTPATIENT)
Age: 85
End: 2025-06-25

## 2025-06-25 ENCOUNTER — APPOINTMENT (OUTPATIENT)
Dept: HEMATOLOGY ONCOLOGY | Facility: CLINIC | Age: 85
End: 2025-06-25

## 2025-06-25 ENCOUNTER — OUTPATIENT (OUTPATIENT)
Dept: OUTPATIENT SERVICES | Facility: HOSPITAL | Age: 85
LOS: 1 days | Discharge: HOME | End: 2025-06-25

## 2025-06-25 ENCOUNTER — APPOINTMENT (OUTPATIENT)
Dept: INFUSION THERAPY | Facility: HOSPITAL | Age: 85
End: 2025-06-25

## 2025-06-25 DIAGNOSIS — Z96.641 PRESENCE OF RIGHT ARTIFICIAL HIP JOINT: Chronic | ICD-10-CM

## 2025-06-25 DIAGNOSIS — Z98.890 OTHER SPECIFIED POSTPROCEDURAL STATES: Chronic | ICD-10-CM

## 2025-06-25 DIAGNOSIS — Z96.642 PRESENCE OF LEFT ARTIFICIAL HIP JOINT: Chronic | ICD-10-CM

## 2025-06-25 DIAGNOSIS — Z98.49 CATARACT EXTRACTION STATUS, UNSPECIFIED EYE: Chronic | ICD-10-CM

## 2025-06-25 DIAGNOSIS — C34.00 MALIGNANT NEOPLASM OF UNSPECIFIED MAIN BRONCHUS: ICD-10-CM

## 2025-06-25 DIAGNOSIS — Z95.5 PRESENCE OF CORONARY ANGIOPLASTY IMPLANT AND GRAFT: Chronic | ICD-10-CM

## 2025-06-25 LAB
ANION GAP SERPL CALC-SCNC: 14 MMOL/L — SIGNIFICANT CHANGE UP (ref 5–17)
ANISOCYTOSIS BLD QL: SLIGHT — SIGNIFICANT CHANGE UP
BASOPHILS # BLD AUTO: 0.08 K/UL — SIGNIFICANT CHANGE UP (ref 0–0.2)
BASOPHILS NFR BLD AUTO: 1 % — SIGNIFICANT CHANGE UP (ref 0–2)
BUN SERPL-MCNC: 10 MG/DL — SIGNIFICANT CHANGE UP (ref 7–23)
CALCIUM SERPL-MCNC: 8 MG/DL — LOW (ref 8.4–10.5)
CHLORIDE SERPL-SCNC: 105 MMOL/L — SIGNIFICANT CHANGE UP (ref 96–108)
CO2 SERPL-SCNC: 22 MMOL/L — SIGNIFICANT CHANGE UP (ref 22–31)
CREAT SERPL-MCNC: 0.72 MG/DL — SIGNIFICANT CHANGE UP (ref 0.5–1.3)
DACRYOCYTES BLD QL SMEAR: SLIGHT — SIGNIFICANT CHANGE UP
EGFR: 83 ML/MIN/1.73M2 — SIGNIFICANT CHANGE UP
EGFR: 83 ML/MIN/1.73M2 — SIGNIFICANT CHANGE UP
ELLIPTOCYTES BLD QL SMEAR: SIGNIFICANT CHANGE UP
EOSINOPHIL # BLD AUTO: 0.15 K/UL — SIGNIFICANT CHANGE UP (ref 0–0.5)
EOSINOPHIL NFR BLD AUTO: 2 % — SIGNIFICANT CHANGE UP (ref 0–6)
GLUCOSE SERPL-MCNC: 103 MG/DL — HIGH (ref 70–99)
HCT VFR BLD CALC: 18.7 % — CRITICAL LOW (ref 34.5–45)
HGB BLD-MCNC: 6.1 G/DL — CRITICAL LOW (ref 11.5–15.5)
HYPOCHROMIA BLD QL: SLIGHT — SIGNIFICANT CHANGE UP
LYMPHOCYTES # BLD AUTO: 0.23 K/UL — LOW (ref 1–3.3)
LYMPHOCYTES # BLD AUTO: 3 % — LOW (ref 13–44)
MACROCYTES BLD QL: SLIGHT — SIGNIFICANT CHANGE UP
MCHC RBC-ENTMCNC: 32.6 G/DL — SIGNIFICANT CHANGE UP (ref 32–36)
MCHC RBC-ENTMCNC: 34.7 PG — HIGH (ref 27–34)
MCV RBC AUTO: 106.3 FL — HIGH (ref 80–100)
MONOCYTES # BLD AUTO: 1.47 K/UL — HIGH (ref 0–0.9)
MONOCYTES NFR BLD AUTO: 19 % — HIGH (ref 2–14)
NEUTROPHILS # BLD AUTO: 5.79 K/UL — SIGNIFICANT CHANGE UP (ref 1.8–7.4)
NEUTROPHILS NFR BLD AUTO: 75 % — SIGNIFICANT CHANGE UP (ref 43–77)
NRBC # BLD: 0 /100 WBCS — SIGNIFICANT CHANGE UP (ref 0–0)
NRBC BLD AUTO-RTO: SIGNIFICANT CHANGE UP /100 WBCS (ref 0–0)
NRBC BLD-RTO: 0 /100 WBCS — SIGNIFICANT CHANGE UP (ref 0–0)
PLAT MORPH BLD: NORMAL — SIGNIFICANT CHANGE UP
PLATELET # BLD AUTO: 232 K/UL — SIGNIFICANT CHANGE UP (ref 150–400)
POIKILOCYTOSIS BLD QL AUTO: SIGNIFICANT CHANGE UP
POLYCHROMASIA BLD QL SMEAR: SLIGHT — SIGNIFICANT CHANGE UP
POTASSIUM SERPL-MCNC: 3.5 MMOL/L — SIGNIFICANT CHANGE UP (ref 3.5–5.3)
POTASSIUM SERPL-SCNC: 3.5 MMOL/L — SIGNIFICANT CHANGE UP (ref 3.5–5.3)
RBC # BLD: 1.76 M/UL — LOW (ref 3.8–5.2)
RBC # FLD: 18.9 % — HIGH (ref 10.3–14.5)
RBC BLD AUTO: ABNORMAL
SODIUM SERPL-SCNC: 141 MMOL/L — SIGNIFICANT CHANGE UP (ref 135–145)
WBC # BLD: 7.72 K/UL — SIGNIFICANT CHANGE UP (ref 3.8–10.5)
WBC # FLD AUTO: 7.72 K/UL — SIGNIFICANT CHANGE UP (ref 3.8–10.5)

## 2025-06-26 ENCOUNTER — APPOINTMENT (OUTPATIENT)
Dept: INFUSION THERAPY | Facility: HOSPITAL | Age: 85
End: 2025-06-26

## 2025-06-26 ENCOUNTER — RESULT REVIEW (OUTPATIENT)
Age: 85
End: 2025-06-26

## 2025-06-26 ENCOUNTER — APPOINTMENT (OUTPATIENT)
Dept: HEMATOLOGY ONCOLOGY | Facility: CLINIC | Age: 85
End: 2025-06-26

## 2025-06-26 DIAGNOSIS — R11.2 NAUSEA WITH VOMITING, UNSPECIFIED: ICD-10-CM

## 2025-06-26 DIAGNOSIS — Z51.11 ENCOUNTER FOR ANTINEOPLASTIC CHEMOTHERAPY: ICD-10-CM

## 2025-06-26 LAB
BASOPHILS # BLD AUTO: 0.1 K/UL — SIGNIFICANT CHANGE UP (ref 0–0.2)
BASOPHILS NFR BLD AUTO: 1.1 % — SIGNIFICANT CHANGE UP (ref 0–2)
EOSINOPHIL # BLD AUTO: 0.25 K/UL — SIGNIFICANT CHANGE UP (ref 0–0.5)
EOSINOPHIL NFR BLD AUTO: 2.8 % — SIGNIFICANT CHANGE UP (ref 0–6)
HCT VFR BLD CALC: 31.1 % — LOW (ref 34.5–45)
HGB BLD-MCNC: 10.8 G/DL — LOW (ref 11.5–15.5)
IMM GRANULOCYTES NFR BLD AUTO: 1.2 % — HIGH (ref 0–0.9)
LYMPHOCYTES # BLD AUTO: 0.39 K/UL — LOW (ref 1–3.3)
LYMPHOCYTES # BLD AUTO: 4.3 % — LOW (ref 13–44)
MCHC RBC-ENTMCNC: 32.7 PG — SIGNIFICANT CHANGE UP (ref 27–34)
MCHC RBC-ENTMCNC: 35.1 G/DL — SIGNIFICANT CHANGE UP (ref 32–36)
MCV RBC AUTO: 93.1 FL — SIGNIFICANT CHANGE UP (ref 80–100)
MONOCYTES # BLD AUTO: 1.98 K/UL — HIGH (ref 0–0.9)
MONOCYTES NFR BLD AUTO: 22 % — HIGH (ref 2–14)
NEUTROPHILS # BLD AUTO: 6.17 K/UL — SIGNIFICANT CHANGE UP (ref 1.8–7.4)
NEUTROPHILS NFR BLD AUTO: 68.6 % — SIGNIFICANT CHANGE UP (ref 43–77)
NRBC BLD AUTO-RTO: 0 /100 WBCS — SIGNIFICANT CHANGE UP (ref 0–0)
PLATELET # BLD AUTO: 235 K/UL — SIGNIFICANT CHANGE UP (ref 150–400)
RBC # BLD: 3.3 M/UL — LOW (ref 3.8–5.2)
RBC # FLD: 22.2 % — HIGH (ref 10.3–14.5)
WBC # BLD: 9 K/UL — SIGNIFICANT CHANGE UP (ref 3.8–10.5)
WBC # FLD AUTO: 9 K/UL — SIGNIFICANT CHANGE UP (ref 3.8–10.5)

## 2025-06-27 ENCOUNTER — APPOINTMENT (OUTPATIENT)
Dept: INFUSION THERAPY | Facility: HOSPITAL | Age: 85
End: 2025-06-27

## 2025-06-28 ENCOUNTER — APPOINTMENT (OUTPATIENT)
Dept: INFUSION THERAPY | Facility: HOSPITAL | Age: 85
End: 2025-06-28

## 2025-06-30 ENCOUNTER — INPATIENT (INPATIENT)
Facility: HOSPITAL | Age: 85
LOS: 2 days | Discharge: HOME | End: 2025-07-03
Attending: INTERNAL MEDICINE | Admitting: INTERNAL MEDICINE
Payer: MEDICARE

## 2025-06-30 VITALS
HEART RATE: 75 BPM | RESPIRATION RATE: 18 BRPM | OXYGEN SATURATION: 96 % | HEIGHT: 60 IN | TEMPERATURE: 98 F | SYSTOLIC BLOOD PRESSURE: 107 MMHG | DIASTOLIC BLOOD PRESSURE: 67 MMHG

## 2025-06-30 DIAGNOSIS — Z51.81 ENCOUNTER FOR THERAPEUTIC DRUG LEVEL MONITORING: ICD-10-CM

## 2025-06-30 DIAGNOSIS — S22.000A WEDGE COMPRESSION FRACTURE OF UNSPECIFIED THORACIC VERTEBRA, INITIAL ENCOUNTER FOR CLOSED FRACTURE: ICD-10-CM

## 2025-06-30 DIAGNOSIS — Z98.49 CATARACT EXTRACTION STATUS, UNSPECIFIED EYE: Chronic | ICD-10-CM

## 2025-06-30 DIAGNOSIS — Z98.890 OTHER SPECIFIED POSTPROCEDURAL STATES: Chronic | ICD-10-CM

## 2025-06-30 DIAGNOSIS — Z96.641 PRESENCE OF RIGHT ARTIFICIAL HIP JOINT: Chronic | ICD-10-CM

## 2025-06-30 DIAGNOSIS — Z95.5 PRESENCE OF CORONARY ANGIOPLASTY IMPLANT AND GRAFT: Chronic | ICD-10-CM

## 2025-06-30 DIAGNOSIS — Z96.642 PRESENCE OF LEFT ARTIFICIAL HIP JOINT: Chronic | ICD-10-CM

## 2025-06-30 LAB
ADD ON TEST-SPECIMEN IN LAB: SIGNIFICANT CHANGE UP
ALBUMIN SERPL ELPH-MCNC: 3.4 G/DL — SIGNIFICANT CHANGE UP (ref 3.3–5)
ALP SERPL-CCNC: 141 U/L — HIGH (ref 40–120)
ALT FLD-CCNC: 9 U/L — SIGNIFICANT CHANGE UP (ref 4–33)
ANION GAP SERPL CALC-SCNC: 14 MMOL/L — SIGNIFICANT CHANGE UP (ref 7–14)
ANISOCYTOSIS BLD QL: SLIGHT — SIGNIFICANT CHANGE UP
APPEARANCE UR: CLEAR — SIGNIFICANT CHANGE UP
APTT BLD: 27.2 SEC — SIGNIFICANT CHANGE UP (ref 26.1–36.8)
AST SERPL-CCNC: 17 U/L — SIGNIFICANT CHANGE UP (ref 4–32)
BACTERIA # UR AUTO: ABNORMAL /HPF
BASOPHILS # BLD AUTO: 0.34 K/UL — HIGH (ref 0–0.2)
BASOPHILS # BLD MANUAL: 0 K/UL — SIGNIFICANT CHANGE UP (ref 0–0.2)
BASOPHILS NFR BLD AUTO: 0.7 % — SIGNIFICANT CHANGE UP (ref 0–2)
BASOPHILS NFR BLD MANUAL: 0 % — SIGNIFICANT CHANGE UP (ref 0–2)
BILIRUB SERPL-MCNC: 0.7 MG/DL — SIGNIFICANT CHANGE UP (ref 0.2–1.2)
BILIRUB UR-MCNC: NEGATIVE — SIGNIFICANT CHANGE UP
BUN SERPL-MCNC: 13 MG/DL — SIGNIFICANT CHANGE UP (ref 7–23)
CALCIUM SERPL-MCNC: 9.2 MG/DL — SIGNIFICANT CHANGE UP (ref 8.4–10.5)
CAST: 0 /LPF — SIGNIFICANT CHANGE UP (ref 0–4)
CHLORIDE SERPL-SCNC: 107 MMOL/L — SIGNIFICANT CHANGE UP (ref 98–107)
CO2 SERPL-SCNC: 23 MMOL/L — SIGNIFICANT CHANGE UP (ref 22–31)
COLOR SPEC: YELLOW — SIGNIFICANT CHANGE UP
CREAT SERPL-MCNC: 0.71 MG/DL — SIGNIFICANT CHANGE UP (ref 0.5–1.3)
DIFF PNL FLD: NEGATIVE — SIGNIFICANT CHANGE UP
EGFR: 84 ML/MIN/1.73M2 — SIGNIFICANT CHANGE UP
EGFR: 84 ML/MIN/1.73M2 — SIGNIFICANT CHANGE UP
ELLIPTOCYTES BLD QL SMEAR: SLIGHT — SIGNIFICANT CHANGE UP
EOSINOPHIL # BLD AUTO: 0.15 K/UL — SIGNIFICANT CHANGE UP (ref 0–0.5)
EOSINOPHIL # BLD MANUAL: 0 K/UL — SIGNIFICANT CHANGE UP (ref 0–0.5)
EOSINOPHIL NFR BLD AUTO: 0.3 % — SIGNIFICANT CHANGE UP (ref 0–6)
EOSINOPHIL NFR BLD MANUAL: 0 % — SIGNIFICANT CHANGE UP (ref 0–6)
GAS PNL BLDV: SIGNIFICANT CHANGE UP
GLUCOSE SERPL-MCNC: 70 MG/DL — SIGNIFICANT CHANGE UP (ref 70–99)
GLUCOSE UR QL: NEGATIVE MG/DL — SIGNIFICANT CHANGE UP
HCT VFR BLD CALC: 30.8 % — LOW (ref 34.5–45)
HGB BLD-MCNC: 10.3 G/DL — LOW (ref 11.5–15.5)
IMM GRANULOCYTES # BLD AUTO: 7.65 K/UL — HIGH (ref 0–0.07)
IMM GRANULOCYTES NFR BLD AUTO: 15.3 % — HIGH (ref 0–0.9)
INR BLD: 1.2 RATIO — HIGH (ref 0.85–1.16)
KETONES UR QL: NEGATIVE MG/DL — SIGNIFICANT CHANGE UP
LEUKOCYTE ESTERASE UR-ACNC: ABNORMAL
LIDOCAIN IGE QN: 28 U/L — SIGNIFICANT CHANGE UP (ref 7–60)
LYMPHOCYTES # BLD AUTO: 0.47 K/UL — LOW (ref 1–3.3)
LYMPHOCYTES # BLD MANUAL: 0 K/UL — LOW (ref 1–3.3)
LYMPHOCYTES NFR BLD AUTO: 0.9 % — LOW (ref 13–44)
LYMPHOCYTES NFR BLD MANUAL: 0 % — LOW (ref 13–44)
MACROCYTES BLD QL: ABNORMAL
MANUAL METAMYELOCYTE #: 0.45 K/UL — HIGH (ref 0–0)
MANUAL NEUTROPHIL BANDS #: 4.76 K/UL — HIGH (ref 0–0.84)
MCHC RBC-ENTMCNC: 33.2 PG — SIGNIFICANT CHANGE UP (ref 27–34)
MCHC RBC-ENTMCNC: 33.4 G/DL — SIGNIFICANT CHANGE UP (ref 32–36)
MCV RBC AUTO: 99.4 FL — SIGNIFICANT CHANGE UP (ref 80–100)
METAMYELOCYTES # FLD: 0.9 % — HIGH (ref 0–0)
METAMYELOCYTES NFR BLD: 0.9 % — HIGH (ref 0–0)
MONOCYTES # BLD AUTO: 0.27 K/UL — SIGNIFICANT CHANGE UP (ref 0–0.9)
MONOCYTES # BLD MANUAL: 0.45 K/UL — SIGNIFICANT CHANGE UP (ref 0–0.9)
MONOCYTES NFR BLD AUTO: 0.5 % — LOW (ref 2–14)
MONOCYTES NFR BLD MANUAL: 0.9 % — LOW (ref 2–14)
NEUTROPHILS # BLD AUTO: 41.23 K/UL — HIGH (ref 1.8–7.4)
NEUTROPHILS # BLD MANUAL: 44.45 K/UL — HIGH (ref 1.8–7.4)
NEUTROPHILS NFR BLD AUTO: 82.3 % — HIGH (ref 43–77)
NEUTROPHILS NFR BLD MANUAL: 88.7 % — HIGH (ref 43–77)
NEUTS BAND # BLD: 9.5 % — HIGH (ref 0–8)
NEUTS BAND NFR BLD: 9.5 % — HIGH (ref 0–8)
NITRITE UR-MCNC: NEGATIVE — SIGNIFICANT CHANGE UP
NRBC # BLD AUTO: 0 K/UL — SIGNIFICANT CHANGE UP (ref 0–0)
NRBC # FLD: 0 K/UL — SIGNIFICANT CHANGE UP (ref 0–0)
NRBC BLD AUTO-RTO: 0 /100 WBCS — SIGNIFICANT CHANGE UP (ref 0–0)
OVALOCYTES BLD QL SMEAR: ABNORMAL
PH UR: 6 — SIGNIFICANT CHANGE UP (ref 5–8)
PLAT MORPH BLD: NORMAL — SIGNIFICANT CHANGE UP
PLATELET # BLD AUTO: 236 K/UL — SIGNIFICANT CHANGE UP (ref 150–400)
PLATELET COUNT - ESTIMATE: NORMAL — SIGNIFICANT CHANGE UP
PMV BLD: 11.1 FL — SIGNIFICANT CHANGE UP (ref 7–13)
POIKILOCYTOSIS BLD QL AUTO: ABNORMAL
POLYCHROMASIA BLD QL SMEAR: SLIGHT — SIGNIFICANT CHANGE UP
POTASSIUM SERPL-MCNC: 3.6 MMOL/L — SIGNIFICANT CHANGE UP (ref 3.5–5.3)
POTASSIUM SERPL-SCNC: 3.6 MMOL/L — SIGNIFICANT CHANGE UP (ref 3.5–5.3)
PROT SERPL-MCNC: 5.7 G/DL — LOW (ref 6–8.3)
PROT UR-MCNC: NEGATIVE MG/DL — SIGNIFICANT CHANGE UP
PROTHROM AB SERPL-ACNC: 13.9 SEC — HIGH (ref 9.9–13.4)
RBC # BLD: 3.1 M/UL — LOW (ref 3.8–5.2)
RBC # FLD: 19.6 % — HIGH (ref 10.3–14.5)
RBC BLD AUTO: ABNORMAL
RBC CASTS # UR COMP ASSIST: 1 /HPF — SIGNIFICANT CHANGE UP (ref 0–4)
SODIUM SERPL-SCNC: 144 MMOL/L — SIGNIFICANT CHANGE UP (ref 135–145)
SP GR SPEC: 1.01 — SIGNIFICANT CHANGE UP (ref 1–1.03)
SQUAMOUS # UR AUTO: 0 /HPF — SIGNIFICANT CHANGE UP (ref 0–5)
UROBILINOGEN FLD QL: 0.2 MG/DL — SIGNIFICANT CHANGE UP (ref 0.2–1)
WBC # BLD: 50.11 K/UL — CRITICAL HIGH (ref 3.8–10.5)
WBC # FLD AUTO: 50.11 K/UL — CRITICAL HIGH (ref 3.8–10.5)
WBC UR QL: 10 /HPF — HIGH (ref 0–5)

## 2025-06-30 PROCEDURE — 71275 CT ANGIOGRAPHY CHEST: CPT | Mod: 26

## 2025-06-30 PROCEDURE — 99285 EMERGENCY DEPT VISIT HI MDM: CPT

## 2025-06-30 PROCEDURE — 71045 X-RAY EXAM CHEST 1 VIEW: CPT | Mod: 26

## 2025-06-30 PROCEDURE — 74177 CT ABD & PELVIS W/CONTRAST: CPT | Mod: 26

## 2025-06-30 RX ORDER — FENTANYL CITRATE-0.9 % NACL/PF 100MCG/2ML
25 SYRINGE (ML) INTRAVENOUS ONCE
Refills: 0 | Status: COMPLETED | OUTPATIENT
Start: 2025-06-30 | End: 2025-06-30

## 2025-06-30 RX ORDER — ACETAMINOPHEN 500 MG/5ML
1000 LIQUID (ML) ORAL ONCE
Refills: 0 | Status: COMPLETED | OUTPATIENT
Start: 2025-06-30 | End: 2025-06-30

## 2025-06-30 RX ADMIN — Medication 400 MILLIGRAM(S): at 16:09

## 2025-06-30 RX ADMIN — Medication 1000 MILLILITER(S): at 16:09

## 2025-06-30 NOTE — ED PROVIDER NOTE - PATIENT PORTAL LINK FT
You can access the FollowMyHealth Patient Portal offered by [facility] by registering at the following website: [email or web address] By joining [facility]’s FollowMyHealth portal, you will also be able to view your health information using other applications (apps) compatible with our system.

## 2025-06-30 NOTE — ED PROVIDER NOTE - PROGRESS NOTE DETAILS
Spoke to heme about the elevated white count. They state that patient is taking Neulasta and that this is an expected reponse.    Jorge Quinn DO (PGY2) CMP unremarkable lipase 28 no lactic acid chest x-ray clear patient states pain has improved.  IV fluids infusing.  Still tachycardic and irregular will check on rectal temperature if afebrile will treat with as per home meds.  Patient updated pending CTs to be done. Memo Stuart MD  CTs showed no pulmonary embolism or bowel obstruction, possible acute interstitial pancreatitis with reactive duodenitis. Also new compression fracture deformities along the superior aspects of the   T10 and T12 vertebral bodies, possibly pathologic.     Patient reports resolution of abdominal pain and denies back pain. Informed of CT results. Spine paged for compression fractures Pending final recommendations from neurosurgery on for spine.  Neurosurgery is aware that patient is asking to be discharged.  Patient is feeling better.  No further abdominal pain no nausea vomiting.  She does not want to stay in the hospital she wants to be discharged with her son. Memo Stuart MD   discussed case with neurosurgery: Recommend new MRI.  However neurosurgery also notes that patient has spine surgeon that she follows with and due to patient wishing to leave and not be admitted, recommend MRI outpatient.  Patient again states that she does not wish to stay inpatient for MRI but does plan to follow-up with her spine surgeon for outpatient MRI.  Patient neurologically intact without spinal pain at this time.  Discussed risk and benefits of leaving without inpatient MRI, patient understands risks.  Patient has scheduled follow-up with her PCP and oncologist as well as a scheduled PET scan. Spoke with neurosurgery PA who spoke to their attending who reviewed the case.  They do recommend MRI of the back.  If patient is willing to stay will do an inpatient otherwise she can do it outpatient.  Patient has been asking to be discharged.  Ready to be go home with her son.  Refusing any admission.  Anticipate they will go with outpatient follow-up. Memo Stuart MD  patient now states she wishes to stay for MRI. Will seek medicine admit

## 2025-06-30 NOTE — ED PROVIDER NOTE - NSFOLLOWUPINSTRUCTIONS_ED_ALL_ED_FT
1. You presented to the emergency department for: abdominal pain. Your symptoms resolved with medication.  On CAT scan you were found to have signs of pancreatitis and duodenitis which is likely the source of your abdominal pain.  You were also found to have new compression fractures of your thoracic spine and were evaluated by the neurosurgery team.  You should follow-up with your spine doctor as well as have an outpatient MRI for further evaluation and management of this.  Should also continue to follow-up with your oncologist and primary care physician.  If you develop any worsening symptoms, nausea vomiting, inability to eat or drink, fevers, back pain, numbness or weakness then he should return to the emergency department for repeat evaluation.    2. Your evaluation in the emergency department included a physician evaluation. Your work-up did not reveal any findings indicating the need for admission to the hospital or any emergent interventions at this time.     3. It is recommended that you follow-up with [SPECIALTY] as arranged by the discharge center for a repeat evaluation, and potentially further testing and treatment.     If needed, to arrange an appointment with a primary care provider please call: [phone]    4. Please continue taking your regular medications as prescribed.     For pain you may take 400-600 mg IBUPROFEN or 500-1000mg ACETAMINOPHEN every 6-8 hours - as needed.  This is an over-the-counter medication - please read the instructions for use and warnings on the label. If you have any questions regarding its use, you may refer them to your local pharmacist.    5. PLEASE RETURN TO THE EMERGENCY DEPARTMENT IMMEDIATELY IF you develop any fevers not responding to over the counter medications, uncontrollable nausea and vomiting, an inability to tolerate eating and drinking, difficulty breathing, chest pain, a severe increase in your symptoms or pain, or any other new symptoms that concern you. 1. You presented to the emergency department for: abdominal pain. Your symptoms resolved with medication.  On CAT scan you were found to have signs of pancreatitis and duodenitis which is likely the source of your abdominal pain.  You were also found to have new compression fractures of your thoracic spine and were evaluated by the neurosurgery team.  You should follow-up with your spine doctor as well as have an outpatient MRI for further evaluation and management of this.  Should also continue to follow-up with your oncologist and primary care physician.  If you develop any worsening symptoms, nausea vomiting, inability to eat or drink, fevers, back pain, numbness or weakness then he should return to the emergency department for repeat evaluation.    2. Your evaluation in the emergency department included a physician evaluation. Your work-up did not reveal any findings indicating the need for admission to the hospital or any emergent interventions at this time.     3. If needed, to arrange an appointment with a primary care provider please call: [phone]    4. Please continue taking your regular medications as prescribed.     5. PLEASE RETURN TO THE EMERGENCY DEPARTMENT IMMEDIATELY IF you develop any fevers not responding to over the counter medications, uncontrollable nausea and vomiting, an inability to tolerate eating and drinking, difficulty breathing, chest pain, a severe increase in your symptoms or pain, or any other new symptoms that concern you.

## 2025-06-30 NOTE — ED PROVIDER NOTE - CLINICAL SUMMARY MEDICAL DECISION MAKING FREE TEXT BOX
84-year-old female with past medical history of lung cancer with metastasis on chemotherapy, COPD, A-fib on Eliquis presents to the ED with complaints of abdominal pain for the past day.  Patient states that the abdominal pain is localized on the left side and radiates to the back.  Endorses nausea and vomiting this morning.  States that her last bowel movement was 2 days ago, normally has bowel movements daily/every other day.  States that since her vomiting this morning, she has not wanted to eat for fear of vomiting.  Surgical history includes 4 C-sections, hysterectomy, cholecystectomy, appendectomy.  Denies any fevers, chills, chest pain, shortness of breath, dysuria.    Vital signs are within normal limits, patient afebrile.  Physical exam reveals abdominal tenderness especially on the left.  Scar is healing well without signs of infection.  Otherwise lungs clear to auscultation.    Differential includes SBO versus constipation.  Given surgical history and age, will obtain CT abdomen pelvis to evaluate for intra-abdominal pathology.  Will obtain labs and VBG.  Will treat with Tylenol and fluids.

## 2025-06-30 NOTE — ED ADULT NURSE REASSESSMENT NOTE - NS ED NURSE REASSESS COMMENT FT1
Pt. received from rita Almendarez. Pt. A&OX4. Pt. respirations even and unlabored on RA. Pt. denies any pain at this time. Safety maintained throughout.

## 2025-06-30 NOTE — ED ADULT NURSE NOTE - NSFALLRISKINTERV_ED_ALL_ED

## 2025-06-30 NOTE — ED PROVIDER NOTE - PHYSICAL EXAMINATION
Jorge Quinn DO (PGY2)   Physical Exam:    Gen: NAD, AOx3  Head: NCAT  HEENT: EOMI, PEERLA  Lung: CTAB, no respiratory distress, no wheezes/rhonchi/rales B/L  CV: RRR, no murmurs, rubs or gallops  Abd: left abd tenderness  MSK: no visible deformities, ROM normal in UE/LE, no back pain  Neuro: No focal sensory or motor deficits. Sensation intact to light touch all extremities.  Skin: Warm, well perfused, no rash, no leg swelling  Psych: normal affect, calm

## 2025-06-30 NOTE — CONSULT NOTE ADULT - PROBLEM SELECTOR RECOMMENDATION 9
- Patient needs new MRI spinal axis w/wo contrast  - If patient does not want to stay for MRI should follow up ASAP with spinal orthopedist Dr. Solorio for imaging and follow up    Pager [phone]   Case discussed with attending neurosurgeon Dr. Anne - Patient needs new MRI C/T/L spine w/wo contrast  - If patient does not want to stay for MRI should follow up ASAP with spinal orthopedist Dr. Solorio for imaging and follow up    Pager [phone]   Case discussed with attending neurosurgeon Dr. Anne

## 2025-06-30 NOTE — ED ADULT NURSE NOTE - SEPSIS REFERENCE DATA CRITERIA 1
[facility] EMERGENCY DEPT  [address] [place] [id number]  [phone]    Work/School Note    Date: 3/13/2023    To Whom It May concern:    Seth Meigs was seen and treated today in the emergency room by the following provider(s):  Attending Provider: Sheila Evans MD.      Seth Meigs is excused from work/school on 03/13/23 and 03/14/23. She is medically clear to return to work/school on 3/15/2023.        Sincerely,          Makayla Simpson MD
Abormal VS: Temp > 100F or < 96.8F; SBP < 90 mmHG; HR > 120bpm; Resp > 24/min

## 2025-06-30 NOTE — ED PROVIDER NOTE - ATTENDING CONTRIBUTION TO CARE
Attending Statement: I have personally seen and examined this patient. I have fully participated in the care of this patient. I have reviewed all pertinent clinical information, including history physical exam, plan and the Resident's note and agree except as noted  84-year-old female history of A-fib on verapamil and Eliquis, asthma, CAD, CHF, GERD, prior CVA, lung cancer and bone cancer just completed chemotherapy 2 days ago presents with 1 day of abdominal pain.  States she has been having abdominal pain since yesterday symptoms constant throughout the night.  Primarily located in the left upper quadrant.  Associated with nausea and an episode of vomiting she tried to breakfast.  No fever no chills no diarrhea.  The therapist came to the house today and noticed that her heart rate was elevated referred to the ER for further evaluation.  Patient states she has not missed any doses of her medications.  Has no chest pain no shortness of breath.  No urinary complaints.  Primarily endorsing pain to the abdomen.  Vital signs noted patient is tachycardic heart rate in the 147 appears irregular on monitor will obtain EKG blood pressure 107/67 satting 96% on room air patient ANO x 3 elderly female appears chronically ill.  Not icteric.  No jaundice.  Has dry mucous membranes.  No respiratory distress.  Tachycardic.  Abdomen is soft but tender in the left upper quadrant no rebound no guarding.  No pedal edema.  Plan cardiac monitor, EKG, rectal temp, pain meds, antiemetics as needed, labs, CT angio CT abdomen pelvis, chest x-ray and reassess.  Discussed with patient and son at bedside.

## 2025-06-30 NOTE — ED ADULT NURSE NOTE - OBJECTIVE STATEMENT
84-year-old female with past medical history of lung cancer with metastasis on chemotherapy, COPD, A-fib on Eliquis presents to the ED with complaints of abdominal pain for the past day. Patient reports several episodes of emesis. Patient noted with decreased white count. Iv #20 placed in right antecubital.

## 2025-06-30 NOTE — CONSULT NOTE ADULT - SUBJECTIVE AND OBJECTIVE BOX
NEUROSURGERY CONSULT    HPI: 84F PMH lung cancer with hx bone mets and radiation (per allscripts has hx radiation to L3-5 and brain met), currently on chemo (oncologist Dr Ferris, rad onc Dr Mcnamara) h/o CAD and PVD with stents, afib on Eliquis p/w abdominal pain. CT AP done showing acute likely pathological compression fx of T10/T12. Has no back pain, denies weakness, numbness, tingling, incontinence. Follows with orthopedic surgeon Dr. Rohan Solorio for spine.     RADIOLOGY:   < from: CT Abdomen and Pelvis w/ IV Cont (06.30.25 @ 19:22) >  IMPRESSION:  No pulmonary embolism or bowel obstruction.    Mild fat stranding and free fluid around the pancreaticoduodenal groove,   possibly representing acute interstitial pancreatitiswith reactive   duodenitis.    Redemonstrated sequelae of metastatic disease which appear overall   decrease in size compared to prior CT CAP.    New compression fracture deformities along the superior aspects of the   T10 and T12 vertebral bodies, possibly pathologic in nature given the   patient's history.    --- End of Report ---          ROHAN ALMANZA MD; Resident Radiologist  This document has been electronically signed.  NIR DELGADO MD; Attending Radiologist  This document has been electronically signed. Jun 30 2025  8:22PM    < end of copied text >    MEDS:      Vital Signs Last 24 Hrs  T(C): 36.6 (30 Jun 2025 20:57), Max: 37 (30 Jun 2025 19:51)  T(F): 97.8 (30 Jun 2025 20:57), Max: 98.6 (30 Jun 2025 19:51)  HR: 85 (30 Jun 2025 20:57) (75 - 122)  BP: 99/64 (30 Jun 2025 20:57) (99/64 - 118/100)  BP(mean): --  RR: 18 (30 Jun 2025 20:57) (16 - 18)  SpO2: 100% (30 Jun 2025 20:57) (96% - 100%)    Parameters below as of 30 Jun 2025 20:57  Patient On (Oxygen Delivery Method): room air        LABS:                        10.3   50.11 )-----------( 236      ( 30 Jun 2025 15:52 )             30.8     06-30    144  |  107  |  13  ----------------------------<  70  3.6   |  23  |  0.71    Ca    9.2      30 Jun 2025 15:52    TPro  5.7[L]  /  Alb  3.4  /  TBili  0.7  /  DBili  x   /  AST  17  /  ALT  9   /  AlkPhos  141[H]  06-30    PT/INR - ( 30 Jun 2025 15:52 )   PT: 13.9 sec;   INR: 1.20 ratio         PTT - ( 30 Jun 2025 15:52 )  PTT:27.2 sec      PHYSICAL EXAM:  AOx3, Following Commands, Face symmetrical  PERRL, 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

## 2025-07-01 DIAGNOSIS — Z87.09 PERSONAL HISTORY OF OTHER DISEASES OF THE RESPIRATORY SYSTEM: ICD-10-CM

## 2025-07-01 DIAGNOSIS — D63.0 ANEMIA IN NEOPLASTIC DISEASE: ICD-10-CM

## 2025-07-01 DIAGNOSIS — R82.90 UNSPECIFIED ABNORMAL FINDINGS IN URINE: ICD-10-CM

## 2025-07-01 DIAGNOSIS — Z29.9 ENCOUNTER FOR PROPHYLACTIC MEASURES, UNSPECIFIED: ICD-10-CM

## 2025-07-01 DIAGNOSIS — I48.91 UNSPECIFIED ATRIAL FIBRILLATION: ICD-10-CM

## 2025-07-01 DIAGNOSIS — C34.90 MALIGNANT NEOPLASM OF UNSPECIFIED PART OF UNSPECIFIED BRONCHUS OR LUNG: ICD-10-CM

## 2025-07-01 DIAGNOSIS — I25.10 ATHEROSCLEROTIC HEART DISEASE OF NATIVE CORONARY ARTERY WITHOUT ANGINA PECTORIS: ICD-10-CM

## 2025-07-01 DIAGNOSIS — R10.9 UNSPECIFIED ABDOMINAL PAIN: ICD-10-CM

## 2025-07-01 DIAGNOSIS — S22.000A WEDGE COMPRESSION FRACTURE OF UNSPECIFIED THORACIC VERTEBRA, INITIAL ENCOUNTER FOR CLOSED FRACTURE: ICD-10-CM

## 2025-07-01 LAB
ANION GAP SERPL CALC-SCNC: 19 MMOL/L — HIGH (ref 7–14)
BUN SERPL-MCNC: 10 MG/DL — SIGNIFICANT CHANGE UP (ref 7–23)
CALCIUM SERPL-MCNC: 8.6 MG/DL — SIGNIFICANT CHANGE UP (ref 8.4–10.5)
CHLORIDE SERPL-SCNC: 103 MMOL/L — SIGNIFICANT CHANGE UP (ref 98–107)
CO2 SERPL-SCNC: 20 MMOL/L — LOW (ref 22–31)
CREAT SERPL-MCNC: 0.55 MG/DL — SIGNIFICANT CHANGE UP (ref 0.5–1.3)
EGFR: 91 ML/MIN/1.73M2 — SIGNIFICANT CHANGE UP
EGFR: 91 ML/MIN/1.73M2 — SIGNIFICANT CHANGE UP
GLUCOSE SERPL-MCNC: 80 MG/DL — SIGNIFICANT CHANGE UP (ref 70–99)
HCT VFR BLD CALC: 33.2 % — LOW (ref 34.5–45)
HGB BLD-MCNC: 11.2 G/DL — LOW (ref 11.5–15.5)
MAGNESIUM SERPL-MCNC: 1.3 MG/DL — LOW (ref 1.6–2.6)
MCHC RBC-ENTMCNC: 33 PG — SIGNIFICANT CHANGE UP (ref 27–34)
MCHC RBC-ENTMCNC: 33.7 G/DL — SIGNIFICANT CHANGE UP (ref 32–36)
MCV RBC AUTO: 97.9 FL — SIGNIFICANT CHANGE UP (ref 80–100)
NRBC # BLD AUTO: 0 K/UL — SIGNIFICANT CHANGE UP (ref 0–0)
NRBC # FLD: 0 K/UL — SIGNIFICANT CHANGE UP (ref 0–0)
NRBC BLD AUTO-RTO: 0 /100 WBCS — SIGNIFICANT CHANGE UP (ref 0–0)
PHOSPHATE SERPL-MCNC: 4 MG/DL — SIGNIFICANT CHANGE UP (ref 2.5–4.5)
PLATELET # BLD AUTO: 205 K/UL — SIGNIFICANT CHANGE UP (ref 150–400)
PMV BLD: 11.2 FL — SIGNIFICANT CHANGE UP (ref 7–13)
POTASSIUM SERPL-MCNC: 3 MMOL/L — LOW (ref 3.5–5.3)
POTASSIUM SERPL-SCNC: 3 MMOL/L — LOW (ref 3.5–5.3)
RBC # BLD: 3.39 M/UL — LOW (ref 3.8–5.2)
RBC # FLD: 19.4 % — HIGH (ref 10.3–14.5)
SODIUM SERPL-SCNC: 142 MMOL/L — SIGNIFICANT CHANGE UP (ref 135–145)
WBC # BLD: 51.95 K/UL — CRITICAL HIGH (ref 3.8–10.5)
WBC # FLD AUTO: 51.95 K/UL — CRITICAL HIGH (ref 3.8–10.5)

## 2025-07-01 PROCEDURE — 72156 MRI NECK SPINE W/O & W/DYE: CPT | Mod: 26

## 2025-07-01 PROCEDURE — 93010 ELECTROCARDIOGRAM REPORT: CPT

## 2025-07-01 PROCEDURE — 72157 MRI CHEST SPINE W/O & W/DYE: CPT | Mod: 26

## 2025-07-01 PROCEDURE — 72158 MRI LUMBAR SPINE W/O & W/DYE: CPT | Mod: 26

## 2025-07-01 PROCEDURE — 99223 1ST HOSP IP/OBS HIGH 75: CPT

## 2025-07-01 PROCEDURE — 99233 SBSQ HOSP IP/OBS HIGH 50: CPT

## 2025-07-01 RX ORDER — METOPROLOL SUCCINATE 50 MG/1
5 TABLET, EXTENDED RELEASE ORAL ONCE
Refills: 0 | Status: COMPLETED | OUTPATIENT
Start: 2025-07-01 | End: 2025-07-01

## 2025-07-01 RX ORDER — APIXABAN 2.5 MG/1
5 TABLET, FILM COATED ORAL
Refills: 0 | Status: DISCONTINUED | OUTPATIENT
Start: 2025-07-01 | End: 2025-07-03

## 2025-07-01 RX ORDER — MAGNESIUM SULFATE 500 MG/ML
1 SYRINGE (ML) INJECTION ONCE
Refills: 0 | Status: COMPLETED | OUTPATIENT
Start: 2025-07-01 | End: 2025-07-01

## 2025-07-01 RX ORDER — ALPRAZOLAM 0.5 MG
0.5 TABLET, EXTENDED RELEASE 24 HR ORAL ONCE
Refills: 0 | Status: DISCONTINUED | OUTPATIENT
Start: 2025-07-01 | End: 2025-07-01

## 2025-07-01 RX ORDER — ACETAMINOPHEN 500 MG/5ML
650 LIQUID (ML) ORAL EVERY 6 HOURS
Refills: 0 | Status: DISCONTINUED | OUTPATIENT
Start: 2025-07-01 | End: 2025-07-03

## 2025-07-01 RX ORDER — ATORVASTATIN CALCIUM 80 MG/1
20 TABLET, FILM COATED ORAL AT BEDTIME
Refills: 0 | Status: DISCONTINUED | OUTPATIENT
Start: 2025-07-01 | End: 2025-07-03

## 2025-07-01 RX ORDER — METOPROLOL SUCCINATE 50 MG/1
25 TABLET, EXTENDED RELEASE ORAL
Refills: 0 | Status: DISCONTINUED | OUTPATIENT
Start: 2025-07-01 | End: 2025-07-03

## 2025-07-01 RX ORDER — ASPIRIN 325 MG
81 TABLET ORAL DAILY
Refills: 0 | Status: DISCONTINUED | OUTPATIENT
Start: 2025-07-01 | End: 2025-07-03

## 2025-07-01 RX ADMIN — Medication 240 MILLIGRAM(S): at 06:03

## 2025-07-01 RX ADMIN — Medication 100 MILLIEQUIVALENT(S): at 10:42

## 2025-07-01 RX ADMIN — Medication 40 MILLIEQUIVALENT(S): at 12:47

## 2025-07-01 RX ADMIN — Medication 100 GRAM(S): at 10:14

## 2025-07-01 RX ADMIN — Medication 40 MILLIEQUIVALENT(S): at 19:40

## 2025-07-01 RX ADMIN — METOPROLOL SUCCINATE 25 MILLIGRAM(S): 50 TABLET, EXTENDED RELEASE ORAL at 19:40

## 2025-07-01 RX ADMIN — APIXABAN 5 MILLIGRAM(S): 2.5 TABLET, FILM COATED ORAL at 19:40

## 2025-07-01 RX ADMIN — Medication 40 MILLIEQUIVALENT(S): at 21:18

## 2025-07-01 RX ADMIN — Medication 81 MILLIGRAM(S): at 11:13

## 2025-07-01 RX ADMIN — Medication 1 DOSE(S): at 21:18

## 2025-07-01 RX ADMIN — METOPROLOL SUCCINATE 5 MILLIGRAM(S): 50 TABLET, EXTENDED RELEASE ORAL at 03:49

## 2025-07-01 RX ADMIN — APIXABAN 5 MILLIGRAM(S): 2.5 TABLET, FILM COATED ORAL at 06:03

## 2025-07-01 RX ADMIN — Medication 1 DOSE(S): at 09:41

## 2025-07-01 RX ADMIN — ATORVASTATIN CALCIUM 20 MILLIGRAM(S): 80 TABLET, FILM COATED ORAL at 21:18

## 2025-07-01 RX ADMIN — Medication 0.5 MILLIGRAM(S): at 14:55

## 2025-07-01 NOTE — H&P ADULT - PROBLEM SELECTOR PLAN 6
-pt with anemia to 10.3 i/s/o known neoplastic disease  -f/u daily CBC, if Hgb <7, transfuse 1U PRBC.

## 2025-07-01 NOTE — H&P ADULT - PROBLEM SELECTOR PLAN 1
-patient incidentally found to have new thoracic compression fractures on CT imaging of abdomen  -neurosurgery consulted, recommend MR imaging of C/T/L spine -  ordered.  -patient denies any focal neurological symptoms, back pain, saddle anesthesia at this time  -f/u imaging and additional neurosurgery recommendations  -fall precautions  -if pain, can give tylenol PRN

## 2025-07-01 NOTE — CONSULT NOTE ADULT - SUBJECTIVE AND OBJECTIVE BOX
CHIEF COMPLAINT: afib    HISTORY OF PRESENT ILLNESS:  84F with PMH lung cancer with metastases on chemotherapy, COPD, Afib on Eliquis who presented to the ED initially for abdominal pain x 1 day, incidentally found on CT scan to have new thoracic compression fractures now admitted for MRI eval. Patient initially noted to have L sided abdominal pain w/ radiation to her back with associated nausea and vomiting and came to the ED for evaluation. Per outpt review patient currently undergoing chemotherapy/immunotherapy at [facility], also follows with palliative care and rad onc.     In the ED patient given 1L NS bolus and IV tylenol as well as dose of fentanyl with improvement in abdominal pain and nausea - patient reports abdominal pain and nausea symptoms have completely resolved. She underwent CT A/P that was negative for PE or bowel obstruction but incidentally showed new compression fractures of T10 and T12. Patient denies any focal neurological symptoms or back pain. Neurosurgery consulted who recommended MR imaging - patient initially refused imaging requesting discharge however changed her mind and was amenable for staying for imaging - admitted to medicine as CDU full. Course complicated by elevated HR to 130's- EKG completed showing Afib with RVR though patient asymptomatic and hemodynamically stable. 5mg lopressor given. Of note patient attempting to have bowel movement at time of elevated HR.        Allergies    No Known Allergies    Intolerances    	    MEDICATIONS:  apixaban 5 milliGRAM(s) Oral two times a day  aspirin enteric coated 81 milliGRAM(s) Oral daily  verapamil  milliGRAM(s) Oral daily      fluticasone propionate/ salmeterol 250-50 MICROgram(s) Diskus 1 Dose(s) Inhalation two times a day    acetaminophen     Tablet .. 650 milliGRAM(s) Oral every 6 hours PRN      atorvastatin 20 milliGRAM(s) Oral at bedtime        PAST MEDICAL & SURGICAL HISTORY:  Coronary Stent  to RCA, LAD, and DIAG 06 at [facility]      Afib  x 15 yrs --on Coumadin  attempted cardioversion 13 yrs ago--failed      GERD (Gastroesophageal Reflux Disease)      Hypercholesterolemia      Emphysema/COPD      Lip Cancer  resected 6 yrs ago (no chemo/rad)      Skin Cancer of Face  removed 1 yr ago      Skin Cancer of Nose  1 yr ago- removed      H/O: CVA  pt unaware of CVA, yet showed up on CT of head as old CVA      CHF (congestive heart failure)      PUD (peptic ulcer disease)      Asthma      Smoker      CAD (coronary artery disease)      Melanoma      Nodule of upper lobe of right lung      MARIE (obstructive sleep apnea)      PVD (peripheral vascular disease)      Lung nodule      History of Hysterectomy   for fibroids        History of   , , 1970,       History of Cholecystectomy  1975      History of Appendectomy  childhood        S/P T&A  childhood        History of cholecystectomy      History of appendectomy      Melanoma      History of total right hip replacement      History of total left hip replacement      History of cataract surgery      H/O cardiac radiofrequency ablation      Status post angiography of extremity      S/P primary angioplasty with coronary stent          FAMILY HISTORY:      SOCIAL HISTORY:    former smoker. indep in adl    REVIEW OF SYSTEMS:  See HPI, otherwise complete 10 point review of systems negative    [ ] All others negative	      PHYSICAL EXAM:  T(C): 36.6 (25 @ 06:11), Max: 37 (25 @ 19:51)  HR: 105 (25 @ 07:34) (75 - 133)  BP: 106/92 (25 @ 07:34) (99/64 - 127/84)  RR: 13 (25 @ 07:34) (13 - 19)  SpO2: 97% (25 @ 07:34) (96% - 100%)  Wt(kg): --  I&O's Summary      Appearance: No Acute Distress	  HEENT:  Normal oral mucosa, PERRL, EOMI	  Cardiovascular: Normal S1 S2, No JVD, No murmurs/rubs/gallops  Respiratory: Lungs clear to auscultation bilaterally  Gastrointestinal:  Soft, Non-tender, + BS	  Skin: No rashes, No ecchymoses, No cyanosis	  Neurologic: Non-focal  Extremities: No clubbing, cyanosis or edema  Vascular: Peripheral pulses palpable 2+ bilaterally  Psychiatry: A & O x 3, Mood & affect appropriate    Laboratory Data:	 	    CBC Full  -  ( 2025 15:52 )  WBC Count : 50.11 K/uL  Hemoglobin : 10.3 g/dL  Hematocrit : 30.8 %  Platelet Count - Automated : 236 K/uL  Mean Cell Volume : 99.4 fl  Mean Cell Hemoglobin : 33.2 pg  Mean Cell Hemoglobin Concentration : 33.4 g/dL  Auto Neutrophil # : 41.23 K/uL  Auto Lymphocyte # : 0.47 K/uL  Auto Monocyte # : 0.27 K/uL  Auto Eosinophil # : 0.15 K/uL  Auto Basophil # : 0.34 K/uL  Auto Neutrophil % : 82.3 %  Auto Lymphocyte % : 0.9 %  Auto Monocyte % : 0.5 %  Auto Eosinophil % : 0.3 %  Auto Basophil % : 0.7 %        144  |  107  |  13  ----------------------------<  70  3.6   |  23  |  0.71    Ca    9.2      2025 15:52    TPro  5.7[L]  /  Alb  3.4  /  TBili  0.7  /  DBili  x   /  AST  17  /  ALT  9   /  AlkPhos  141[H]        proBNP:   Lipid Profile:   HgA1c:   TSH:       CARDIAC MARKERS:            Interpretation of Telemetry: 	    ECG:  	  RADIOLOGY:  OTHER: 	    PREVIOUS DIAGNOSTIC TESTING:    [ ] Echocardiogram:  [ ] Catheterization:  [ ] Stress Test:  	    IMPRESSION:    1.  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 No findings suspicious for new intracranial metastases.   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  2.  Moderate to severe chronic microvascular ischemic disease.  3.  Moderate diffuse cerebral volume loss.  4.  No significant abnormality of the orbits..      Assessment:  84F with PMH lung cancer with metastases on chemotherapy, COPD, Afib on Eliquis who presented to the ED initially for abdominal pain x 1 day, incidentally found on CT scan to have new thoracic compression fractures now admitted for MRI eval and rapid afib    Recs:  cv stable  no e/o acs or chf  vol status stable off diuretics. ctm  afib with rvr. cw eliquis as tolerates (brain mets on mri noted) and verapamil. start lopressor 25mg bid. eps to consult  obtain echo  tele monitoring  remainder of care per primary team  will follow        Greater than 60 minutes spent on total encounter; more than 50% of the visit was spent counseling and/or coordinating care by the attending physician.   	  Bill Cho MD   Cardiovascular Diseases  [phone]

## 2025-07-01 NOTE — ED ADULT NURSE REASSESSMENT NOTE - NS ED NURSE REASSESS COMMENT FT1
Break RN: pt resting comfortably in stretcher, breathing even and unlabored. Offers no complaints at this time. instructed to call for assistance. Stretcher in lowest position, wheels locked, appropriate side rails in place, call bell in reach. Pt Afib on cardiac monitor, ACP Leonard aware. Pending admitted bed assignment

## 2025-07-01 NOTE — H&P ADULT - PROBLEM SELECTOR PLAN 4
-pt with known hx stage IV adenocarcinoma of lung s/p palliative radiation and currently on treatment  -will email onc to follow - patient follows at [facility]  -f/u MR for compression fracture as above  -pt. WBC noted to be 50 however pt. on filgrastim as per heme, will trend CBC daily.

## 2025-07-01 NOTE — PHARMACOTHERAPY INTERVENTION NOTE - COMMENTS
Medication history is complete. Medication list updated in Outpatient Medication Record (OMR) via [facility], and patient.     Of Note:  -Spiriva listed as a follow up because no fill history at either pharmacy  -As per patient, does not like Ondansetron ODT due to the taste and prefers metoclopramide     Home Medications:  aspirin 81 mg oral delayed release tablet: 1 tab(s) orally once a day   atorvastatin 20 mg oral tablet: 1 tab(s) orally once a day   Eliquis 5 mg oral tablet: 1 tab(s) orally 2 times a day  metoclopramide 10 mg oral tablet: 1 tab(s) orally every 6 hours as needed for  nausea   pantoprazole 40 mg oral delayed release tablet: 1 tab(s) orally once a day   potassium chloride 20 mEq oral tablet, extended release: 2 tab(s) orally once a day as directed   (?)Spiriva HandiHaler 18 mcg inhalation capsule: 1 cap(s) inhaled once a day   Symbicort 160 mcg-4.5 mcg/inh inhalation aerosol: 2 puff(s) inhaled 2 times a day (filled 3/27/25 for 30 days)   verapamil 240 mg/24 hours oral capsule, extended release: 1 cap(s) orally once a day   Tylenol Extra Strength 500 mg oral tablet: 2 tab(s) orally every 6 hours as needed for  moderate pain    Please call spectra [phone] if you have any questions.  Medication history is complete. Medication list updated in Outpatient Medication Record (OMR) via [facility], and patient.   Spoke with ACP provider to notify OMR updated.     Of Note:  -Spiriva listed as a follow up because no fill history at either pharmacy  -As per patient, does not like Ondansetron ODT due to the taste and prefers metoclopramide     Home Medications:  aspirin 81 mg oral delayed release tablet: 1 tab(s) orally once a day   atorvastatin 20 mg oral tablet: 1 tab(s) orally once a day   Eliquis 5 mg oral tablet: 1 tab(s) orally 2 times a day  metoclopramide 10 mg oral tablet: 1 tab(s) orally every 6 hours as needed for  nausea   pantoprazole 40 mg oral delayed release tablet: 1 tab(s) orally once a day   potassium chloride 20 mEq oral tablet, extended release: 2 tab(s) orally once a day as directed   (?)Spiriva HandiHaler 18 mcg inhalation capsule: 1 cap(s) inhaled once a day   Symbicort 160 mcg-4.5 mcg/inh inhalation aerosol: 2 puff(s) inhaled 2 times a day (filled 3/27/25 for 30 days)   verapamil 240 mg/24 hours oral capsule, extended release: 1 cap(s) orally once a day   Tylenol Extra Strength 500 mg oral tablet: 2 tab(s) orally every 6 hours as needed for  moderate pain    Please call spectra [phone] if you have any questions.

## 2025-07-01 NOTE — H&P ADULT - NSHPLABSRESULTS_GEN_ALL_CORE
LABS:                          10.3   50.11 )-----------( 236      ( 30 Jun 2025 15:52 )             30.8     06-30    144  |  107  |  13  ----------------------------<  70  3.6   |  23  |  0.71    Ca    9.2      30 Jun 2025 15:52    TPro  5.7[L]  /  Alb  3.4  /  TBili  0.7  /  DBili  x   /  AST  17  /  ALT  9   /  AlkPhos  141[H]  06-30    PT/INR - ( 30 Jun 2025 15:52 )   PT: 13.9 sec;   INR: 1.20 ratio         PTT - ( 30 Jun 2025 15:52 )  PTT:27.2 sec

## 2025-07-01 NOTE — CONSULT NOTE ADULT - SUBJECTIVE AND OBJECTIVE BOX
HPI:  84F with PMH lung cancer with metastases on chemotherapy, COPD, Afib on Eliquis who presented to the ED initially for abdominal pain x 1 day, incidentally found on CT scan to have new thoracic compression fractures now admitted for MRI eval. Patient initially noted to have L sided abdominal pain w/ radiation to her back with associated nausea and vomiting and came to the ED for evaluation. Per outpt review patient currently undergoing chemotherapy/immunotherapy at [facility], also follows with palliative care and rad onc.     In the ED patient given 1L NS bolus and IV tylenol as well as dose of fentanyl with improvement in abdominal pain and nausea - patient reports abdominal pain and nausea symptoms have completely resolved. She underwent CT A/P that was negative for PE or bowel obstruction but incidentally showed new compression fractures of T10 and T12. Patient denies any focal neurological symptoms or back pain. Neurosurgery consulted who recommended MR imaging - patient initially refused imaging requesting discharge however changed her mind and was amenable for staying for imaging - admitted to medicine as CDU full. Course complicated by elevated HR to 130's- EKG completed showing Afib with RVR though patient asymptomatic and hemodynamically stable. 5mg lopressor given. Of note patient attempting to have bowel movement at time of elevated HR. (2025 04:05)    PAST MEDICAL & SURGICAL HISTORY:  Coronary Stent  to RCA, LAD, and DIAG 06 at [facility]  Afib  x 15 yrs --on Coumadin  attempted cardioversion 13 yrs ago--failed  GERD (Gastroesophageal Reflux Disease)  Hypercholesterolemia  Emphysema/COPD  Lip Cancer  resected 6 yrs ago (no chemo/rad)  Skin Caner of Face  removed 1 yr ago  Skin Cancer of Nose  1 yr ago- removed  H/O: CVA  pt unaware of CVA, yet showed up on CT of head as old CVA  CHF (congestive heart failure)  PUD (peptic ulcer disease)  Asthma  Smoker  CAD (coronary artery disease)  Melanoma  Nodule of upper lobe of right lung  MARIE (obstructive sleep apnea)  PVD (peripheral vascular disease)  Lung nodule  History of Hysterectomy   for fibroids  History of   1966, 1968, 1970, 197  History of Cholecystectomy  1975  History of Appendectomy  childhood  S/P T&A  childhood  History of cholecystectomy  History of appendectomy  elanoma  History of total right hip replacement  History of total left hip replacement  History of cataract surgery  H/O cardiac radiofrequency ablation  Status post angiography of extremity  S/P primary angioplasty with coronary stent    FAMILY HISTORY: relevant information in HPI    SOCIAL HISTORY:  relevant information in HPI    MEDICATIONS:  apixaban 5 milliGRAM(s) Oral two times a day  aspirin enteric coated 81 milliGRAM(s) Oral daily  metoprolol tartrate 25 milliGRAM(s) Oral two times a day  verapamil  milliGRAM(s) Oral daily  fluticasone propionate/ salmeterol 250-50 MICROgram(s) Diskus 1 Dose(s) Inhalation two times a day  acetaminophen     Tablet .. 650 milliGRAM(s) Oral every 6 hours PRN  atorvastatin 20 milliGRAM(s) Oral at bedtime    -------------------------------------------------------------------------------------------  PHYSICAL EXAM:  T(C): 36.6 (25 @ 06:11), Max: 37 (25 @ 19:51)  HR: 105 (25 @ 07:34) (75 - 133)  BP: 106/92 (25 @ 07:34) (99/64 - 127/84)  RR: 13 (25 @ 07:34) (13 - 19)  SpO2: 97% (25 @ 07:34) (96% - 100%)    GENERAL: no acute distress  NECK: JVP is   CHEST/LUNG: clear to auscultation bilaterally, unlabored breathing  HEART: regular rate and rhythm, no murmurs or gallops.  ABDOMEN: soft, non-tender, non-distended, bowel sounds present  EXTREMITIES:  warm, no LE edema, 2+ DP pulses    -------------------------------------------------------------------------------------------  RELEVANT LABS:    WBC 7 -> 9 -> 50  Hgb 7.3 -> 6.1 -> 10.8 -> 10.3  Plt 230s    Cr 0.7    TSH 3  lactate 1.3    -------------------------------------------------------------------------------------------  RELEVANT IMAGING:    ECG: Afib with RVR    Echo:   :  Conclusions:  1. Mitral annular calcification, otherwise normal mitral  valve.  2. Normal trileaflet aortic valve. Minimal aortic  regurgitation.  3. Mild concentric left ventricular hypertrophy.  4. Overall preserved left ventricular systolic function.  The basal inferoseptum is hypokinetic. The basal inferior  wall is akinetic.  5. Mild diastolic dysfunction (Stage I).  6. Moderate right atrial enlargement.  7. Right ventricular enlargement with decreased right  ventricular systolic function.  *** Compared with echocardiogram of 2022, no  significant changes noted.    Cath ():  LM   Left main artery:Angiography shows no disease.      LAD   Left anterior descending artery: Angiography shows no disease.      CX   Circumflex: Angiography shows mild atherosclerosis. First obtuse  marginal: There is a 40 % stenosis.    RCA   Right coronary artery: Angiography shows mild atherosclerosis.        CT C/A/P:  No pulmonary embolism or bowel obstruction.    Mild fat stranding and free fluid around the pancreaticoduodenal groove,   possibly representing acute interstitial pancreatitis with reactive   duodenitis.    Redemonstrated sequelae of metastatic disease which appear overall   decrease in size compared to prior CT CAP.    New compression fracture deformities along the superior aspects of the   T10 and T12 vertebral bodies, possibly pathologic in nature given the   patient's history.    -------------------------------------------------------------------------------------------                 HPI:    83 y/o female with PMHx of metastatic lung cancer (on chemo/immuno), former smoker, COPD/asthma, MARIE, Afib (s/p 1 prior ablation, on eliquis), CAD s/p PCI, who presented to the ED for abdominal pain. EP is consulted for management to Afib.    She began experiencing abdominal pain starting . Occasional nausea. Due to symptoms, PO intake has been very poor. CT abd/pelvis was negative for bowel obstruction, but incidentally revealed a T10 and T12 compression fracture.    EKG notable for Afib. Telemetry showed HR ~120-130s. She received IV lopressor 5 mg, and then started on PO lopressor 25 mg BID. Home verapamil 240 mg was also resumed.    On my evaluation, she remains in Afib. HR ~90s. She is asymptomatic. Pending a MRI of the spine.    PAST MEDICAL & SURGICAL HISTORY:  Coronary Stent  to RCA, LAD, and DIAG 06 at [facility]  Afib  x 15 yrs --on Coumadin  attempted cardioversion 13 yrs ago--failed  GERD (Gastroesophageal Reflux Disease)  Hypercholesterolemia  Emphysema/COPD  Lip Cancer  resected 6 yrs ago (no chemo/rad)  Skin Caner of Face  removed 1 yr ago  Skin Cancer of Nose  1 yr ago- removed  H/O: CVA  pt unaware of CVA, yet showed up on CT of head as old CVA  CHF (congestive heart failure)  PUD (peptic ulcer disease)  Asthma  Smoker  CAD (coronary artery disease)  Melanoma  Nodule of upper lobe of right lung  MARIE (obstructive sleep apnea)  PVD (peripheral vascular disease)  Lung nodule  History of Hysterectomy   for fibroids  History of   1966, 1968, 1970, 197  History of Cholecystectomy  1975  History of Appendectomy  childhood  S/P T&A  childhood  History of cholecystectomy  History of appendectomy  elanoma  History of total right hip replacement  History of total left hip replacement  History of cataract surgery  H/O cardiac radiofrequency ablation  Status post angiography of extremity  S/P primary angioplasty with coronary stent    FAMILY HISTORY: relevant information in HPI    SOCIAL HISTORY:  relevant information in HPI    MEDICATIONS:  apixaban 5 milliGRAM(s) Oral two times a day  aspirin enteric coated 81 milliGRAM(s) Oral daily  metoprolol tartrate 25 milliGRAM(s) Oral two times a day  verapamil  milliGRAM(s) Oral daily  fluticasone propionate/ salmeterol 250-50 MICROgram(s) Diskus 1 Dose(s) Inhalation two times a day  acetaminophen     Tablet .. 650 milliGRAM(s) Oral every 6 hours PRN  atorvastatin 20 milliGRAM(s) Oral at bedtime    -------------------------------------------------------------------------------------------  PHYSICAL EXAM:  T(C): 36.6 (25 @ 06:11), Max: 37 (25 @ 19:51)  HR: 105 (25 @ 07:34) (75 - 133)  BP: 106/92 (25 @ 07:34) (99/64 - 127/84)  RR: 13 (25 @ 07:34) (13 - 19)  SpO2: 97% (25 @ 07:34) (96% - 100%)    GENERAL: no acute distress  NECK: JVP is NOT elevated  CHEST/LUNG: unlabored breathing  HEART: irregularly irregular   ABDOMEN: soft, non-tender, non-distended  EXTREMITIES:  warm, no LE edema    -------------------------------------------------------------------------------------------  RELEVANT LABS:    WBC 7 -> 9 -> 50  Hgb 7.3 -> 6.1 -> 10.8 -> 10.3  Plt 230s    Cr 0.7    TSH 3  lactate 1.3    -------------------------------------------------------------------------------------------  RELEVANT IMAGING:    ECG: Afib with RVR    Echo:   :  Conclusions:  1. Mitral annular calcification, otherwise normal mitral  valve.  2. Normal trileaflet aortic valve. Minimal aortic  regurgitation.  3. Mild concentric left ventricular hypertrophy.  4. Overall preserved left ventricular systolic function.  The basal inferoseptum is hypokinetic. The basal inferior  wall is akinetic.  5. Mild diastolic dysfunction (Stage I).  6. Moderate right atrial enlargement.  7. Right ventricular enlargement with decreased right  ventricular systolic function.  *** Compared with echocardiogram of 2022, no  significant changes noted.    Cath ():  LM   Left main artery:Angiography shows no disease.      LAD   Left anterior descending artery: Angiography shows no disease.      CX   Circumflex: Angiography shows mild atherosclerosis. First obtuse  marginal: There is a 40 % stenosis.    RCA   Right coronary artery: Angiography shows mild atherosclerosis.        CT C/A/P:  No pulmonary embolism or bowel obstruction.    Mild fat stranding and free fluid around the pancreaticoduodenal groove,   possibly representing acute interstitial pancreatitis with reactive   duodenitis.    Redemonstrated sequelae of metastatic disease which appear overall   decrease in size compared to prior CT CAP.    New compression fracture deformities along the superior aspects of the   T10 and T12 vertebral bodies, possibly pathologic in nature given the   patient's history.    -------------------------------------------------------------------------------------------

## 2025-07-01 NOTE — H&P ADULT - PROBLEM SELECTOR PLAN 5
-pt noted to have large amount of bacteria on UA however denies any fever or urinary symptoms  -will f/u culture, monitor off antibiotics for now

## 2025-07-01 NOTE — PATIENT PROFILE ADULT - FALL HARM RISK - HARM RISK INTERVENTIONS

## 2025-07-01 NOTE — PATIENT PROFILE ADULT - NSFALLSECTIONLABEL_GEN_A_CORE
Problem: Post Op Day 4 CABG/Heart Valve Replacement  Goal: Optimal care of the Post Op CABG/Heart Valve replacement Post Op Day 4    Intervention: Daily Weights  Done.  Intervention: Shower daily and clean incisions twice daily with soap and water  Done.  Intervention: Up in chair for all meals  Done.  Intervention: Ambulate, increasing the distance each time x 3 and before bed  Done.  Intervention: IS q 1 hour while awake and record best IS volume  Done. Best volume 1750 mL.  Intervention: Consider removal of rodriguez, chest tube and pacer wire if not already done  Already done.  Intervention: Discharge Education  Medication education provided.  Intervention: Add special instructions for cardiac surgery discharge instructions - [facility] to after visit summary and review with patient  In progress.         .

## 2025-07-01 NOTE — H&P ADULT - ASSESSMENT
84F with PMH lung cancer with metastases on chemotherapy, COPD, Afib on Eliquis who presented to the ED initially for abdominal pain x 1 day, incidentally found on CT scan to have new thoracic compression fractures now admitted for MRI eval.

## 2025-07-01 NOTE — ED ADULT NURSE REASSESSMENT NOTE - NS ED NURSE REASSESS COMMENT FT1
Pt. HR sustaining 125-140bpm with atrial fibrillation noted. ACP notified. MD Tamez at bedside. EKG and rectal temperature obtained. Pt. denies pain, HA, dizziness, blurry vision, chest pain, SOB, or any other acute symptoms at this time. Safety maintained throughout.

## 2025-07-01 NOTE — H&P ADULT - NSHPSOCIALHISTORY_GEN_ALL_CORE
Former smoker, 40 pack years, quit 2022  Social alcohol use  Lives with son and daughter  Uses walker/wheelchair

## 2025-07-01 NOTE — H&P ADULT - PROBLEM SELECTOR PLAN 3
-pt initially presented to ED for abdominal pain  radiating to back with nausea/vomiting  -CT imaging showing possible acute interstitial pancreatitis, though patient notes pain and nausea now resolved after medication in ED.  -Lipase 28 on ED labs  -pain control with tylenol for now  -can trial PO diet given resolution of patient's abdominal pain  -zofran PRN for nausea/vomiting

## 2025-07-01 NOTE — H&P ADULT - NSHPPHYSICALEXAM_GEN_ALL_CORE
VITALS:   T(C): 36.7 (07-01-25 @ 03:45), Max: 37 (06-30-25 @ 19:51)  HR: 105 (07-01-25 @ 04:15) (75 - 133)  BP: 123/94 (07-01-25 @ 04:15) (99/64 - 123/94)  RR: 17 (07-01-25 @ 04:15) (16 - 19)  SpO2: 97% (07-01-25 @ 04:15) (96% - 100%)    GENERAL: NAD, lying in bed comfortably  HEAD:  Atraumatic, normocephalic  EYES: EOMI, PERRLA, conjunctiva and sclera clear  ENT: Moist mucous membranes  NECK: Supple, no JVD  HEART: Regular rate and rhythm, no murmurs, rubs, or gallops  LUNGS: Unlabored respirations.  Clear to auscultation bilaterally, no crackles, wheezing, or rhonchi  ABDOMEN: Soft, nontender, nondistended, +BS  EXTREMITIES: 2+ peripheral pulses bilaterally. No clubbing, cyanosis, or edema  NERVOUS SYSTEM:  A&Ox3, no focal deficits   SKIN: No rashes or lesions

## 2025-07-01 NOTE — H&P ADULT - PROBLEM SELECTOR PLAN 7
-pt with hx COPD on symbicort  -c/w inhalers, if acutely symptomatic can order duoneb  -monitor respiratory status.

## 2025-07-01 NOTE — PATIENT PROFILE ADULT - FUNCTIONAL ASSESSMENT - BASIC MOBILITY 4.
Fascitis plantar: Instrucciones de cuidado  Plantar Fasciitis: Care Instructions  Instrucciones de cuidado    La fascitis plantar es un dolor e inflamación de la fascia plantar, el tejido en la parte inferior del pie que conecta el hueso del talón a los dedos del pie. La fascia plantar también sostiene el arco. Si tensiona la fascia plantar, puede desarrollar pequeños desgarros y causar dolor en el talón al levantarse o al caminar. La fascitis plantar puede ser ocasionada por correr o practicar otros deportes. También puede presentarse en personas con sobrepeso o que tienen jojo altos o pies planos. Usted puede tener fascitis plantar si camina o permanece parado [employer], o tiene un tendón de Thomas tenso o músculos de la pantorrilla rígidos. Puede mejorar lemos dolor de pie con descanso y [de-identified] cuidados en el [facility]. Podría llevar unas cuantas semanas a algunos meses para que lemos pie sane completamente. La atención de seguimiento es miko parte clave de lemos tratamiento y seguridad. Asegúrese de hacer y acudir a todas las citas, y llame a lemos médico si está teniendo problemas. También es miko buena idea saber los resultados de bambi exámenes y mantener miko lista de los medicamentos que tammy. Cómo puede cuidarse en el [facility]? · Descanse lemos pie a menudo. Reduzca lemos actividad hasta un nivel que le permita evitar el dolor. Si es posible, no corra ni camine sobre superficies duras. · [employer] analgésicos (medicamentos para el dolor) exactamente según las indicaciones. ? Si el médico le recetó un analgésico, tómelo según las indicaciones. ? Si no está tomando un analgésico recetado, tómese un antiinflamatorio de venta asha para el dolor y la hinchazón, elaine ibuprofeno (Advil, Motrin) o naproxeno (Aleve). Denise y siga todas las instrucciones de la [place]. · Aplíquese masajes con hielo para ayudar con el dolor y la hinchazón. Puede utilizar cubitos de hielo o miko copa de hielo varias veces al día.  Para hacer miko copa de hielo, llene miko taza de papel con agua y congélela. Pietro la parte superior de la taza hasta que sobresalga media pulgada (1.25 cm) de hielo. Sosténgala por la parte remanente de papel para utilizar la copa. Frote el hielo en círculos pequeños sobre la cristóbal patt 5 a 7 minutos. · Los franklin alternados con [place] y agua fría también pueden ayudar a disminuir la hinchazón. Saige elaine el calor solo podría empeorar el dolor y la hinchazón, finalice un baño de contraste sumergiendo el pie en agua fría. · Use miko tablilla (férula) nocturna si lemos médico se lo sugiere. Miko tablilla nocturna mantiene el pie con los dedos apuntando [place] y el pie y el tobillo a un ángulo de 90 grados. Esta posición le proporciona un estiramiento suave y krishna a la parte inferior del pie. · Marjan ejercicios simples elaine estiramientos de la pantorrilla y estiramientos con miko toalla 2 a 3 veces al día, especialmente al [employer]. Éstos pueden ayudar a la fascia plantar a volverse más flexible. También hacen que se fortalezcan los músculos que soportan el arco. Mantenga estos estiramientos patt 15 a 30 segundos cada vez. Repítalos 2 a 4 veces. ? Párese a 1 pie (31 cm) de la pared. Coloque las marco de ambas danette contra la pared a la altura del pecho. Inclínese hacia adelante contra la pared, mantenga miko pierna con la rodilla recta y el talón en el suelo mientras dobla la rodilla de la otra pierna. ? Siéntese en el suelo o en miko colchoneta con los pies estirados al frente. Enrolle miko toalla a lo rossana y colóquela alrededor de la parte anterior de la planta de ramin de los pies. Sosteniendo la toalla por ambos extremos, jálela suavemente hacia usted para estirar lemos pie. · Use zapatos con buen soporte para el arco. El calzado deportivo o los zapatos con la suela yee acolchada son miko Darra Sida. · Pruebe con cuñas o plantillas (ortóticos) para ayudar a amortiguar el talón.  Se pueden comprar en muchas tiendas de calzado. · Póngase los zapatos tan pronto se levante de la cama. Andar descalzo o usar pantuflas podría empeorar el dolor. · Alcance un buen peso de acuerdo a lemos estatura, y [place]. [place] shelia la carga de los pies. Cuándo debe pedir ayuda? Llame a lemos médico ahora mismo o busque atención médica inmediata si:    · Tiene dolor en el talón con fiebre, enrojecimiento o calor.     · No puede cargar [employer] adolorido. Preste especial atención a los cambios en lemos ned y asegúrese de comunicarse con lemos médico si:    · Siente hormigueo o entumecimiento en el talón.     · El dolor en el talón dura más de 2 semanas. Dónde puede encontrar más información en inglés? Vaya a http://[email or web address].com/  Lei [id number] en la búsqueda para aprender más acerca de \"Fascitis plantar: Instrucciones de cuidado. \"  Revisado: 16 noviembre, 2020               Versión del contenido: 12.8  © 2006-2021 Healthwise, Incorporated. Las instrucciones de cuidado fueron adaptadas bajo licencia por [facility] (which disclaims liability or warranty for this information). Si usted tiene [place] afección médica o sobre estas instrucciones, siempre pregunte a lemos profesional de ned. Healthwise, Incorporated niega toda garantía o responsabilidad por lemos uso de esta información. Fascitis plantar: Ejercicios  Plantar Fasciitis: Exercises  Instrucciones de cuidado  Aquí se presentan algunos ejemplos de ejercicios típicos de rehabilitación para tratar lemos afección. Empiece cada ejercicio lentamente. Reduzca la intensidad del ejercicio si Eric Reamer a tener dolor. Lemos médico o fisioterapeuta le dirán cuándo puede comenzar con estos ejercicios y cuáles funcionarán mejor para usted. Cómo hacer los ejercicios  Estiramiento con toalla   1. Siéntese con las piernas extendidas y las rodillas rectas.   2. Coloque miko toalla alrededor de lemos pie fabi por debajo de los dedos. 3. Sostenga cada extremo de la toalla con cada mano, con las danette por encima de las rodillas. 4. Jale hacia atrás con la toalla para que el pie se extienda hacia usted. 5. Mantenga la posición por lo menos de 15 a 30 segundos. 6. Repita 2 a 4 veces por sesión, hasta 5 veces al día. Estiramiento de pantorrilla   1. Póngase de pie frente a miko pared, con las danette apoyadas en la pared a la altura de los ojos. Ponga la pierna que Paco Shah a estirar un paso atrás de la [place]. 2. Manteniendo wendy talón en el suelo, doble la pierna de adelante hasta que sienta el estiramiento en la pierna de atrás. 3. Sostenga el estiramiento de 15 a 30 segundos. Repita de 2 a 4 veces. Estiramiento de la fascia plantar y la pantorrilla   1. Párese sobre un pie, elaine se [place]. Asegúrese de sujetarse al barandal (pasamanos). 2. Lentamente pose bambi talones sobre el borde del escalón a medida que relaja los músculos de la pantorrilla. Debe sentir un leve estiramiento en la parte inferior de lemos pie y en la parte de atrás de lemos pierna hasta la rodilla. 3. Mantenga javier estiramiento de 15 a 30 segundos y Reji apriete los músculos de la pantorrilla un poco para llevar el talón hacia atrás hasta el nivel del escalón. Repita de 2 a 4 veces. Flexiones con toalla   1. Estando sentado, coloque el pie sobre miko toalla en el suelo y acerque la toalla hacia usted con los dedos del pie. 2. Luego, usando [employer] dedos del pie, aleje la toalla de usted. Recoger canicas   1. Ponga canicas en el suelo junto a miko taza. 2. Usando los dedos del pie, trate de levantar las canicas del piso y ponerlas en la taza. La atención de seguimiento es miko parte clave de lemos tratamiento y seguridad. Asegúrese de hacer y acudir a todas las citas, y llame a lemos médico si está teniendo problemas. También es miko buena idea saber los resultados de bambi exámenes y mantener miko lista de los medicamentos que tammy.   
Dónde puede encontrar más información en inglés? Vaya a http://www.gray.com/  Lei [id number] en la búsqueda para aprender más acerca de \"Fascitis plantar: Ejercicios. \"  Revisado: 16 noviembre, 2020               Versión del contenido: 12.8  © 2006-2021 Healthwise, Incorporated. Las instrucciones de cuidado fueron adaptadas bajo licencia por [facility] [employer] (which disclaims liability or warranty for this information). Si usted tiene [place] afección médica o sobre estas instrucciones, siempre pregunte a lemos profesional de ned. [employer] ulike niega toda garantía o responsabilidad por lemos uso de esta información. 3 = A little assistance

## 2025-07-01 NOTE — H&P ADULT - PROBLEM SELECTOR PLAN 2
-pt noted to have Hr 130's and EKG showing Afib with RVR  -pt. reports she did not take her usual home medications due to coming to hospital  -5mg IV lopressor given, /100 prior to administration  -will restart patient's home verapamil and continue AC with eliquis  -f/u HR, monitor on telemetry

## 2025-07-01 NOTE — ED ADULT NURSE REASSESSMENT NOTE - NS ED NURSE REASSESS COMMENT FT1
Pt. resting in bed, respirations even and unlabored on RA. Pt. on the cardiac monitor, atrial fibrillation (105-115bpm) noted. [facility]. Pt. weight offloaded with pillows. Comfort measures provided, safety maintained throughout.

## 2025-07-01 NOTE — H&P ADULT - HISTORY OF PRESENT ILLNESS
84F with PMH lung cancer with metastases on chemotherapy, COPD, Afib on Eliquis who presented to the ED initially for abdominal pain x 1 day, incidentally found on CT scan to have new thoracic compression fractures now admitted for MRI eval. Patient initially noted to have L sided abdominal pain w/ radiation to her back with associated nausea and vomiting and came to the ED for evaluation. Per outpt review patient currently undergoing chemotherapy/immunotherapy at [facility], also follows with palliative care and rad onc.     In the ED patient given 1L NS bolus and IV tylenol as well as dose of fentanyl with improvement in abdominal pain and nausea - patient reports abdominal pain and nausea symptoms have completely resolved. She underwent CT A/P that was negative for PE or bowel obstruction but incidentally showed new compression fractures of T10 and T12. Patient denies any focal neurological symptoms or back pain. Neurosurgery consulted who recommended MR imaging - patient initially refused imaging requesting discharge however changed her mind and was amenable for staying for imaging - admitted to medicine as CDU full. Course complicated by elevated HR to 130's- EKG completed showing Afib with RVR though patient asymptomatic and hemodynamically stable. 5mg lopressor given. Of note patient attempting to have bowel movement at time of elevated HR.

## 2025-07-01 NOTE — PATIENT PROFILE ADULT - FUNCTIONAL ASSESSMENT - BASIC MOBILITY 6.
3-calculated by average/Not able to assess (calculate score using [facility] averaging method) 3 = A little assistance

## 2025-07-02 ENCOUNTER — APPOINTMENT (OUTPATIENT)
Dept: NUCLEAR MEDICINE | Facility: IMAGING CENTER | Age: 85
End: 2025-07-02

## 2025-07-02 LAB
ANION GAP SERPL CALC-SCNC: 14 MMOL/L — SIGNIFICANT CHANGE UP (ref 7–14)
BUN SERPL-MCNC: 12 MG/DL — SIGNIFICANT CHANGE UP (ref 7–23)
CALCIUM SERPL-MCNC: 8.5 MG/DL — SIGNIFICANT CHANGE UP (ref 8.4–10.5)
CHLORIDE SERPL-SCNC: 107 MMOL/L — SIGNIFICANT CHANGE UP (ref 98–107)
CO2 SERPL-SCNC: 21 MMOL/L — LOW (ref 22–31)
CREAT SERPL-MCNC: 0.61 MG/DL — SIGNIFICANT CHANGE UP (ref 0.5–1.3)
EGFR: 89 ML/MIN/1.73M2 — SIGNIFICANT CHANGE UP
EGFR: 89 ML/MIN/1.73M2 — SIGNIFICANT CHANGE UP
GLUCOSE SERPL-MCNC: 62 MG/DL — LOW (ref 70–99)
HCT VFR BLD CALC: 29 % — LOW (ref 34.5–45)
HGB BLD-MCNC: 9.6 G/DL — LOW (ref 11.5–15.5)
MAGNESIUM SERPL-MCNC: 1.7 MG/DL — SIGNIFICANT CHANGE UP (ref 1.6–2.6)
MCHC RBC-ENTMCNC: 32.9 PG — SIGNIFICANT CHANGE UP (ref 27–34)
MCHC RBC-ENTMCNC: 33.1 G/DL — SIGNIFICANT CHANGE UP (ref 32–36)
MCV RBC AUTO: 99.3 FL — SIGNIFICANT CHANGE UP (ref 80–100)
NRBC # BLD AUTO: 0 K/UL — SIGNIFICANT CHANGE UP (ref 0–0)
NRBC # FLD: 0 K/UL — SIGNIFICANT CHANGE UP (ref 0–0)
NRBC BLD AUTO-RTO: 0 /100 WBCS — SIGNIFICANT CHANGE UP (ref 0–0)
PHOSPHATE SERPL-MCNC: 2.5 MG/DL — SIGNIFICANT CHANGE UP (ref 2.5–4.5)
PLATELET # BLD AUTO: 143 K/UL — LOW (ref 150–400)
PMV BLD: 12.2 FL — SIGNIFICANT CHANGE UP (ref 7–13)
POTASSIUM SERPL-MCNC: 5 MMOL/L — SIGNIFICANT CHANGE UP (ref 3.5–5.3)
POTASSIUM SERPL-SCNC: 5 MMOL/L — SIGNIFICANT CHANGE UP (ref 3.5–5.3)
RBC # BLD: 2.92 M/UL — LOW (ref 3.8–5.2)
RBC # FLD: 19 % — HIGH (ref 10.3–14.5)
SODIUM SERPL-SCNC: 142 MMOL/L — SIGNIFICANT CHANGE UP (ref 135–145)
TROPONIN T, HIGH SENSITIVITY RESULT: 18 NG/L — SIGNIFICANT CHANGE UP
WBC # BLD: 34.12 K/UL — HIGH (ref 3.8–10.5)
WBC # FLD AUTO: 34.12 K/UL — HIGH (ref 3.8–10.5)

## 2025-07-02 PROCEDURE — 99233 SBSQ HOSP IP/OBS HIGH 50: CPT

## 2025-07-02 PROCEDURE — 99232 SBSQ HOSP IP/OBS MODERATE 35: CPT

## 2025-07-02 RX ORDER — ATORVASTATIN CALCIUM 80 MG/1
1 TABLET, FILM COATED ORAL
Qty: 30 | Refills: 0
Start: 2025-07-02 | End: 2025-07-31

## 2025-07-02 RX ADMIN — METOPROLOL SUCCINATE 25 MILLIGRAM(S): 50 TABLET, EXTENDED RELEASE ORAL at 08:55

## 2025-07-02 RX ADMIN — METOPROLOL SUCCINATE 25 MILLIGRAM(S): 50 TABLET, EXTENDED RELEASE ORAL at 17:51

## 2025-07-02 RX ADMIN — Medication 81 MILLIGRAM(S): at 17:51

## 2025-07-02 RX ADMIN — APIXABAN 5 MILLIGRAM(S): 2.5 TABLET, FILM COATED ORAL at 17:51

## 2025-07-02 RX ADMIN — Medication 240 MILLIGRAM(S): at 08:55

## 2025-07-02 RX ADMIN — ATORVASTATIN CALCIUM 20 MILLIGRAM(S): 80 TABLET, FILM COATED ORAL at 22:02

## 2025-07-02 RX ADMIN — Medication 1 DOSE(S): at 22:02

## 2025-07-02 RX ADMIN — APIXABAN 5 MILLIGRAM(S): 2.5 TABLET, FILM COATED ORAL at 08:55

## 2025-07-02 RX ADMIN — Medication 1 DOSE(S): at 09:41

## 2025-07-02 RX ADMIN — Medication 75 MILLILITER(S): at 14:09

## 2025-07-02 NOTE — PROGRESS NOTE ADULT - ASSESSMENT
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 Pt was planned for outpt PET CT 7/2 but was admitted to [facility] on 7/1 with increasing abd pain; CT a/p showed mild fat stranding and free fluid around the pancreaticoduodenal groove, possibly representing acute interstitial pancreatitiswith reactive duodenitis; MRI total spine showing T12 compression fx as well as extensive progression of osseous mets.     ACTIVE PROBLEMS  Met lung adenoca with new osseous mets  Abd pain (transient acute pancreatitis suspected)  T8 path compression fx  pAfib with RVR  Hypercoag state  HTN, HLD  Leukocytosis  Relative anemia/cytopenia 2/2 chemotherapy  Immunocompromised d/t cancer, cancer tx    Met lung adenoca with new osseous/vertebral  mets  Pt being considered for 2L [place] as well as outpt palliative RT to T12 compression fx  Pt to continue outpt fu with Dr. Ferris after discharge  Long term prognosis: guarded; pt is full code    Abd pain   Transient acute pancreatitis suspected, however, lipase wnl on admission  Referred pain from compression fx also on the differential  Pt known to [facility] (Dr. Sanju Velasquez)  Continuing Tylenol 650mg q6/prn for pain control    T8 path compression fx  No role for surgical intervention as per NSGY  Appreciate Rad Onc consult: pt to be considered for palliative RT to T-spine as an outpt    pAfib with RVR  Hypercoag state  HTN, HLD  Continuing Metoprolol 25mg BID with holding parameters  Continuing Verapamil SR 240mg daily with holding parameters  Continuing Eliquis 50mg BID  Continuing ASA 81mg daily  Continuing Atorvastatin 20mg nightly    Leukocytosis  Likely 2/2 growth factor (Trilaciclib, Neulasta). last given on 6/28  Pt without other clinical s/s of infection  Abx deferred for now  Monitoring closely    Relative anemia/cytopenia 2/2 chemotherapy  Counts nadiring  Will transfuse supportively prn (goal hgb 7; plts > 10K)
85 y/o female with PMHx of metastatic lung cancer (on chemo/immuno), former smoker, COPD/asthma, MARIE, Afib (s/p 1 prior ablation, on eliquis), CAD s/p PCI, who presented to the ED for abdominal pain. Incidentally found to have T10 and T12 compressions fractures. Pending a MRI spine. EP is consulted for management of Afib.    - Persistent Afib (CHADSVASC = 5)    Remains in Afib. HR ~80-90s. BP stable. Denies any symptoms.    RECOMMENDATIONS:  - continue eliquis 5 mg BID  - continue home verapamil 240 mg daily  - continue PO lopressor 25 mg BID (consider transitioning to metoprolol succinate)  - outpatient follow-up to discuss afib ablation. EP will sign off        James Anaya (PGY 4)  Cardiology Fellow      Of note, these recommendations are preliminary. Case to be discussed with attending. Please see attending attestation for final recommendations.

## 2025-07-02 NOTE — PROGRESS NOTE ADULT - TIME-BASED
Rotator cuff injury to the right shoulder along with advanced arthritis of the right shoulder not a good candidate at 87 going on 88 for shoulder surgery.  The patient understands and agrees that the best approach is to live with the symptoms that she has using Tylenol 1000 mg every 8 hours to try to control her pain.  Also continue meloxicam at 7.5 mg daily   60

## 2025-07-02 NOTE — CONSULT NOTE ADULT - SUBJECTIVE AND OBJECTIVE BOX
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  now has T10/T12 "mild " compression fractures by imaging CT and MRI. T12 "stable since 2025"    HPI:  84F with PMH lung cancer with metastases on chemotherapy, COPD, Afib on Eliquis who presented to the ED initially for abdominal pain x 1 day, incidentally found on CT scan to have new thoracic compression fractures now admitted for MRI eval. Patient initially noted to have L sided abdominal pain w/ radiation to her back with associated nausea and vomiting and came to the ED for evaluation. Per outpt review patient currently undergoing chemotherapy/immunotherapy at [facility], also follows with palliative care and rad onc.     In the ED patient given 1L NS bolus and IV tylenol as well as dose of fentanyl with improvement in abdominal pain and nausea - patient reports abdominal pain and nausea symptoms have completely resolved. She underwent CT A/P that was negative for PE or bowel obstruction but incidentally showed new compression fractures of T10 and T12. Patient denies any focal neurological symptoms or back pain. Neurosurgery consulted who recommended MR imaging - patient initially refused imaging requesting discharge however changed her mind and was amenable for staying for imaging - admitted to medicine as CDU full. Course complicated by elevated HR to 130's- EKG completed showing Afib with RVR though patient asymptomatic and hemodynamically stable. 5mg lopressor given. Of note patient attempting to have bowel movement at time of elevated HR. (2025 04:05)      < from: CT Abdomen and Pelvis w/ IV Cont (25 @ 19:22) >  IMPRESSION:  No pulmonary embolism or bowel obstruction.    Mild fat stranding and free fluid around the pancreaticoduodenal groove,   possibly representing acute interstitial pancreatitiswith reactive   duodenitis.    Redemonstrated sequelae of metastatic disease which appear overall   decrease in size compared to prior CT CAP.    New compression fracture deformities along the superior aspects of the   T10 and T12 vertebral bodies, possibly pathologic in nature given the   patient's history.      < from: MR Thoracic Spine w/wo IV Cont (25 @ 18:37) >  IMPRESSION:    Mild compression deformity of the superior endplate of T12, stable since   2025.    Scattered enhancing metastatic lesions throughout the spine with thoracic   lesions that are new since 2025. Some of the cervical spine lesions   are new while others have increased in size.    < end of copied text >        Allergies    No Known Allergies    Intolerances        ROS: [  ] Fever  [  ] Chills  [  ]Chest Pain [  ] SOB  [  ]Cough [  ] N/V  [  ] Diarrhea [  ]Constipation [  ]Other ROS:  [  ] ROS otherwise negative    PAST MEDICAL & SURGICAL HISTORY:  CAD (Coronary Artery Disease)    Coronary Stent  to RCA, LAD, and DIAG 06 at [facility]    Afib  x 15 yrs --on Coumadin  attempted cardioversion 13 yrs ago--failed    GERD (Gastroesophageal Reflux Disease)    Hypercholesterolemia    Emphysema/COPD    Lip Cancer  resected 6 yrs ago (no chemo/rad)    Skin Cancer of Face  removed 1 yr ago    Skin Cancer of Nose  1 yr ago- removed    H/O: CVA  pt unaware of CVA, yet showed up on CT of head as old CVA    History of Appendectomy    CHF (congestive heart failure)    Skin cancer    PUD (peptic ulcer disease)    Asthma    Smoker    CAD (coronary artery disease)    Melanoma    Nodule of upper lobe of right lung    MARIE (obstructive sleep apnea)    PVD (peripheral vascular disease)    Lung nodule    H/O: CVA    History of Hysterectomy   for fibroids      History of   , , 1970,     History of Cholecystectomy  1975    History of Appendectomy  childhood      S/P T&A  childhood      History of cholecystectomy    History of appendectomy    Melanoma    History of total right hip replacement    History of total left hip replacement    History of cataract surgery    H/O cardiac radiofrequency ablation    Status post angiography of extremity    S/P primary angioplasty with coronary stent        FAMILY HISTORY:      MEDICATIONS  (STANDING):  apixaban 5 milliGRAM(s) Oral two times a day  aspirin enteric coated 81 milliGRAM(s) Oral daily  atorvastatin 20 milliGRAM(s) Oral at bedtime  fluticasone propionate/ salmeterol 250-50 MICROgram(s) Diskus 1 Dose(s) Inhalation two times a day  metoprolol tartrate 25 milliGRAM(s) Oral two times a day  verapamil  milliGRAM(s) Oral daily    MEDICATIONS  (PRN):  acetaminophen     Tablet .. 650 milliGRAM(s) Oral every 6 hours PRN Temp greater or equal to 38C (100.4F), Mild Pain (1 - 3)      PHYSICAL EXAM  Vital Signs Last 24 Hrs  T(C): 36.3 (2025 05:23), Max: 36.6 (2025 13:26)  T(F): 97.4 (:), Max: 97.8 (2025 13:26)  HR: 69 (:23) (69 - 94)  BP: 104/55 (:) (94/58 - 113/63)  BP(mean): --  RR: 18 (2025 05:23) (18 - 18)  SpO2: 95% (:) (95% - 99%)    Parameters below as of :  Patient On (Oxygen Delivery Method): room air        General: appears stated age,  no acute distress  HEENT: NC/AT; EOMI, PERRL, sclera nonicteric; external ears normal; no rhinorrhea or epistaxis; mucous membranes moist; oropharynx clear and without erythema  CV: NR, RR; no appreciable r/m/g  Lungs: CTAB, no increased work of breathing  Abdomen: Bowel sounds present; soft, NTND  MSK: Vertebral spine non-tender to palpation  Neuro: AAOx3; cranial nerves II-XII intact; strength 5/5 in upper and lower extremities; sensation to light touch in tact bilaterally.  Psych: Full affect; mood congruent  Skin: no visible rashes on limited examination    IMAGING/LABS/PATHOLOGY: I have personally reviewed the relevant labs, pathology, and imaging as noted in the HPI.  In addition,                          9.6    34.12 )-----------( 143      ( 2025 06:06 )             29.0     07-02    142  |  107  |  12  ----------------------------<  62[L]  5.0   |  21[L]  |  0.61    Ca    8.5      2025 06:06  Phos  2.5     07-02  Mg     1.70     07-02    TPro  5.7[L]  /  Alb  3.4  /  TBili  0.7  /  DBili  x   /  AST  17  /  ALT  9   /  AlkPhos  141[H]  06-30        ASSESSMENT/PLAN    JANA RECORD is a 84y woman with     We discussed the use of palliative radiation in this setting, namely to improve quality of life through the reduction of symptoms.  We talked about the risks, benefits, acute and long term side effects, as well as expected treatment outcomes.  He/She was given the opportunity to ask questions, which were answered to his/her apparent satisfaction.  He/She provided written consent to proceed with radiation therapy. We will arrange for inpatient/outpatient treatment. 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  now has T10/T12 "mild " compression fractures by imaging CT and MRI. T12 "stable since 2025"    HPI:  84F with PMH lung cancer with metastases on chemotherapy, COPD, Afib on Eliquis who presented to the ED initially for abdominal pain x 1 day, incidentally found on CT scan to have new thoracic compression fractures now admitted for MRI eval. Patient initially noted to have L sided abdominal pain w/ radiation to her back with associated nausea and vomiting and came to the ED for evaluation. Per outpt review patient currently undergoing chemotherapy/immunotherapy at [facility], also follows with palliative care and rad onc.     In the ED patient given 1L NS bolus and IV tylenol as well as dose of fentanyl with improvement in abdominal pain and nausea - patient reports abdominal pain and nausea symptoms have completely resolved. She underwent CT A/P that was negative for PE or bowel obstruction but incidentally showed new compression fractures of T10 and T12. Patient denies any focal neurological symptoms or back pain. Neurosurgery consulted who recommended MR imaging - patient initially refused imaging requesting discharge however changed her mind and was amenable for staying for imaging - admitted to medicine as CDU full. Course complicated by elevated HR to 130's- EKG completed showing Afib with RVR though patient asymptomatic and hemodynamically stable. 5mg lopressor given. Of note patient attempting to have bowel movement at time of elevated HR. (2025 04:05)    During exam and HPI, daughter Giuliana on the phone.  She has minimal pains on palpation and no pain at rest to her spine.      < from: CT Abdomen and Pelvis w/ IV Cont (25 @ 19:22) >  IMPRESSION:  No pulmonary embolism or bowel obstruction.    Mild fat stranding and free fluid around the pancreaticoduodenal groove,   possibly representing acute interstitial pancreatitiswith reactive   duodenitis.    Redemonstrated sequelae of metastatic disease which appear overall   decrease in size compared to prior CT CAP.    New compression fracture deformities along the superior aspects of the   T10 and T12 vertebral bodies, possibly pathologic in nature given the   patient's history.      < from: MR Thoracic Spine w/wo IV Cont (25 @ 18:37) >  IMPRESSION:    Mild compression deformity of the superior endplate of T12, stable since   2025.    Scattered enhancing metastatic lesions throughout the spine with thoracic   lesions that are new since 2025. Some of the cervical spine lesions   are new while others have increased in size.    < end of copied text >        Allergies    No Known Allergies    Intolerances        ROS: [  ] Fever  [  ] Chills  [  ]Chest Pain [  ] SOB  [  ]Cough [  ] N/V  [  ] Diarrhea [  ]Constipation [  ]Other ROS:  [  ] ROS otherwise negative    PAST MEDICAL & SURGICAL HISTORY:  CAD (Coronary Artery Disease)    Coronary Stent  to RCA, LAD, and DIAG 06 at [facility]    Afib  x 15 yrs --on Coumadin  attempted cardioversion 13 yrs ago--failed    GERD (Gastroesophageal Reflux Disease)    Hypercholesterolemia    Emphysema/COPD    Lip Cancer  resected 6 yrs ago (no chemo/rad)    Skin Cancer of Face  removed 1 yr ago    Skin Cancer of Nose  1 yr ago- removed    H/O: CVA  pt unaware of CVA, yet showed up on CT of head as old CVA    History of Appendectomy    CHF (congestive heart failure)    Skin cancer    PUD (peptic ulcer disease)    Asthma    Smoker    CAD (coronary artery disease)    Melanoma    Nodule of upper lobe of right lung    MARIE (obstructive sleep apnea)    PVD (peripheral vascular disease)    Lung nodule    H/O: CVA    History of Hysterectomy   for fibroids      History of   , , ,     History of Cholecystectomy      History of Appendectomy  childhood      S/P T&A  childhood      History of cholecystectomy    History of appendectomy    Melanoma    History of total right hip replacement    History of total left hip replacement    History of cataract surgery    H/O cardiac radiofrequency ablation    Status post angiography of extremity    S/P primary angioplasty with coronary stent        FAMILY HISTORY:      MEDICATIONS  (STANDING):  apixaban 5 milliGRAM(s) Oral two times a day  aspirin enteric coated 81 milliGRAM(s) Oral daily  atorvastatin 20 milliGRAM(s) Oral at bedtime  fluticasone propionate/ salmeterol 250-50 MICROgram(s) Diskus 1 Dose(s) Inhalation two times a day  metoprolol tartrate 25 milliGRAM(s) Oral two times a day  verapamil  milliGRAM(s) Oral daily    MEDICATIONS  (PRN):  acetaminophen     Tablet .. 650 milliGRAM(s) Oral every 6 hours PRN Temp greater or equal to 38C (100.4F), Mild Pain (1 - 3)      PHYSICAL EXAM  Vital Signs Last 24 Hrs  T(C): 36.3 (2025 05:23), Max: 36.6 (2025 13:26)  T(F): 97.4 (:), Max: 97.8 (2025 13:)  HR: 69 (:) (69 - 94)  BP: 104/55 (:) (94/58 - 113/63)  BP(mean): --  RR: 18 (:) (18 - 18)  SpO2: 95% () (95% - 99%)    Parameters below as of :  Patient On (Oxygen Delivery Method): room air      kps 60  General: appears stated age,  no acute distress, laying in bed on her side, minimal pain  HEENT: NC/AT; EOMI, PERRL, sclera nonicteric; external ears normal; no rhinorrhea or epistaxis; mucous membranes moist; oropharynx clear and without erythema  CV: NR, RR; no appreciable r/m/g  Lungs: CTAB, no increased work of breathing  Abdomen: Bowel sounds present; soft, NTND  MSK: Vertebral spine non-tender to palpation largely, mild tenderness to T12 region.  Neuro: AAOx3; cranial nerves II-XII intact; strength 5/5 in upper and lower extremities; sensation to light touch in tact bilaterally.      IMAGING/LABS/PATHOLOGY: I have personally reviewed the relevant labs, pathology, and imaging as noted in the HPI.  In addition,                          9.6    34.12 )-----------( 143      ( 2025 06:06 )             29.0     07-02    142  |  107  |  12  ----------------------------<  62[L]  5.0   |  21[L]  |  0.61    Ca    8.5      2025 06:06  Phos  2.5     07-02  Mg     1.70     07-02    TPro  5.7[L]  /  Alb  3.4  /  TBili  0.7  /  DBili  x   /  AST  17  /  ALT  9   /  AlkPhos  141[H]          ASSESSMENT/PLAN    JANA OLIVARES is a 84y woman with h/o mets small cell/combined squamous cell lung, had prior RT to Right lung, brain stem SRS x 3, and sacrum x 5, came to [facility] from home   c/o "abdominal pain" and found to have T10 and T12 mild compression fx by both CT and MRI but no pain on exam largely.  Both she and daughter prefer outpatient   treatments and she wants to go home.  Case d/w Dr. Mcnamara who treated her prior and d/w Dr. Blair who is on call: we recommend soon discharge and outpatient eval.  with Dr. Mcnamara at [facility] T spine palliative radiotherapy consideration.  No plans for inpatient RT.

## 2025-07-02 NOTE — CHART NOTE - NSCHARTNOTEFT_GEN_A_CORE
patient admit for T spine compression fx.     on exam, patient has chronic back pain and LE weakness, attributing to chemo.   no recent fall or change in these conditions.   had Afib RVR, now rate controlled.     - f/u MRI.  - tele monitor for RVR.   - rest of plan as per H&P.
imaging and case reviewed, no nsx intervention. Can f/u with established spine surgeon she is seeing outpatient.

## 2025-07-03 ENCOUNTER — TRANSCRIPTION ENCOUNTER (OUTPATIENT)
Age: 85
End: 2025-07-03

## 2025-07-03 VITALS
HEART RATE: 85 BPM | TEMPERATURE: 98 F | RESPIRATION RATE: 18 BRPM | SYSTOLIC BLOOD PRESSURE: 101 MMHG | OXYGEN SATURATION: 100 % | DIASTOLIC BLOOD PRESSURE: 57 MMHG

## 2025-07-03 DIAGNOSIS — Z71.89 OTHER SPECIFIED COUNSELING: ICD-10-CM

## 2025-07-03 DIAGNOSIS — Z51.5 ENCOUNTER FOR PALLIATIVE CARE: ICD-10-CM

## 2025-07-03 LAB
ALBUMIN SERPL ELPH-MCNC: 3 G/DL — LOW (ref 3.3–5)
ALP SERPL-CCNC: 145 U/L — HIGH (ref 40–120)
ALT FLD-CCNC: 9 U/L — SIGNIFICANT CHANGE UP (ref 4–33)
ANION GAP SERPL CALC-SCNC: 12 MMOL/L — SIGNIFICANT CHANGE UP (ref 7–14)
AST SERPL-CCNC: 11 U/L — SIGNIFICANT CHANGE UP (ref 4–32)
BASOPHILS # BLD AUTO: 0.14 K/UL — SIGNIFICANT CHANGE UP (ref 0–0.2)
BASOPHILS NFR BLD AUTO: 1.1 % — SIGNIFICANT CHANGE UP (ref 0–2)
BILIRUB SERPL-MCNC: 0.7 MG/DL — SIGNIFICANT CHANGE UP (ref 0.2–1.2)
BUN SERPL-MCNC: 9 MG/DL — SIGNIFICANT CHANGE UP (ref 7–23)
CALCIUM SERPL-MCNC: 8.2 MG/DL — LOW (ref 8.4–10.5)
CHLORIDE SERPL-SCNC: 106 MMOL/L — SIGNIFICANT CHANGE UP (ref 98–107)
CO2 SERPL-SCNC: 22 MMOL/L — SIGNIFICANT CHANGE UP (ref 22–31)
CREAT SERPL-MCNC: 0.54 MG/DL — SIGNIFICANT CHANGE UP (ref 0.5–1.3)
EGFR: 91 ML/MIN/1.73M2 — SIGNIFICANT CHANGE UP
EGFR: 91 ML/MIN/1.73M2 — SIGNIFICANT CHANGE UP
EOSINOPHIL # BLD AUTO: 0.38 K/UL — SIGNIFICANT CHANGE UP (ref 0–0.5)
EOSINOPHIL NFR BLD AUTO: 2.9 % — SIGNIFICANT CHANGE UP (ref 0–6)
GLUCOSE SERPL-MCNC: 79 MG/DL — SIGNIFICANT CHANGE UP (ref 70–99)
HCT VFR BLD CALC: 24.7 % — LOW (ref 34.5–45)
HGB BLD-MCNC: 8 G/DL — LOW (ref 11.5–15.5)
IMM GRANULOCYTES # BLD AUTO: 0.37 K/UL — HIGH (ref 0–0.07)
IMM GRANULOCYTES NFR BLD AUTO: 2.8 % — HIGH (ref 0–0.9)
LIDOCAIN IGE QN: 21 U/L — SIGNIFICANT CHANGE UP (ref 7–60)
LYMPHOCYTES # BLD AUTO: 0.45 K/UL — LOW (ref 1–3.3)
LYMPHOCYTES NFR BLD AUTO: 3.4 % — LOW (ref 13–44)
MAGNESIUM SERPL-MCNC: 1.3 MG/DL — LOW (ref 1.6–2.6)
MCHC RBC-ENTMCNC: 32.4 G/DL — SIGNIFICANT CHANGE UP (ref 32–36)
MCHC RBC-ENTMCNC: 32.7 PG — SIGNIFICANT CHANGE UP (ref 27–34)
MCV RBC AUTO: 100.8 FL — HIGH (ref 80–100)
MONOCYTES # BLD AUTO: 0.55 K/UL — SIGNIFICANT CHANGE UP (ref 0–0.9)
MONOCYTES NFR BLD AUTO: 4.2 % — SIGNIFICANT CHANGE UP (ref 2–14)
NEUTROPHILS # BLD AUTO: 11.25 K/UL — HIGH (ref 1.8–7.4)
NEUTROPHILS NFR BLD AUTO: 85.6 % — HIGH (ref 43–77)
NRBC # BLD AUTO: 0 K/UL — SIGNIFICANT CHANGE UP (ref 0–0)
NRBC # FLD: 0 K/UL — SIGNIFICANT CHANGE UP (ref 0–0)
NRBC BLD AUTO-RTO: 0 /100 WBCS — SIGNIFICANT CHANGE UP (ref 0–0)
PHOSPHATE SERPL-MCNC: 2.7 MG/DL — SIGNIFICANT CHANGE UP (ref 2.5–4.5)
PLATELET # BLD AUTO: 101 K/UL — LOW (ref 150–400)
PMV BLD: 12.1 FL — SIGNIFICANT CHANGE UP (ref 7–13)
POTASSIUM SERPL-MCNC: 4 MMOL/L — SIGNIFICANT CHANGE UP (ref 3.5–5.3)
POTASSIUM SERPL-SCNC: 4 MMOL/L — SIGNIFICANT CHANGE UP (ref 3.5–5.3)
PROT SERPL-MCNC: 4.8 G/DL — LOW (ref 6–8.3)
RBC # BLD: 2.45 M/UL — LOW (ref 3.8–5.2)
RBC # FLD: 18.8 % — HIGH (ref 10.3–14.5)
SODIUM SERPL-SCNC: 140 MMOL/L — SIGNIFICANT CHANGE UP (ref 135–145)
WBC # BLD: 13.14 K/UL — HIGH (ref 3.8–10.5)
WBC # FLD AUTO: 13.14 K/UL — HIGH (ref 3.8–10.5)

## 2025-07-03 PROCEDURE — 99223 1ST HOSP IP/OBS HIGH 75: CPT

## 2025-07-03 PROCEDURE — 99239 HOSP IP/OBS DSCHRG MGMT >30: CPT

## 2025-07-03 RX ORDER — CEFPODOXIME PROXETIL 200 MG/1
1 TABLET, FILM COATED ORAL
Qty: 10 | Refills: 0
Start: 2025-07-03 | End: 2025-07-07

## 2025-07-03 RX ORDER — HEPARIN SODIUM,PORCINE/NS/PF 20/20 ML
300 SYRINGE (ML) INTRAVENOUS ONCE
Refills: 0 | Status: COMPLETED | OUTPATIENT
Start: 2025-07-03 | End: 2025-07-03

## 2025-07-03 RX ORDER — MAGNESIUM SULFATE 500 MG/ML
2 SYRINGE (ML) INJECTION ONCE
Refills: 0 | Status: COMPLETED | OUTPATIENT
Start: 2025-07-03 | End: 2025-07-03

## 2025-07-03 RX ORDER — METOPROLOL SUCCINATE 50 MG/1
1 TABLET, EXTENDED RELEASE ORAL
Qty: 60 | Refills: 0
Start: 2025-07-03 | End: 2025-08-01

## 2025-07-03 RX ADMIN — Medication 81 MILLIGRAM(S): at 12:42

## 2025-07-03 RX ADMIN — Medication 300 UNIT(S): at 12:42

## 2025-07-03 RX ADMIN — APIXABAN 5 MILLIGRAM(S): 2.5 TABLET, FILM COATED ORAL at 05:52

## 2025-07-03 RX ADMIN — METOPROLOL SUCCINATE 25 MILLIGRAM(S): 50 TABLET, EXTENDED RELEASE ORAL at 05:51

## 2025-07-03 RX ADMIN — Medication 1 DOSE(S): at 08:47

## 2025-07-03 RX ADMIN — Medication 240 MILLIGRAM(S): at 05:51

## 2025-07-03 RX ADMIN — Medication 25 GRAM(S): at 10:06

## 2025-07-03 NOTE — DISCHARGE NOTE PROVIDER - PROVIDER TOKENS
PROVIDER:[TOKEN:[[id number]],SCHEDULEDAPPT:[07/08/2025]],PROVIDER:[TOKEN:[[id number]],SCHEDULEDAPPT:[07/08/2025]]

## 2025-07-03 NOTE — DIETITIAN INITIAL EVALUATION ADULT - OTHER INFO
Patient is currently ordered for a PO diet with IV hydration support. Patient reports a poor appetite and PO intake at meals during course of admission. Writer offered to explore food preferences, however patient deferred. Writer offered oral nutrition supplementation with various products, however patient deferred as well. Patient reports swallowing difficulty with thin liquids; would recommend SLP evaluation to optimize diet. Covering provider notified to nutrition recommendations via [employer] Teams. No report of GI distress (nausea, vomiting, diarrhea, constipation). Last BM 7/1/25 per RN flowsheet documentation. Not noted to be on a bowel regimen.     Writer provided verbal education regarding current diet order and nutrition recommendations for after discharge. Patient verbalized understanding to the discussion.

## 2025-07-03 NOTE — CONSULT NOTE ADULT - PROBLEM SELECTOR RECOMMENDATION 4
Patient's HCP is her daughter, Giuliana York ([phone])   > Outpatient follow up with Dr. Ferris for continued DMT and Dr. Mcnamara for RT

## 2025-07-03 NOTE — DIETITIAN INITIAL EVALUATION ADULT - PERTINENT LABORATORY DATA
07-03    140  |  106  |  9   ----------------------------<  79  4.0   |  22  |  0.54    Ca    8.2[L]      03 Jul 2025 06:00  Phos  2.7     07-03  Mg     1.30     07-03    TPro  4.8[L]  /  Alb  3.0[L]  /  TBili  0.7  /  DBili  x   /  AST  11  /  ALT  9   /  AlkPhos  145[H]  07-03

## 2025-07-03 NOTE — DIETITIAN INITIAL EVALUATION ADULT - NSICDXPASTMEDICALHX_GEN_ALL_CORE_FT
PAST MEDICAL HISTORY:  Afib x 15 yrs --on Coumadin  attempted cardioversion 13 yrs ago--failed    Asthma     CAD (coronary artery disease)     CHF (congestive heart failure)     Coronary Stent to RCA, LAD, and DIAG 9/25/06 at [facility]    Emphysema/COPD     GERD (Gastroesophageal Reflux Disease)     H/O: CVA pt unaware of CVA, yet showed up on CT of head as old CVA    Hypercholesterolemia     Lip Cancer resected 6 yrs ago (no chemo/rad)    Lung nodule     Melanoma     Nodule of upper lobe of right lung     MARIE (obstructive sleep apnea)     PUD (peptic ulcer disease)     PVD (peripheral vascular disease)     Skin Cancer of Face removed 1 yr ago    Skin Cancer of Nose 1 yr ago- removed    Smoker

## 2025-07-03 NOTE — DISCHARGE NOTE PROVIDER - ATTENDING DISCHARGE PHYSICAL EXAMINATION:
Vital Signs Last 24 Hrs  T(C): 36.6 (03 Jul 2025 13:21), Max: 37.2 (03 Jul 2025 05:31)  T(F): 97.9 (03 Jul 2025 13:21), Max: 98.9 (03 Jul 2025 05:31)  HR: 85 (03 Jul 2025 13:21) (85 - 95)  BP: 101/57 (03 Jul 2025 13:21) (101/57 - 105/63)  RR: 18 (03 Jul 2025 13:21) (18 - 18)  SpO2: 100% (03 Jul 2025 13:21) (98% - 100%)  Parameters below as of 03 Jul 2025 13:21  Patient On (Oxygen Delivery Method): room air  Elderly, non-toxic-appearing woman, laying in bed  Answering all questions appropriately and following commands  L eye strabismus present, no scleral icterus; pt is adentulous, no oral thrush  Regular rate, irregular rhythm, no m/r/g  Trace RLB insp crackles, no w/c in L lung zones  Abd tender on palpation of epigastrum (pain slightly oop); no rebound, guarding; normoactive bowel sounds  No peripheral edema  CN 2-12 grossly intact; no gross focal neuro deficits; 3/5 strength and normal ROM in b/l LEs

## 2025-07-03 NOTE — CONSULT NOTE ADULT - PROBLEM SELECTOR RECOMMENDATION 9
Patient diagnosed with lung cancer in 2023 with mets liver, bone, R hilar adenopathy, brain s/p GK-SRS, on chemotherapy with Dr. Ferris at [facility].   > Appreciate inpt heme/onc recs - outpatient follow up. Patient has outpatient appointment on 7/8 with Dr. Ferris and rad/onc appointment with Dr. Mcnamara

## 2025-07-03 NOTE — DIETITIAN INITIAL EVALUATION ADULT - OTHER CALCULATIONS
IBW: 100 lb +/- 10%  No weight for current admission noted, writer unable to obtain bed scale weight during encounter due to patient seated in chart. RN notified via verbal discussion to please zero the bed and obtain bed scale weight once patient returns to bed.  Per Jonny FOX, noted the following weight history: 59kg (6/27), 61.1kg (5/14), 65.2kg (4/7), 68.8kg (10/10)  Objective weight measurements suggest 9.8kg (14%) weight loss x6 months (significant)

## 2025-07-03 NOTE — DISCHARGE NOTE PROVIDER - NSDCFUSCHEDAPPT_GEN_ALL_CORE_FT
Parish Ferris  [facility]  [facility]  Scheduled Appointment: 07/08/2025    [facility]  [facility]  Scheduled Appointment: 07/08/2025    Chris Mcnamara  [facility]  RAD[address]  Scheduled Appointment: 07/08/2025    [facility]  Scheduled Appointment: 07/16/2025    [facility]  [facility]  Scheduled Appointment: 07/16/2025    [facility]  Scheduled Appointment: 07/17/2025    [facility]  [facility]  Scheduled Appointment: 07/18/2025    Nilda Lehman  [facility]  [facility]  Scheduled Appointment: 07/29/2025    [facility]  [facility]  Scheduled Appointment: 07/29/2025     Parish Ferris  [facility]  Scheduled Appointment: 07/08/2025    Chris Mcnamara  [facility]  RAD[facility] [address]  Scheduled Appointment: 07/08/2025    [facility]  Scheduled Appointment: 07/16/2025    [facility]  Scheduled Appointment: 07/16/2025    [facility]  Scheduled Appointment: 07/17/2025    [facility]  Scheduled Appointment: 07/18/2025    Nilda Lehman  [facility]  Scheduled Appointment: 07/29/2025    [facility]  Scheduled Appointment: 07/29/2025

## 2025-07-03 NOTE — CONSULT NOTE ADULT - CONSULT REASON
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
symptom management
Afib
afib rvr
T10/T12 compression fx

## 2025-07-03 NOTE — CONSULT NOTE ADULT - TIME BILLING
Total time spent including the following  [ x] Physical chart review and documentation   An extensive review of the physical chart, electronic health record, and documentation was conducted to obtain collateral information including but not limited to:   - Current inpatient records (ED, H&P, primary team, and consultants [IR])   - Inpatient values/results (CBC, CMP, CT)   - Prior inpatient records   - Current or proposed treatment plans   - Pharmacotherapy review   [x ]discussion with the interdisciplinary palliative care team -RN, , clinicians, trainees,   [ x]discussion with the patient/family/decision maker  [ x]Physical Exam and/or review of systems   [ x]Formulation of assessment and plan   [ x]Evaluating for response to treatment and side effects of opioids or benzodiazepines.  [ x]Review of care coordination documentation.

## 2025-07-03 NOTE — CONSULT NOTE ADULT - SUBJECTIVE AND OBJECTIVE BOX
[facility] Geriatrics and Palliative Care  Julia Leon Palliative Care Attending  Contact Info: Page [phone] (including Nights/Weekends), message on Microsoft Teams (Julia Leon), or leave  at Palliative Office [phone] (non-urgent)     Date of [id number] @ 13:12  HPI: 84F with PMH lung cancer with metastases on chemotherapy, COPD, Afib on Eliquis who presented to the ED initially for abdominal pain x 1 day, incidentally found on CT scan to have new thoracic compression fractures now admitted for MRI eval. Patient initially noted to have L sided abdominal pain w/ radiation to her back with associated nausea and vomiting and came to the ED for evaluation. Per outpt review patient currently undergoing chemotherapy/immunotherapy at [facility], also follows with palliative care and rad onc.     In the ED patient given 1L NS bolus and IV tylenol as well as dose of fentanyl with improvement in abdominal pain and nausea - patient reports abdominal pain and nausea symptoms have completely resolved. She underwent CT A/P that was negative for PE or bowel obstruction but incidentally showed new compression fractures of T10 and T12. Patient denies any focal neurological symptoms or back pain. Neurosurgery consulted who recommended MR imaging - patient initially refused imaging requesting discharge however changed her mind and was amenable for staying for imaging - admitted to medicine as CDU full. Course complicated by elevated HR to 130's- EKG completed showing Afib with RVR though patient asymptomatic and hemodynamically stable. 5mg lopressor given. Of note patient attempting to have bowel movement at time of elevated HR. (2025 04:05)    PERTINENT PM/SXH:   CAD (Coronary Artery Disease)  Coronary Stent  Afib  GERD (Gastroesophageal Reflux Disease)  Hypercholesterolemia  Emphysema/COPD  Lip Cancer  Skin Cancer of Face  Skin Cancer of Nose  H/O: CVA  History of Appendectomy  CHF (congestive heart failure)  Skin cancer  PUD (peptic ulcer disease)  Asthma  Smoker  CAD (coronary artery disease)  Melanoma  Nodule of upper lobe of right lung    MARIE (obstructive sleep apnea)    PVD (peripheral vascular disease)    Lung nodule      H/O: CVA    History of Hysterectomy    History of     History of Cholecystectomy    History of Appendectomy    S/P T&A    History of cholecystectomy    History of appendectomy    Melanoma    History of total right hip replacement    History of total left hip replacement    History of cataract surgery    H/O cardiac radiofrequency ablation    Status post angiography of extremity    S/P primary angioplasty with coronary stent      FAMILY HISTORY:    Family Hx substance abuse [ ]yes [ ]no  ITEMS NOT CHECKED ARE NOT PRESENT    SOCIAL HISTORY:   Significant other/partner[ ]  Children[ x-3 children (Giuliana Stewart and Charissa]  Mu-ism/Spirituality:  Substance hx:  [ ]   Tobacco hx:  [ ]   Alcohol hx: [ ]   Home Opioid hx:  [ ] I-Stop Reference No:   This report was [facility] by: Julia Leon | Reference #: [id number]    Practitioner Count: 3  Pharmacy Count: 2  Current Opioid Prescriptions: 0  Current Benzodiazepine Prescriptions: 0  Current Stimulant Prescriptions: 0      Patient Demographic Information (PDI)       PDI	First Name	Last Name	Birth Date	Gender	Street Address	City	State	Zip Code  A	Marilyn	Record	1940	Male	[id number] [place] [id number]  B	Marilyn	Record	1940	Female	[address]	[id number]    Prescription Information      PDI Filter:    PDI	My Rx	Current Rx	Drug Type	Rx Written	Rx Dispensed	Drug	Quantity	Days Supply	Prescriber Name	Prescriber HEATHER #	Payment Method	Dispenser  A	N	N		2025	vireo green balm (1:1) 1.4mg thc : 1.4mg cbd/1.25cc	1	5	Adeola Knott)	[id number]	Cash	[facility]  A	N	N		2025	vireo green (1:1) 3mg thc and 3mg cbd/0.25ml oral solution	1	10	Adeola Knott)	[id number]	Cash	[facility]  B	N	N	O	2025	oxycodone hcl (ir) 5 mg tablet	60	30	Parish Ferris MD	[id number]	Medicare	[id number]  B	N	N	O	2025	morphine sulfate ir 15 mg tab	18	3	Sea Singh	[id number]	Medicare	[id number]    Living Situation: [x ]Home  [ ]Long term care  [ ]Rehab [ ]Other    ADVANCE DIRECTIVES:    DNR/MOLST  [ ]  Living Will  [ ]   DECISION MAKER(s): Patient   [ ] Health Care Proxy(s)  [ ] Surrogate(s)  [ ] Guardian           Name(s): Phone Number(s):    BASELINE (I)ADL(s) (prior to admission):  [place]: [ x]Total  [ ] Moderate [ ]Dependent    Allergies    No Known Allergies    Intolerances    MEDICATIONS  (STANDING):  apixaban 5 milliGRAM(s) Oral two times a day  aspirin enteric coated 81 milliGRAM(s) Oral daily  atorvastatin 20 milliGRAM(s) Oral at bedtime  fluticasone propionate/ salmeterol 250-50 MICROgram(s) Diskus 1 Dose(s) Inhalation two times a day  metoprolol tartrate 25 milliGRAM(s) Oral two times a day  sodium chloride 0.9%. 1000 milliLiter(s) (75 mL/Hr) IV Continuous <Continuous>  verapamil  milliGRAM(s) Oral daily    MEDICATIONS  (PRN):  acetaminophen     Tablet .. 650 milliGRAM(s) Oral every 6 hours PRN Temp greater or equal to 38C (100.4F), Mild Pain (1 - 3)    PRESENT SYMPTOMS: [ ]Unable to self-report  [ ] CPOT [ ] PAINADs [ ] RDOS  Source if other than patient:  [ ]Family   [ ]Team     Pain: [ ]yes [x ]no  QOL impact -   Location -                    Aggravating factors -  Quality -  Radiation -  Timing-  Severity (0-10 scale):  Minimal acceptable level/pain goal (0-10 scale):     CPOT:    https://www.sccm.org/getattachment/xad57c04-6c4z-5h9f-9r1y-0339n7505f4r/Critical-Care-Pain-Observation-Tool-(CPOT)    Dyspnea:                           [ ]Mild [ ]Moderate [ ]Severe  Anxiety:                             [ ]Mild [ ]Moderate [ ]Severe  Fatigue:                             [ ]Mild [ ]Moderate [ ]Severe  Nausea:                             [ ]Mild [ ]Moderate [ ]Severe  Loss of appetite:              [ ]Mild [ ]Moderate [ ]Severe  Constipation:                    [ ]Mild [ ]Moderate [ ]Severe    Other Symptoms:  [x ]All other review of systems negative     PCSSQ[Palliative Care Spiritual Screening Question]   Severity (0-10):  Chaplaincy Referral: [ ] yes [ ] refused [ ] following [ x] deferred     Caregiver [place]? : [ ] yes [ ] no [x ] Deferred [ ] Declined             Social work referral [ ] Patient & Family Centered Care Referral [ ]  Anticipatory Grief present?:  [ ] yes [ ] no  [x ] Deferred                  Social work referral [ ] Patient & Family Centered Care Referral [ ]    PHYSICAL EXAM:  Vital Signs Last 24 Hrs  T(C): 37.2 (2025 05:31), Max: 37.2 (2025 05:31)  T(F): 98.9 (2025 05:31), Max: 98.9 (2025 05:31)  HR: 95 (:) (70 - 95)  BP: 105/63 (2025 05:31) (100/56 - 105/63)  BP(mean): --  RR: 18 (:) (18 - 18)  SpO2: 100% (:) (94% - 100%)    Parameters below as of 2025 05:31  Patient On (Oxygen Delivery Method): room air     I&O's Summary    GENERAL: [ ]Cachexia    [ x]Alert  [x ]Oriented x4   [ ]Lethargic  [ ]Unarousable  [ x]Verbal  [ ]Non-Verbal  Behavioral:   [ ] Anxiety  [ ] Delirium [ ] Agitation [x ] Other  HEENT:  [ x]Normal   [ ]Dry mouth   [ ]ET Tube/Trach  [ ]Oral lesions  PULMONARY:   [x ]Clear [ ]Tachypnea  [ ]Audible excessive secretions   [ ]Rhonchi        [ ]Right [ ]Left [ ]Bilateral  [ ]Crackles        [ ]Right [ ]Left [ ]Bilateral  [ ]Wheezing     [ ]Right [ ]Left [ ]Bilateral  [ ]Diminished breath sounds [ ]right [ ]left [ ]bilateral  CARDIOVASCULAR:    [ x]Regular [ ]Irregular [ ]Tachy  [ ]Sree [ ]Murmur [ ]Other  GASTROINTESTINAL:  [ ]Soft  [ ]Distended   [ ]+BS  [ ]Non tender [ ]Tender  [ ]Other [ ]PEG [ ]OGT/ NGT  Last BM:   GENITOURINARY:  [ x]Normal [ ] Incontinent   [ ]Oliguria/Anuria   [ ][place]  MUSCULOSKELETAL:   [ x]Normal   [ ]Weakness  [ ]Bed/Wheelchair bound [ ]Edema  NEUROLOGIC:   [x ]No focal deficits  [ ]Cognitive impairment  [ ]Dysphagia [ ]Dysarthria [ ]Paresis [ ]Other   SKIN: Please see flowsheets   [x ]Normal  [ ]Rash  [ ]Other  [ ]Pressure ulcer(s)       Present on admission [ ]y [ ]n    CRITICAL CARE:  [ ] Shock Present  [ ]Septic [ ]Cardiogenic [ ]Neurologic [ ]Hypovolemic  [ ]  Vasopressors [ ]  Inotropes   [ ]Respiratory failure present [ ]Mechanical ventilation [ ]Non-invasive ventilatory support [ ]High flow    [ ]Acute  [ ]Chronic [ ]Hypoxic  [ ]Hypercarbic [ ]Other  [ ]Other organ failure     LABS:                        8.0    13.14 )-----------( 101      ( 2025 06:00 )             24.7   07-03    140  |  106  |  9   ----------------------------<  79  4.0   |  22  |  0.54    Ca    8.2[L]      2025 06:00  Phos  2.7     07-  Mg     1.30     07-03    TPro  4.8[L]  /  Alb  3.0[L]  /  TBili  0.7  /  DBili  x   /  AST  11  /  ALT  9   /  AlkPhos  145[H]  07-03      Urinalysis Basic - ( 2025 06:00 )    Color: x / Appearance: x / SG: x / pH: x  Gluc: 79 mg/dL / Ketone: x  / Bili: x / Urobili: x   Blood: x / Protein: x / Nitrite: x   Leuk Esterase: x / RBC: x / WBC x   Sq Epi: x / Non Sq Epi: x / Bacteria: x      RADIOLOGY & ADDITIONAL STUDIES: < from: MR Lumbar Spine w/wo IV Cont (25 @ 18:38) >  IMPRESSION:    Mild compression deformity of the superior endplate of T12, stable since   2025.    Scattered enhancing metastatic lesions throughout the spine with thoracic   lesions that are new since 2025. Some of the cervical spine lesions   are new while others have increased in size.    < end of copied text >      PROTEIN CALORIE MALNUTRITION PRESENT: [ ]mild [ ]moderate [ ]severe [ ]underweight [ ]morbid obesity  https://www.andeal.org/vault/5230/web/files/ONC/Table_Clinical%20Characteristics%20to%20Document%20Malnutrition-White%20JV%20et%20al%2020.pdf    Height (cm): 152.4 (25 @ 14:10), 149 (25 @ 15:17), 152.4 (25 @ :42)  Weight (kg): 59 (25 @ 15:17), 60.8 (25 @ :42), 64.4 (25 @ 10:33)  BMI (kg/m2): 25.4 (25 @ 14:10), 26.6 (25 @ 15:17), 26.2 (25 @ :42)    [ ]PPSV2 < or = to 30% [ ]significant weight loss  [ ]poor nutritional intake  [ ]anasarca[ ]Artificial Nutrition      Other REFERRALS:  [ ]Hospice  [ ]Child Life  [ ]Social Work  [ ]Case management [ ]Holistic Therapy

## 2025-07-03 NOTE — PROGRESS NOTE ADULT - SUBJECTIVE AND OBJECTIVE BOX
Cardiovascular Disease Progress Note    Overnight events: No acute events overnight.  no new cardiac sx  Otherwise review of systems negative    Objective Findings:  T(C): 37.2 (07-03-25 @ 05:31), Max: 37.2 (07-03-25 @ 05:31)  HR: 95 (07-03-25 @ 05:31) (70 - 95)  BP: 105/63 (07-03-25 @ 05:31) (100/56 - 105/63)  RR: 18 (07-03-25 @ 05:31) (18 - 18)  SpO2: 100% (07-03-25 @ 05:31) (94% - 100%)  Wt(kg): --  Daily     Daily       Physical Exam:  Gen: NAD  HEENT: EOMI  CV: RRR, normal S1 + S2, no m/r/g  Lungs: CTAB  Abd: soft, non-tender  Ext: No edema    Telemetry:    Laboratory Data:                        9.6    34.12 )-----------( 143      ( 02 Jul 2025 06:06 )             29.0     07-02    142  |  107  |  12  ----------------------------<  62[L]  5.0   |  21[L]  |  0.61    Ca    8.5      02 Jul 2025 06:06  Phos  2.5     07-02  Mg     1.70     07-02                Inpatient Medications:  MEDICATIONS  (STANDING):  apixaban 5 milliGRAM(s) Oral two times a day  aspirin enteric coated 81 milliGRAM(s) Oral daily  atorvastatin 20 milliGRAM(s) Oral at bedtime  fluticasone propionate/ salmeterol 250-50 MICROgram(s) Diskus 1 Dose(s) Inhalation two times a day  metoprolol tartrate 25 milliGRAM(s) Oral two times a day  sodium chloride 0.9%. 1000 milliLiter(s) (75 mL/Hr) IV Continuous <Continuous>  verapamil  milliGRAM(s) Oral daily      Assessment:  84F with PMH lung cancer with metastases on chemotherapy, COPD, Afib on Eliquis who presented to the ED initially for abdominal pain x 1 day, incidentally found on CT scan to have new thoracic compression fractures now admitted for MRI eval and rapid afib    Recs:  cv stable  no e/o acs or chf  vol status stable off diuretics. ctm  admitted afib with rvr, rates now improved. cw eliquis as tolerates (brain mets on mri noted) and verapamil. cw lopressor 25mg bid. eps recs appreciated  obtain echo - inpatient vs outpatient  tele monitoring  remainder of care per primary team  nsgy f/u OP for RT  will follow      Over 25 minutes spent on total encounter; more than 50% of the visit was spent counseling and/or coordinating care by the attending physician.      Bill Cho MD   Cardiovascular Disease  [phone]
Tele: Afib with HR 80-90s    -------------------------------------------------------------------------------------------  PHYSICAL EXAM:  BP 100s/60s  HR 80-90    GENERAL: sleeping comfortably  CHEST/LUNG: unlabored breathing  HEART: irregularly irregular   EXTREMITIES:  warm, no LE edema    -------------------------------------------------------------------------------------------  RELEVANT LABS:    WBC 7 -> 9 -> 50 -> 34  Hgb 7.3 -> 6.1 -> 10.8 -> 10.3 -> 9.6  Plt 230s -> 140s    Cr 0.7 -> 0.6    TSH 3  lactate 1.3    -------------------------------------------------------------------------------------------  RELEVANT IMAGING:    ECG: Afib with RVR    Echo:   2022:  Conclusions:  1. Mitral annular calcification, otherwise normal mitral  valve.  2. Normal trileaflet aortic valve. Minimal aortic  regurgitation.  3. Mild concentric left ventricular hypertrophy.  4. Overall preserved left ventricular systolic function.  The basal inferoseptum is hypokinetic. The basal inferior  wall is akinetic.  5. Mild diastolic dysfunction (Stage I).  6. Moderate right atrial enlargement.  7. Right ventricular enlargement with decreased right  ventricular systolic function.  *** Compared with echocardiogram of 4/25/2022, no  significant changes noted.    Cath (2022):  LM   Left main artery:Angiography shows no disease.      LAD   Left anterior descending artery: Angiography shows no disease.      CX   Circumflex: Angiography shows mild atherosclerosis. First obtuse  marginal: There is a 40 % stenosis.    RCA   Right coronary artery: Angiography shows mild atherosclerosis.        CT C/A/P:  No pulmonary embolism or bowel obstruction.    Mild fat stranding and free fluid around the pancreaticoduodenal groove,   possibly representing acute interstitial pancreatitis with reactive   duodenitis.    Redemonstrated sequelae of metastatic disease which appear overall   decrease in size compared to prior CT CAP.    New compression fracture deformities along the superior aspects of the   T10 and T12 vertebral bodies, possibly pathologic in nature given the   patient's history.    -------------------------------------------------------------------------------------------                
Cardiovascular Disease Progress Note    Overnight events: No acute events overnight.  no new cardiac sx  Otherwise review of systems negative    Objective Findings:  T(C): 36.3 (07-02-25 @ 05:23), Max: 36.6 (07-01-25 @ 10:11)  HR: 69 (07-02-25 @ 05:23) (69 - 96)  BP: 104/55 (07-02-25 @ 05:23) (94/58 - 113/63)  RR: 18 (07-02-25 @ 05:23) (17 - 18)  SpO2: 95% (07-02-25 @ 05:23) (95% - 100%)  Wt(kg): --  Daily     Daily       Physical Exam:  Gen: NAD  HEENT: EOMI  CV: RRR, normal S1 + S2, no m/r/g  Lungs: CTAB  Abd: soft, non-tender  Ext: No edema    Telemetry:    Laboratory Data:                        11.2   51.95 )-----------( 205      ( 01 Jul 2025 09:19 )             33.2     07-01    142  |  103  |  10  ----------------------------<  80  3.0[L]   |  20[L]  |  0.55    Ca    8.6      01 Jul 2025 09:19  Phos  4.0     07-01  Mg     1.30     07-01    TPro  5.7[L]  /  Alb  3.4  /  TBili  0.7  /  DBili  x   /  AST  17  /  ALT  9   /  AlkPhos  141[H]  06-30    PT/INR - ( 30 Jun 2025 15:52 )   PT: 13.9 sec;   INR: 1.20 ratio         PTT - ( 30 Jun 2025 15:52 )  PTT:27.2 sec          Inpatient Medications:  MEDICATIONS  (STANDING):  apixaban 5 milliGRAM(s) Oral two times a day  aspirin enteric coated 81 milliGRAM(s) Oral daily  atorvastatin 20 milliGRAM(s) Oral at bedtime  fluticasone propionate/ salmeterol 250-50 MICROgram(s) Diskus 1 Dose(s) Inhalation two times a day  metoprolol tartrate 25 milliGRAM(s) Oral two times a day  verapamil  milliGRAM(s) Oral daily      Assessment:  84F with PMH lung cancer with metastases on chemotherapy, COPD, Afib on Eliquis who presented to the ED initially for abdominal pain x 1 day, incidentally found on CT scan to have new thoracic compression fractures now admitted for MRI eval and rapid afib    Recs:  cv stable  no e/o acs or chf  vol status stable off diuretics. ctm  admitted afib with rvr, rates now improved. cw eliquis as tolerates (brain mets on mri noted) and verapamil. cw lopressor 25mg bid. eps recs appreciated  obtain echo  tele monitoring  remainder of care per primary team  will follow        Over 25 minutes spent on total encounter; more than 50% of the visit was spent counseling and/or coordinating care by the attending physician.      Bill Cho MD   Cardiovascular Disease  [phone]
SOLID TUMOR ONCOLOGY HOSPITALIST PROGRESS NOTE    S: No acute events overnight.  Pt reported that she feels relatively unchanged compared to yesterday.  She noted that she still has abd pain but denied back pain.  She also reported that she's been very weak and unable to walk in the days leading up to her hospitalization (using a wheelchair at home).  She also reported that she had a large bm.    CURRENT MEDICATIONS  MEDICATIONS  (STANDING):  apixaban 5 milliGRAM(s) Oral two times a day  aspirin enteric coated 81 milliGRAM(s) Oral daily  atorvastatin 20 milliGRAM(s) Oral at bedtime  fluticasone propionate/ salmeterol 250-50 MICROgram(s) Diskus 1 Dose(s) Inhalation two times a day  metoprolol tartrate 25 milliGRAM(s) Oral two times a day  sodium chloride 0.9%. 1000 milliLiter(s) (75 mL/Hr) IV Continuous <Continuous>  verapamil  milliGRAM(s) Oral daily  MEDICATIONS  (PRN):  acetaminophen     Tablet .. 650 milliGRAM(s) Oral every 6 hours PRN Temp greater or equal to 38C (100.4F), Mild Pain (1 - 3)      PHYSICAL EXAM  T(C): 36.3 (07-02-25 @ 20:28), Max: 36.5 (07-02-25 @ 13:26)  HR: 87 (07-02-25 @ 20:28) (69 - 94)  BP: 104/62 (07-02-25 @ 20:28) (94/58 - 104/62)  RR: 18 (07-02-25 @ 20:28) (18 - 18)  SpO2: 98% (07-02-25 @ 20:28) (94% - 98%)    07-01-25 @ 07:01  -  07-02-25 @ 07:00  --------------------------------------------------------  IN: 650 mL / OUT: 0 mL / NET: 650 mL  Elderly, non-toxic-appearing woman, laying in bed  Answering all questions appropriately and following commands  L eye strabismus present, no scleral icterus; pt is adentulous, no oral thrush  Regular rate, irregular rhythm, no m/r/g  Trace RLB insp crackles, no w/c in L lung zones  Abd tender on palpation of epigastrum (pain slightly oop); no rebound, guarding; normoactive bowel sounds  No peripheral edema  CN 2-12 grossly intact; no gross focal neuro deficits; 3/5 strength and normal ROM in b/l LEs      LABS                        9.6    34.12 )-----------( 143      ( 02 Jul 2025 06:06 )             29.0     07-02    142  |  107  |  12  ----------------------------<  62[L]  5.0   |  21[L]  |  0.61    Ca    8.5      02 Jul 2025 06:06  Phos  2.5     07-02  Mg     1.70     07-02      ADDITIONAL LABS  trop 18 wnl  lactate 1.3  lipase 28      MICROBIOLOGY  Abx: none    Cx Data  6/30 UA: +bacteria, small LE, neg nitrite  6/30 Bcx (PIV x 2): NGTD      PERTINENT RADIOLOGY  6/30 CTA Chest: No PE. Emphysema.   6/30 CTAPL no bowel obstruction. Mild fat stranding and free fluid around the pancreaticoduodenal groove, possibly representing acute interstitial pancreatitiswith reactive duodenitis. Stable metastatic disease    6/30 CXR: No acute radiographic findings and no changed

## 2025-07-03 NOTE — CONSULT NOTE ADULT - ASSESSMENT
84F PMH lung cancer with hx bone mets and radiation (per allscripts has hx radiation to L3-5 and brain met), currently on chemo (oncologist Dr Ferris, rad onc Dr Mcnamara) h/o CAD and PVD with stents, afib on Eliquis p/w abdominal pain. CT AP done showing acute likely pathological compression fx of T10/T12. Has no back pain, denies weakness, numbness, tingling, incontinence. Follows with orthopedic surgeon Dr. Rohan Solorio for spine. 
83 y/o female with PMHx of metastatic lung cancer (on chemo/immuno), former smoker, COPD/asthma, MARIE, Afib (s/p 1 prior ablation, on eliquis), CAD s/p PCI, who presented to the ED for abdominal pain. EP is consulted for management to Afib.    - Persistent Afib (CHADSVASC = 5)    Remains in Afib. HR ~80-90s. BP stable. Denies any symptoms.    RECOMMENDATIONS:  - continue eliquis 5 mg BID  - continue home verapamil 240 mg daily  - continue PO lopressor 25 mg BID  - outpatient follow-up to discuss afib ablation        James Anaya (PGY 4)  Cardiology Fellow      Of note, these recommendations are preliminary. Case to be discussed with attending. Please see attending attestation for final recommendations. 
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 Pt was planned for outpt PET CT 7/2 but was admitted to [facility] on 7/1 with increasing abd pain; CT a/p showed mild fat stranding and free fluid around the pancreaticoduodenal groove, possibly representing acute interstitial pancreatitiswith reactive duodenitis; MRI total spine showing T12 compression fx as well as extensive progression of osseous mets. Palliative consulted for sx management in setting of advanced lung cancer.

## 2025-07-03 NOTE — CONSULT NOTE ADULT - PROBLEM SELECTOR RECOMMENDATION 2
MRI spine (7/1): Mild compression deformity of the superior endplate of T12, stable since 5/6/2025. Scattered enhancing metastatic lesions throughout the spine with thoracic lesions that are new since 4/1/2025. Some of the cervical spine lesions are new while others have increased in size.  > Appreciate Neurosurgery recs  > Appreciate rad/onc recs- no inpt radiation  > Pain management as below, though patient without pain during encounter.

## 2025-07-03 NOTE — DISCHARGE NOTE PROVIDER - CARE PROVIDERS DIRECT ADDRESSES
[email or web address]

## 2025-07-03 NOTE — CONSULT NOTE ADULT - CONVERSATION DETAILS
Referral for complex decision making and symptom management in setting of advanced malignancy. Introduced role of GaP team to patient who was amenable to speaking to provider. Patient shared that she was diagnosed with cancer 1.5 years ago and is currently tolerating chemo with Dr. Ferris with minimal side effects. She states that she has chemo next Thursday for which she is looking forward to pursuing. She states that she has very adequate support with her children and friends taking turns bringing her to chemo and hospital. She states that she is eager to go home and denies pain.     Patient had 3 adult children (Giuliana, Terry, and Charissa who lives in [place]). Giuliana is her healthcare proxy. Per discussion with patient, hcp was done prior to hospitalization.

## 2025-07-03 NOTE — PHYSICAL THERAPY INITIAL EVALUATION ADULT - ADDITIONAL COMMENTS
Pt lives in a home with adult children, owns DME, daughter assists as needed, few stairs to negotiate.   Patients Current SpO2: 99%

## 2025-07-03 NOTE — DISCHARGE NOTE PROVIDER - HOSPITAL COURSE
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 Pt was planned for outpt PET CT 7/2 but was admitted to [facility] on 7/1 with increasing abd pain; CT a/p showed mild fat stranding and free fluid around the pancreaticoduodenal groove, possibly representing acute interstitial pancreatitiswith reactive duodenitis, lipase wnl; MRI total spine showing T12 compression fx as well as extensive progression of osseous mets. NSGY recc no surgical intervention. Rad Onc plan for outpatient eval for consideration fo palliative T-spine XRT. Pain controlled with Tylenol. Pt had course c/b Afib w/ RVR in ED which resolved s/p IV lopressor x1, continuing home PO metop and Diltiazem.     Met lung adenoca with new osseous/vertebral  mets  Pt being considered for 2L Tarlatamab as well as outpt palliative RT to T12 compression fx  Pt to continue outpt fu with Dr. Ferris after discharge on 7/8  Long term prognosis: guarded; pt is full code    Abd pain   Transient acute pancreatitis suspected, however, lipase wnl on admission  Referred pain from compression fx also on the differential  Pt known to [facility] (Dr. Sanju Velasquez)  Continuing Tylenol 650mg q6/prn for pain control    T8 path compression fx  No role for surgical intervention as per NSGY  Appreciate Rad Onc consult: pt to be considered for palliative RT to T-spine as an outpt with Dr. Mcnamara on 7/8    pAfib with RVR  Hypercoag state  HTN, HLD  Continuing Metoprolol 25mg BID with holding parameters  Continuing Verapamil SR 240mg daily with holding parameters  Continuing Eliquis 50mg BID  Continuing ASA 81mg daily  Continuing Atorvastatin 20mg nightly    Leukocytosis  Likely 2/2 growth factor (Trilaciclib, Neulasta). 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 Pt was planned for outpt PET CT 7/2 but was admitted to [facility] on 7/1 with increasing abd pain; CT a/p showed mild fat stranding and free fluid around the pancreaticoduodenal groove, possibly representing acute interstitial pancreatitiswith reactive duodenitis, lipase wnl; MRI total spine showing T12 compression fx as well as extensive progression of osseous mets. NSGY recc no surgical intervention. Rad Onc plan for outpatient eval for consideration fo palliative T-spine XRT. Pain controlled with Tylenol. Pt had course c/b Afib w/ RVR in ED which resolved s/p IV lopressor x1, continuing home PO metop and Diltiazem.     Met lung adenoca with new osseous/vertebral  mets  Pt being considered for 2L Tarlatamab as well as outpt palliative RT to T12 compression fx  Pt to continue outpt fu with Dr. Ferris after discharge on 7/8  Long term prognosis: guarded; pt is full code    Abd pain   Transient acute pancreatitis suspected, however, lipase wnl on admission  Referred pain from compression fx also on the differential  Pt known to [facility] (Dr. Sanju Velasquez)  Continuing Tylenol 650mg q6/prn for pain control    T8 path compression fx  No role for surgical intervention as per NSGY  Appreciate Rad Onc consult: pt to be considered for palliative RT to T-spine as an outpt with Dr. Mcnamara on 7/8    pAfib with RVR  Hypercoag state  HTN, HLD  Continuing Metoprolol 25mg BID with holding parameters  Continuing Verapamil SR 240mg daily with holding parameters  Continuing Eliquis 50mg BID  Continuing ASA 81mg daily  Continuing Atorvastatin 20mg nightly    Leukocytosis  Likely 2/2 growth factor (Trilaciclib, Neulasta). last given on 6/28  Pt without other clinical s/s of infection  Ucx 7/1 growing GNR. Pt has prior hx of pan sensitive E.Coli. 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 Pt was planned for outpt PET CT 7/2 but was admitted to [facility] on 7/1 with increasing abd pain; CT a/p showed mild fat stranding and free fluid around the pancreaticoduodenal groove, possibly representing acute interstitial pancreatitiswith reactive duodenitis; MRI total spine showing T12 compression fx as well as extensive progression of osseous mets.    NSGY recc no surgical intervention. Rad Onc plan for outpatient eval for consideration fo palliative T-spine XRT. Pain controlled with Tylenol. Pt had course c/b Afib w/ RVR in ED which resolved s/p IV lopressor x1, continuing home PO metop and Diltiazem.     Met lung adenoca with new osseous/vertebral  mets  Pt being considered for 2L Tarlatamab as well as outpt palliative RT to T12 compression fx  Pt to continue outpt fu with Dr. Ferris after discharge on 7/8  Long term prognosis: guarded; pt is full code    Abd pain   Transient acute pancreatitis suspected, however, lipase wnl on admission  Referred pain from compression fx also on the differential  Pt known to [facility] (Dr. Sanju Velasquez)  Continuing Tylenol 650mg q6/prn for pain control    T8 path compression fx  No role for surgical intervention as per NSGY  Appreciate Rad Onc consult: pt to be considered for palliative RT to T-spine as an outpt with Dr. Mcnamara on 7/8    pAfib with RVR  Hypercoag state  HTN, HLD  Continuing Metoprolol 25mg BID with holding parameters  Continuing Verapamil SR 240mg daily with holding parameters  Continuing Eliquis 50mg BID  Continuing ASA 81mg daily  Continuing Atorvastatin 20mg nightly    UTI  Leukocytosis  Likely 2/2 growth factor (Trilaciclib, Neulasta). last given on 6/28  Pt without other clinical s/s of infection  Ucx 7/1 growing GNR. Pt has prior hx of pan sensitive E.Coli. Discharging on PO Cefpodoxime x 5 days    Relative anemia/thrombocytopenia 2/2 chemotherapy  Counts still nadiring on day of discharge but pt remained aysmptomatic

## 2025-07-03 NOTE — DISCHARGE NOTE PROVIDER - DETAILS OF MALNUTRITION DIAGNOSIS/DIAGNOSES
This patient has been assessed with a concern for Malnutrition and was treated during this hospitalization for the following Nutrition diagnosis/diagnoses:     -  07/03/2025: Severe protein-calorie malnutrition

## 2025-07-03 NOTE — DISCHARGE NOTE NURSING/CASE MANAGEMENT/SOCIAL WORK - PATIENT PORTAL LINK FT
You can access the FollowMyHealth Patient Portal offered by [facility] by registering at the following website: [email or web address]. By joining [employer]’s FollowMyHealth portal, you will also be able to view your health information using other applications (apps) compatible with our system.

## 2025-07-03 NOTE — DISCHARGE NOTE NURSING/CASE MANAGEMENT/SOCIAL WORK - FINANCIAL ASSISTANCE
[facility] provides services at a reduced cost to those who are determined to be eligible through [facility]’s financial assistance program. Information regarding [facility]’s financial assistance program can be found by going to [email or web address] or by calling [phone].

## 2025-07-03 NOTE — DISCHARGE NOTE NURSING/CASE MANAGEMENT/SOCIAL WORK - NSDCPEFALRISK_GEN_ALL_CORE
For information on Fall & Injury Prevention, visit: [email or web address] OR  [email or web address] OR  https://www.cdc.gov/steadi/patient.html

## 2025-07-03 NOTE — CONSULT NOTE ADULT - PROBLEM SELECTOR RECOMMENDATION 5
Dispo: Home when medically stable     In the event of newly developing, evolving, or worsening symptoms, please contact the Palliative Medicine team via pager (if the patient is at [facility] #[phone] or if the patient is at [facility] #[phone]) The Geriatric and Palliative Medicine service has coverage 24 hours a day/ 7 days a week to provide medical recommendations regarding symptom management needs via telephone.

## 2025-07-03 NOTE — DISCHARGE NOTE PROVIDER - NSDCFUADDAPPT_GEN_ALL_CORE_FT
Please call [facility] at [phone] to schedule an appointment a swallow evaluation.  [address] [phone]

## 2025-07-03 NOTE — DISCHARGE NOTE NURSING/CASE MANAGEMENT/SOCIAL WORK - NSDCFUADDAPPT_GEN_ALL_CORE_FT
Please call [facility] at [phone] to schedule an appointment a swallow evaluation.  [address] [phone]

## 2025-07-03 NOTE — DISCHARGE NOTE PROVIDER - NSDCDCMDCOMP_GEN_ALL_CORE
Chief Complaint   Patient presents with     Pain     pt here for right ankle pain for 2 months and low back pain for 1.5 months       Nguyen cShmid CMA, EMT at 12:34 PM on 2/21/2020.    This document is complete and the patient is ready for discharge.

## 2025-07-03 NOTE — DISCHARGE NOTE PROVIDER - CARE PROVIDER_API CALL
Parish Ferris.  Hematology & Oncology  [address] [place] [address]  Phone: [phone]  Fax: [phone]  Scheduled Appointment: 07/08/2025    Chris Mcnamara  Radiation Oncology  [address] [place] [address], [facility]- [place] [address]  Phone: [phone]  Fax: [phone]  Scheduled Appointment: 07/08/2025

## 2025-07-03 NOTE — CONSULT NOTE ADULT - PROBLEM SELECTOR RECOMMENDATION 3
Home regimen: oxycodone PRNs and medical cannabis   Outpatient pallcare- Dr. Adeola Knott     Recommendations:   > can continue tylenol prn mild pain  > multimodal pain control: lidocaine patch   > outpatient f/u with Dr. Mcnamara  > bowel regimen prn while on opioids

## 2025-07-03 NOTE — PHYSICAL THERAPY INITIAL EVALUATION ADULT - PERTINENT HX OF CURRENT PROBLEM, REHAB EVAL
84F with PMH lung cancer with metastases on chemotherapy, COPD, Afib on Eliquis who presented to the ED initially for abdominal pain x 1 day, incidentally found on CT scan to have new thoracic compression fractures now admitted for MRI eval. Patient initially noted to have L sided abdominal pain w/ radiation to her back with associated nausea and vomiting and came to the ED for evaluation. Per outpt review patient currently undergoing chemotherapy/immunotherapy at [facility], also follows with palliative care and rad onc.

## 2025-07-03 NOTE — DISCHARGE NOTE PROVIDER - NSDCCPCAREPLAN_GEN_ALL_CORE_FT
PRINCIPAL DISCHARGE DIAGNOSIS  Diagnosis: Abdominal pain  Assessment and Plan of Treatment: You presented with abdominal pain a/w nausea and vomiting. CT scan showed inflammation of the pancreas, you received IV fluids with improvemnt. Labs resolved. Continue tylenol as needed for pain      SECONDARY DISCHARGE DIAGNOSES  Diagnosis: Compression fracture of thoracic vertebra  Assessment and Plan of Treatment: MRI spine showed compression fractures on the mid back and new spine lesions concerning for progression of disease. Follow up with Dr. Mcnamara (Radiation Oncology) team on 7/8.    Diagnosis: Stage IV adenocarcinoma of lung  Assessment and Plan of Treatment: Follow up with Dr. Ferris on 7/8 for next steps in treatment plan.    Diagnosis: Atrial fibrillation with RVR  Assessment and Plan of Treatment: continue home diltiazem and continue metoprolol as intructed for afib.     PRINCIPAL DISCHARGE DIAGNOSIS  Diagnosis: Abdominal pain  Assessment and Plan of Treatment: You presented with abdominal pain a/w nausea and vomiting. CT scan showed inflammation of the pancreas, you received IV fluids with improvemnt. Labs resolved. Continue tylenol as needed for pain      SECONDARY DISCHARGE DIAGNOSES  Diagnosis: Compression fracture of thoracic vertebra  Assessment and Plan of Treatment: MRI spine showed compression fractures on the mid back and new spine lesions concerning for progression of disease. Follow up with Dr. Mcnamara (Radiation Oncology) team on 7/8.    Diagnosis: Stage IV adenocarcinoma of lung  Assessment and Plan of Treatment: Follow up with Dr. Ferris on 7/8 for next steps in treatment plan.    Diagnosis: Atrial fibrillation with RVR  Assessment and Plan of Treatment: continue home diltiazem and continue metoprolol as intructed for afib.    Diagnosis: Acute UTI  Assessment and Plan of Treatment: urine culture growing bacteria, you are being discharged on 5 days of antibiotics (cefpodoxime) given immunocomprommised status

## 2025-07-03 NOTE — DISCHARGE NOTE PROVIDER - NSDCMRMEDTOKEN_GEN_ALL_CORE_FT
aspirin 81 mg oral delayed release tablet: 1 tab(s) orally once a day  atorvastatin 20 mg oral tablet: 1 tab(s) orally once a day  Eliquis 5 mg oral tablet: 1 tab(s) orally 2 times a day  metoclopramide 10 mg oral tablet: 1 tab(s) orally every 6 hours as needed for  nausea  pantoprazole 40 mg oral delayed release tablet: 1 tab(s) orally once a day  potassium chloride 20 mEq oral tablet, extended release: 2 tab(s) orally once a day as directed  Spiriva HandiHaler 18 mcg inhalation capsule: 1 cap(s) inhaled once a day  Symbicort 160 mcg-4.5 mcg/inh inhalation aerosol: 2 puff(s) inhaled 2 times a day (filled 3/27/25 for 30 days)  Tylenol Extra Strength 500 mg oral tablet: 2 tab(s) orally every 6 hours as needed for  moderate pain  verapamil 240 mg/24 hours oral capsule, extended release: 1 cap(s) orally once a day   aspirin 81 mg oral delayed release tablet: 1 tab(s) orally once a day  atorvastatin 20 mg oral tablet: 1 tab(s) orally once a day  Eliquis 5 mg oral tablet: 1 tab(s) orally 2 times a day  metoclopramide 10 mg oral tablet: 1 tab(s) orally every 6 hours as needed for  nausea  metoprolol tartrate 25 mg oral tablet: 1 tab(s) orally 2 times a day  pantoprazole 40 mg oral delayed release tablet: 1 tab(s) orally once a day  potassium chloride 20 mEq oral tablet, extended release: 2 tab(s) orally once a day as directed  Spiriva HandiHaler 18 mcg inhalation capsule: 1 cap(s) inhaled once a day  Symbicort 160 mcg-4.5 mcg/inh inhalation aerosol: 2 puff(s) inhaled 2 times a day (filled 3/27/25 for 30 days)  Tylenol Extra Strength 500 mg oral tablet: 2 tab(s) orally every 6 hours as needed for  moderate pain  verapamil 240 mg/24 hours oral capsule, extended release: 1 cap(s) orally once a day   aspirin 81 mg oral delayed release tablet: 1 tab(s) orally once a day  atorvastatin 20 mg oral tablet: 1 tab(s) orally once a day  cefpodoxime 200 mg oral tablet: 1 tab(s) orally 2 times a day for UTI  Eliquis 5 mg oral tablet: 1 tab(s) orally 2 times a day  metoclopramide 10 mg oral tablet: 1 tab(s) orally every 6 hours as needed for  nausea  metoprolol tartrate 25 mg oral tablet: 1 tab(s) orally 2 times a day  pantoprazole 40 mg oral delayed release tablet: 1 tab(s) orally once a day  potassium chloride 20 mEq oral tablet, extended release: 2 tab(s) orally once a day as directed  Spiriva HandiHaler 18 mcg inhalation capsule: 1 cap(s) inhaled once a day  Symbicort 160 mcg-4.5 mcg/inh inhalation aerosol: 2 puff(s) inhaled 2 times a day (filled 3/27/25 for 30 days)  Tylenol Extra Strength 500 mg oral tablet: 2 tab(s) orally every 6 hours as needed for  moderate pain  verapamil 240 mg/24 hours oral capsule, extended release: 1 cap(s) orally once a day

## 2025-07-03 NOTE — DIETITIAN INITIAL EVALUATION ADULT - ORAL INTAKE PTA/DIET HISTORY
Patient reports no known food allergies or food intolerances. Nutrition supplementation at home includes Ensure on occasion. Patient reports swallowing difficulty with thin liquids. Reports a very poor appetite for "a long time." Describes consuming at least one meal daily, and often "throws up food." Likely meeting <75% estimated energy needs as a result.

## 2025-07-03 NOTE — DIETITIAN INITIAL EVALUATION ADULT - REASON INDICATOR FOR ASSESSMENT
Nutrition Risk Screen for Oncology    Per chart, patient is an 84y Female with PMH lung SCC (dx 3/2023) s/p R VATS wedge resection with POD to liver/bone/R hilar adenopathy/brain who presented to [facility] with nausea, vomiting, and abdominal pain. Found to have acute interstitial pancreatitis.

## 2025-07-03 NOTE — DIETITIAN INITIAL EVALUATION ADULT - PERTINENT MEDS FT
MEDICATIONS  (STANDING):  apixaban 5 milliGRAM(s) Oral two times a day  aspirin enteric coated 81 milliGRAM(s) Oral daily  atorvastatin 20 milliGRAM(s) Oral at bedtime  fluticasone propionate/ salmeterol 250-50 MICROgram(s) Diskus 1 Dose(s) Inhalation two times a day  metoprolol tartrate 25 milliGRAM(s) Oral two times a day  sodium chloride 0.9%. 1000 milliLiter(s) (75 mL/Hr) IV Continuous <Continuous>  verapamil  milliGRAM(s) Oral daily    MEDICATIONS  (PRN):  acetaminophen     Tablet .. 650 milliGRAM(s) Oral every 6 hours PRN Temp greater or equal to 38C (100.4F), Mild Pain (1 - 3)

## 2025-07-03 NOTE — DIETITIAN NUTRITION RISK NOTIFICATION - ADDITIONAL COMMENTS/DIETITIAN RECOMMENDATIONS
Please see Dietitian Initial Assessment for complete recommendations.    Berta Engel MS RDN CDN  On [employer] Teams (Preferred), Pager #[phone]

## 2025-07-04 LAB
-  AMOXICILLIN/CLAVULANIC ACID: SIGNIFICANT CHANGE UP
-  AMPICILLIN/SULBACTAM: SIGNIFICANT CHANGE UP
-  AMPICILLIN: SIGNIFICANT CHANGE UP
-  AZTREONAM: SIGNIFICANT CHANGE UP
-  CEFAZOLIN: SIGNIFICANT CHANGE UP
-  CEFEPIME: SIGNIFICANT CHANGE UP
-  CEFOXITIN: SIGNIFICANT CHANGE UP
-  CEFTRIAXONE: SIGNIFICANT CHANGE UP
-  CIPROFLOXACIN: SIGNIFICANT CHANGE UP
-  ERTAPENEM: SIGNIFICANT CHANGE UP
-  GENTAMICIN: SIGNIFICANT CHANGE UP
-  IMIPENEM: SIGNIFICANT CHANGE UP
-  LEVOFLOXACIN: SIGNIFICANT CHANGE UP
-  MEROPENEM: SIGNIFICANT CHANGE UP
-  NITROFURANTOIN: SIGNIFICANT CHANGE UP
-  PIPERACILLIN/TAZOBACTAM: SIGNIFICANT CHANGE UP
-  TIGECYCLINE: SIGNIFICANT CHANGE UP
-  TOBRAMYCIN: SIGNIFICANT CHANGE UP
-  TRIMETHOPRIM/SULFAMETHOXAZOLE: SIGNIFICANT CHANGE UP
CULTURE RESULTS: ABNORMAL
METHOD TYPE: SIGNIFICANT CHANGE UP
ORGANISM # SPEC MICROSCOPIC CNT: ABNORMAL
ORGANISM # SPEC MICROSCOPIC CNT: ABNORMAL
SPECIMEN SOURCE: SIGNIFICANT CHANGE UP

## 2025-07-06 LAB
CULTURE RESULTS: SIGNIFICANT CHANGE UP
CULTURE RESULTS: SIGNIFICANT CHANGE UP
SPECIMEN SOURCE: SIGNIFICANT CHANGE UP
SPECIMEN SOURCE: SIGNIFICANT CHANGE UP

## 2025-07-08 ENCOUNTER — APPOINTMENT (OUTPATIENT)
Dept: INFUSION THERAPY | Facility: HOSPITAL | Age: 85
End: 2025-07-08

## 2025-07-08 ENCOUNTER — APPOINTMENT (OUTPATIENT)
Dept: HEMATOLOGY ONCOLOGY | Facility: CLINIC | Age: 85
End: 2025-07-08
Payer: MEDICARE

## 2025-07-08 ENCOUNTER — APPOINTMENT (OUTPATIENT)
Dept: RADIATION ONCOLOGY | Facility: CLINIC | Age: 85
End: 2025-07-08

## 2025-07-08 VITALS
DIASTOLIC BLOOD PRESSURE: 61 MMHG | OXYGEN SATURATION: 96 % | SYSTOLIC BLOOD PRESSURE: 88 MMHG | HEART RATE: 89 BPM | RESPIRATION RATE: 17 BRPM

## 2025-07-08 VITALS
WEIGHT: 124.56 LBS | BODY MASS INDEX: 21.38 KG/M2 | OXYGEN SATURATION: 96 % | HEART RATE: 84 BPM | TEMPERATURE: 98 F | RESPIRATION RATE: 17 BRPM

## 2025-07-08 PROCEDURE — 99214 OFFICE O/P EST MOD 30 MIN: CPT

## 2025-07-08 PROCEDURE — G2211 COMPLEX E/M VISIT ADD ON: CPT

## 2025-07-08 PROCEDURE — 99215 OFFICE O/P EST HI 40 MIN: CPT

## 2025-07-11 ENCOUNTER — NON-APPOINTMENT (OUTPATIENT)
Age: 85
End: 2025-07-11

## 2025-07-14 ENCOUNTER — INPATIENT (INPATIENT)
Facility: HOSPITAL | Age: 85
LOS: 0 days | Discharge: HOME | End: 2025-07-15
Attending: INTERNAL MEDICINE | Admitting: INTERNAL MEDICINE
Payer: MEDICARE

## 2025-07-14 VITALS
TEMPERATURE: 98 F | RESPIRATION RATE: 18 BRPM | OXYGEN SATURATION: 100 % | DIASTOLIC BLOOD PRESSURE: 60 MMHG | SYSTOLIC BLOOD PRESSURE: 107 MMHG | HEART RATE: 91 BPM

## 2025-07-14 DIAGNOSIS — Z96.641 PRESENCE OF RIGHT ARTIFICIAL HIP JOINT: Chronic | ICD-10-CM

## 2025-07-14 DIAGNOSIS — Z98.890 OTHER SPECIFIED POSTPROCEDURAL STATES: Chronic | ICD-10-CM

## 2025-07-14 DIAGNOSIS — Z98.49 CATARACT EXTRACTION STATUS, UNSPECIFIED EYE: Chronic | ICD-10-CM

## 2025-07-14 DIAGNOSIS — Z96.642 PRESENCE OF LEFT ARTIFICIAL HIP JOINT: Chronic | ICD-10-CM

## 2025-07-14 DIAGNOSIS — C34.91 MALIGNANT NEOPLASM OF UNSPECIFIED PART OF RIGHT BRONCHUS OR LUNG: ICD-10-CM

## 2025-07-14 DIAGNOSIS — Z95.5 PRESENCE OF CORONARY ANGIOPLASTY IMPLANT AND GRAFT: Chronic | ICD-10-CM

## 2025-07-14 LAB
ALBUMIN SERPL ELPH-MCNC: 3.3 G/DL — SIGNIFICANT CHANGE UP (ref 3.3–5)
ALP SERPL-CCNC: 110 U/L — SIGNIFICANT CHANGE UP (ref 40–120)
ALT FLD-CCNC: 8 U/L — SIGNIFICANT CHANGE UP (ref 4–33)
ANION GAP SERPL CALC-SCNC: 13 MMOL/L — SIGNIFICANT CHANGE UP (ref 7–14)
ANION GAP SERPL CALC-SCNC: 15 MMOL/L — HIGH (ref 7–14)
ANISOCYTOSIS BLD QL: SLIGHT — SIGNIFICANT CHANGE UP
AST SERPL-CCNC: 12 U/L — SIGNIFICANT CHANGE UP (ref 4–32)
BASOPHILS # BLD AUTO: 0.09 K/UL — SIGNIFICANT CHANGE UP (ref 0–0.2)
BASOPHILS # BLD MANUAL: 0.18 K/UL — SIGNIFICANT CHANGE UP (ref 0–0.2)
BASOPHILS NFR BLD AUTO: 0.9 % — SIGNIFICANT CHANGE UP (ref 0–2)
BASOPHILS NFR BLD MANUAL: 1.7 % — SIGNIFICANT CHANGE UP (ref 0–2)
BILIRUB SERPL-MCNC: 0.4 MG/DL — SIGNIFICANT CHANGE UP (ref 0.2–1.2)
BUN SERPL-MCNC: 11 MG/DL — SIGNIFICANT CHANGE UP (ref 7–23)
BUN SERPL-MCNC: 8 MG/DL — SIGNIFICANT CHANGE UP (ref 7–23)
CALCIUM SERPL-MCNC: 8 MG/DL — LOW (ref 8.4–10.5)
CALCIUM SERPL-MCNC: 8.1 MG/DL — LOW (ref 8.4–10.5)
CHLORIDE SERPL-SCNC: 103 MMOL/L — SIGNIFICANT CHANGE UP (ref 98–107)
CHLORIDE SERPL-SCNC: 106 MMOL/L — SIGNIFICANT CHANGE UP (ref 98–107)
CO2 SERPL-SCNC: 21 MMOL/L — LOW (ref 22–31)
CO2 SERPL-SCNC: 22 MMOL/L — SIGNIFICANT CHANGE UP (ref 22–31)
CREAT SERPL-MCNC: 0.59 MG/DL — SIGNIFICANT CHANGE UP (ref 0.5–1.3)
CREAT SERPL-MCNC: 0.67 MG/DL — SIGNIFICANT CHANGE UP (ref 0.5–1.3)
DACRYOCYTES BLD QL SMEAR: SLIGHT — SIGNIFICANT CHANGE UP
EGFR: 87 ML/MIN/1.73M2 — SIGNIFICANT CHANGE UP
EGFR: 87 ML/MIN/1.73M2 — SIGNIFICANT CHANGE UP
EGFR: 89 ML/MIN/1.73M2 — SIGNIFICANT CHANGE UP
EGFR: 89 ML/MIN/1.73M2 — SIGNIFICANT CHANGE UP
EOSINOPHIL # BLD AUTO: 0.21 K/UL — SIGNIFICANT CHANGE UP (ref 0–0.5)
EOSINOPHIL # BLD MANUAL: 0.08 K/UL — SIGNIFICANT CHANGE UP (ref 0–0.5)
EOSINOPHIL NFR BLD AUTO: 2 % — SIGNIFICANT CHANGE UP (ref 0–6)
EOSINOPHIL NFR BLD MANUAL: 0.8 % — SIGNIFICANT CHANGE UP (ref 0–6)
GLUCOSE SERPL-MCNC: 117 MG/DL — HIGH (ref 70–99)
GLUCOSE SERPL-MCNC: 225 MG/DL — HIGH (ref 70–99)
HCT VFR BLD CALC: 21.6 % — LOW (ref 34.5–45)
HGB BLD-MCNC: 7.3 G/DL — LOW (ref 11.5–15.5)
IMM GRANULOCYTES # BLD AUTO: 0.12 K/UL — HIGH (ref 0–0.07)
IMM GRANULOCYTES NFR BLD AUTO: 1.2 % — HIGH (ref 0–0.9)
LYMPHOCYTES # BLD AUTO: 0.51 K/UL — LOW (ref 1–3.3)
LYMPHOCYTES # BLD MANUAL: 0.35 K/UL — LOW (ref 1–3.3)
LYMPHOCYTES NFR BLD AUTO: 4.9 % — LOW (ref 13–44)
LYMPHOCYTES NFR BLD MANUAL: 3.4 % — LOW (ref 13–44)
MACROCYTES BLD QL: SLIGHT — SIGNIFICANT CHANGE UP
MAGNESIUM SERPL-MCNC: 1.2 MG/DL — LOW (ref 1.6–2.6)
MAGNESIUM SERPL-MCNC: 1.7 MG/DL — SIGNIFICANT CHANGE UP (ref 1.6–2.6)
MANUAL METAMYELOCYTE #: 0.08 K/UL — HIGH (ref 0–0)
MANUAL NEUTROPHIL BANDS #: 0.08 K/UL — SIGNIFICANT CHANGE UP (ref 0–0.84)
MANUAL NRBC #: 0.1 K/UL — HIGH (ref 0–0)
MCHC RBC-ENTMCNC: 33.3 PG — SIGNIFICANT CHANGE UP (ref 27–34)
MCHC RBC-ENTMCNC: 33.8 G/DL — SIGNIFICANT CHANGE UP (ref 32–36)
MCV RBC AUTO: 98.6 FL — SIGNIFICANT CHANGE UP (ref 80–100)
METAMYELOCYTES # FLD: 0.8 % — HIGH (ref 0–0)
METAMYELOCYTES NFR BLD: 0.8 % — HIGH (ref 0–0)
MONOCYTES # BLD AUTO: 1.68 K/UL — HIGH (ref 0–0.9)
MONOCYTES # BLD MANUAL: 0.95 K/UL — HIGH (ref 0–0.9)
MONOCYTES NFR BLD AUTO: 16.2 % — HIGH (ref 2–14)
MONOCYTES NFR BLD MANUAL: 9.2 % — SIGNIFICANT CHANGE UP (ref 2–14)
NEUTROPHILS # BLD AUTO: 7.73 K/UL — HIGH (ref 1.8–7.4)
NEUTROPHILS # BLD MANUAL: 8.61 K/UL — HIGH (ref 1.8–7.4)
NEUTROPHILS NFR BLD AUTO: 74.8 % — SIGNIFICANT CHANGE UP (ref 43–77)
NEUTROPHILS NFR BLD MANUAL: 83.3 % — HIGH (ref 43–77)
NEUTS BAND # BLD: 0.8 % — SIGNIFICANT CHANGE UP (ref 0–8)
NEUTS BAND NFR BLD: 0.8 % — SIGNIFICANT CHANGE UP (ref 0–8)
NRBC # BLD AUTO: 0 K/UL — SIGNIFICANT CHANGE UP (ref 0–0)
NRBC # BLD: 1 /100 WBCS — HIGH (ref 0–0)
NRBC # FLD: 0 K/UL — SIGNIFICANT CHANGE UP (ref 0–0)
NRBC BLD AUTO-RTO: 0 /100 WBCS — SIGNIFICANT CHANGE UP (ref 0–0)
NRBC BLD-RTO: 1 /100 WBCS — HIGH (ref 0–0)
OVALOCYTES BLD QL SMEAR: ABNORMAL
PHOSPHATE SERPL-MCNC: 2.6 MG/DL — SIGNIFICANT CHANGE UP (ref 2.5–4.5)
PHOSPHATE SERPL-MCNC: 3.1 MG/DL — SIGNIFICANT CHANGE UP (ref 2.5–4.5)
PLAT MORPH BLD: ABNORMAL
PLATELET # BLD AUTO: 173 K/UL — SIGNIFICANT CHANGE UP (ref 150–400)
PLATELET COUNT - ESTIMATE: NORMAL — SIGNIFICANT CHANGE UP
PMV BLD: 11.6 FL — SIGNIFICANT CHANGE UP (ref 7–13)
POIKILOCYTOSIS BLD QL AUTO: ABNORMAL
POLYCHROMASIA BLD QL SMEAR: ABNORMAL
POTASSIUM SERPL-MCNC: 2.7 MMOL/L — CRITICAL LOW (ref 3.5–5.3)
POTASSIUM SERPL-MCNC: 3.9 MMOL/L — SIGNIFICANT CHANGE UP (ref 3.5–5.3)
POTASSIUM SERPL-SCNC: 2.7 MMOL/L — CRITICAL LOW (ref 3.5–5.3)
POTASSIUM SERPL-SCNC: 3.9 MMOL/L — SIGNIFICANT CHANGE UP (ref 3.5–5.3)
PROT SERPL-MCNC: 5.3 G/DL — LOW (ref 6–8.3)
RBC # BLD: 2.19 M/UL — LOW (ref 3.8–5.2)
RBC # FLD: 20.4 % — HIGH (ref 10.3–14.5)
RBC BLD AUTO: ABNORMAL
SMUDGE CELLS # BLD: PRESENT
SODIUM SERPL-SCNC: 140 MMOL/L — SIGNIFICANT CHANGE UP (ref 135–145)
SODIUM SERPL-SCNC: 140 MMOL/L — SIGNIFICANT CHANGE UP (ref 135–145)
WBC # BLD: 10.34 K/UL — SIGNIFICANT CHANGE UP (ref 3.8–10.5)
WBC # FLD AUTO: 10.34 K/UL — SIGNIFICANT CHANGE UP (ref 3.8–10.5)

## 2025-07-14 PROCEDURE — 99223 1ST HOSP IP/OBS HIGH 75: CPT

## 2025-07-14 RX ORDER — TARLATAMAB-DLLE 1 MG
1 KIT INTRAVENOUS ONCE
Refills: 0 | Status: COMPLETED | OUTPATIENT
Start: 2025-07-14 | End: 2025-07-14

## 2025-07-14 RX ORDER — IPRATROPIUM BROMIDE AND ALBUTEROL SULFATE .5; 2.5 MG/3ML; MG/3ML
3 SOLUTION RESPIRATORY (INHALATION) EVERY 6 HOURS
Refills: 0 | Status: DISCONTINUED | OUTPATIENT
Start: 2025-07-14 | End: 2025-07-15

## 2025-07-14 RX ORDER — DEXAMETHASONE 0.5 MG/1
8 TABLET ORAL ONCE
Refills: 0 | Status: COMPLETED | OUTPATIENT
Start: 2025-07-14 | End: 2025-07-14

## 2025-07-14 RX ORDER — METOCLOPRAMIDE HCL 10 MG
10 TABLET ORAL EVERY 6 HOURS
Refills: 0 | Status: DISCONTINUED | OUTPATIENT
Start: 2025-07-14 | End: 2025-07-15

## 2025-07-14 RX ORDER — METOPROLOL SUCCINATE 50 MG/1
25 TABLET, EXTENDED RELEASE ORAL
Refills: 0 | Status: DISCONTINUED | OUTPATIENT
Start: 2025-07-14 | End: 2025-07-15

## 2025-07-14 RX ORDER — ASPIRIN 325 MG
81 TABLET ORAL AT BEDTIME
Refills: 0 | Status: DISCONTINUED | OUTPATIENT
Start: 2025-07-14 | End: 2025-07-15

## 2025-07-14 RX ORDER — MAGNESIUM SULFATE 500 MG/ML
2 SYRINGE (ML) INJECTION ONCE
Refills: 0 | Status: COMPLETED | OUTPATIENT
Start: 2025-07-14 | End: 2025-07-14

## 2025-07-14 RX ORDER — APIXABAN 5 MG/1
5 TABLET, FILM COATED ORAL EVERY 12 HOURS
Refills: 0 | Status: DISCONTINUED | OUTPATIENT
Start: 2025-07-14 | End: 2025-07-15

## 2025-07-14 RX ADMIN — Medication 100 MILLIEQUIVALENT(S): at 10:26

## 2025-07-14 RX ADMIN — APIXABAN 5 MILLIGRAM(S): 5 TABLET, FILM COATED ORAL at 10:13

## 2025-07-14 RX ADMIN — Medication 100 MILLIEQUIVALENT(S): at 11:31

## 2025-07-14 RX ADMIN — APIXABAN 5 MILLIGRAM(S): 5 TABLET, FILM COATED ORAL at 22:08

## 2025-07-14 RX ADMIN — Medication 250 MILLILITER(S): at 16:16

## 2025-07-14 RX ADMIN — Medication 25 GRAM(S): at 10:26

## 2025-07-14 RX ADMIN — Medication 240 MILLIGRAM(S): at 16:30

## 2025-07-14 RX ADMIN — DEXAMETHASONE 101.6 MILLIGRAM(S): 0.5 TABLET ORAL at 13:59

## 2025-07-14 RX ADMIN — Medication 1 APPLICATION(S): at 15:03

## 2025-07-14 RX ADMIN — METOPROLOL SUCCINATE 25 MILLIGRAM(S): 50 TABLET, EXTENDED RELEASE ORAL at 16:30

## 2025-07-14 RX ADMIN — TARLATAMAB-DLLE 1 MILLIGRAM(S): KIT at 15:01

## 2025-07-14 RX ADMIN — Medication 81 MILLIGRAM(S): at 22:08

## 2025-07-14 RX ADMIN — Medication 100 MILLIEQUIVALENT(S): at 12:32

## 2025-07-14 NOTE — PROVIDER CONTACT NOTE (OTHER) - REASON
Tele notify RN pt HR dropped to 38 on tele, non sustained, in afib ranging between 42-90
Pt BP 90/54

## 2025-07-14 NOTE — PROVIDER CONTACT NOTE (OTHER) - ASSESSMENT
Tele notify RN pt HR dropped to 38 on tele, non sustained, in afib ranging between 42-90. denues chest pain and palpations, /60
Pt BP 90/54. Other vitals stable. Denies dizziness, headache, heart palpation or feelings of fainting

## 2025-07-14 NOTE — PATIENT PROFILE ADULT - NSPROMEDSADMININFO_GEN_A_NUR
PHYSICIAN PROGRESS NOTE      Subjective    No acute distress  Awaiting bronchoscopy    Objective  Physical Exam  Vitals and nursing note reviewed.   Constitutional:       General: He is not in acute distress.  HENT:      Head: Normocephalic and atraumatic.   Eyes:      General:         Right eye: No discharge.         Left eye: No discharge.      Conjunctiva/sclera: Conjunctivae normal.   Cardiovascular:      Rate and Rhythm: Normal rate and regular rhythm.      Heart sounds: No murmur heard.    No friction rub.   Pulmonary:      Effort: Pulmonary effort is normal. No respiratory distress.      Breath sounds: No wheezing or rales.   Abdominal:      General: Bowel sounds are normal.      Palpations: Abdomen is soft.      Tenderness: There is no abdominal tenderness. There is no guarding or rebound.   Musculoskeletal:         General: No tenderness or deformity. Normal range of motion.   Skin:     Findings: No rash.   Neurological:      Mental Status: He is alert and oriented to person, place, and time.   Psychiatric:         Mood and Affect: Affect normal.          Last Recorded Vitals  Blood pressure (!) 130/101, pulse 84, temperature 97.6 °F (36.4 °C), temperature source Temporal, resp. rate (!) 23, height 6' (1.829 m), weight 72.6 kg (160 lb 0.9 oz), SpO2 100 %.  Body mass index is 21.71 kg/m².    Labs     Recent Results (from the past 24 hour(s))   GLUCOSE, BEDSIDE - POINT OF CARE    Collection Time: 11/09/22  3:09 PM   Result Value Ref Range    GLUCOSE, BEDSIDE - POINT OF CARE 194 (H) 70 - 99 mg/dL   Basic Metabolic Panel    Collection Time: 11/09/22  3:44 PM   Result Value Ref Range    Fasting Status      Sodium 129 (L) 135 - 145 mmol/L    Potassium 5.8 (H) 3.4 - 5.1 mmol/L    Chloride 100 97 - 110 mmol/L    Carbon Dioxide 20 (L) 21 - 32 mmol/L    Anion Gap 15 7 - 19 mmol/L    Glucose 221 (H) 70 - 99 mg/dL    BUN 24 (H) 6 - 20 mg/dL    Creatinine 1.43 (H) 0.67 - 1.17 mg/dL    Glomerular Filtration Rate 56 (L)  >=60    BUN/ Creatinine Ratio 17 7 - 25    Calcium 8.5 8.4 - 10.2 mg/dL   GLUCOSE, BEDSIDE - POINT OF CARE    Collection Time: 11/09/22  8:13 PM   Result Value Ref Range    GLUCOSE, BEDSIDE - POINT OF CARE 155 (H) 70 - 99 mg/dL   Osmolality    Collection Time: 11/09/22  8:20 PM   Result Value Ref Range    Osmolality 289 275 - 300 mOsm/kg   Magnesium    Collection Time: 11/10/22  5:09 AM   Result Value Ref Range    Magnesium 1.8 1.7 - 2.4 mg/dL   Comprehensive Metabolic Panel    Collection Time: 11/10/22  5:09 AM   Result Value Ref Range    Fasting Status      Sodium 131 (L) 135 - 145 mmol/L    Potassium 4.9 3.4 - 5.1 mmol/L    Chloride 99 97 - 110 mmol/L    Carbon Dioxide 22 21 - 32 mmol/L    Anion Gap 15 7 - 19 mmol/L    Glucose 84 70 - 99 mg/dL    BUN 19 6 - 20 mg/dL    Creatinine 1.36 (H) 0.67 - 1.17 mg/dL    Glomerular Filtration Rate 60 >=60    BUN/ Creatinine Ratio 14 7 - 25    Calcium 8.8 8.4 - 10.2 mg/dL    Bilirubin, Total 0.2 0.2 - 1.0 mg/dL    GOT/AST 23 <=37 Units/L    GPT/ALT 31 <64 Units/L    Alkaline Phosphatase 57 45 - 117 Units/L    Albumin 2.6 (L) 3.6 - 5.1 g/dL    Protein, Total 7.5 6.4 - 8.2 g/dL    Globulin 4.9 (H) 2.0 - 4.0 g/dL    A/G Ratio 0.5 (L) 1.0 - 2.4   Phosphorus    Collection Time: 11/10/22  5:09 AM   Result Value Ref Range    Phosphorus 3.9 2.4 - 4.7 mg/dL   CBC with Automated Differential (performable only)    Collection Time: 11/10/22  5:09 AM   Result Value Ref Range    WBC 11.6 (H) 4.2 - 11.0 K/mcL    RBC 3.80 (L) 4.50 - 5.90 mil/mcL    HGB 11.0 (L) 13.0 - 17.0 g/dL    HCT 31.9 (L) 39.0 - 51.0 %    MCV 83.9 78.0 - 100.0 fl    MCH 28.9 26.0 - 34.0 pg    MCHC 34.5 32.0 - 36.5 g/dL    RDW-CV 15.9 (H) 11.0 - 15.0 %    RDW-SD 48.2 39.0 - 50.0 fL     140 - 450 K/mcL    NRBC 0 <=0 /100 WBC    Neutrophil, Percent 84 %    Lymphocytes, Percent 3 %    Mono, Percent 12 %    Eosinophils, Percent 0 %    Basophils, Percent 0 %    Immature Granulocytes 1 %    Absolute Neutrophils 9.7 (H)  1.8 - 7.7 K/mcL    Absolute Lymphocytes 0.4 (L) 1.0 - 4.0 K/mcL    Absolute Monocytes 1.4 (H) 0.3 - 0.9 K/mcL    Absolute Eosinophils  0.0 0.0 - 0.5 K/mcL    Absolute Basophils 0.0 0.0 - 0.3 K/mcL    Absolute Immmature Granulocytes 0.1 0.0 - 0.2 K/mcL   Osmolality    Collection Time: 11/10/22  5:09 AM   Result Value Ref Range    Osmolality 285 275 - 300 mOsm/kg   GLUCOSE, BEDSIDE - POINT OF CARE    Collection Time: 11/10/22  5:29 AM   Result Value Ref Range    GLUCOSE, BEDSIDE - POINT OF CARE 115 (H) 70 - 99 mg/dL        I/O's    Intake/Output Summary (Last 24 hours) at 11/10/2022 1227  Last data filed at 11/10/2022 0900  Gross per 24 hour   Intake 500 ml   Output --   Net 500 ml       Imaging      Assessment & Plan    Acute hypoxemic respiratory failure  Atypical pneumonia  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 Walk test on dc   -- Pulmonology consulted. Case discussed with Dr. Chou  -- ID recs are appreciated. Case discussed with dr. Fernández   - recommend induced sputum    - continue empiric IV abx   - continue Bactrim  Plan for bronchoscopy tomorrow  Continue to wean O2 to maintain saturations above 92%  Will need walk test prior to discharge     Sepsis  Lactic acidosis   -- improving      Newly diagnosed HIV   -- HIV-1 antibody positive  -- ID on consult, appreciate recs  -- Screen for co-infections:              - Tuberculosis: collecting AFB sputum x 3. Initial quantiferon indeterminate, repeating              - viral hepatitis panel: negative              - syphilis: negative              - cryptococcus negative   -- suspect low CD4 count given likely PJP  pneumonia, leukopenia      Oral Thrush  Presumed candidal esophagitis  -- Patient noted to have oral thrush on admission as well as complaint of pain in his throat with swallowing  -- Treat with oral fluconazole 400 mg x 1 then 200 mg daily for presumed candidal esophagitis x 14 (EOT 11/9)      Hyperglycemia   --likely in setting of steroids  -- sliding scale insulin, accuchecks     Acute kidney injury Ruled Out    -- Improved   -- Monitor creatinine daily  -- Avoid nephrotoxins     Mild hyponatremia  Nephrology consulted for management  Na of 131 today  Monitor bmp closely     Normocytic Anemia  -- hemoglobin stable   -- no evidence of active bleeding     Elevated AST  -- elevated to 41 in 5/2022, now in 60s, other liver studies normal   -- CT abdomen demonstrated diffuse hepatic steatosis, likely NAFLD     Chronic Constipation  -- CT a/p on admission and f/u KUB with moderate stool burden  -- Start miralax BID, docusate/senna daily  -- lactulose x 1      Thrombocytopenia  -- stable      COPD  -- Not actively wheezing currently  -- Continue home controller inhaler  -- DuoNebs every 4 hours as needed     History of sarcoidosis  -- Continue home hydroxychloroquine     Chronic hypertension  -- Continue home clonidine 0.1 twice daily, amlodipine 10 mg daily  Resume PTA medications, titrate as indicated     Hyperlipidemia  -- Continue statin     History of CAD  -- Continue aspirin, statin    DVT Prophylaxis  DVT ppx: Heparin, SCDs    PCP: Dylan Buck MD    I have reviewed the chart and documentation and attest that this patient meets inpatient criteria.    Floresita Tinajero MD  11/10/2022    Patient Privacy Notice      The 21st Century Cures Act makes medical notes like these available to patients in the interest of transparency. Please be advised that this is a medical document. Medical documents are intended to carry relevant information and the clinical opinion of the practitioner. The medical note is intended as  medical provider to provider communication, and may appear blunt or direct. It is written in medical language, and may contain abbreviations or verbiage that are unfamiliar.     no concerns

## 2025-07-14 NOTE — H&P ADULT - ASSESSMENT
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 Pt was planned for outpt PET CT 7/2 but was admitted to [facility] on 7/1-3 for mgmt of acute interstitial pancreatitis with reactive duodenitis; MRI total spine with incidental finding of T12 compression fx as well as extensive progression of osseous mets- pt to f/u with outpt Rad Onc (Dr. Mcnamara); her ED course during this hospitalization was also c/b pAfib with RVR for which pt was dced on Eliquis, Metoprolol, and Dilt.  Pt is now admitted for initiation 2L Tarlatamab- C1 (dose: 1mg); pt's admission labs are notable for hypomag, hypophos.    ACTIVE PROBLEMS  Met lung adenoca with new osseous mets  Encounter for antineoplastic immunotherapy  T8 path compression fx  pAfib with RVR  Hypercoag state  HTN, HLD  Hypomag, hypophos  Immunocompromised d/t cancer, cancer tx    Met lung adenoca with osseous/vertebral  mets  Encounter for antineoplastic immunotherapy  Will proceed with 2L Tarlatamab today- monitoring for CRS with plan to treat as per BiTE CRS protocol prn  Pt to continue outpt fu with Dr. Ferris after discharge  Long term prognosis: guarded; pt is full code    T8 path compression fx  No role for surgical intervention as per NSGY  Appreciate Rad Onc consult: pt to be considered for palliative RT to T-spine as an outpt    pAfib with RVR  Hypercoag state  HTN, HLD  Continuing Metoprolol 25mg BID with holding parameters  Continuing Verapamil SR 240mg daily with holding parameters  Continuing Eliquis 5mg BID  Continuing ASA 81mg daily  Continuing Atorvastatin 20mg nightly    Hypomagnesemia  Hypophosphatemia  Likely d/t poor dietary Mg intake  Pt is asymptomatic  Repletions ordered today (goal Mag 2.0, Phos 3.0)  Will fu pm bmp

## 2025-07-14 NOTE — H&P ADULT - NSHPLABSRESULTS_GEN_ALL_CORE
7.3                  140  | 22   | 11           10.34 >-----------< 173     ------------------------< 117                        21.6                 2.7  | 103  | 0.67                                         Ca 8.0   Mg 1.20  Ph 3.1        MICROBIOLOGY  Abx: none    Cx Data: none      HISTORICAL RADIOLOGY  7/1 MRI Total spine: Mild compression deformity of the superior endplate of T12, stable since   5/6/2025. Scattered enhancing metastatic lesions throughout the spine with thoracic   lesions that are new since 4/1/2025. Some of the cervical spine lesions are new while others have increased in size.  6/30 CTA Chest: No PE. Emphysema.   6/30 CTAPL no bowel obstruction. Mild fat stranding and free fluid around the pancreaticoduodenal groove, possibly representing acute interstitial pancreatitiswith reactive duodenitis. Stable metastatic disease  6/30 CXR: No acute radiographic findings and no changed

## 2025-07-14 NOTE — H&P ADULT - HISTORY OF PRESENT ILLNESS
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 Pt was planned for outpt PET CT 7/2 but was admitted to [facility] on 7/1-3 for mgmt of acute interstitial pancreatitis with reactive duodenitis; MRI total spine with incidental finding of T12 compression fx as well as extensive progression of osseous mets- pt to f/u with outpt Rad Onc (Dr. Mcnamara); her ED course during this hospitalization was also c/b pAfib with RVR for which pt was dced on Eliquis, Metoprolol, and Dilt.  Pt is now admitted for initiation 2L Tarlatamab- C1 (dose: 1mg). She reported feeling well and had no complaints. She denied malaise, generalized weakness, fever, dyspnea, chest pain/palpitations, n/v/d/c (confirmed she had a bm this am), dysuria, HA, vision changes.

## 2025-07-14 NOTE — PATIENT PROFILE ADULT - FALL HARM RISK - HARM RISK INTERVENTIONS

## 2025-07-14 NOTE — H&P ADULT - NSHPPHYSICALEXAM_GEN_ALL_CORE
Vital Signs Last 24 Hrs  T(C): 36.6 (14 Jul 2025 12:23), Max: 36.6 (14 Jul 2025 12:23)  T(F): 97.8 (14 Jul 2025 12:23), Max: 97.8 (14 Jul 2025 12:23)  HR: 85 (14 Jul 2025 12:23) (82 - 91)  BP: 90/54 (14 Jul 2025 12:23) (90/54 - 107/60)  RR: 18 (14 Jul 2025 12:23) (18 - 18)  SpO2: 100% (14 Jul 2025 12:23) (100% - 100%)  Parameters below as of 14 Jul 2025 12:23  Patient On (Oxygen Delivery Method): room air  Elderly, non-toxic-appearing woman, laying in bed  Answering all questions appropriately and following commands  L eye strabismus present, no scleral icterus; pt is adentulous, no oral thrush  Regular rate, irregular rhythm, no m/r/g  Trace RLB insp crackles, no w/c in L lung zones  Abd tender on palpation of epigastrum (pain slightly oop); no rebound, guarding; normoactive bowel sounds  No peripheral edema  CN 2-12 grossly intact; no gross focal neuro deficits; 3/5 strength and normal ROM in b/l LEs

## 2025-07-14 NOTE — H&P ADULT - NSICDXPASTMEDICALHX_GEN_ALL_CORE_FT
PAST MEDICAL HISTORY:  Afib x 15 yrs --on Coumadin  attempted cardioversion 13 yrs ago--failed    Asthma     CAD (coronary artery disease)     CHF (congestive heart failure)     Coronary Stent to RCA, LAD, and DIAG 9/25/06 at [facility]    Emphysema/COPD     GERD (Gastroesophageal Reflux Disease)     H/O: CVA pt unaware of CVA, yet showed up on CT of head as old CVA    Hypercholesterolemia     Lip Cancer resected 6 yrs ago (no chemo/rad)    Lung nodule     Melanoma     Nodule of upper lobe of right lung     MARIE (obstructive sleep apnea)     PUD (peptic ulcer disease)     PVD (peripheral vascular disease)     Skin Cancer of Face removed 1 yr ago    Skin Cancer of Nose 1 yr ago- removed    Smoker

## 2025-07-14 NOTE — PROVIDER CONTACT NOTE (OTHER) - SITUATION
Pt BP 90/54
Tele notify RN pt HR dropped to 38 on tele, non sustained, in afib ranging between 42-90
Quinolones Counseling:  I discussed with the patient the risks of fluoroquinolones including but not limited to GI upset, allergic reaction, drug rash, diarrhea, dizziness, photosensitivity, yeast infections, liver function test abnormalities, tendonitis/tendon rupture.

## 2025-07-15 ENCOUNTER — TRANSCRIPTION ENCOUNTER (OUTPATIENT)
Age: 85
End: 2025-07-15

## 2025-07-15 VITALS — WEIGHT: 125.66 LBS

## 2025-07-15 LAB
ADD ON TEST-SPECIMEN IN LAB: SIGNIFICANT CHANGE UP
ALBUMIN SERPL ELPH-MCNC: 3.4 G/DL — SIGNIFICANT CHANGE UP (ref 3.3–5)
ALP SERPL-CCNC: 109 U/L — SIGNIFICANT CHANGE UP (ref 40–120)
ALT FLD-CCNC: 8 U/L — SIGNIFICANT CHANGE UP (ref 4–33)
ANION GAP SERPL CALC-SCNC: 10 MMOL/L — SIGNIFICANT CHANGE UP (ref 7–14)
AST SERPL-CCNC: 10 U/L — SIGNIFICANT CHANGE UP (ref 4–32)
BILIRUB DIRECT SERPL-MCNC: <0.2 MG/DL — SIGNIFICANT CHANGE UP (ref 0–0.3)
BILIRUB INDIRECT FLD-MCNC: >0.1 MG/DL — SIGNIFICANT CHANGE UP (ref 0–1)
BILIRUB SERPL-MCNC: 0.3 MG/DL — SIGNIFICANT CHANGE UP (ref 0.2–1.2)
BUN SERPL-MCNC: 7 MG/DL — SIGNIFICANT CHANGE UP (ref 7–23)
CALCIUM SERPL-MCNC: 8.9 MG/DL — SIGNIFICANT CHANGE UP (ref 8.4–10.5)
CHLORIDE SERPL-SCNC: 109 MMOL/L — HIGH (ref 98–107)
CO2 SERPL-SCNC: 22 MMOL/L — SIGNIFICANT CHANGE UP (ref 22–31)
CREAT SERPL-MCNC: 0.56 MG/DL — SIGNIFICANT CHANGE UP (ref 0.5–1.3)
EGFR: 90 ML/MIN/1.73M2 — SIGNIFICANT CHANGE UP
EGFR: 90 ML/MIN/1.73M2 — SIGNIFICANT CHANGE UP
GLUCOSE SERPL-MCNC: 171 MG/DL — HIGH (ref 70–99)
HAV IGM SER-ACNC: SIGNIFICANT CHANGE UP
HBV CORE IGM SER-ACNC: SIGNIFICANT CHANGE UP
HBV SURFACE AG SER-ACNC: SIGNIFICANT CHANGE UP
HCT VFR BLD CALC: 22.8 % — LOW (ref 34.5–45)
HCV AB S/CO SERPL IA: 0.07 S/CO — SIGNIFICANT CHANGE UP (ref 0–0.79)
HCV AB SERPL-IMP: SIGNIFICANT CHANGE UP
HGB BLD-MCNC: 7.5 G/DL — LOW (ref 11.5–15.5)
MAGNESIUM SERPL-MCNC: 1.8 MG/DL — SIGNIFICANT CHANGE UP (ref 1.6–2.6)
MCHC RBC-ENTMCNC: 32.9 G/DL — SIGNIFICANT CHANGE UP (ref 32–36)
MCHC RBC-ENTMCNC: 33.3 PG — SIGNIFICANT CHANGE UP (ref 27–34)
MCV RBC AUTO: 101.3 FL — HIGH (ref 80–100)
NRBC # BLD AUTO: 0.02 K/UL — HIGH (ref 0–0)
NRBC # FLD: 0.02 K/UL — HIGH (ref 0–0)
NRBC BLD AUTO-RTO: 0 /100 WBCS — SIGNIFICANT CHANGE UP (ref 0–0)
PHOSPHATE SERPL-MCNC: 2.6 MG/DL — SIGNIFICANT CHANGE UP (ref 2.5–4.5)
PLATELET # BLD AUTO: 212 K/UL — SIGNIFICANT CHANGE UP (ref 150–400)
PMV BLD: 11.2 FL — SIGNIFICANT CHANGE UP (ref 7–13)
POTASSIUM SERPL-MCNC: 3.8 MMOL/L — SIGNIFICANT CHANGE UP (ref 3.5–5.3)
POTASSIUM SERPL-SCNC: 3.8 MMOL/L — SIGNIFICANT CHANGE UP (ref 3.5–5.3)
PROT SERPL-MCNC: 5.3 G/DL — LOW (ref 6–8.3)
RBC # BLD: 2.25 M/UL — LOW (ref 3.8–5.2)
RBC # FLD: 20.5 % — HIGH (ref 10.3–14.5)
SODIUM SERPL-SCNC: 141 MMOL/L — SIGNIFICANT CHANGE UP (ref 135–145)
WBC # BLD: 12.89 K/UL — HIGH (ref 3.8–10.5)
WBC # FLD AUTO: 12.89 K/UL — HIGH (ref 3.8–10.5)

## 2025-07-15 PROCEDURE — 99239 HOSP IP/OBS DSCHRG MGMT >30: CPT

## 2025-07-15 RX ORDER — ACETAMINOPHEN 500 MG/5ML
650 LIQUID (ML) ORAL ONCE
Refills: 0 | Status: COMPLETED | OUTPATIENT
Start: 2025-07-15 | End: 2025-07-15

## 2025-07-15 RX ADMIN — Medication 650 MILLIGRAM(S): at 09:45

## 2025-07-15 RX ADMIN — APIXABAN 5 MILLIGRAM(S): 5 TABLET, FILM COATED ORAL at 10:13

## 2025-07-15 RX ADMIN — Medication 1 APPLICATION(S): at 07:14

## 2025-07-15 NOTE — DISCHARGE NOTE PROVIDER - HOSPITAL COURSE
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 Pt was planned for outpt PET CT 7/2 but was admitted to [facility] on 7/1-3 for mgmt of acute interstitial pancreatitis with reactive duodenitis; MRI total spine with incidental finding of T12 compression fx as well as extensive progression of osseous mets- pt to f/u with outpt Rad Onc (Dr. Mcnamara); her ED course during this hospitalization was also c/b pAfib with RVR for which pt was dced on Eliquis, Metoprolol, and Dilt. Pt is now admitted for initiation 2L Tarlatamab- C1 (dose: 1mg); pt's admission labs are notable for hypomag, hypophos.      Met lung adenoca with osseous/vertebral  mets  Encounter for antineoplastic immunotherapy  S/p 2L Tarlatamab 7/14 and tolerated well without CRS events    Pt to continue outpt fu with Dr. Ferris after discharge  Long term prognosis: guarded; pt is full code    T8 path compression fx  No role for surgical intervention as per NSGY  Appreciate Rad Onc consult: pt to be considered for palliative RT to T-spine as an outpt    pAfib with RVR  Hypercoag state  HTN, HLD  Continuing Metoprolol 25mg BID with holding parameters  Continuing Verapamil SR 240mg daily with holding parameters  Continuing Eliquis 5mg BID  Continuing ASA 81mg daily  Continuing Atorvastatin 20mg nightly    Hypomagnesemia  Hypophosphatemia  Likely d/t poor dietary Mg intake  Pt is asymptomatic  Repleted prn     83 yo woman, former smoker (40py, quit 2022), with h/o HTN, HLD, COPD (on Advair), CAD with stents, PVD, h/o HFpEF, h/o skin cancer, and 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 Pt was planned for outpt PET CT 7/2 but was admitted to [facility] on 7/1-3 for mgmt of acute interstitial pancreatitis with reactive duodenitis; MRI total spine with incidental finding of T12 compression fx as well as extensive progression of osseous mets- pt to f/u with outpt Rad Onc (Dr. Mcnamara); her ED course during this hospitalization was also c/b pAfib with RVR for which pt was dced on Eliquis, Metoprolol, and Dilt. Pt was admitted to [facility] on 7/14 for initiation 2L Tarlatamab- C1 (dose: 1mg); pt's admission labs are notable for hypomag, hypophos- improved with repletions; she tolerated tx well without significant adverse effects/events. She was seen by private Cardiology on day of discharge- her Eliquis was decreased to 2.5mg BID as per Cardiology recs. Pt was clinically stable and appropriate for discharge on 7/15.    Met lung adenoca with osseous/vertebral  mets  Encounter for antineoplastic immunotherapy  S/p 2L Tarlatamab 7/14 and tolerated well without CRS events    Pt to continue outpt fu with Dr. Ferris after discharge  Long term prognosis: guarded; pt is full code    T8 path compression fx  No role for surgical intervention as per NSGY  Pt to be considered for palliative RT to T-spine as an outpt    pAfib with RVR  Hypercoag state  HTN, HLD  Continuing Metoprolol 25mg BID with holding parameters  Continuing Verapamil SR 240mg daily with holding parameters  Continuing Eliquis > decreased from 5mg BID to 2.5mg BID on day of discharge as per Cardiology recs  Continuing ASA 81mg daily  Continuing Atorvastatin 20mg nightly    Hypomagnesemia  Hypophosphatemia  Likely d/t poor dietary Mg intake  Pt is asymptomatic  Repleted- Mag and Phos normal on day of discharge

## 2025-07-15 NOTE — DISCHARGE NOTE PROVIDER - ATTENDING DISCHARGE PHYSICAL EXAMINATION:
Vital Signs Last 24 Hrs  T(C): 36.4 (15 Jul 2025 05:17), Max: 36.6 (14 Jul 2025 12:23)  T(F): 97.5 (15 Jul 2025 05:17), Max: 97.9 (14 Jul 2025 21:06)  HR: 88 (15 Jul 2025 05:17) (82 - 88)  BP: 97/53 (15 Jul 2025 05:17) (90/52 - 110/66)  RR: 17 (15 Jul 2025 05:17) (17 - 18)  SpO2: 97% (15 Jul 2025 05:17) (97% - 100%)  Parameters below as of 15 Jul 2025 05:17  Patient On (Oxygen Delivery Method): room air  Elderly, non-toxic-appearing woman, laying in bed  Answering all questions appropriately and following commands  L eye strabismus present, no scleral icterus; pt is adentulous, no oral thrush  Regular rate, irregular rhythm, no m/r/g  Trace RLB insp crackles, no w/c in L lung zones  Abd tender on palpation of epigastrum (pain slightly oop); no rebound, guarding; normoactive bowel sounds  No peripheral edema  CN 2-12 grossly intact; no gross focal neuro deficits; 3/5 strength and normal ROM in b/l LEs

## 2025-07-15 NOTE — DIETITIAN INITIAL EVALUATION ADULT - REASON INDICATOR FOR ASSESSMENT
Nutrition Consult for MST score 2 or greater   Nutrition Risk Screen for Oncology    Per chart, patient is an 84y Female with PMH lung SCC (dx 3/2023) s/p R VATS wedge resection with POD to liver/bone/R hilar adenopathy/brain who presented to [facility] for initiation of 2L Tarlatamab-C1.

## 2025-07-15 NOTE — DIETITIAN INITIAL EVALUATION ADULT - OTHER CALCULATIONS
IBW: 100 lb +/- 10%, %% - upper range IBW used to determine estimated energy needs   Per [facility] HIE, noted the following weight history: 59kg (6/27), 61.1kg (5/14), 65.2kg (4/7), 68.8kg (10/10)  Objective weight measurements suggest 8.2kg (13%) weight loss x3 months (significant)

## 2025-07-15 NOTE — DIETITIAN INITIAL EVALUATION ADULT - FACTORS AFF FOOD INTAKE
Chaya will call back.     Lenora HOWELL RN, BSN  [facility]     dry mouth/persistent lack of appetite

## 2025-07-15 NOTE — DIETITIAN INITIAL EVALUATION ADULT - PERTINENT LABORATORY DATA
07-15    141  |  109[H]  |  7   ----------------------------<  171[H]  3.8   |  22  |  0.56    Ca    8.9      15 Jul 2025 06:29  Phos  2.6     07-15  Mg     1.80     07-15    TPro  5.3[L]  /  Alb  3.4  /  TBili  0.3  /  DBili  <0.2  /  AST  10  /  ALT  8   /  AlkPhos  109  07-15

## 2025-07-15 NOTE — DIETITIAN INITIAL EVALUATION ADULT - PERTINENT MEDS FT
MEDICATIONS  (STANDING):  apixaban 5 milliGRAM(s) Oral every 12 hours  aspirin enteric coated 81 milliGRAM(s) Oral at bedtime  chlorhexidine 2% Cloths 1 Application(s) Topical <User Schedule>  metoprolol tartrate 25 milliGRAM(s) Oral two times a day  verapamil  milliGRAM(s) Oral daily    MEDICATIONS  (PRN):  albuterol/ipratropium for Nebulization 3 milliLiter(s) Nebulizer every 6 hours PRN Shortness of Breath and/or Wheezing  metoclopramide 10 milliGRAM(s) Oral every 6 hours PRN for nausea

## 2025-07-15 NOTE — DISCHARGE NOTE PROVIDER - NSDCMRMEDTOKEN_GEN_ALL_CORE_FT
aspirin 81 mg oral delayed release tablet: 1 tab(s) orally once a day  atorvastatin 20 mg oral tablet: 1 tab(s) orally once a day  Eliquis 5 mg oral tablet: 1 tab(s) orally 2 times a day  metoclopramide 10 mg oral tablet: 1 tab(s) orally every 6 hours as needed for  nausea  metoprolol tartrate 25 mg oral tablet: 1 tab(s) orally 2 times a day  pantoprazole 40 mg oral delayed release tablet: 1 tab(s) orally once a day  potassium chloride 20 mEq oral tablet, extended release: 2 tab(s) orally once a day as directed  Spiriva HandiHaler 18 mcg inhalation capsule: 1 cap(s) inhaled once a day  Symbicort 160 mcg-4.5 mcg/inh inhalation aerosol: 2 puff(s) inhaled 2 times a day (filled 3/27/25 for 30 days)  Tylenol Extra Strength 500 mg oral tablet: 2 tab(s) orally every 6 hours as needed for  moderate pain  verapamil 240 mg/24 hours oral capsule, extended release: 1 cap(s) orally once a day   aspirin 81 mg oral delayed release tablet: 1 tab(s) orally once a day  atorvastatin 20 mg oral tablet: 1 tab(s) orally once a day  Eliquis 5 mg oral tablet: 0.5 tab(s) orally 2 times a day  metoclopramide 10 mg oral tablet: 1 tab(s) orally every 6 hours as needed for  nausea  metoprolol tartrate 25 mg oral tablet: 1 tab(s) orally 2 times a day  pantoprazole 40 mg oral delayed release tablet: 1 tab(s) orally once a day  potassium chloride 20 mEq oral tablet, extended release: 2 tab(s) orally once a day as directed  Spiriva HandiHaler 18 mcg inhalation capsule: 1 cap(s) inhaled once a day  Symbicort 160 mcg-4.5 mcg/inh inhalation aerosol: 2 puff(s) inhaled 2 times a day (filled 3/27/25 for 30 days)  Tylenol Extra Strength 500 mg oral tablet: 2 tab(s) orally every 6 hours as needed for  moderate pain  verapamil 240 mg/24 hours oral capsule, extended release: 1 cap(s) orally once a day

## 2025-07-15 NOTE — DISCHARGE NOTE NURSING/CASE MANAGEMENT/SOCIAL WORK - PATIENT PORTAL LINK FT
You can access the FollowMyHealth Patient Portal offered by [facility] by registering at the following website: [email or web address]. By joining [employer]’s FollowMyHealth portal, you will also be able to view your health information using other applications (apps) compatible with our system. Paramedian Forehead Flap Text: A decision was made to reconstruct the defect utilizing an interpolation axial flap and a staged reconstruction.  A telfa template was made of the defect.  This telfa template was then used to outline the paramedian forehead pedicle flap.  The donor area for the pedicle flap was then injected with anesthesia.  The flap was excised through the skin and subcutaneous tissue down to the layer of the underlying musculature.  The pedicle flap was carefully excised within this deep plane to maintain its blood supply.  The edges of the donor site were undermined.   The donor site was closed in a primary fashion.  The pedicle was then rotated into position and sutured.  Once the tube was sutured into place, adequate blood supply was confirmed with blanching and refill.  The pedicle was then wrapped with xeroform gauze and dressed appropriately with a telfa and gauze bandage to ensure continued blood supply and protect the attached pedicle.

## 2025-07-15 NOTE — DIETITIAN NUTRITION RISK NOTIFICATION - ADDITIONAL COMMENTS/DIETITIAN RECOMMENDATIONS
Please see Dietitian Initial Assessment for complete recommendations.    Berta Engel MS RDN CDN  On Microsoft Teams (Preferred), Pager #[phone]

## 2025-07-15 NOTE — DISCHARGE NOTE NURSING/CASE MANAGEMENT/SOCIAL WORK - FINANCIAL ASSISTANCE
[facility] provides services at a reduced cost to those who are determined to be eligible through [facility]’s financial assistance program. Information regarding [facility]’s financial assistance program can be found by going to [email or web address] or by calling [phone].

## 2025-07-15 NOTE — DIETITIAN INITIAL EVALUATION ADULT - OTHER INFO
Patient is known to [facility] clinical nutrition services, previously seen during admission from 6/30-7/3/25, course notable for Severe protein calorie malnutrition.     Patient is currently ordered for a PO diet. Patient reports a poor appetite and PO intake at meals during course of admission. Documented on RN flowsheet as consuming 0-50% of meals. No report of GI distress (nausea, vomiting, diarrhea, constipation). Last BM 7/14/25 per RN flowsheet documentation. Not noted to be on a bowel regimen.     Writer provided verbal education regarding current diet order and nutrition recommendations for after discharge. Patient and sister verbalized understanding to the discussion.

## 2025-07-15 NOTE — DIETITIAN INITIAL EVALUATION ADULT - NSICDXPASTMEDICALHX_GEN_ALL_CORE_FT
PAST MEDICAL HISTORY:  Afib x 15 yrs --on Coumadin  attempted cardioversion 13 yrs ago--failed    Asthma     CAD (coronary artery disease)     CHF (congestive heart failure)     Coronary Stent to RCA, LAD, and DIAG 9/25/06 at [facility]    Emphysema/COPD     GERD (Gastroesophageal Reflux Disease)     H/O: CVA pt unaware of CVA, yet showed up on CT of head as old CVA    Hypercholesterolemia     Lip Cancer resected 6 yrs ago (no chemo/rad)    Lung nodule     Melanoma     Nodule of upper lobe of right lung     MARIE (obstructive sleep apnea)     PUD (peptic ulcer disease)     PVD (peripheral vascular disease)     Skin Cancer of Face removed 1 yr ago    Skin Cancer of Nose 1 yr ago- removed    Smoker

## 2025-07-15 NOTE — DISCHARGE NOTE PROVIDER - NSDCCPCAREPLAN_GEN_ALL_CORE_FT
PRINCIPAL DISCHARGE DIAGNOSIS  Diagnosis: Encounter for antineoplastic chemotherapy and immunotherapy  Assessment and Plan of Treatment: You were directly admitted for cycle 1 of Tarlatamab on 7/14 and tolerated well without any adverse reactions. Please follow up with Dr. Ferris. Next inpatient direct admission will be on 7/21 for cycle 1 day 8 of Tarlatamab for the step-up dose.

## 2025-07-15 NOTE — CONSULT NOTE ADULT - SUBJECTIVE AND OBJECTIVE BOX
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 Pt was planned for outpt PET CT  but was admitted to [facility] on -3 for mgmt of acute interstitial pancreatitis with reactive duodenitis; MRI total spine with incidental finding of T12 compression fx as well as extensive progression of osseous mets- pt to f/u with outpt Rad Onc (Dr. Mcnamara); her ED course during this hospitalization was also c/b pAfib with RVR for which pt was dced on Eliquis, Metoprolol, and Dilt.  Pt is now admitted for initiation 2L Tarlatamab- C1 (dose: 1mg). She reported feeling well and had no complaints. She denied malaise, generalized weakness, fever, dyspnea, chest pain/palpitations, n/v/d/c (confirmed she had a bm this am), dysuria, HA, vision changes.    Allergies    No Known Allergies    Intolerances    	    MEDICATIONS:  apixaban 5 milliGRAM(s) Oral every 12 hours  aspirin enteric coated 81 milliGRAM(s) Oral at bedtime  metoprolol tartrate 25 milliGRAM(s) Oral two times a day  verapamil  milliGRAM(s) Oral daily      albuterol/ipratropium for Nebulization 3 milliLiter(s) Nebulizer every 6 hours PRN      metoclopramide 10 milliGRAM(s) Oral every 6 hours PRN      chlorhexidine 2% Cloths 1 Application(s) Topical <User Schedule>      PAST MEDICAL & SURGICAL HISTORY:  Coronary Stent  to RCA, LAD, and DIAG 06 at [facility]      Afib  x 15 yrs --on Coumadin  attempted cardioversion 13 yrs ago--failed      GERD (Gastroesophageal Reflux Disease)      Hypercholesterolemia      Emphysema/COPD      Lip Cancer  resected 6 yrs ago (no chemo/rad)      Skin Cancer of Face  removed 1 yr ago      Skin Cancer of Nose  1 yr ago- removed      H/O: CVA  pt unaware of CVA, yet showed up on CT of head as old CVA      CHF (congestive heart failure)      PUD (peptic ulcer disease)      Asthma      Smoker      CAD (coronary artery disease)      Melanoma      Nodule of upper lobe of right lung      MARIE (obstructive sleep apnea)      PVD (peripheral vascular disease)      Lung nodule      History of Hysterectomy   for fibroids        History of   , , ,       History of Cholecystectomy        History of Appendectomy  childhood        S/P T&A  childhood        History of cholecystectomy      History of appendectomy      Melanoma      History of total right hip replacement      History of total left hip replacement      History of cataract surgery      H/O cardiac radiofrequency ablation      Status post angiography of extremity      S/P primary angioplasty with coronary stent          FAMILY HISTORY:      SOCIAL HISTORY:    per hpi      REVIEW OF SYSTEMS:  See HPI, otherwise complete 10 point review of systems negative    [ ] All others negative	      PHYSICAL EXAM:  T(C): 36.4 (07-15-25 @ 05:17), Max: 36.6 (25 @ 12:23)  HR: 88 (07-15-25 @ 05:17) (82 - 88)  BP: 97/53 (07-15-25 @ 05:17) (90/52 - 110/66)  RR: 17 (07-15-25 @ 05:17) (17 - 18)  SpO2: 97% (07-15-25 @ 05:17) (97% - 100%)  Wt(kg): --  I&O's Summary    2025 07:01  -  15 Jul 2025 07:00  --------------------------------------------------------  IN: 400 mL / OUT: 1 mL / NET: 399 mL        Appearance: No Acute Distress	  HEENT:  Normal oral mucosa, PERRL, EOMI	  Cardiovascular: Normal S1 S2, No JVD, No murmurs/rubs/gallops  Respiratory: Lungs clear to auscultation bilaterally  Gastrointestinal:  Soft, Non-tender, + BS	  Skin: No rashes, No ecchymoses, No cyanosis	  Neurologic: Non-focal  Extremities: No clubbing, cyanosis or edema  Vascular: Peripheral pulses palpable 2+ bilaterally  Psychiatry: A & O x 3, Mood & affect appropriate    Laboratory Data:	 	    CBC Full  -  ( 15 Jul 2025 06:29 )  WBC Count : 12.89 K/uL  Hemoglobin : 7.5 g/dL  Hematocrit : 22.8 %  Platelet Count - Automated : 212 K/uL  Mean Cell Volume : 101.3 fl  Mean Cell Hemoglobin : 33.3 pg  Mean Cell Hemoglobin Concentration : 32.9 g/dL  Auto Neutrophil # : x  Auto Lymphocyte # : x  Auto Monocyte # : x  Auto Eosinophil # : x  Auto Basophil # : x  Auto Neutrophil % : x  Auto Lymphocyte % : x  Auto Monocyte % : x  Auto Eosinophil % : x  Auto Basophil % : x    07-15    141  |  109[H]  |  7   ----------------------------<  171[H]  3.8   |  22  |  0.56  07-14    140  |  106  |  8   ----------------------------<  225[H]  3.9   |  21[L]  |  0.59    Ca    8.9      15 Jul 2025 06:29  Ca    8.1[L]      2025 21:01  Phos  2.6     07-15  Phos  2.6     07-14  Mg     1.80     07-15  Mg     1.70     07-14    TPro  5.3[L]  /  Alb  3.3  /  TBili  0.4  /  DBili  x   /  AST  12  /  ALT  8   /  AlkPhos  110  07-14      proBNP:   Lipid Profile:   HgA1c:   TSH:       CARDIAC MARKERS:            Interpretation of Telemetry: 	    ECG:  	  RADIOLOGY:  OTHER: 	    PREVIOUS DIAGNOSTIC TESTING:    [ ] Echocardiogram:  [ ] Catheterization:  [ ] Stress Test:  	    Assessment:  85 yo woman, former smoker (40py, quit ), with h/o HTN, HLD, COPD (on Advair), CAD with stents, PVD, h/o HFpEF, h/o skin cancer, and 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 Pt was planned for outpt PET CT  but was admitted to [facility] on -3 for mgmt of acute interstitial pancreatitis with reactive duodenitis; MRI total spine with incidental finding of T12 compression fx as well as extensive progression of osseous mets- pt to f/u with outpt Rad Onc (Dr. Mcnamara); her ED course during this hospitalization was also c/b pAfib with RVR for which pt was dced on Eliquis, Metoprolol, and Dilt.    Recs:  cv stable  no e/o acs or chf  would reduce eliquis to 2.5mg bid given age (>80) and weight (<60kg)  cw rate control meds with parameters  on aspirin for CAD and remote PCI  care per oncology  will follow        Greater than 60 minutes spent on total encounter; more than 50% of the visit was spent counseling and/or coordinating care by the attending physician.   	  Bill Cho MD   Cardiovascular Diseases  [phone]

## 2025-07-15 NOTE — DIETITIAN INITIAL EVALUATION ADULT - ORAL INTAKE PTA/DIET HISTORY
Patient seen at bedside with her sister present. Patient reports no known food allergies or food intolerances. Patient wears upper and lower dentures which she has in-house. Prefers softer textured foods. Patient reports a poor appetite at baseline, ongoing >1 month. Complains of dry mouth. Consuming mostly soups or ice cream. Likely meeting <75% estimated energy needs as a result.

## 2025-07-15 NOTE — DIETITIAN INITIAL EVALUATION ADULT - NSPROEDAABILITYLEARN_GEN_A_NUR
Quality 110: Preventive Care And Screening: Influenza Immunization: Influenza Immunization Administered during Influenza season Quality 226: Preventive Care And Screening: Tobacco Use: Screening And Cessation Intervention: Patient screened for tobacco use and is an ex/non-smoker Quality 111:Pneumonia Vaccination Status For Older Adults: Patient received any pneumococcal conjugate or polysaccharide vaccine on or after their 60th birthday and before the end of the measurement period Detail Level: Detailed Quality 431: Preventive Care And Screening: Unhealthy Alcohol Use - Screening: Patient not identified as an unhealthy alcohol user when screened for unhealthy alcohol use using a systematic screening method none

## 2025-07-15 NOTE — DISCHARGE NOTE PROVIDER - CARE PROVIDER_API CALL
Parish Ferris.  Hematology & Oncology  [address] [place] [id number]  Phone: [phone]  Fax: [phone]  Follow Up Time:

## 2025-07-15 NOTE — DISCHARGE NOTE PROVIDER - NSDCFUSCHEDAPPT_GEN_ALL_CORE_FT
[facility]  Dionicio Fischer  Scheduled Appointment: 07/28/2025

## 2025-07-15 NOTE — DIETITIAN INITIAL EVALUATION ADULT - LOSS OF SUBCUTANEOUS FAT
Render In Strict Bullet Format?: No
Detail Level: Zone
Initiate Treatment: Triple paste AF\\nHC 1% cream
Orbital.../Buccal...

## 2025-07-16 ENCOUNTER — APPOINTMENT (OUTPATIENT)
Dept: HEMATOLOGY ONCOLOGY | Facility: CLINIC | Age: 85
End: 2025-07-16

## 2025-07-16 ENCOUNTER — INPATIENT (INPATIENT)
Facility: HOSPITAL | Age: 85
LOS: 5 days | Discharge: HOME HEALTH | End: 2025-07-22
Attending: STUDENT IN AN ORGANIZED HEALTH CARE EDUCATION/TRAINING PROGRAM | Admitting: STUDENT IN AN ORGANIZED HEALTH CARE EDUCATION/TRAINING PROGRAM
Payer: MEDICARE

## 2025-07-16 ENCOUNTER — APPOINTMENT (OUTPATIENT)
Dept: INFUSION THERAPY | Facility: HOSPITAL | Age: 85
End: 2025-07-16

## 2025-07-16 VITALS
HEIGHT: 60 IN | TEMPERATURE: 98 F | RESPIRATION RATE: 16 BRPM | WEIGHT: 130.07 LBS | OXYGEN SATURATION: 99 % | DIASTOLIC BLOOD PRESSURE: 60 MMHG | SYSTOLIC BLOOD PRESSURE: 107 MMHG | HEART RATE: 95 BPM

## 2025-07-16 DIAGNOSIS — Z95.5 PRESENCE OF CORONARY ANGIOPLASTY IMPLANT AND GRAFT: Chronic | ICD-10-CM

## 2025-07-16 DIAGNOSIS — S34.109A UNSPECIFIED INJURY TO UNSPECIFIED LEVEL OF LUMBAR SPINAL CORD, INITIAL ENCOUNTER: ICD-10-CM

## 2025-07-16 DIAGNOSIS — S32.001A STABLE BURST FRACTURE OF UNSPECIFIED LUMBAR VERTEBRA, INITIAL ENCOUNTER FOR CLOSED FRACTURE: ICD-10-CM

## 2025-07-16 DIAGNOSIS — Z96.641 PRESENCE OF RIGHT ARTIFICIAL HIP JOINT: Chronic | ICD-10-CM

## 2025-07-16 DIAGNOSIS — Z98.49 CATARACT EXTRACTION STATUS, UNSPECIFIED EYE: Chronic | ICD-10-CM

## 2025-07-16 DIAGNOSIS — Z96.642 PRESENCE OF LEFT ARTIFICIAL HIP JOINT: Chronic | ICD-10-CM

## 2025-07-16 DIAGNOSIS — Z98.890 OTHER SPECIFIED POSTPROCEDURAL STATES: Chronic | ICD-10-CM

## 2025-07-16 LAB
ALBUMIN SERPL ELPH-MCNC: 3.4 G/DL — SIGNIFICANT CHANGE UP (ref 3.3–5)
ALP SERPL-CCNC: 103 U/L — SIGNIFICANT CHANGE UP (ref 40–120)
ALT FLD-CCNC: 11 U/L — SIGNIFICANT CHANGE UP (ref 4–33)
ANION GAP SERPL CALC-SCNC: 13 MMOL/L — SIGNIFICANT CHANGE UP (ref 7–14)
APTT BLD: 26.8 SEC — SIGNIFICANT CHANGE UP (ref 26.1–36.8)
AST SERPL-CCNC: 13 U/L — SIGNIFICANT CHANGE UP (ref 4–32)
BASOPHILS # BLD AUTO: 0.08 K/UL — SIGNIFICANT CHANGE UP (ref 0–0.2)
BASOPHILS NFR BLD AUTO: 0.6 % — SIGNIFICANT CHANGE UP (ref 0–2)
BILIRUB SERPL-MCNC: 0.5 MG/DL — SIGNIFICANT CHANGE UP (ref 0.2–1.2)
BLD GP AB SCN SERPL QL: NEGATIVE — SIGNIFICANT CHANGE UP
BUN SERPL-MCNC: 10 MG/DL — SIGNIFICANT CHANGE UP (ref 7–23)
CALCIUM SERPL-MCNC: 8.9 MG/DL — SIGNIFICANT CHANGE UP (ref 8.4–10.5)
CHLORIDE SERPL-SCNC: 109 MMOL/L — HIGH (ref 98–107)
CO2 SERPL-SCNC: 21 MMOL/L — LOW (ref 22–31)
CREAT SERPL-MCNC: 0.54 MG/DL — SIGNIFICANT CHANGE UP (ref 0.5–1.3)
EGFR: 91 ML/MIN/1.73M2 — SIGNIFICANT CHANGE UP
EGFR: 91 ML/MIN/1.73M2 — SIGNIFICANT CHANGE UP
EOSINOPHIL # BLD AUTO: 0.32 K/UL — SIGNIFICANT CHANGE UP (ref 0–0.5)
EOSINOPHIL NFR BLD AUTO: 2.3 % — SIGNIFICANT CHANGE UP (ref 0–6)
GLUCOSE SERPL-MCNC: 89 MG/DL — SIGNIFICANT CHANGE UP (ref 70–99)
HCT VFR BLD CALC: 21.9 % — LOW (ref 34.5–45)
HGB BLD-MCNC: 7 G/DL — CRITICAL LOW (ref 11.5–15.5)
IMM GRANULOCYTES # BLD AUTO: 0.17 K/UL — HIGH (ref 0–0.07)
IMM GRANULOCYTES NFR BLD AUTO: 1.2 % — HIGH (ref 0–0.9)
INR BLD: 1.53 RATIO — HIGH (ref 0.85–1.16)
LYMPHOCYTES # BLD AUTO: 0.4 K/UL — LOW (ref 1–3.3)
LYMPHOCYTES NFR BLD AUTO: 2.9 % — LOW (ref 13–44)
MCHC RBC-ENTMCNC: 32 G/DL — SIGNIFICANT CHANGE UP (ref 32–36)
MCHC RBC-ENTMCNC: 32.6 PG — SIGNIFICANT CHANGE UP (ref 27–34)
MCV RBC AUTO: 101.9 FL — HIGH (ref 80–100)
MONOCYTES # BLD AUTO: 2.07 K/UL — HIGH (ref 0–0.9)
MONOCYTES NFR BLD AUTO: 14.8 % — HIGH (ref 2–14)
NEUTROPHILS # BLD AUTO: 10.91 K/UL — HIGH (ref 1.8–7.4)
NEUTROPHILS NFR BLD AUTO: 78.2 % — HIGH (ref 43–77)
NRBC # BLD AUTO: 0.02 K/UL — HIGH (ref 0–0)
NRBC # FLD: 0.02 K/UL — HIGH (ref 0–0)
NRBC BLD AUTO-RTO: 0 /100 WBCS — SIGNIFICANT CHANGE UP (ref 0–0)
PLATELET # BLD AUTO: 241 K/UL — SIGNIFICANT CHANGE UP (ref 150–400)
PMV BLD: 11 FL — SIGNIFICANT CHANGE UP (ref 7–13)
POTASSIUM SERPL-MCNC: 3.3 MMOL/L — LOW (ref 3.5–5.3)
POTASSIUM SERPL-SCNC: 3.3 MMOL/L — LOW (ref 3.5–5.3)
PROT SERPL-MCNC: 5.5 G/DL — LOW (ref 6–8.3)
PROTHROM AB SERPL-ACNC: 17.7 SEC — HIGH (ref 9.9–13.4)
RBC # BLD: 2.15 M/UL — LOW (ref 3.8–5.2)
RBC # FLD: 21.8 % — HIGH (ref 10.3–14.5)
RH IG SCN BLD-IMP: POSITIVE — SIGNIFICANT CHANGE UP
SODIUM SERPL-SCNC: 143 MMOL/L — SIGNIFICANT CHANGE UP (ref 135–145)
WBC # BLD: 13.95 K/UL — HIGH (ref 3.8–10.5)
WBC # FLD AUTO: 13.95 K/UL — HIGH (ref 3.8–10.5)

## 2025-07-16 PROCEDURE — 72125 CT NECK SPINE W/O DYE: CPT | Mod: 26

## 2025-07-16 PROCEDURE — 99223 1ST HOSP IP/OBS HIGH 75: CPT

## 2025-07-16 PROCEDURE — 73502 X-RAY EXAM HIP UNI 2-3 VIEWS: CPT | Mod: 26,LT

## 2025-07-16 PROCEDURE — 70450 CT HEAD/BRAIN W/O DYE: CPT | Mod: 26

## 2025-07-16 PROCEDURE — 72131 CT LUMBAR SPINE W/O DYE: CPT | Mod: 26

## 2025-07-16 PROCEDURE — 99285 EMERGENCY DEPT VISIT HI MDM: CPT

## 2025-07-16 PROCEDURE — 71046 X-RAY EXAM CHEST 2 VIEWS: CPT | Mod: 26

## 2025-07-16 RX ORDER — ONDANSETRON HCL/PF 4 MG/2 ML
4 VIAL (ML) INJECTION ONCE
Refills: 0 | Status: COMPLETED | OUTPATIENT
Start: 2025-07-16 | End: 2025-07-16

## 2025-07-16 RX ORDER — APIXABAN 5 MG/1
2.5 TABLET, FILM COATED ORAL EVERY 12 HOURS
Refills: 0 | Status: DISCONTINUED | OUTPATIENT
Start: 2025-07-16 | End: 2025-07-22

## 2025-07-16 RX ORDER — POLYETHYLENE GLYCOL 3350 17 G/17G
17 POWDER, FOR SOLUTION ORAL DAILY
Refills: 0 | Status: DISCONTINUED | OUTPATIENT
Start: 2025-07-16 | End: 2025-07-22

## 2025-07-16 RX ORDER — ACETAMINOPHEN 500 MG/5ML
650 LIQUID (ML) ORAL EVERY 6 HOURS
Refills: 0 | Status: DISCONTINUED | OUTPATIENT
Start: 2025-07-16 | End: 2025-07-17

## 2025-07-16 RX ORDER — LIDOCAINE HYDROCHLORIDE 20 MG/ML
1 JELLY TOPICAL ONCE
Refills: 0 | Status: COMPLETED | OUTPATIENT
Start: 2025-07-16 | End: 2025-07-16

## 2025-07-16 RX ORDER — ASPIRIN 325 MG
81 TABLET ORAL DAILY
Refills: 0 | Status: DISCONTINUED | OUTPATIENT
Start: 2025-07-16 | End: 2025-07-19

## 2025-07-16 RX ORDER — TIOTROPIUM BROMIDE INHALATION SPRAY 3.12 UG/1
2 SPRAY, METERED RESPIRATORY (INHALATION) DAILY
Refills: 0 | Status: DISCONTINUED | OUTPATIENT
Start: 2025-07-16 | End: 2025-07-19

## 2025-07-16 RX ORDER — METOCLOPRAMIDE HCL 10 MG
10 TABLET ORAL EVERY 6 HOURS
Refills: 0 | Status: DISCONTINUED | OUTPATIENT
Start: 2025-07-16 | End: 2025-07-22

## 2025-07-16 RX ORDER — ACETAMINOPHEN 500 MG/5ML
1000 LIQUID (ML) ORAL ONCE
Refills: 0 | Status: COMPLETED | OUTPATIENT
Start: 2025-07-16 | End: 2025-07-16

## 2025-07-16 RX ORDER — SENNA 187 MG
2 TABLET ORAL AT BEDTIME
Refills: 0 | Status: DISCONTINUED | OUTPATIENT
Start: 2025-07-16 | End: 2025-07-22

## 2025-07-16 RX ORDER — METOPROLOL SUCCINATE 50 MG/1
25 TABLET, EXTENDED RELEASE ORAL
Refills: 0 | Status: DISCONTINUED | OUTPATIENT
Start: 2025-07-16 | End: 2025-07-19

## 2025-07-16 RX ORDER — ATORVASTATIN CALCIUM 80 MG/1
20 TABLET, FILM COATED ORAL AT BEDTIME
Refills: 0 | Status: DISCONTINUED | OUTPATIENT
Start: 2025-07-16 | End: 2025-07-22

## 2025-07-16 RX ORDER — OXYCODONE HYDROCHLORIDE 30 MG/1
5 TABLET ORAL EVERY 4 HOURS
Refills: 0 | Status: DISCONTINUED | OUTPATIENT
Start: 2025-07-16 | End: 2025-07-17

## 2025-07-16 RX ADMIN — ATORVASTATIN CALCIUM 20 MILLIGRAM(S): 80 TABLET, FILM COATED ORAL at 22:53

## 2025-07-16 RX ADMIN — Medication 1000 MILLIGRAM(S): at 13:00

## 2025-07-16 RX ADMIN — Medication 2 MILLIGRAM(S): at 19:20

## 2025-07-16 RX ADMIN — OXYCODONE HYDROCHLORIDE 5 MILLIGRAM(S): 30 TABLET ORAL at 22:53

## 2025-07-16 RX ADMIN — Medication 40 MILLIEQUIVALENT(S): at 14:51

## 2025-07-16 RX ADMIN — Medication 400 MILLIGRAM(S): at 12:44

## 2025-07-16 RX ADMIN — Medication 2 MILLIGRAM(S): at 12:44

## 2025-07-16 RX ADMIN — Medication 2 MILLIGRAM(S): at 13:00

## 2025-07-16 RX ADMIN — Medication 4 MILLIGRAM(S): at 12:45

## 2025-07-16 RX ADMIN — Medication 2 MILLIGRAM(S): at 19:06

## 2025-07-16 RX ADMIN — OXYCODONE HYDROCHLORIDE 5 MILLIGRAM(S): 30 TABLET ORAL at 00:23

## 2025-07-16 RX ADMIN — LIDOCAINE HYDROCHLORIDE 1 PATCH: 20 JELLY TOPICAL at 18:33

## 2025-07-16 RX ADMIN — LIDOCAINE HYDROCHLORIDE 1 PATCH: 20 JELLY TOPICAL at 23:54

## 2025-07-16 RX ADMIN — LIDOCAINE HYDROCHLORIDE 1 PATCH: 20 JELLY TOPICAL at 12:34

## 2025-07-17 ENCOUNTER — APPOINTMENT (OUTPATIENT)
Dept: INFUSION THERAPY | Facility: HOSPITAL | Age: 85
End: 2025-07-17

## 2025-07-17 DIAGNOSIS — I10 ESSENTIAL (PRIMARY) HYPERTENSION: ICD-10-CM

## 2025-07-17 DIAGNOSIS — D68.59 OTHER PRIMARY THROMBOPHILIA: ICD-10-CM

## 2025-07-17 DIAGNOSIS — I25.10 ATHEROSCLEROTIC HEART DISEASE OF NATIVE CORONARY ARTERY WITHOUT ANGINA PECTORIS: ICD-10-CM

## 2025-07-17 DIAGNOSIS — I48.0 PAROXYSMAL ATRIAL FIBRILLATION: ICD-10-CM

## 2025-07-17 DIAGNOSIS — E87.6 HYPOKALEMIA: ICD-10-CM

## 2025-07-17 DIAGNOSIS — C34.90 MALIGNANT NEOPLASM OF UNSPECIFIED PART OF UNSPECIFIED BRONCHUS OR LUNG: ICD-10-CM

## 2025-07-17 DIAGNOSIS — D64.9 ANEMIA, UNSPECIFIED: ICD-10-CM

## 2025-07-17 DIAGNOSIS — E78.5 HYPERLIPIDEMIA, UNSPECIFIED: ICD-10-CM

## 2025-07-17 DIAGNOSIS — Z29.9 ENCOUNTER FOR PROPHYLACTIC MEASURES, UNSPECIFIED: ICD-10-CM

## 2025-07-17 LAB
ALBUMIN SERPL ELPH-MCNC: 3.6 G/DL — SIGNIFICANT CHANGE UP (ref 3.3–5)
ALP SERPL-CCNC: 120 U/L — SIGNIFICANT CHANGE UP (ref 40–120)
ALT FLD-CCNC: 10 U/L — SIGNIFICANT CHANGE UP (ref 4–33)
ANION GAP SERPL CALC-SCNC: 12 MMOL/L — SIGNIFICANT CHANGE UP (ref 7–14)
AST SERPL-CCNC: 14 U/L — SIGNIFICANT CHANGE UP (ref 4–32)
BASOPHILS # BLD AUTO: 0.1 K/UL — SIGNIFICANT CHANGE UP (ref 0–0.2)
BASOPHILS NFR BLD AUTO: 0.8 % — SIGNIFICANT CHANGE UP (ref 0–2)
BILIRUB SERPL-MCNC: 0.7 MG/DL — SIGNIFICANT CHANGE UP (ref 0.2–1.2)
BUN SERPL-MCNC: 9 MG/DL — SIGNIFICANT CHANGE UP (ref 7–23)
CALCIUM SERPL-MCNC: 9.2 MG/DL — SIGNIFICANT CHANGE UP (ref 8.4–10.5)
CHLORIDE SERPL-SCNC: 108 MMOL/L — HIGH (ref 98–107)
CO2 SERPL-SCNC: 22 MMOL/L — SIGNIFICANT CHANGE UP (ref 22–31)
CREAT SERPL-MCNC: 0.65 MG/DL — SIGNIFICANT CHANGE UP (ref 0.5–1.3)
EGFR: 87 ML/MIN/1.73M2 — SIGNIFICANT CHANGE UP
EGFR: 87 ML/MIN/1.73M2 — SIGNIFICANT CHANGE UP
EOSINOPHIL # BLD AUTO: 0.64 K/UL — HIGH (ref 0–0.5)
EOSINOPHIL NFR BLD AUTO: 5.2 % — SIGNIFICANT CHANGE UP (ref 0–6)
FERRITIN SERPL-MCNC: 1147 NG/ML — HIGH (ref 13–330)
FOLATE SERPL-MCNC: 10.7 NG/ML — SIGNIFICANT CHANGE UP (ref 3.1–17.5)
GLUCOSE SERPL-MCNC: 94 MG/DL — SIGNIFICANT CHANGE UP (ref 70–99)
HCT VFR BLD CALC: 32.4 % — LOW (ref 34.5–45)
HGB BLD-MCNC: 10.6 G/DL — LOW (ref 11.5–15.5)
IMM GRANULOCYTES # BLD AUTO: 0.2 K/UL — HIGH (ref 0–0.07)
IMM GRANULOCYTES NFR BLD AUTO: 1.6 % — HIGH (ref 0–0.9)
IRON SATN MFR SERPL: 17 % — SIGNIFICANT CHANGE UP (ref 14–50)
IRON SATN MFR SERPL: 35 UG/DL — SIGNIFICANT CHANGE UP (ref 30–160)
LYMPHOCYTES # BLD AUTO: 0.39 K/UL — LOW (ref 1–3.3)
LYMPHOCYTES NFR BLD AUTO: 3.2 % — LOW (ref 13–44)
MAGNESIUM SERPL-MCNC: 1.5 MG/DL — LOW (ref 1.6–2.6)
MCHC RBC-ENTMCNC: 32.7 G/DL — SIGNIFICANT CHANGE UP (ref 32–36)
MCHC RBC-ENTMCNC: 33.1 PG — SIGNIFICANT CHANGE UP (ref 27–34)
MCV RBC AUTO: 101.3 FL — HIGH (ref 80–100)
MONOCYTES # BLD AUTO: 2.2 K/UL — HIGH (ref 0–0.9)
MONOCYTES NFR BLD AUTO: 18 % — HIGH (ref 2–14)
NEUTROPHILS # BLD AUTO: 8.72 K/UL — HIGH (ref 1.8–7.4)
NEUTROPHILS NFR BLD AUTO: 71.2 % — SIGNIFICANT CHANGE UP (ref 43–77)
NRBC # BLD AUTO: 0.03 K/UL — HIGH (ref 0–0)
NRBC # FLD: 0.03 K/UL — HIGH (ref 0–0)
NRBC BLD AUTO-RTO: 0 /100 WBCS — SIGNIFICANT CHANGE UP (ref 0–0)
PHOSPHATE SERPL-MCNC: 3.2 MG/DL — SIGNIFICANT CHANGE UP (ref 2.5–4.5)
PLATELET # BLD AUTO: 242 K/UL — SIGNIFICANT CHANGE UP (ref 150–400)
PMV BLD: 11.2 FL — SIGNIFICANT CHANGE UP (ref 7–13)
POTASSIUM SERPL-MCNC: 4.4 MMOL/L — SIGNIFICANT CHANGE UP (ref 3.5–5.3)
POTASSIUM SERPL-SCNC: 4.4 MMOL/L — SIGNIFICANT CHANGE UP (ref 3.5–5.3)
PROT SERPL-MCNC: 6.1 G/DL — SIGNIFICANT CHANGE UP (ref 6–8.3)
RBC # BLD: 3.2 M/UL — LOW (ref 3.8–5.2)
RBC # FLD: 20.8 % — HIGH (ref 10.3–14.5)
SODIUM SERPL-SCNC: 142 MMOL/L — SIGNIFICANT CHANGE UP (ref 135–145)
TIBC SERPL-MCNC: 203 UG/DL — LOW (ref 220–430)
UIBC SERPL-MCNC: 168 UG/DL — SIGNIFICANT CHANGE UP (ref 110–370)
VIT B12 SERPL-MCNC: >2000 PG/ML — HIGH (ref 200–900)
WBC # BLD: 12.25 K/UL — HIGH (ref 3.8–10.5)
WBC # FLD AUTO: 12.25 K/UL — HIGH (ref 3.8–10.5)

## 2025-07-17 PROCEDURE — 72157 MRI CHEST SPINE W/O & W/DYE: CPT | Mod: 26

## 2025-07-17 PROCEDURE — 99233 SBSQ HOSP IP/OBS HIGH 50: CPT

## 2025-07-17 PROCEDURE — 93010 ELECTROCARDIOGRAM REPORT: CPT

## 2025-07-17 PROCEDURE — 72158 MRI LUMBAR SPINE W/O & W/DYE: CPT | Mod: 26

## 2025-07-17 PROCEDURE — 70450 CT HEAD/BRAIN W/O DYE: CPT | Mod: 26

## 2025-07-17 PROCEDURE — 72156 MRI NECK SPINE W/O & W/DYE: CPT | Mod: 26

## 2025-07-17 RX ORDER — METOCLOPRAMIDE HCL 10 MG
1 TABLET ORAL
Refills: 0 | DISCHARGE

## 2025-07-17 RX ORDER — METOPROLOL SUCCINATE 50 MG/1
5 TABLET, EXTENDED RELEASE ORAL ONCE
Refills: 0 | Status: COMPLETED | OUTPATIENT
Start: 2025-07-17 | End: 2025-07-17

## 2025-07-17 RX ORDER — OXYCODONE HYDROCHLORIDE 30 MG/1
2.5 TABLET ORAL EVERY 6 HOURS
Refills: 0 | Status: DISCONTINUED | OUTPATIENT
Start: 2025-07-17 | End: 2025-07-17

## 2025-07-17 RX ORDER — OXYCODONE HYDROCHLORIDE 30 MG/1
5 TABLET ORAL EVERY 4 HOURS
Refills: 0 | Status: DISCONTINUED | OUTPATIENT
Start: 2025-07-17 | End: 2025-07-17

## 2025-07-17 RX ORDER — DEXAMETHASONE 0.5 MG/1
4 TABLET ORAL EVERY 6 HOURS
Refills: 0 | Status: DISCONTINUED | OUTPATIENT
Start: 2025-07-17 | End: 2025-07-18

## 2025-07-17 RX ADMIN — OXYCODONE HYDROCHLORIDE 2.5 MILLIGRAM(S): 30 TABLET ORAL at 23:03

## 2025-07-17 RX ADMIN — Medication 240 MILLIGRAM(S): at 06:15

## 2025-07-17 RX ADMIN — Medication 1 MILLIGRAM(S): at 02:30

## 2025-07-17 RX ADMIN — Medication 650 MILLIGRAM(S): at 09:59

## 2025-07-17 RX ADMIN — Medication 40 MILLIGRAM(S): at 06:16

## 2025-07-17 RX ADMIN — ATORVASTATIN CALCIUM 20 MILLIGRAM(S): 80 TABLET, FILM COATED ORAL at 23:04

## 2025-07-17 RX ADMIN — APIXABAN 2.5 MILLIGRAM(S): 5 TABLET, FILM COATED ORAL at 06:15

## 2025-07-17 RX ADMIN — DEXAMETHASONE 4 MILLIGRAM(S): 0.5 TABLET ORAL at 13:39

## 2025-07-17 RX ADMIN — OXYCODONE HYDROCHLORIDE 2.5 MILLIGRAM(S): 30 TABLET ORAL at 23:58

## 2025-07-17 RX ADMIN — METOPROLOL SUCCINATE 5 MILLIGRAM(S): 50 TABLET, EXTENDED RELEASE ORAL at 09:52

## 2025-07-17 RX ADMIN — POLYETHYLENE GLYCOL 3350 17 GRAM(S): 17 POWDER, FOR SOLUTION ORAL at 13:39

## 2025-07-17 RX ADMIN — OXYCODONE HYDROCHLORIDE 5 MILLIGRAM(S): 30 TABLET ORAL at 19:26

## 2025-07-17 RX ADMIN — OXYCODONE HYDROCHLORIDE 5 MILLIGRAM(S): 30 TABLET ORAL at 07:19

## 2025-07-17 RX ADMIN — Medication 1 DOSE(S): at 23:27

## 2025-07-17 RX ADMIN — Medication 1 DOSE(S): at 09:26

## 2025-07-17 RX ADMIN — APIXABAN 2.5 MILLIGRAM(S): 5 TABLET, FILM COATED ORAL at 18:25

## 2025-07-17 RX ADMIN — METOPROLOL SUCCINATE 25 MILLIGRAM(S): 50 TABLET, EXTENDED RELEASE ORAL at 06:16

## 2025-07-17 RX ADMIN — TIOTROPIUM BROMIDE INHALATION SPRAY 2 PUFF(S): 3.12 SPRAY, METERED RESPIRATORY (INHALATION) at 18:50

## 2025-07-17 RX ADMIN — Medication 650 MILLIGRAM(S): at 09:12

## 2025-07-17 RX ADMIN — Medication 81 MILLIGRAM(S): at 18:25

## 2025-07-17 RX ADMIN — OXYCODONE HYDROCHLORIDE 5 MILLIGRAM(S): 30 TABLET ORAL at 06:16

## 2025-07-17 RX ADMIN — METOPROLOL SUCCINATE 25 MILLIGRAM(S): 50 TABLET, EXTENDED RELEASE ORAL at 18:26

## 2025-07-17 RX ADMIN — DEXAMETHASONE 4 MILLIGRAM(S): 0.5 TABLET ORAL at 18:26

## 2025-07-17 RX ADMIN — OXYCODONE HYDROCHLORIDE 5 MILLIGRAM(S): 30 TABLET ORAL at 18:26

## 2025-07-17 RX ADMIN — Medication 1 MILLIGRAM(S): at 01:58

## 2025-07-18 ENCOUNTER — APPOINTMENT (OUTPATIENT)
Dept: INFUSION THERAPY | Facility: HOSPITAL | Age: 85
End: 2025-07-18

## 2025-07-18 LAB
ALBUMIN SERPL ELPH-MCNC: 3.4 G/DL — SIGNIFICANT CHANGE UP (ref 3.3–5)
ALP SERPL-CCNC: 108 U/L — SIGNIFICANT CHANGE UP (ref 40–120)
ALT FLD-CCNC: 9 U/L — SIGNIFICANT CHANGE UP (ref 4–33)
ANION GAP SERPL CALC-SCNC: 11 MMOL/L — SIGNIFICANT CHANGE UP (ref 7–14)
AST SERPL-CCNC: 11 U/L — SIGNIFICANT CHANGE UP (ref 4–32)
BASOPHILS # BLD AUTO: 0.05 K/UL — SIGNIFICANT CHANGE UP (ref 0–0.2)
BASOPHILS NFR BLD AUTO: 0.3 % — SIGNIFICANT CHANGE UP (ref 0–2)
BILIRUB SERPL-MCNC: 0.9 MG/DL — SIGNIFICANT CHANGE UP (ref 0.2–1.2)
BUN SERPL-MCNC: 14 MG/DL — SIGNIFICANT CHANGE UP (ref 7–23)
CALCIUM SERPL-MCNC: 8.8 MG/DL — SIGNIFICANT CHANGE UP (ref 8.4–10.5)
CHLORIDE SERPL-SCNC: 102 MMOL/L — SIGNIFICANT CHANGE UP (ref 98–107)
CO2 SERPL-SCNC: 22 MMOL/L — SIGNIFICANT CHANGE UP (ref 22–31)
CREAT SERPL-MCNC: 0.54 MG/DL — SIGNIFICANT CHANGE UP (ref 0.5–1.3)
EGFR: 91 ML/MIN/1.73M2 — SIGNIFICANT CHANGE UP
EGFR: 91 ML/MIN/1.73M2 — SIGNIFICANT CHANGE UP
EOSINOPHIL # BLD AUTO: 0.01 K/UL — SIGNIFICANT CHANGE UP (ref 0–0.5)
EOSINOPHIL NFR BLD AUTO: 0.1 % — SIGNIFICANT CHANGE UP (ref 0–6)
GLUCOSE SERPL-MCNC: 145 MG/DL — HIGH (ref 70–99)
HCT VFR BLD CALC: 27.5 % — LOW (ref 34.5–45)
HGB BLD-MCNC: 9.2 G/DL — LOW (ref 11.5–15.5)
IMM GRANULOCYTES # BLD AUTO: 0.14 K/UL — HIGH (ref 0–0.07)
IMM GRANULOCYTES NFR BLD AUTO: 1 % — HIGH (ref 0–0.9)
LYMPHOCYTES # BLD AUTO: 0.21 K/UL — LOW (ref 1–3.3)
LYMPHOCYTES NFR BLD AUTO: 1.5 % — LOW (ref 13–44)
MAGNESIUM SERPL-MCNC: 1.5 MG/DL — LOW (ref 1.6–2.6)
MCHC RBC-ENTMCNC: 32.7 PG — SIGNIFICANT CHANGE UP (ref 27–34)
MCHC RBC-ENTMCNC: 33.5 G/DL — SIGNIFICANT CHANGE UP (ref 32–36)
MCV RBC AUTO: 97.9 FL — SIGNIFICANT CHANGE UP (ref 80–100)
MONOCYTES # BLD AUTO: 1 K/UL — HIGH (ref 0–0.9)
MONOCYTES NFR BLD AUTO: 7 % — SIGNIFICANT CHANGE UP (ref 2–14)
NEUTROPHILS # BLD AUTO: 12.95 K/UL — HIGH (ref 1.8–7.4)
NEUTROPHILS NFR BLD AUTO: 90.1 % — HIGH (ref 43–77)
NRBC # BLD AUTO: 0 K/UL — SIGNIFICANT CHANGE UP (ref 0–0)
NRBC # FLD: 0 K/UL — SIGNIFICANT CHANGE UP (ref 0–0)
NRBC BLD AUTO-RTO: 0 /100 WBCS — SIGNIFICANT CHANGE UP (ref 0–0)
PHOSPHATE SERPL-MCNC: 3.5 MG/DL — SIGNIFICANT CHANGE UP (ref 2.5–4.5)
PLATELET # BLD AUTO: 230 K/UL — SIGNIFICANT CHANGE UP (ref 150–400)
PMV BLD: 10.8 FL — SIGNIFICANT CHANGE UP (ref 7–13)
POTASSIUM SERPL-MCNC: 4.2 MMOL/L — SIGNIFICANT CHANGE UP (ref 3.5–5.3)
POTASSIUM SERPL-SCNC: 4.2 MMOL/L — SIGNIFICANT CHANGE UP (ref 3.5–5.3)
PROT SERPL-MCNC: 5.6 G/DL — LOW (ref 6–8.3)
RBC # BLD: 2.81 M/UL — LOW (ref 3.8–5.2)
RBC # FLD: 19.8 % — HIGH (ref 10.3–14.5)
SODIUM SERPL-SCNC: 135 MMOL/L — SIGNIFICANT CHANGE UP (ref 135–145)
WBC # BLD: 14.36 K/UL — HIGH (ref 3.8–10.5)
WBC # FLD AUTO: 14.36 K/UL — HIGH (ref 3.8–10.5)

## 2025-07-18 PROCEDURE — 99233 SBSQ HOSP IP/OBS HIGH 50: CPT

## 2025-07-18 RX ORDER — ACETAMINOPHEN 500 MG/5ML
650 LIQUID (ML) ORAL EVERY 6 HOURS
Refills: 0 | Status: DISCONTINUED | OUTPATIENT
Start: 2025-07-18 | End: 2025-07-18

## 2025-07-18 RX ORDER — ACETAMINOPHEN 500 MG/5ML
650 LIQUID (ML) ORAL ONCE
Refills: 0 | Status: COMPLETED | OUTPATIENT
Start: 2025-07-18 | End: 2025-07-18

## 2025-07-18 RX ORDER — MAGNESIUM SULFATE 500 MG/ML
2 SYRINGE (ML) INJECTION ONCE
Refills: 0 | Status: COMPLETED | OUTPATIENT
Start: 2025-07-18 | End: 2025-07-18

## 2025-07-18 RX ORDER — DEXAMETHASONE 0.5 MG/1
4 TABLET ORAL EVERY 8 HOURS
Refills: 0 | Status: DISCONTINUED | OUTPATIENT
Start: 2025-07-18 | End: 2025-07-19

## 2025-07-18 RX ADMIN — Medication 40 MILLIGRAM(S): at 17:24

## 2025-07-18 RX ADMIN — APIXABAN 2.5 MILLIGRAM(S): 5 TABLET, FILM COATED ORAL at 17:24

## 2025-07-18 RX ADMIN — Medication 650 MILLIGRAM(S): at 19:49

## 2025-07-18 RX ADMIN — APIXABAN 2.5 MILLIGRAM(S): 5 TABLET, FILM COATED ORAL at 05:26

## 2025-07-18 RX ADMIN — Medication 25 GRAM(S): at 09:14

## 2025-07-18 RX ADMIN — Medication 650 MILLIGRAM(S): at 20:49

## 2025-07-18 RX ADMIN — DEXAMETHASONE 4 MILLIGRAM(S): 0.5 TABLET ORAL at 12:26

## 2025-07-18 RX ADMIN — DEXAMETHASONE 4 MILLIGRAM(S): 0.5 TABLET ORAL at 21:21

## 2025-07-18 RX ADMIN — Medication 2 TABLET(S): at 21:20

## 2025-07-18 RX ADMIN — DEXAMETHASONE 4 MILLIGRAM(S): 0.5 TABLET ORAL at 00:47

## 2025-07-18 RX ADMIN — DEXAMETHASONE 4 MILLIGRAM(S): 0.5 TABLET ORAL at 05:26

## 2025-07-18 RX ADMIN — TIOTROPIUM BROMIDE INHALATION SPRAY 2 PUFF(S): 3.12 SPRAY, METERED RESPIRATORY (INHALATION) at 09:15

## 2025-07-18 RX ADMIN — Medication 1 DOSE(S): at 20:34

## 2025-07-18 RX ADMIN — METOPROLOL SUCCINATE 25 MILLIGRAM(S): 50 TABLET, EXTENDED RELEASE ORAL at 05:26

## 2025-07-18 RX ADMIN — Medication 240 MILLIGRAM(S): at 05:26

## 2025-07-18 RX ADMIN — Medication 1 DOSE(S): at 09:15

## 2025-07-18 RX ADMIN — ATORVASTATIN CALCIUM 20 MILLIGRAM(S): 80 TABLET, FILM COATED ORAL at 21:20

## 2025-07-18 RX ADMIN — Medication 81 MILLIGRAM(S): at 12:26

## 2025-07-18 RX ADMIN — Medication 40 MILLIGRAM(S): at 05:26

## 2025-07-19 LAB
ADD ON TEST-SPECIMEN IN LAB: SIGNIFICANT CHANGE UP
ALBUMIN SERPL ELPH-MCNC: 3.4 G/DL — SIGNIFICANT CHANGE UP (ref 3.3–5)
ALP SERPL-CCNC: 109 U/L — SIGNIFICANT CHANGE UP (ref 40–120)
ALT FLD-CCNC: 8 U/L — SIGNIFICANT CHANGE UP (ref 4–33)
ANION GAP SERPL CALC-SCNC: 14 MMOL/L — SIGNIFICANT CHANGE UP (ref 7–14)
APPEARANCE UR: ABNORMAL
AST SERPL-CCNC: 9 U/L — SIGNIFICANT CHANGE UP (ref 4–32)
BACTERIA # UR AUTO: ABNORMAL /HPF
BASOPHILS # BLD AUTO: 0.03 K/UL — SIGNIFICANT CHANGE UP (ref 0–0.2)
BASOPHILS NFR BLD AUTO: 0.2 % — SIGNIFICANT CHANGE UP (ref 0–2)
BILIRUB SERPL-MCNC: 0.7 MG/DL — SIGNIFICANT CHANGE UP (ref 0.2–1.2)
BILIRUB UR-MCNC: NEGATIVE — SIGNIFICANT CHANGE UP
BUN SERPL-MCNC: 15 MG/DL — SIGNIFICANT CHANGE UP (ref 7–23)
CALCIUM SERPL-MCNC: 9.2 MG/DL — SIGNIFICANT CHANGE UP (ref 8.4–10.5)
CAST: 2 /LPF — SIGNIFICANT CHANGE UP (ref 0–4)
CHLORIDE SERPL-SCNC: 102 MMOL/L — SIGNIFICANT CHANGE UP (ref 98–107)
CO2 SERPL-SCNC: 23 MMOL/L — SIGNIFICANT CHANGE UP (ref 22–31)
COLOR SPEC: YELLOW — SIGNIFICANT CHANGE UP
CREAT SERPL-MCNC: 0.47 MG/DL — LOW (ref 0.5–1.3)
DIFF PNL FLD: ABNORMAL
EGFR: 94 ML/MIN/1.73M2 — SIGNIFICANT CHANGE UP
EGFR: 94 ML/MIN/1.73M2 — SIGNIFICANT CHANGE UP
EOSINOPHIL # BLD AUTO: 0.01 K/UL — SIGNIFICANT CHANGE UP (ref 0–0.5)
EOSINOPHIL NFR BLD AUTO: 0.1 % — SIGNIFICANT CHANGE UP (ref 0–6)
GLUCOSE SERPL-MCNC: 183 MG/DL — HIGH (ref 70–99)
GLUCOSE UR QL: NEGATIVE MG/DL — SIGNIFICANT CHANGE UP
HCT VFR BLD CALC: 27 % — LOW (ref 34.5–45)
HGB BLD-MCNC: 9 G/DL — LOW (ref 11.5–15.5)
IMM GRANULOCYTES # BLD AUTO: 0.2 K/UL — HIGH (ref 0–0.07)
IMM GRANULOCYTES NFR BLD AUTO: 1.2 % — HIGH (ref 0–0.9)
KETONES UR QL: NEGATIVE MG/DL — SIGNIFICANT CHANGE UP
LEUKOCYTE ESTERASE UR-ACNC: ABNORMAL
LYMPHOCYTES # BLD AUTO: 0.19 K/UL — LOW (ref 1–3.3)
LYMPHOCYTES NFR BLD AUTO: 1.2 % — LOW (ref 13–44)
MAGNESIUM SERPL-MCNC: 1.9 MG/DL — SIGNIFICANT CHANGE UP (ref 1.6–2.6)
MCHC RBC-ENTMCNC: 33.3 G/DL — SIGNIFICANT CHANGE UP (ref 32–36)
MCHC RBC-ENTMCNC: 33.3 PG — SIGNIFICANT CHANGE UP (ref 27–34)
MCV RBC AUTO: 100 FL — SIGNIFICANT CHANGE UP (ref 80–100)
MONOCYTES # BLD AUTO: 1.35 K/UL — HIGH (ref 0–0.9)
MONOCYTES NFR BLD AUTO: 8.2 % — SIGNIFICANT CHANGE UP (ref 2–14)
NEUTROPHILS # BLD AUTO: 14.67 K/UL — HIGH (ref 1.8–7.4)
NEUTROPHILS NFR BLD AUTO: 89.1 % — HIGH (ref 43–77)
NITRITE UR-MCNC: NEGATIVE — SIGNIFICANT CHANGE UP
NRBC # BLD AUTO: 0.02 K/UL — HIGH (ref 0–0)
NRBC # FLD: 0.02 K/UL — HIGH (ref 0–0)
NRBC BLD AUTO-RTO: 0 /100 WBCS — SIGNIFICANT CHANGE UP (ref 0–0)
PH UR: 6.5 — SIGNIFICANT CHANGE UP (ref 5–8)
PHOSPHATE SERPL-MCNC: 3.5 MG/DL — SIGNIFICANT CHANGE UP (ref 2.5–4.5)
PLATELET # BLD AUTO: 250 K/UL — SIGNIFICANT CHANGE UP (ref 150–400)
PMV BLD: 10.9 FL — SIGNIFICANT CHANGE UP (ref 7–13)
POTASSIUM SERPL-MCNC: 3.9 MMOL/L — SIGNIFICANT CHANGE UP (ref 3.5–5.3)
POTASSIUM SERPL-SCNC: 3.9 MMOL/L — SIGNIFICANT CHANGE UP (ref 3.5–5.3)
PROCALCITONIN SERPL-MCNC: 0.04 NG/ML — SIGNIFICANT CHANGE UP (ref 0.02–0.1)
PROT SERPL-MCNC: 5.5 G/DL — LOW (ref 6–8.3)
PROT UR-MCNC: SIGNIFICANT CHANGE UP MG/DL
RBC # BLD: 2.7 M/UL — LOW (ref 3.8–5.2)
RBC # FLD: 19.7 % — HIGH (ref 10.3–14.5)
RBC CASTS # UR COMP ASSIST: 5 /HPF — HIGH (ref 0–4)
SODIUM SERPL-SCNC: 139 MMOL/L — SIGNIFICANT CHANGE UP (ref 135–145)
SP GR SPEC: 1.01 — SIGNIFICANT CHANGE UP (ref 1–1.03)
SQUAMOUS # UR AUTO: 0 /HPF — SIGNIFICANT CHANGE UP (ref 0–5)
UROBILINOGEN FLD QL: 1 MG/DL — SIGNIFICANT CHANGE UP (ref 0.2–1)
WBC # BLD: 16.45 K/UL — HIGH (ref 3.8–10.5)
WBC # FLD AUTO: 16.45 K/UL — HIGH (ref 3.8–10.5)
WBC UR QL: 271 /HPF — HIGH (ref 0–5)

## 2025-07-19 PROCEDURE — 93010 ELECTROCARDIOGRAM REPORT: CPT

## 2025-07-19 PROCEDURE — 99233 SBSQ HOSP IP/OBS HIGH 50: CPT

## 2025-07-19 RX ORDER — METOPROLOL SUCCINATE 50 MG/1
25 TABLET, EXTENDED RELEASE ORAL
Refills: 0 | Status: DISCONTINUED | OUTPATIENT
Start: 2025-07-19 | End: 2025-07-22

## 2025-07-19 RX ORDER — DEXAMETHASONE 0.5 MG/1
4 TABLET ORAL EVERY 12 HOURS
Refills: 0 | Status: DISCONTINUED | OUTPATIENT
Start: 2025-07-19 | End: 2025-07-21

## 2025-07-19 RX ORDER — CEFTRIAXONE 500 MG/1
1000 INJECTION, POWDER, FOR SOLUTION INTRAMUSCULAR; INTRAVENOUS EVERY 24 HOURS
Refills: 0 | Status: COMPLETED | OUTPATIENT
Start: 2025-07-19 | End: 2025-07-21

## 2025-07-19 RX ORDER — ACETAMINOPHEN 500 MG/5ML
1000 LIQUID (ML) ORAL ONCE
Refills: 0 | Status: COMPLETED | OUTPATIENT
Start: 2025-07-19 | End: 2025-07-19

## 2025-07-19 RX ORDER — METOPROLOL SUCCINATE 50 MG/1
5 TABLET, EXTENDED RELEASE ORAL ONCE
Refills: 0 | Status: COMPLETED | OUTPATIENT
Start: 2025-07-19 | End: 2025-07-19

## 2025-07-19 RX ADMIN — Medication 400 MILLIGRAM(S): at 13:19

## 2025-07-19 RX ADMIN — Medication 1 DOSE(S): at 21:38

## 2025-07-19 RX ADMIN — Medication 2 TABLET(S): at 21:37

## 2025-07-19 RX ADMIN — METOPROLOL SUCCINATE 25 MILLIGRAM(S): 50 TABLET, EXTENDED RELEASE ORAL at 19:47

## 2025-07-19 RX ADMIN — TIOTROPIUM BROMIDE INHALATION SPRAY 2 PUFF(S): 3.12 SPRAY, METERED RESPIRATORY (INHALATION) at 09:48

## 2025-07-19 RX ADMIN — Medication 40 MILLIGRAM(S): at 06:36

## 2025-07-19 RX ADMIN — Medication 240 MILLIGRAM(S): at 06:35

## 2025-07-19 RX ADMIN — APIXABAN 2.5 MILLIGRAM(S): 5 TABLET, FILM COATED ORAL at 06:35

## 2025-07-19 RX ADMIN — Medication 40 MILLIGRAM(S): at 17:45

## 2025-07-19 RX ADMIN — DEXAMETHASONE 4 MILLIGRAM(S): 0.5 TABLET ORAL at 17:45

## 2025-07-19 RX ADMIN — CEFTRIAXONE 100 MILLIGRAM(S): 500 INJECTION, POWDER, FOR SOLUTION INTRAMUSCULAR; INTRAVENOUS at 02:19

## 2025-07-19 RX ADMIN — Medication 1000 MILLIGRAM(S): at 14:19

## 2025-07-19 RX ADMIN — Medication 81 MILLIGRAM(S): at 12:14

## 2025-07-19 RX ADMIN — METOPROLOL SUCCINATE 5 MILLIGRAM(S): 50 TABLET, EXTENDED RELEASE ORAL at 03:02

## 2025-07-19 RX ADMIN — ATORVASTATIN CALCIUM 20 MILLIGRAM(S): 80 TABLET, FILM COATED ORAL at 21:37

## 2025-07-19 RX ADMIN — METOPROLOL SUCCINATE 25 MILLIGRAM(S): 50 TABLET, EXTENDED RELEASE ORAL at 06:37

## 2025-07-19 RX ADMIN — APIXABAN 2.5 MILLIGRAM(S): 5 TABLET, FILM COATED ORAL at 17:45

## 2025-07-19 RX ADMIN — Medication 1 DOSE(S): at 09:48

## 2025-07-19 RX ADMIN — DEXAMETHASONE 4 MILLIGRAM(S): 0.5 TABLET ORAL at 06:37

## 2025-07-19 RX ADMIN — POLYETHYLENE GLYCOL 3350 17 GRAM(S): 17 POWDER, FOR SOLUTION ORAL at 12:15

## 2025-07-20 LAB
ADD ON TEST-SPECIMEN IN LAB: SIGNIFICANT CHANGE UP
ALBUMIN SERPL ELPH-MCNC: 3.2 G/DL — LOW (ref 3.3–5)
ALP SERPL-CCNC: 87 U/L — SIGNIFICANT CHANGE UP (ref 40–120)
ALT FLD-CCNC: 10 U/L — SIGNIFICANT CHANGE UP (ref 4–33)
ANION GAP SERPL CALC-SCNC: 13 MMOL/L — SIGNIFICANT CHANGE UP (ref 7–14)
AST SERPL-CCNC: 9 U/L — SIGNIFICANT CHANGE UP (ref 4–32)
B PERT DNA SPEC QL NAA+PROBE: SIGNIFICANT CHANGE UP
B PERT+PARAPERT DNA PNL SPEC NAA+PROBE: SIGNIFICANT CHANGE UP
BASOPHILS # BLD AUTO: 0.02 K/UL — SIGNIFICANT CHANGE UP (ref 0–0.2)
BASOPHILS NFR BLD AUTO: 0.2 % — SIGNIFICANT CHANGE UP (ref 0–2)
BILIRUB SERPL-MCNC: 0.4 MG/DL — SIGNIFICANT CHANGE UP (ref 0.2–1.2)
BLD GP AB SCN SERPL QL: NEGATIVE — SIGNIFICANT CHANGE UP
BUN SERPL-MCNC: 18 MG/DL — SIGNIFICANT CHANGE UP (ref 7–23)
C PNEUM DNA SPEC QL NAA+PROBE: SIGNIFICANT CHANGE UP
CALCIUM SERPL-MCNC: 9.1 MG/DL — SIGNIFICANT CHANGE UP (ref 8.4–10.5)
CHLORIDE SERPL-SCNC: 101 MMOL/L — SIGNIFICANT CHANGE UP (ref 98–107)
CO2 SERPL-SCNC: 22 MMOL/L — SIGNIFICANT CHANGE UP (ref 22–31)
CREAT SERPL-MCNC: 0.5 MG/DL — SIGNIFICANT CHANGE UP (ref 0.5–1.3)
EGFR: 93 ML/MIN/1.73M2 — SIGNIFICANT CHANGE UP
EGFR: 93 ML/MIN/1.73M2 — SIGNIFICANT CHANGE UP
EOSINOPHIL # BLD AUTO: 0.01 K/UL — SIGNIFICANT CHANGE UP (ref 0–0.5)
EOSINOPHIL NFR BLD AUTO: 0.1 % — SIGNIFICANT CHANGE UP (ref 0–6)
FLUAV AG NPH QL: SIGNIFICANT CHANGE UP
FLUAV SUBTYP SPEC NAA+PROBE: SIGNIFICANT CHANGE UP
FLUBV AG NPH QL: SIGNIFICANT CHANGE UP
FLUBV RNA SPEC QL NAA+PROBE: SIGNIFICANT CHANGE UP
GLUCOSE BLDC GLUCOMTR-MCNC: 178 MG/DL — HIGH (ref 70–99)
GLUCOSE SERPL-MCNC: 151 MG/DL — HIGH (ref 70–99)
HADV DNA SPEC QL NAA+PROBE: SIGNIFICANT CHANGE UP
HCOV 229E RNA SPEC QL NAA+PROBE: SIGNIFICANT CHANGE UP
HCOV HKU1 RNA SPEC QL NAA+PROBE: SIGNIFICANT CHANGE UP
HCOV NL63 RNA SPEC QL NAA+PROBE: SIGNIFICANT CHANGE UP
HCOV OC43 RNA SPEC QL NAA+PROBE: SIGNIFICANT CHANGE UP
HCT VFR BLD CALC: 26.4 % — LOW (ref 34.5–45)
HGB BLD-MCNC: 8.8 G/DL — LOW (ref 11.5–15.5)
HMPV RNA SPEC QL NAA+PROBE: SIGNIFICANT CHANGE UP
HPIV1 RNA SPEC QL NAA+PROBE: SIGNIFICANT CHANGE UP
HPIV2 RNA SPEC QL NAA+PROBE: SIGNIFICANT CHANGE UP
HPIV3 RNA SPEC QL NAA+PROBE: SIGNIFICANT CHANGE UP
HPIV4 RNA SPEC QL NAA+PROBE: SIGNIFICANT CHANGE UP
IMM GRANULOCYTES # BLD AUTO: 0.23 K/UL — HIGH (ref 0–0.07)
IMM GRANULOCYTES NFR BLD AUTO: 1.9 % — HIGH (ref 0–0.9)
LYMPHOCYTES # BLD AUTO: 0.19 K/UL — LOW (ref 1–3.3)
LYMPHOCYTES NFR BLD AUTO: 1.5 % — LOW (ref 13–44)
M PNEUMO DNA SPEC QL NAA+PROBE: SIGNIFICANT CHANGE UP
MAGNESIUM SERPL-MCNC: 1.8 MG/DL — SIGNIFICANT CHANGE UP (ref 1.6–2.6)
MCHC RBC-ENTMCNC: 33.3 G/DL — SIGNIFICANT CHANGE UP (ref 32–36)
MCHC RBC-ENTMCNC: 33.5 PG — SIGNIFICANT CHANGE UP (ref 27–34)
MCV RBC AUTO: 100.4 FL — HIGH (ref 80–100)
MONOCYTES # BLD AUTO: 1.17 K/UL — HIGH (ref 0–0.9)
MONOCYTES NFR BLD AUTO: 9.5 % — SIGNIFICANT CHANGE UP (ref 2–14)
NEUTROPHILS # BLD AUTO: 10.64 K/UL — HIGH (ref 1.8–7.4)
NEUTROPHILS NFR BLD AUTO: 86.8 % — HIGH (ref 43–77)
NRBC # BLD AUTO: 0 K/UL — SIGNIFICANT CHANGE UP (ref 0–0)
NRBC # FLD: 0 K/UL — SIGNIFICANT CHANGE UP (ref 0–0)
NRBC BLD AUTO-RTO: 0 /100 WBCS — SIGNIFICANT CHANGE UP (ref 0–0)
PHOSPHATE SERPL-MCNC: 3.2 MG/DL — SIGNIFICANT CHANGE UP (ref 2.5–4.5)
PLATELET # BLD AUTO: 232 K/UL — SIGNIFICANT CHANGE UP (ref 150–400)
PMV BLD: 10.9 FL — SIGNIFICANT CHANGE UP (ref 7–13)
POTASSIUM SERPL-MCNC: 4.1 MMOL/L — SIGNIFICANT CHANGE UP (ref 3.5–5.3)
POTASSIUM SERPL-SCNC: 4.1 MMOL/L — SIGNIFICANT CHANGE UP (ref 3.5–5.3)
PROT SERPL-MCNC: 5.2 G/DL — LOW (ref 6–8.3)
RAPID RVP RESULT: SIGNIFICANT CHANGE UP
RBC # BLD: 2.63 M/UL — LOW (ref 3.8–5.2)
RBC # FLD: 19.7 % — HIGH (ref 10.3–14.5)
RH IG SCN BLD-IMP: POSITIVE — SIGNIFICANT CHANGE UP
RSV RNA NPH QL NAA+NON-PROBE: SIGNIFICANT CHANGE UP
RSV RNA SPEC QL NAA+PROBE: SIGNIFICANT CHANGE UP
RV+EV RNA SPEC QL NAA+PROBE: SIGNIFICANT CHANGE UP
SARS-COV-2 RNA SPEC QL NAA+PROBE: SIGNIFICANT CHANGE UP
SARS-COV-2 RNA SPEC QL NAA+PROBE: SIGNIFICANT CHANGE UP
SODIUM SERPL-SCNC: 136 MMOL/L — SIGNIFICANT CHANGE UP (ref 135–145)
SOURCE RESPIRATORY: SIGNIFICANT CHANGE UP
WBC # BLD: 12.26 K/UL — HIGH (ref 3.8–10.5)
WBC # FLD AUTO: 12.26 K/UL — HIGH (ref 3.8–10.5)

## 2025-07-20 PROCEDURE — 99233 SBSQ HOSP IP/OBS HIGH 50: CPT

## 2025-07-20 PROCEDURE — 71045 X-RAY EXAM CHEST 1 VIEW: CPT | Mod: 26

## 2025-07-20 RX ORDER — ACETAMINOPHEN 500 MG/5ML
1000 LIQUID (ML) ORAL ONCE
Refills: 0 | Status: COMPLETED | OUTPATIENT
Start: 2025-07-20 | End: 2025-07-20

## 2025-07-20 RX ORDER — DEXTROMETHORPHAN HBR, GUAIFENESIN 200 MG/10ML
100 LIQUID ORAL EVERY 6 HOURS
Refills: 0 | Status: DISCONTINUED | OUTPATIENT
Start: 2025-07-20 | End: 2025-07-22

## 2025-07-20 RX ORDER — BENZONATATE 100 MG
100 CAPSULE ORAL EVERY 8 HOURS
Refills: 0 | Status: DISCONTINUED | OUTPATIENT
Start: 2025-07-20 | End: 2025-07-22

## 2025-07-20 RX ADMIN — Medication 240 MILLIGRAM(S): at 06:46

## 2025-07-20 RX ADMIN — Medication 1 DOSE(S): at 21:24

## 2025-07-20 RX ADMIN — DEXTROMETHORPHAN HBR, GUAIFENESIN 100 MILLIGRAM(S): 200 LIQUID ORAL at 14:06

## 2025-07-20 RX ADMIN — METOPROLOL SUCCINATE 25 MILLIGRAM(S): 50 TABLET, EXTENDED RELEASE ORAL at 18:16

## 2025-07-20 RX ADMIN — Medication 100 MILLIGRAM(S): at 14:07

## 2025-07-20 RX ADMIN — Medication 1000 MILLIGRAM(S): at 19:05

## 2025-07-20 RX ADMIN — APIXABAN 2.5 MILLIGRAM(S): 5 TABLET, FILM COATED ORAL at 18:16

## 2025-07-20 RX ADMIN — DEXAMETHASONE 4 MILLIGRAM(S): 0.5 TABLET ORAL at 18:16

## 2025-07-20 RX ADMIN — Medication 40 MILLIGRAM(S): at 05:43

## 2025-07-20 RX ADMIN — Medication 400 MILLIGRAM(S): at 18:17

## 2025-07-20 RX ADMIN — Medication 100 MILLIGRAM(S): at 21:23

## 2025-07-20 RX ADMIN — POLYETHYLENE GLYCOL 3350 17 GRAM(S): 17 POWDER, FOR SOLUTION ORAL at 11:26

## 2025-07-20 RX ADMIN — METOPROLOL SUCCINATE 25 MILLIGRAM(S): 50 TABLET, EXTENDED RELEASE ORAL at 05:42

## 2025-07-20 RX ADMIN — APIXABAN 2.5 MILLIGRAM(S): 5 TABLET, FILM COATED ORAL at 05:44

## 2025-07-20 RX ADMIN — Medication 40 MILLIGRAM(S): at 18:16

## 2025-07-20 RX ADMIN — Medication 1 DOSE(S): at 08:34

## 2025-07-20 RX ADMIN — DEXAMETHASONE 4 MILLIGRAM(S): 0.5 TABLET ORAL at 05:43

## 2025-07-20 RX ADMIN — Medication 2 TABLET(S): at 21:23

## 2025-07-20 RX ADMIN — CEFTRIAXONE 100 MILLIGRAM(S): 500 INJECTION, POWDER, FOR SOLUTION INTRAMUSCULAR; INTRAVENOUS at 01:14

## 2025-07-20 RX ADMIN — ATORVASTATIN CALCIUM 20 MILLIGRAM(S): 80 TABLET, FILM COATED ORAL at 21:23

## 2025-07-21 LAB
ALBUMIN SERPL ELPH-MCNC: 3.4 G/DL — SIGNIFICANT CHANGE UP (ref 3.3–5)
ALP SERPL-CCNC: 95 U/L — SIGNIFICANT CHANGE UP (ref 40–120)
ALT FLD-CCNC: 8 U/L — SIGNIFICANT CHANGE UP (ref 4–33)
ANION GAP SERPL CALC-SCNC: 11 MMOL/L — SIGNIFICANT CHANGE UP (ref 7–14)
AST SERPL-CCNC: 9 U/L — SIGNIFICANT CHANGE UP (ref 4–32)
BASOPHILS # BLD AUTO: 0.04 K/UL — SIGNIFICANT CHANGE UP (ref 0–0.2)
BASOPHILS NFR BLD AUTO: 0.3 % — SIGNIFICANT CHANGE UP (ref 0–2)
BILIRUB SERPL-MCNC: 0.5 MG/DL — SIGNIFICANT CHANGE UP (ref 0.2–1.2)
BUN SERPL-MCNC: 18 MG/DL — SIGNIFICANT CHANGE UP (ref 7–23)
CALCIUM SERPL-MCNC: 9 MG/DL — SIGNIFICANT CHANGE UP (ref 8.4–10.5)
CHLORIDE SERPL-SCNC: 102 MMOL/L — SIGNIFICANT CHANGE UP (ref 98–107)
CO2 SERPL-SCNC: 25 MMOL/L — SIGNIFICANT CHANGE UP (ref 22–31)
CREAT SERPL-MCNC: 0.48 MG/DL — LOW (ref 0.5–1.3)
EGFR: 94 ML/MIN/1.73M2 — SIGNIFICANT CHANGE UP
EGFR: 94 ML/MIN/1.73M2 — SIGNIFICANT CHANGE UP
EOSINOPHIL # BLD AUTO: 0.01 K/UL — SIGNIFICANT CHANGE UP (ref 0–0.5)
EOSINOPHIL NFR BLD AUTO: 0.1 % — SIGNIFICANT CHANGE UP (ref 0–6)
GLUCOSE SERPL-MCNC: 145 MG/DL — HIGH (ref 70–99)
HCT VFR BLD CALC: 27.4 % — LOW (ref 34.5–45)
HGB BLD-MCNC: 9.1 G/DL — LOW (ref 11.5–15.5)
IMM GRANULOCYTES # BLD AUTO: 0.21 K/UL — HIGH (ref 0–0.07)
IMM GRANULOCYTES NFR BLD AUTO: 1.8 % — HIGH (ref 0–0.9)
LYMPHOCYTES # BLD AUTO: 0.2 K/UL — LOW (ref 1–3.3)
LYMPHOCYTES NFR BLD AUTO: 1.7 % — LOW (ref 13–44)
MAGNESIUM SERPL-MCNC: 1.8 MG/DL — SIGNIFICANT CHANGE UP (ref 1.6–2.6)
MCHC RBC-ENTMCNC: 33.2 G/DL — SIGNIFICANT CHANGE UP (ref 32–36)
MCHC RBC-ENTMCNC: 33.2 PG — SIGNIFICANT CHANGE UP (ref 27–34)
MCV RBC AUTO: 100 FL — SIGNIFICANT CHANGE UP (ref 80–100)
MONOCYTES # BLD AUTO: 1.43 K/UL — HIGH (ref 0–0.9)
MONOCYTES NFR BLD AUTO: 12 % — SIGNIFICANT CHANGE UP (ref 2–14)
NEUTROPHILS # BLD AUTO: 10.07 K/UL — HIGH (ref 1.8–7.4)
NEUTROPHILS NFR BLD AUTO: 84.1 % — HIGH (ref 43–77)
NRBC # BLD AUTO: 0 K/UL — SIGNIFICANT CHANGE UP (ref 0–0)
NRBC # FLD: 0 K/UL — SIGNIFICANT CHANGE UP (ref 0–0)
NRBC BLD AUTO-RTO: 0 /100 WBCS — SIGNIFICANT CHANGE UP (ref 0–0)
PHOSPHATE SERPL-MCNC: 3.4 MG/DL — SIGNIFICANT CHANGE UP (ref 2.5–4.5)
PLATELET # BLD AUTO: 247 K/UL — SIGNIFICANT CHANGE UP (ref 150–400)
PMV BLD: 10.9 FL — SIGNIFICANT CHANGE UP (ref 7–13)
POTASSIUM SERPL-MCNC: 4 MMOL/L — SIGNIFICANT CHANGE UP (ref 3.5–5.3)
POTASSIUM SERPL-SCNC: 4 MMOL/L — SIGNIFICANT CHANGE UP (ref 3.5–5.3)
PROT SERPL-MCNC: 5.4 G/DL — LOW (ref 6–8.3)
RBC # BLD: 2.74 M/UL — LOW (ref 3.8–5.2)
RBC # FLD: 19.6 % — HIGH (ref 10.3–14.5)
SODIUM SERPL-SCNC: 138 MMOL/L — SIGNIFICANT CHANGE UP (ref 135–145)
WBC # BLD: 11.96 K/UL — HIGH (ref 3.8–10.5)
WBC # FLD AUTO: 11.96 K/UL — HIGH (ref 3.8–10.5)

## 2025-07-21 PROCEDURE — 99233 SBSQ HOSP IP/OBS HIGH 50: CPT

## 2025-07-21 RX ORDER — DEXAMETHASONE 0.5 MG/1
4 TABLET ORAL DAILY
Refills: 0 | Status: DISCONTINUED | OUTPATIENT
Start: 2025-07-22 | End: 2025-07-22

## 2025-07-21 RX ORDER — DEXAMETHASONE 0.5 MG/1
8 TABLET ORAL ONCE
Refills: 0 | Status: COMPLETED | OUTPATIENT
Start: 2025-07-21 | End: 2025-07-21

## 2025-07-21 RX ORDER — TARLATAMAB-DLLE 1 MG
10 KIT INTRAVENOUS ONCE
Refills: 0 | Status: COMPLETED | OUTPATIENT
Start: 2025-07-21 | End: 2025-07-21

## 2025-07-21 RX ADMIN — TARLATAMAB-DLLE 10 MILLIGRAM(S): KIT at 14:06

## 2025-07-21 RX ADMIN — CEFTRIAXONE 100 MILLIGRAM(S): 500 INJECTION, POWDER, FOR SOLUTION INTRAMUSCULAR; INTRAVENOUS at 01:17

## 2025-07-21 RX ADMIN — Medication 100 MILLIGRAM(S): at 22:31

## 2025-07-21 RX ADMIN — Medication 1 DOSE(S): at 09:27

## 2025-07-21 RX ADMIN — Medication 240 MILLIGRAM(S): at 05:16

## 2025-07-21 RX ADMIN — Medication 100 MILLIGRAM(S): at 05:15

## 2025-07-21 RX ADMIN — Medication 40 MILLIGRAM(S): at 05:15

## 2025-07-21 RX ADMIN — Medication 40 MILLIGRAM(S): at 18:51

## 2025-07-21 RX ADMIN — ATORVASTATIN CALCIUM 20 MILLIGRAM(S): 80 TABLET, FILM COATED ORAL at 22:31

## 2025-07-21 RX ADMIN — Medication 100 MILLIGRAM(S): at 14:57

## 2025-07-21 RX ADMIN — Medication 1 DOSE(S): at 22:31

## 2025-07-21 RX ADMIN — DEXAMETHASONE 101.6 MILLIGRAM(S): 0.5 TABLET ORAL at 12:21

## 2025-07-21 RX ADMIN — METOPROLOL SUCCINATE 25 MILLIGRAM(S): 50 TABLET, EXTENDED RELEASE ORAL at 05:15

## 2025-07-21 RX ADMIN — METOPROLOL SUCCINATE 25 MILLIGRAM(S): 50 TABLET, EXTENDED RELEASE ORAL at 18:52

## 2025-07-21 RX ADMIN — DEXAMETHASONE 4 MILLIGRAM(S): 0.5 TABLET ORAL at 05:15

## 2025-07-21 RX ADMIN — APIXABAN 2.5 MILLIGRAM(S): 5 TABLET, FILM COATED ORAL at 18:51

## 2025-07-21 RX ADMIN — APIXABAN 2.5 MILLIGRAM(S): 5 TABLET, FILM COATED ORAL at 05:15

## 2025-07-22 ENCOUNTER — TRANSCRIPTION ENCOUNTER (OUTPATIENT)
Age: 85
End: 2025-07-22

## 2025-07-22 VITALS
TEMPERATURE: 98 F | DIASTOLIC BLOOD PRESSURE: 59 MMHG | OXYGEN SATURATION: 99 % | RESPIRATION RATE: 16 BRPM | SYSTOLIC BLOOD PRESSURE: 104 MMHG | HEART RATE: 99 BPM

## 2025-07-22 LAB
-  AMOXICILLIN/CLAVULANIC ACID: SIGNIFICANT CHANGE UP
-  AMPICILLIN/SULBACTAM: SIGNIFICANT CHANGE UP
-  AMPICILLIN: SIGNIFICANT CHANGE UP
-  AZTREONAM: SIGNIFICANT CHANGE UP
-  CEFAZOLIN: SIGNIFICANT CHANGE UP
-  CEFEPIME: SIGNIFICANT CHANGE UP
-  CEFOXITIN: SIGNIFICANT CHANGE UP
-  CEFTRIAXONE: SIGNIFICANT CHANGE UP
-  CIPROFLOXACIN: SIGNIFICANT CHANGE UP
-  ERTAPENEM: SIGNIFICANT CHANGE UP
-  GENTAMICIN: SIGNIFICANT CHANGE UP
-  IMIPENEM: SIGNIFICANT CHANGE UP
-  LEVOFLOXACIN: SIGNIFICANT CHANGE UP
-  MEROPENEM: SIGNIFICANT CHANGE UP
-  NITROFURANTOIN: SIGNIFICANT CHANGE UP
-  PIPERACILLIN/TAZOBACTAM: SIGNIFICANT CHANGE UP
-  TIGECYCLINE: SIGNIFICANT CHANGE UP
-  TOBRAMYCIN: SIGNIFICANT CHANGE UP
-  TRIMETHOPRIM/SULFAMETHOXAZOLE: SIGNIFICANT CHANGE UP
ALBUMIN SERPL ELPH-MCNC: 3.4 G/DL — SIGNIFICANT CHANGE UP (ref 3.3–5)
ALP SERPL-CCNC: 90 U/L — SIGNIFICANT CHANGE UP (ref 40–120)
ALT FLD-CCNC: 9 U/L — SIGNIFICANT CHANGE UP (ref 4–33)
ANION GAP SERPL CALC-SCNC: 12 MMOL/L — SIGNIFICANT CHANGE UP (ref 7–14)
AST SERPL-CCNC: 9 U/L — SIGNIFICANT CHANGE UP (ref 4–32)
BASOPHILS # BLD AUTO: 0.02 K/UL — SIGNIFICANT CHANGE UP (ref 0–0.2)
BASOPHILS NFR BLD AUTO: 0.2 % — SIGNIFICANT CHANGE UP (ref 0–2)
BILIRUB SERPL-MCNC: 0.4 MG/DL — SIGNIFICANT CHANGE UP (ref 0.2–1.2)
BUN SERPL-MCNC: 21 MG/DL — SIGNIFICANT CHANGE UP (ref 7–23)
CALCIUM SERPL-MCNC: 8.9 MG/DL — SIGNIFICANT CHANGE UP (ref 8.4–10.5)
CHLORIDE SERPL-SCNC: 101 MMOL/L — SIGNIFICANT CHANGE UP (ref 98–107)
CO2 SERPL-SCNC: 23 MMOL/L — SIGNIFICANT CHANGE UP (ref 22–31)
CREAT SERPL-MCNC: 0.54 MG/DL — SIGNIFICANT CHANGE UP (ref 0.5–1.3)
CULTURE RESULTS: ABNORMAL
EGFR: 91 ML/MIN/1.73M2 — SIGNIFICANT CHANGE UP
EGFR: 91 ML/MIN/1.73M2 — SIGNIFICANT CHANGE UP
EOSINOPHIL # BLD AUTO: 0.02 K/UL — SIGNIFICANT CHANGE UP (ref 0–0.5)
EOSINOPHIL NFR BLD AUTO: 0.2 % — SIGNIFICANT CHANGE UP (ref 0–6)
GLUCOSE SERPL-MCNC: 143 MG/DL — HIGH (ref 70–99)
HCT VFR BLD CALC: 27.5 % — LOW (ref 34.5–45)
HGB BLD-MCNC: 9 G/DL — LOW (ref 11.5–15.5)
IMM GRANULOCYTES # BLD AUTO: 0.4 K/UL — HIGH (ref 0–0.07)
IMM GRANULOCYTES NFR BLD AUTO: 3.1 % — HIGH (ref 0–0.9)
LYMPHOCYTES # BLD AUTO: 0.25 K/UL — LOW (ref 1–3.3)
LYMPHOCYTES NFR BLD AUTO: 1.9 % — LOW (ref 13–44)
MAGNESIUM SERPL-MCNC: 1.9 MG/DL — SIGNIFICANT CHANGE UP (ref 1.6–2.6)
MCHC RBC-ENTMCNC: 32.7 G/DL — SIGNIFICANT CHANGE UP (ref 32–36)
MCHC RBC-ENTMCNC: 32.7 PG — SIGNIFICANT CHANGE UP (ref 27–34)
MCV RBC AUTO: 100 FL — SIGNIFICANT CHANGE UP (ref 80–100)
METHOD TYPE: SIGNIFICANT CHANGE UP
MONOCYTES # BLD AUTO: 1.83 K/UL — HIGH (ref 0–0.9)
MONOCYTES NFR BLD AUTO: 14 % — SIGNIFICANT CHANGE UP (ref 2–14)
NEUTROPHILS # BLD AUTO: 10.52 K/UL — HIGH (ref 1.8–7.4)
NEUTROPHILS NFR BLD AUTO: 80.6 % — HIGH (ref 43–77)
NRBC # BLD AUTO: 0.03 K/UL — HIGH (ref 0–0)
NRBC # FLD: 0.03 K/UL — HIGH (ref 0–0)
NRBC BLD AUTO-RTO: 0 /100 WBCS — SIGNIFICANT CHANGE UP (ref 0–0)
ORGANISM # SPEC MICROSCOPIC CNT: ABNORMAL
ORGANISM # SPEC MICROSCOPIC CNT: ABNORMAL
PHOSPHATE SERPL-MCNC: 3.2 MG/DL — SIGNIFICANT CHANGE UP (ref 2.5–4.5)
PLATELET # BLD AUTO: 239 K/UL — SIGNIFICANT CHANGE UP (ref 150–400)
PMV BLD: 10.5 FL — SIGNIFICANT CHANGE UP (ref 7–13)
POTASSIUM SERPL-MCNC: 3.8 MMOL/L — SIGNIFICANT CHANGE UP (ref 3.5–5.3)
POTASSIUM SERPL-SCNC: 3.8 MMOL/L — SIGNIFICANT CHANGE UP (ref 3.5–5.3)
PROT SERPL-MCNC: 5.3 G/DL — LOW (ref 6–8.3)
RBC # BLD: 2.75 M/UL — LOW (ref 3.8–5.2)
RBC # FLD: 19.3 % — HIGH (ref 10.3–14.5)
SODIUM SERPL-SCNC: 136 MMOL/L — SIGNIFICANT CHANGE UP (ref 135–145)
SPECIMEN SOURCE: SIGNIFICANT CHANGE UP
WBC # BLD: 13.04 K/UL — HIGH (ref 3.8–10.5)
WBC # FLD AUTO: 13.04 K/UL — HIGH (ref 3.8–10.5)

## 2025-07-22 PROCEDURE — 99239 HOSP IP/OBS DSCHRG MGMT >30: CPT

## 2025-07-22 RX ORDER — DEXAMETHASONE 0.5 MG/1
1 TABLET ORAL
Qty: 2 | Refills: 0
Start: 2025-07-22 | End: 2025-07-23

## 2025-07-22 RX ORDER — ASPIRIN 325 MG
1 TABLET ORAL
Refills: 0 | DISCHARGE

## 2025-07-22 RX ADMIN — METOPROLOL SUCCINATE 25 MILLIGRAM(S): 50 TABLET, EXTENDED RELEASE ORAL at 06:08

## 2025-07-22 RX ADMIN — DEXAMETHASONE 4 MILLIGRAM(S): 0.5 TABLET ORAL at 06:08

## 2025-07-22 RX ADMIN — Medication 240 MILLIGRAM(S): at 06:09

## 2025-07-22 RX ADMIN — Medication 100 MILLIGRAM(S): at 06:09

## 2025-07-22 RX ADMIN — Medication 1 DOSE(S): at 09:38

## 2025-07-22 RX ADMIN — Medication 40 MILLIGRAM(S): at 06:08

## 2025-07-22 RX ADMIN — APIXABAN 2.5 MILLIGRAM(S): 5 TABLET, FILM COATED ORAL at 06:09

## 2025-07-28 ENCOUNTER — LABORATORY RESULT (OUTPATIENT)
Age: 85
End: 2025-07-28

## 2025-07-28 ENCOUNTER — RESULT REVIEW (OUTPATIENT)
Age: 85
End: 2025-07-28

## 2025-07-28 ENCOUNTER — NON-APPOINTMENT (OUTPATIENT)
Age: 85
End: 2025-07-28

## 2025-07-28 ENCOUNTER — APPOINTMENT (OUTPATIENT)
Dept: INFUSION THERAPY | Facility: HOSPITAL | Age: 85
End: 2025-07-28

## 2025-07-28 ENCOUNTER — APPOINTMENT (OUTPATIENT)
Dept: HEMATOLOGY ONCOLOGY | Facility: CLINIC | Age: 85
End: 2025-07-28
Payer: MEDICARE

## 2025-07-28 DIAGNOSIS — C79.31 MALIGNANT NEOPLASM OF UNSPECIFIED PART OF UNSPECIFIED BRONCHUS OR LUNG: ICD-10-CM

## 2025-07-28 DIAGNOSIS — G89.3 NEOPLASM RELATED PAIN (ACUTE) (CHRONIC): ICD-10-CM

## 2025-07-28 DIAGNOSIS — C34.90 MALIGNANT NEOPLASM OF UNSPECIFIED PART OF UNSPECIFIED BRONCHUS OR LUNG: ICD-10-CM

## 2025-07-28 DIAGNOSIS — R19.5 OTHER FECAL ABNORMALITIES: ICD-10-CM

## 2025-07-28 LAB
ALBUMIN SERPL ELPH-MCNC: 3.2 G/DL — LOW (ref 3.3–5)
ALP SERPL-CCNC: 66 U/L — SIGNIFICANT CHANGE UP (ref 40–120)
ALT FLD-CCNC: 10 U/L — SIGNIFICANT CHANGE UP (ref 10–45)
ANION GAP SERPL CALC-SCNC: 10 MMOL/L — SIGNIFICANT CHANGE UP (ref 5–17)
AST SERPL-CCNC: 11 U/L — SIGNIFICANT CHANGE UP (ref 10–40)
BASOPHILS # BLD AUTO: 0.06 K/UL — SIGNIFICANT CHANGE UP (ref 0–0.2)
BASOPHILS NFR BLD AUTO: 0.7 % — SIGNIFICANT CHANGE UP (ref 0–2)
BILIRUB SERPL-MCNC: 0.5 MG/DL — SIGNIFICANT CHANGE UP (ref 0.2–1.2)
BUN SERPL-MCNC: 17 MG/DL — SIGNIFICANT CHANGE UP (ref 7–23)
CALCIUM SERPL-MCNC: 7.9 MG/DL — LOW (ref 8.4–10.5)
CHLORIDE SERPL-SCNC: 104 MMOL/L — SIGNIFICANT CHANGE UP (ref 96–108)
CO2 SERPL-SCNC: 22 MMOL/L — SIGNIFICANT CHANGE UP (ref 22–31)
CREAT SERPL-MCNC: 0.56 MG/DL — SIGNIFICANT CHANGE UP (ref 0.5–1.3)
EGFR: 90 ML/MIN/1.73M2 — SIGNIFICANT CHANGE UP
EGFR: 90 ML/MIN/1.73M2 — SIGNIFICANT CHANGE UP
EOSINOPHIL # BLD AUTO: 0.53 K/UL — HIGH (ref 0–0.5)
EOSINOPHIL NFR BLD AUTO: 6 % — SIGNIFICANT CHANGE UP (ref 0–6)
GLUCOSE SERPL-MCNC: 99 MG/DL — SIGNIFICANT CHANGE UP (ref 70–99)
HCT VFR BLD CALC: 28 % — LOW (ref 34.5–45)
HGB BLD-MCNC: 9.5 G/DL — LOW (ref 11.5–15.5)
IMM GRANULOCYTES NFR BLD AUTO: 1.1 % — HIGH (ref 0–0.9)
LDH SERPL L TO P-CCNC: 217 U/L — SIGNIFICANT CHANGE UP (ref 50–242)
LYMPHOCYTES # BLD AUTO: 0.28 K/UL — LOW (ref 1–3.3)
LYMPHOCYTES # BLD AUTO: 3.2 % — LOW (ref 13–44)
MCHC RBC-ENTMCNC: 33.5 PG — SIGNIFICANT CHANGE UP (ref 27–34)
MCHC RBC-ENTMCNC: 33.9 G/DL — SIGNIFICANT CHANGE UP (ref 32–36)
MCV RBC AUTO: 98.6 FL — SIGNIFICANT CHANGE UP (ref 80–100)
MONOCYTES # BLD AUTO: 1.52 K/UL — HIGH (ref 0–0.9)
MONOCYTES NFR BLD AUTO: 17.1 % — HIGH (ref 2–14)
NEUTROPHILS # BLD AUTO: 6.38 K/UL — SIGNIFICANT CHANGE UP (ref 1.8–7.4)
NEUTROPHILS NFR BLD AUTO: 71.9 % — SIGNIFICANT CHANGE UP (ref 43–77)
NRBC BLD AUTO-RTO: 0 /100 WBCS — SIGNIFICANT CHANGE UP (ref 0–0)
PLATELET # BLD AUTO: 178 K/UL — SIGNIFICANT CHANGE UP (ref 150–400)
POTASSIUM SERPL-MCNC: 3.6 MMOL/L — SIGNIFICANT CHANGE UP (ref 3.5–5.3)
POTASSIUM SERPL-SCNC: 3.6 MMOL/L — SIGNIFICANT CHANGE UP (ref 3.5–5.3)
PROT SERPL-MCNC: 4.7 G/DL — LOW (ref 6–8.3)
RBC # BLD: 2.84 M/UL — LOW (ref 3.8–5.2)
RBC # FLD: 20.3 % — HIGH (ref 10.3–14.5)
SODIUM SERPL-SCNC: 136 MMOL/L — SIGNIFICANT CHANGE UP (ref 135–145)
WBC # BLD: 8.87 K/UL — SIGNIFICANT CHANGE UP (ref 3.8–10.5)
WBC # FLD AUTO: 8.87 K/UL — SIGNIFICANT CHANGE UP (ref 3.8–10.5)

## 2025-07-28 PROCEDURE — 99214 OFFICE O/P EST MOD 30 MIN: CPT

## 2025-07-29 ENCOUNTER — APPOINTMENT (OUTPATIENT)
Dept: INFUSION THERAPY | Facility: HOSPITAL | Age: 85
End: 2025-07-29

## 2025-07-29 ENCOUNTER — RX RENEWAL (OUTPATIENT)
Age: 85
End: 2025-07-29

## 2025-07-29 ENCOUNTER — APPOINTMENT (OUTPATIENT)
Dept: HEMATOLOGY ONCOLOGY | Facility: CLINIC | Age: 85
End: 2025-07-29

## 2025-07-29 DIAGNOSIS — E86.0 DEHYDRATION: ICD-10-CM

## 2025-07-29 RX ORDER — PANTOPRAZOLE 40 MG/1
40 TABLET, DELAYED RELEASE ORAL DAILY
Qty: 90 | Refills: 3 | Status: ACTIVE | COMMUNITY
Start: 2025-07-29 | End: 1900-01-01

## 2025-08-06 ENCOUNTER — APPOINTMENT (OUTPATIENT)
Dept: INFUSION THERAPY | Facility: HOSPITAL | Age: 85
End: 2025-08-06

## 2025-08-06 ENCOUNTER — APPOINTMENT (OUTPATIENT)
Dept: HEMATOLOGY ONCOLOGY | Facility: CLINIC | Age: 85
End: 2025-08-06

## 2025-08-07 ENCOUNTER — APPOINTMENT (OUTPATIENT)
Dept: HEMATOLOGY ONCOLOGY | Facility: CLINIC | Age: 85
End: 2025-08-07

## 2025-08-07 ENCOUNTER — APPOINTMENT (OUTPATIENT)
Dept: INFUSION THERAPY | Facility: HOSPITAL | Age: 85
End: 2025-08-07

## 2025-08-08 ENCOUNTER — APPOINTMENT (OUTPATIENT)
Dept: INFUSION THERAPY | Facility: HOSPITAL | Age: 85
End: 2025-08-08

## 2025-08-11 ENCOUNTER — NON-APPOINTMENT (OUTPATIENT)
Age: 85
End: 2025-08-11

## 2025-08-12 ENCOUNTER — RESULT REVIEW (OUTPATIENT)
Age: 85
End: 2025-08-12

## 2025-08-12 ENCOUNTER — APPOINTMENT (OUTPATIENT)
Dept: INFUSION THERAPY | Facility: HOSPITAL | Age: 85
End: 2025-08-12

## 2025-08-12 ENCOUNTER — APPOINTMENT (OUTPATIENT)
Dept: HEMATOLOGY ONCOLOGY | Facility: CLINIC | Age: 85
End: 2025-08-12
Payer: MEDICARE

## 2025-08-12 DIAGNOSIS — C78.7 SECONDARY MALIGNANT NEOPLASM OF LIVER AND INTRAHEPATIC BILE DUCT: ICD-10-CM

## 2025-08-12 DIAGNOSIS — C34.91 MALIGNANT NEOPLASM OF UNSPECIFIED PART OF RIGHT BRONCHUS OR LUNG: ICD-10-CM

## 2025-08-12 DIAGNOSIS — C77.1 SECONDARY AND UNSPECIFIED MALIGNANT NEOPLASM OF INTRATHORACIC LYMPH NODES: ICD-10-CM

## 2025-08-12 DIAGNOSIS — C79.31 SECONDARY MALIGNANT NEOPLASM OF BRAIN: ICD-10-CM

## 2025-08-12 DIAGNOSIS — R05.9 COUGH, UNSPECIFIED: ICD-10-CM

## 2025-08-12 DIAGNOSIS — E87.6 HYPOKALEMIA: ICD-10-CM

## 2025-08-12 DIAGNOSIS — C79.51 SECONDARY MALIGNANT NEOPLASM OF BONE: ICD-10-CM

## 2025-08-12 LAB
ALBUMIN SERPL ELPH-MCNC: 3.2 G/DL — LOW (ref 3.3–5)
ALP SERPL-CCNC: 91 U/L — SIGNIFICANT CHANGE UP (ref 40–120)
ALT FLD-CCNC: 8 U/L — LOW (ref 10–45)
ANION GAP SERPL CALC-SCNC: 15 MMOL/L — SIGNIFICANT CHANGE UP (ref 5–17)
AST SERPL-CCNC: 19 U/L — SIGNIFICANT CHANGE UP (ref 10–40)
BASOPHILS # BLD AUTO: 0.04 K/UL — SIGNIFICANT CHANGE UP (ref 0–0.2)
BASOPHILS NFR BLD AUTO: 0.6 % — SIGNIFICANT CHANGE UP (ref 0–2)
BILIRUB SERPL-MCNC: 1 MG/DL — SIGNIFICANT CHANGE UP (ref 0.2–1.2)
BUN SERPL-MCNC: 17 MG/DL — SIGNIFICANT CHANGE UP (ref 7–23)
CALCIUM SERPL-MCNC: 9.1 MG/DL — SIGNIFICANT CHANGE UP (ref 8.4–10.5)
CHLORIDE SERPL-SCNC: 97 MMOL/L — SIGNIFICANT CHANGE UP (ref 96–108)
CO2 SERPL-SCNC: 25 MMOL/L — SIGNIFICANT CHANGE UP (ref 22–31)
CREAT SERPL-MCNC: 0.52 MG/DL — SIGNIFICANT CHANGE UP (ref 0.5–1.3)
EGFR: 92 ML/MIN/1.73M2 — SIGNIFICANT CHANGE UP
EGFR: 92 ML/MIN/1.73M2 — SIGNIFICANT CHANGE UP
EOSINOPHIL # BLD AUTO: 0.45 K/UL — SIGNIFICANT CHANGE UP (ref 0–0.5)
EOSINOPHIL NFR BLD AUTO: 6.2 % — HIGH (ref 0–6)
GLUCOSE SERPL-MCNC: 119 MG/DL — HIGH (ref 70–99)
HCT VFR BLD CALC: 25.8 % — LOW (ref 34.5–45)
HGB BLD-MCNC: 8.5 G/DL — LOW (ref 11.5–15.5)
IMM GRANULOCYTES NFR BLD AUTO: 1 % — HIGH (ref 0–0.9)
LDH SERPL L TO P-CCNC: 290 U/L — HIGH (ref 50–242)
LYMPHOCYTES # BLD AUTO: 0.27 K/UL — LOW (ref 1–3.3)
LYMPHOCYTES # BLD AUTO: 3.7 % — LOW (ref 13–44)
MCHC RBC-ENTMCNC: 32.9 G/DL — SIGNIFICANT CHANGE UP (ref 32–36)
MCHC RBC-ENTMCNC: 32.9 PG — SIGNIFICANT CHANGE UP (ref 27–34)
MCV RBC AUTO: 100 FL — SIGNIFICANT CHANGE UP (ref 80–100)
MONOCYTES # BLD AUTO: 0.6 K/UL — SIGNIFICANT CHANGE UP (ref 0–0.9)
MONOCYTES NFR BLD AUTO: 8.3 % — SIGNIFICANT CHANGE UP (ref 2–14)
NEUTROPHILS # BLD AUTO: 5.81 K/UL — SIGNIFICANT CHANGE UP (ref 1.8–7.4)
NEUTROPHILS NFR BLD AUTO: 80.2 % — HIGH (ref 43–77)
NRBC BLD AUTO-RTO: 0 /100 WBCS — SIGNIFICANT CHANGE UP (ref 0–0)
PLATELET # BLD AUTO: 190 K/UL — SIGNIFICANT CHANGE UP (ref 150–400)
POTASSIUM SERPL-MCNC: 3 MMOL/L — LOW (ref 3.5–5.3)
POTASSIUM SERPL-SCNC: 3 MMOL/L — LOW (ref 3.5–5.3)
PROT SERPL-MCNC: 5.6 G/DL — LOW (ref 6–8.3)
RBC # BLD: 2.58 M/UL — LOW (ref 3.8–5.2)
RBC # FLD: 20.3 % — HIGH (ref 10.3–14.5)
SODIUM SERPL-SCNC: 138 MMOL/L — SIGNIFICANT CHANGE UP (ref 135–145)
WBC # BLD: 7.24 K/UL — SIGNIFICANT CHANGE UP (ref 3.8–10.5)
WBC # FLD AUTO: 7.24 K/UL — SIGNIFICANT CHANGE UP (ref 3.8–10.5)

## 2025-08-12 PROCEDURE — 99214 OFFICE O/P EST MOD 30 MIN: CPT

## 2025-08-13 PROBLEM — E87.6 HYPOKALEMIA: Status: ACTIVE | Noted: 2025-06-19

## 2025-08-13 RX ORDER — POTASSIUM CHLORIDE 1500 MG/1
20 TABLET, FILM COATED, EXTENDED RELEASE ORAL DAILY
Qty: 30 | Refills: 0 | Status: ACTIVE | COMMUNITY
Start: 2025-08-13 | End: 1900-01-01

## 2025-08-22 ENCOUNTER — NON-APPOINTMENT (OUTPATIENT)
Age: 85
End: 2025-08-22

## 2025-08-22 ENCOUNTER — APPOINTMENT (OUTPATIENT)
Dept: GERIATRICS | Facility: CLINIC | Age: 85
End: 2025-08-22
Payer: MEDICARE

## 2025-08-22 DIAGNOSIS — C34.91 MALIGNANT NEOPLASM OF UNSPECIFIED PART OF RIGHT BRONCHUS OR LUNG: ICD-10-CM

## 2025-08-22 DIAGNOSIS — R41.82 ALTERED MENTAL STATUS, UNSPECIFIED: ICD-10-CM

## 2025-08-22 PROCEDURE — 99213 OFFICE O/P EST LOW 20 MIN: CPT | Mod: 2W

## 2025-08-26 ENCOUNTER — RESULT REVIEW (OUTPATIENT)
Age: 85
End: 2025-08-26

## 2025-08-26 ENCOUNTER — NON-APPOINTMENT (OUTPATIENT)
Age: 85
End: 2025-08-26

## 2025-08-26 ENCOUNTER — APPOINTMENT (OUTPATIENT)
Dept: HEMATOLOGY ONCOLOGY | Facility: CLINIC | Age: 85
End: 2025-08-26
Payer: MEDICARE

## 2025-08-26 ENCOUNTER — APPOINTMENT (OUTPATIENT)
Dept: INFUSION THERAPY | Facility: HOSPITAL | Age: 85
End: 2025-08-26

## 2025-08-26 VITALS
RESPIRATION RATE: 17 BRPM | SYSTOLIC BLOOD PRESSURE: 104 MMHG | DIASTOLIC BLOOD PRESSURE: 71 MMHG | OXYGEN SATURATION: 92 % | TEMPERATURE: 96 F | BODY MASS INDEX: 19.49 KG/M2 | HEART RATE: 91 BPM | WEIGHT: 113.54 LBS

## 2025-08-26 DIAGNOSIS — C79.31 SECONDARY MALIGNANT NEOPLASM OF BRAIN: ICD-10-CM

## 2025-08-26 DIAGNOSIS — R41.0 DISORIENTATION, UNSPECIFIED: ICD-10-CM

## 2025-08-26 DIAGNOSIS — E87.6 HYPOKALEMIA: ICD-10-CM

## 2025-08-26 DIAGNOSIS — C78.7 SECONDARY MALIGNANT NEOPLASM OF LIVER AND INTRAHEPATIC BILE DUCT: ICD-10-CM

## 2025-08-26 DIAGNOSIS — C77.1 SECONDARY AND UNSPECIFIED MALIGNANT NEOPLASM OF INTRATHORACIC LYMPH NODES: ICD-10-CM

## 2025-08-26 DIAGNOSIS — C79.51 SECONDARY MALIGNANT NEOPLASM OF BONE: ICD-10-CM

## 2025-08-26 PROCEDURE — G2211 COMPLEX E/M VISIT ADD ON: CPT

## 2025-08-26 PROCEDURE — 99214 OFFICE O/P EST MOD 30 MIN: CPT

## 2025-08-27 ENCOUNTER — APPOINTMENT (OUTPATIENT)
Dept: INFUSION THERAPY | Facility: HOSPITAL | Age: 85
End: 2025-08-27

## 2025-08-27 ENCOUNTER — APPOINTMENT (OUTPATIENT)
Dept: HEMATOLOGY ONCOLOGY | Facility: CLINIC | Age: 85
End: 2025-08-27

## 2025-08-27 LAB
APPEARANCE: CLEAR
BACTERIA: NEGATIVE /HPF
BILIRUBIN URINE: ABNORMAL
BLOOD URINE: NEGATIVE
CAST: 0 /LPF
COLOR: NORMAL
EPITHELIAL CELLS: 4 /HPF
GLUCOSE QUALITATIVE U: NEGATIVE MG/DL
KETONES URINE: ABNORMAL MG/DL
LEUKOCYTE ESTERASE URINE: ABNORMAL
MICROSCOPIC-UA: NORMAL
NITRITE URINE: NEGATIVE
PH URINE: 6
PROTEIN URINE: NORMAL MG/DL
RED BLOOD CELLS URINE: 1 /HPF
SPECIFIC GRAVITY URINE: 1.02
UROBILINOGEN URINE: 2 MG/DL
WHITE BLOOD CELLS URINE: 1 /HPF

## 2025-08-28 ENCOUNTER — NON-APPOINTMENT (OUTPATIENT)
Age: 85
End: 2025-08-28

## 2025-08-28 ENCOUNTER — APPOINTMENT (OUTPATIENT)
Dept: HEMATOLOGY ONCOLOGY | Facility: CLINIC | Age: 85
End: 2025-08-28

## 2025-08-28 ENCOUNTER — APPOINTMENT (OUTPATIENT)
Dept: INFUSION THERAPY | Facility: HOSPITAL | Age: 85
End: 2025-08-28

## 2025-08-28 ENCOUNTER — APPOINTMENT (OUTPATIENT)
Dept: CT IMAGING | Facility: IMAGING CENTER | Age: 85
End: 2025-08-28

## 2025-08-29 ENCOUNTER — OUTPATIENT (OUTPATIENT)
Dept: OUTPATIENT SERVICES | Facility: HOSPITAL | Age: 85
LOS: 1 days | End: 2025-08-29
Payer: MEDICARE

## 2025-08-29 ENCOUNTER — APPOINTMENT (OUTPATIENT)
Dept: CT IMAGING | Facility: HOSPITAL | Age: 85
End: 2025-08-29
Payer: MEDICARE

## 2025-08-29 ENCOUNTER — APPOINTMENT (OUTPATIENT)
Dept: MRI IMAGING | Facility: HOSPITAL | Age: 85
End: 2025-08-29
Payer: MEDICARE

## 2025-08-29 ENCOUNTER — APPOINTMENT (OUTPATIENT)
Dept: INFUSION THERAPY | Facility: HOSPITAL | Age: 85
End: 2025-08-29

## 2025-08-29 DIAGNOSIS — Z96.641 PRESENCE OF RIGHT ARTIFICIAL HIP JOINT: Chronic | ICD-10-CM

## 2025-08-29 DIAGNOSIS — Z96.642 PRESENCE OF LEFT ARTIFICIAL HIP JOINT: Chronic | ICD-10-CM

## 2025-08-29 DIAGNOSIS — Z98.49 CATARACT EXTRACTION STATUS, UNSPECIFIED EYE: Chronic | ICD-10-CM

## 2025-08-29 DIAGNOSIS — Z98.890 OTHER SPECIFIED POSTPROCEDURAL STATES: Chronic | ICD-10-CM

## 2025-08-29 DIAGNOSIS — C34.91 MALIGNANT NEOPLASM OF UNSPECIFIED PART OF RIGHT BRONCHUS OR LUNG: ICD-10-CM

## 2025-08-29 DIAGNOSIS — Z95.5 PRESENCE OF CORONARY ANGIOPLASTY IMPLANT AND GRAFT: Chronic | ICD-10-CM

## 2025-08-29 PROCEDURE — 70553 MRI BRAIN STEM W/O & W/DYE: CPT | Mod: 26

## 2025-08-29 PROCEDURE — 71260 CT THORAX DX C+: CPT | Mod: 26

## 2025-08-29 PROCEDURE — A9579: CPT

## 2025-08-29 PROCEDURE — 71260 CT THORAX DX C+: CPT

## 2025-08-29 PROCEDURE — 74177 CT ABD & PELVIS W/CONTRAST: CPT | Mod: 26

## 2025-08-29 PROCEDURE — 70553 MRI BRAIN STEM W/O & W/DYE: CPT

## 2025-08-29 PROCEDURE — 74177 CT ABD & PELVIS W/CONTRAST: CPT

## 2025-09-02 ENCOUNTER — RESULT REVIEW (OUTPATIENT)
Age: 85
End: 2025-09-02

## 2025-09-02 ENCOUNTER — APPOINTMENT (OUTPATIENT)
Dept: NEUROLOGY | Facility: CLINIC | Age: 85
End: 2025-09-02
Payer: MEDICARE

## 2025-09-02 ENCOUNTER — APPOINTMENT (OUTPATIENT)
Dept: INFUSION THERAPY | Facility: HOSPITAL | Age: 85
End: 2025-09-02

## 2025-09-02 VITALS
HEIGHT: 64 IN | TEMPERATURE: 97.7 F | OXYGEN SATURATION: 95 % | BODY MASS INDEX: 19.12 KG/M2 | RESPIRATION RATE: 16 BRPM | WEIGHT: 112 LBS

## 2025-09-02 VITALS — DIASTOLIC BLOOD PRESSURE: 68 MMHG | HEART RATE: 119 BPM | SYSTOLIC BLOOD PRESSURE: 96 MMHG

## 2025-09-02 PROCEDURE — 99205 OFFICE O/P NEW HI 60 MIN: CPT

## 2025-09-03 ENCOUNTER — NON-APPOINTMENT (OUTPATIENT)
Age: 85
End: 2025-09-03

## 2025-09-03 DIAGNOSIS — E83.42 HYPOMAGNESEMIA: ICD-10-CM

## 2025-09-03 DIAGNOSIS — N39.0 URINARY TRACT INFECTION, SITE NOT SPECIFIED: ICD-10-CM

## 2025-09-03 LAB — MAGNESIUM SERPL-MCNC: 1.4 MG/DL

## 2025-09-03 RX ORDER — MAGNESIUM 200 MG
200 TABLET ORAL DAILY
Qty: 15 | Refills: 0 | Status: ACTIVE | COMMUNITY
Start: 2025-09-03 | End: 1900-01-01

## 2025-09-03 RX ORDER — SULFAMETHOXAZOLE AND TRIMETHOPRIM 800; 160 MG/1; MG/1
800-160 TABLET ORAL TWICE DAILY
Qty: 6 | Refills: 0 | Status: ACTIVE | COMMUNITY
Start: 2025-09-03 | End: 1900-01-01

## 2025-09-08 ENCOUNTER — TRANSCRIPTION ENCOUNTER (OUTPATIENT)
Age: 85
End: 2025-09-08

## 2025-09-08 ENCOUNTER — NON-APPOINTMENT (OUTPATIENT)
Age: 85
End: 2025-09-08

## 2025-09-09 ENCOUNTER — APPOINTMENT (OUTPATIENT)
Dept: INFUSION THERAPY | Facility: HOSPITAL | Age: 85
End: 2025-09-09

## 2025-09-09 ENCOUNTER — APPOINTMENT (OUTPATIENT)
Dept: HEMATOLOGY ONCOLOGY | Facility: CLINIC | Age: 85
End: 2025-09-09

## (undated) DEVICE — SUT VICRYL 0 27" CT-1 UNDYED

## (undated) DEVICE — STAPLER ETHICON ECHELON FLEX 45MM X 340MM

## (undated) DEVICE — NDL HYPO REGULAR BEVEL 25G X 1.5" (BLUE)

## (undated) DEVICE — DRAPE 3/4 SHEET 52X76"

## (undated) DEVICE — SPECIMEN CONTAINER 100ML

## (undated) DEVICE — SYR LUER LOK 10CC

## (undated) DEVICE — DISSECTOR ENDOSCOPIC KITTNER SINGLE TIP

## (undated) DEVICE — DRAPE IOBAN 33" X 23"

## (undated) DEVICE — SUT SILK 0 18" TIES

## (undated) DEVICE — ELCTR BOVIE TIP BLADE INSULATED 2.75" EDGE

## (undated) DEVICE — ELCTR BOVIE TIP BLADE INSULATED 6.5" EDGE

## (undated) DEVICE — DRSG CURITY GAUZE SPONGE 4 X 4" 12-PLY

## (undated) DEVICE — TRAP SPECIMEN SPUTUM 40CC

## (undated) DEVICE — SYR SLIP 10CC

## (undated) DEVICE — CHEST DRAIN PLEUR-EVAC DRY/WET ADULT-PEDS SINGLE (QUICK)

## (undated) DEVICE — VENODYNE/SCD SLEEVE CALF MEDIUM

## (undated) DEVICE — POSITIONER STRAP ARMBOARD VELCRO TS-30

## (undated) DEVICE — CONNECTOR REDUCING STRAIGHT 3/8X0.25"

## (undated) DEVICE — PACK MAJOR ABDOMINAL W ENDO DRAPE

## (undated) DEVICE — SUT VICRYL 2-0 27" UR-6

## (undated) DEVICE — FOLEY TRAY 16FR 5CC LF UMETER CLOSED

## (undated) DEVICE — DRAPE MAGNETIC INSTRUMENT MEDIUM

## (undated) DEVICE — SUT VICRYL 3-0 27" SH UNDYED

## (undated) DEVICE — WARMING BLANKET LOWER ADULT

## (undated) DEVICE — SOL ANTI FOG

## (undated) DEVICE — DRAPE MAYO STAND 23"

## (undated) DEVICE — ELCTR GROUNDING PAD ADULT COVIDIEN

## (undated) DEVICE — DURABLE MEDICAL EQUIPMENT: Type: DURABLE MEDICAL EQUIPMENT

## (undated) DEVICE — VISITEC 4X4

## (undated) DEVICE — SUT MONOCRYL 4-0 27" PS-2 UNDYED

## (undated) DEVICE — DRSG BENZOIN 0.6CC

## (undated) DEVICE — DRSG STERISTRIPS 0.5 X 4"

## (undated) DEVICE — TUBING SUCTION NONCONDUCTIVE 6MM X 12FT

## (undated) DEVICE — DRSG TELFA 3 X 8

## (undated) DEVICE — SUT SILK 2-0 30" TIES

## (undated) DEVICE — TAPE SILK 3"

## (undated) DEVICE — VALVE BIOPSY BRONCHOVIDEOSCOPE

## (undated) DEVICE — DRSG TEGADERM 4X4.75"

## (undated) DEVICE — ELCTR EXTENSION STRAIGHT

## (undated) DEVICE — SOL IRR POUR NS 0.9% 500ML

## (undated) DEVICE — PROTECTOR HEEL / ELBOW FLUFFY

## (undated) DEVICE — VALVE SUCTION EVIS 160/200/240

## (undated) DEVICE — PREP CHLORAPREP HI-LITE ORANGE 26ML

## (undated) DEVICE — SUT PROLENE 0 30" CT-1